# Patient Record
Sex: FEMALE | Race: WHITE | NOT HISPANIC OR LATINO | Employment: UNEMPLOYED | URBAN - METROPOLITAN AREA
[De-identification: names, ages, dates, MRNs, and addresses within clinical notes are randomized per-mention and may not be internally consistent; named-entity substitution may affect disease eponyms.]

---

## 2017-01-26 ENCOUNTER — PRENATAL OFFICE VISIT (OUTPATIENT)
Dept: OBGYN | Facility: CLINIC | Age: 37
End: 2017-01-26
Payer: MEDICAID

## 2017-01-26 VITALS
HEIGHT: 61 IN | BODY MASS INDEX: 26.32 KG/M2 | WEIGHT: 139.4 LBS | SYSTOLIC BLOOD PRESSURE: 110 MMHG | DIASTOLIC BLOOD PRESSURE: 60 MMHG

## 2017-01-26 DIAGNOSIS — Z34.82 PRENATAL CARE, SUBSEQUENT PREGNANCY, SECOND TRIMESTER: Primary | ICD-10-CM

## 2017-01-26 PROCEDURE — 87653 STREP B DNA AMP PROBE: CPT | Performed by: FAMILY MEDICINE

## 2017-01-26 PROCEDURE — 99212 OFFICE O/P EST SF 10 MIN: CPT | Performed by: FAMILY MEDICINE

## 2017-01-26 NOTE — PATIENT INSTRUCTIONS
Return in 1 week.   Schedule ultrasound   Phone numbers [place]:  Day/ night [phone] ask for ob triage  Emergency:  Call labor and delivery:  [phone]    What should I call about??    Contraction every 5 minutes for 1 hour, bleeding, loss of fluid, headache that doesn't resolve with tylenol, and decreased fetal movement       [facility] Address   [address], [place] [address]  [phone]    Dr. Cindy Laurent, DO    OB/GYN   [facility]              Dr. Cindy Laurent, DO    Obstetrics and Gynecology  [facility] [place] [facility] [place]

## 2017-01-26 NOTE — MR AVS SNAPSHOT
After Visit Summary   1/26/2017    Rizwana Plummer    MRN: [id number]           Patient Information     Date Of Birth          1980        Visit Information        Provider Department      1/26/2017 1:45 PM Cindy Laurent, DO [facility]        Today's Diagnoses     Prenatal care, subsequent pregnancy, second trimester    -  1       Care Instructions    Return in 1 week.   Schedule ultrasound   Phone numbers [place]:  Day/ night [phone] ask for ob triage  Emergency:  Call labor and delivery:  [phone]    What should I call about??    Contraction every 5 minutes for 1 hour, bleeding, loss of fluid, headache that doesn't resolve with tylenol, and decreased fetal movement       [facility] Address   [address] [facility], [place] [address] [phone]    Dr. Cindy Laurent, DO    OB/GYN   [facility]              Dr. Cindy Laurent, DO    Obstetrics and Gynecology  [facility] [place]             Follow-ups after your visit        Future tests that were ordered for you today     Open Future Orders        Priority Expected Expires Ordered    US OB Limited One Or More Fetuses Routine  1/26/2018 1/26/2017            Who to contact     If you have questions or need follow up information about today's clinic visit or your schedule please contact [facility] directly at [phone].  Normal or non-critical lab and imaging results will be communicated to you by MyChart, letter or phone within 4 business days after the clinic has received the results. If you do not hear from us within 7 days, please contact the clinic through MyChart or phone. If you have a critical or abnormal lab result, we will notify you by phone as soon as possible.  Submit refill requests through [employer] or call your pharmacy and they will forward the refill request to us. Please allow 3 business days for your refill  "to be completed.          Additional Information About Your Visit        [employer] Information     [employer] gives you secure access to your electronic health record. If you see a primary care provider, you can also send messages to your care team and make appointments. If you have questions, please call your primary care clinic.  If you do not have a primary care provider, please call [phone] and they will assist you.        Care EveryWhere ID     This is your Care EveryWhere ID. This could be used by other organizations to access your [place] medical records  [id number]        Your Vitals Were     Height BMI (Body Mass Index) Last Period             5' 1\" (1.549 m) 26.35 kg/m2 05/29/2016          Blood Pressure from Last 3 Encounters:   01/26/17 110/60   12/23/16 102/50   11/18/16 94/50    Weight from Last 3 Encounters:   01/26/17 139 lb 6.4 oz (63.231 kg)   12/23/16 135 lb 3.2 oz (61.326 kg)   11/18/16 131 lb 3.2 oz (59.512 kg)              We Performed the Following     Strep, Group B by PCR        Primary Care Provider Office Phone # Fax #    Cindy Bajwa Mehran,  [phone]       [facility] [address] [place] [address]        Thank you!     Thank you for choosing [facility]  for your care. Our goal is always to provide you with excellent care. Hearing back from our patients is one way we can continue to improve our services. Please take a few minutes to complete the written survey that you may receive in the mail after your visit with us. Thank you!             Your Updated Medication List - Protect others around you: Learn how to safely use, store and throw away your medicines at www.disposemymeds.org.          This list is accurate as of: 1/26/17  2:28 PM.  Always use your most recent med list.                   Brand Name Dispense Instructions for use    Abdominal Binder/Elastic 3XL Misc     1 each    1 Device daily       diphenhydrAMINE 25 MG " tablet    BENADRYL    60 tablet    Take 1-2 tablets (25-50 mg) by mouth every 6 hours as needed for sleep       doxylamine 25 MG Tabs tablet    UNISOM    14 each    Take 1 tablet (25 mg) by mouth At Bedtime       * ferrous sulfate 325 (65 FE) MG tablet    IRON    30 tablet    Take 1 tablet (325 mg) by mouth daily (with breakfast)       * ferrous sulfate 325 (65 FE) MG tablet    IRON    60 tablet    Take 1 tablet (325 mg) by mouth 2 times daily       prenatal multivitamin  plus iron 27-0.8 MG Tabs per tablet      Take 1 tablet by mouth daily.       * Notice:  This list has 2 medication(s) that are the same as other medications prescribed for you. Read the directions carefully, and ask your doctor or other care provider to review them with you.

## 2017-01-26 NOTE — PROGRESS NOTES
"CC: Here for routine prenatal visit @ 35w5d   HPI: Doing well, no concerns at this time.  /60 mmHg  Ht 1.549 m (5' 1\")  Wt 63.231 kg (139 lb 6.4 oz)  BMI 26.35 kg/m2  LMP 2016   See OB flowsheet    No vaginal bleeding, no LOF, some contractions contractions     This document serves as a record of the services and decisions personally performed and made by Cindy Laurent DO. It was created on his/her behalf by Solange Jimenez, a trained medical scribe. The creation of this document is based the provider's statements to the medical scribe.  Dorothy Jimenez 2:22 PM, 2017      ASSESSMENT/PLAN:  Rizwana Plummer is a 36 year old year old    @ 35w5d wks EGA with EDC 2017 who presents to the clinic for an ob visit.     1) I Hx of CS, would like ,  paperwork given at last visit, signed 16  2)  tdap and flu Injections done  3) insomnia:  On unisom, will increase to 1.5 tablets   4) Return in 1 week.  5) GBS and bedside US done today   6) Girl  7) Schedule growth US and compare to previous pregnancy    The information in this document, created by the medical scribe for me, accurately reflects the services I personally performed and the decisions made by me. I have reviewed and approved this document for accuracy prior to leaving the patient care area.  Cindy Laurent DO  2:22 PM, 2017      Dr. Cindy Laurent DO   OB/GYN   [facility]      "

## 2017-01-26 NOTE — NURSING NOTE
"35w5d    Chief Complaint   Patient presents with     Prenatal Care       Initial /60 mmHg  Ht 5' 1\" (1.549 m)  Wt 139 lb 6.4 oz (63.231 kg)  BMI 26.35 kg/m2  LMP 05/29/2016 Estimated body mass index is 26.35 kg/(m^2) as calculated from the following:    Height as of this encounter: 5' 1\" (1.549 m).    Weight as of this encounter: 139 lb 6.4 oz (63.231 kg).  BP completed using cuff size: rogers Benson CMA      "

## 2017-01-27 LAB
GP B STREP DNA SPEC QL NAA+PROBE: NORMAL
SPECIMEN SOURCE: NORMAL

## 2017-02-01 ENCOUNTER — HOSPITAL ENCOUNTER (INPATIENT)
Facility: CLINIC | Age: 37
LOS: 1 days | Discharge: HOME | End: 2017-02-02
Attending: EMERGENCY MEDICINE | Admitting: OBSTETRICS & GYNECOLOGY
Payer: MEDICAID

## 2017-02-01 ENCOUNTER — HOSPITAL ENCOUNTER (OUTPATIENT)
Facility: CLINIC | Age: 37
Discharge: STILL ADMITTED OR ADMINISTRATIVE | End: 2017-02-01
Attending: OBSTETRICS & GYNECOLOGY | Admitting: OBSTETRICS & GYNECOLOGY
Payer: MEDICAID

## 2017-02-01 VITALS
TEMPERATURE: 99 F | SYSTOLIC BLOOD PRESSURE: 90 MMHG | WEIGHT: 139 LBS | DIASTOLIC BLOOD PRESSURE: 55 MMHG | HEART RATE: 120 BPM | BODY MASS INDEX: 27.29 KG/M2 | RESPIRATION RATE: 20 BRPM | HEIGHT: 60 IN

## 2017-02-01 DIAGNOSIS — J10.1 INFLUENZA A: ICD-10-CM

## 2017-02-01 DIAGNOSIS — J02.0 ACUTE STREPTOCOCCAL PHARYNGITIS: ICD-10-CM

## 2017-02-01 DIAGNOSIS — R00.0 TACHYCARDIA: ICD-10-CM

## 2017-02-01 DIAGNOSIS — I95.9 HYPOTENSION, UNSPECIFIED HYPOTENSION TYPE: ICD-10-CM

## 2017-02-01 LAB
ALBUMIN SERPL-MCNC: 2.2 G/DL (ref 3.4–5)
ALBUMIN UR-MCNC: 30 MG/DL
ALP SERPL-CCNC: 98 U/L (ref 40–150)
ALT SERPL W P-5'-P-CCNC: 18 U/L (ref 0–50)
ANION GAP SERPL CALCULATED.3IONS-SCNC: 13 MMOL/L (ref 3–14)
APPEARANCE UR: CLEAR
AST SERPL W P-5'-P-CCNC: 35 U/L (ref 0–45)
BACTERIA #/AREA URNS HPF: ABNORMAL /HPF
BASOPHILS # BLD AUTO: 0 10E9/L (ref 0–0.2)
BASOPHILS NFR BLD AUTO: 0.1 %
BILIRUB DIRECT SERPL-MCNC: 0.1 MG/DL (ref 0–0.2)
BILIRUB SERPL-MCNC: 0.7 MG/DL (ref 0.2–1.3)
BILIRUB UR QL STRIP: NEGATIVE
BUN SERPL-MCNC: 6 MG/DL (ref 7–30)
CALCIUM SERPL-MCNC: 7.7 MG/DL (ref 8.5–10.1)
CHLORIDE SERPL-SCNC: 109 MMOL/L (ref 94–109)
CO2 SERPL-SCNC: 17 MMOL/L (ref 20–32)
COLOR UR AUTO: YELLOW
CREAT SERPL-MCNC: 0.52 MG/DL (ref 0.52–1.04)
DEPRECATED S PYO AG THROAT QL EIA: ABNORMAL
DEPRECATED S PYO AG THROAT QL EIA: NORMAL
DIFFERENTIAL METHOD BLD: ABNORMAL
EOSINOPHIL # BLD AUTO: 0.1 10E9/L (ref 0–0.7)
EOSINOPHIL NFR BLD AUTO: 0.5 %
ERYTHROCYTE [DISTWIDTH] IN BLOOD BY AUTOMATED COUNT: 13.3 % (ref 10–15)
FLUAV+FLUBV AG SPEC QL: ABNORMAL
FLUAV+FLUBV AG SPEC QL: POSITIVE
GFR SERPL CREATININE-BSD FRML MDRD: ABNORMAL ML/MIN/1.7M2
GLUCOSE SERPL-MCNC: 75 MG/DL (ref 70–99)
GLUCOSE UR STRIP-MCNC: NEGATIVE MG/DL
HCT VFR BLD AUTO: 29.2 % (ref 35–47)
HGB BLD-MCNC: 9.9 G/DL (ref 11.7–15.7)
HGB UR QL STRIP: NEGATIVE
IMM GRANULOCYTES # BLD: 0.3 10E9/L (ref 0–0.4)
IMM GRANULOCYTES NFR BLD: 2.6 %
KETONES UR STRIP-MCNC: >150 MG/DL
LACTATE BLD-SCNC: 1.3 MMOL/L (ref 0.7–2.1)
LEUKOCYTE ESTERASE UR QL STRIP: NEGATIVE
LYMPHOCYTES # BLD AUTO: 0.3 10E9/L (ref 0.8–5.3)
LYMPHOCYTES NFR BLD AUTO: 3.2 %
MCH RBC QN AUTO: 30.8 PG (ref 26.5–33)
MCHC RBC AUTO-ENTMCNC: 33.9 G/DL (ref 31.5–36.5)
MCV RBC AUTO: 91 FL (ref 78–100)
MICRO REPORT STATUS: ABNORMAL
MICRO REPORT STATUS: NORMAL
MONOCYTES # BLD AUTO: 0.8 10E9/L (ref 0–1.3)
MONOCYTES NFR BLD AUTO: 7.9 %
MUCOUS THREADS #/AREA URNS LPF: PRESENT /LPF
NEUTROPHILS # BLD AUTO: 8.1 10E9/L (ref 1.6–8.3)
NEUTROPHILS NFR BLD AUTO: 85.7 %
NITRATE UR QL: NEGATIVE
NRBC # BLD AUTO: 0 10*3/UL
NRBC BLD AUTO-RTO: 0 /100
PH UR STRIP: 6 PH (ref 5–7)
PLATELET # BLD AUTO: 143 10E9/L (ref 150–450)
POTASSIUM SERPL-SCNC: 3.5 MMOL/L (ref 3.4–5.3)
PROT SERPL-MCNC: 5.4 G/DL (ref 6.8–8.8)
RBC # BLD AUTO: 3.21 10E12/L (ref 3.8–5.2)
RBC #/AREA URNS AUTO: 1 /HPF (ref 0–2)
SODIUM SERPL-SCNC: 139 MMOL/L (ref 133–144)
SP GR UR STRIP: 1.02 (ref 1–1.03)
SPECIMEN SOURCE: ABNORMAL
SPECIMEN SOURCE: ABNORMAL
SPECIMEN SOURCE: NORMAL
SQUAMOUS #/AREA URNS AUTO: 1 /HPF (ref 0–1)
URN SPEC COLLECT METH UR: ABNORMAL
UROBILINOGEN UR STRIP-MCNC: 4 MG/DL (ref 0–2)
WBC # BLD AUTO: 9.5 10E9/L (ref 4–11)
WBC #/AREA URNS AUTO: 1 /HPF (ref 0–2)

## 2017-02-01 PROCEDURE — 80048 BASIC METABOLIC PNL TOTAL CA: CPT | Performed by: EMERGENCY MEDICINE

## 2017-02-01 PROCEDURE — 36415 COLL VENOUS BLD VENIPUNCTURE: CPT | Performed by: EMERGENCY MEDICINE

## 2017-02-01 PROCEDURE — 20000003 ZZH R&B ICU

## 2017-02-01 PROCEDURE — 93005 ELECTROCARDIOGRAM TRACING: CPT

## 2017-02-01 PROCEDURE — 96361 HYDRATE IV INFUSION ADD-ON: CPT

## 2017-02-01 PROCEDURE — 87640 STAPH A DNA AMP PROBE: CPT | Performed by: INTERNAL MEDICINE

## 2017-02-01 PROCEDURE — 99222 1ST HOSP IP/OBS MODERATE 55: CPT | Performed by: HOSPITALIST

## 2017-02-01 PROCEDURE — 87880 STREP A ASSAY W/OPTIC: CPT | Performed by: EMERGENCY MEDICINE

## 2017-02-01 PROCEDURE — 25000128 H RX IP 250 OP 636: Performed by: EMERGENCY MEDICINE

## 2017-02-01 PROCEDURE — 87641 MR-STAPH DNA AMP PROBE: CPT | Performed by: INTERNAL MEDICINE

## 2017-02-01 PROCEDURE — 96372 THER/PROPH/DIAG INJ SC/IM: CPT

## 2017-02-01 PROCEDURE — 80076 HEPATIC FUNCTION PANEL: CPT | Performed by: EMERGENCY MEDICINE

## 2017-02-01 PROCEDURE — 87804 INFLUENZA ASSAY W/OPTIC: CPT | Performed by: EMERGENCY MEDICINE

## 2017-02-01 PROCEDURE — 83605 ASSAY OF LACTIC ACID: CPT | Performed by: EMERGENCY MEDICINE

## 2017-02-01 PROCEDURE — 25000132 ZZH RX MED GY IP 250 OP 250 PS 637: Performed by: EMERGENCY MEDICINE

## 2017-02-01 PROCEDURE — 25000132 ZZH RX MED GY IP 250 OP 250 PS 637: Performed by: HOSPITALIST

## 2017-02-01 PROCEDURE — 25000128 H RX IP 250 OP 636: Performed by: HOSPITALIST

## 2017-02-01 PROCEDURE — 99285 EMERGENCY DEPT VISIT HI MDM: CPT | Mod: 25

## 2017-02-01 PROCEDURE — 85025 COMPLETE CBC W/AUTO DIFF WBC: CPT | Performed by: EMERGENCY MEDICINE

## 2017-02-01 PROCEDURE — 25000125 ZZHC RX 250: Performed by: EMERGENCY MEDICINE

## 2017-02-01 PROCEDURE — 96374 THER/PROPH/DIAG INJ IV PUSH: CPT

## 2017-02-01 PROCEDURE — 25000125 ZZHC RX 250: Performed by: HOSPITALIST

## 2017-02-01 PROCEDURE — 81001 URINALYSIS AUTO W/SCOPE: CPT | Performed by: EMERGENCY MEDICINE

## 2017-02-01 PROCEDURE — 87086 URINE CULTURE/COLONY COUNT: CPT | Performed by: EMERGENCY MEDICINE

## 2017-02-01 RX ORDER — ONDANSETRON 2 MG/ML
4 INJECTION INTRAMUSCULAR; INTRAVENOUS EVERY 6 HOURS PRN
Status: DISCONTINUED | OUTPATIENT
Start: 2017-02-01 | End: 2017-02-01 | Stop reason: HOSPADM

## 2017-02-01 RX ORDER — AMOXICILLIN 250 MG
1-2 CAPSULE ORAL 2 TIMES DAILY PRN
Status: DISCONTINUED | OUTPATIENT
Start: 2017-02-01 | End: 2017-02-02 | Stop reason: HOSPADM

## 2017-02-01 RX ORDER — NALOXONE HYDROCHLORIDE 0.4 MG/ML
.1-.4 INJECTION, SOLUTION INTRAMUSCULAR; INTRAVENOUS; SUBCUTANEOUS
Status: DISCONTINUED | OUTPATIENT
Start: 2017-02-01 | End: 2017-02-02 | Stop reason: HOSPADM

## 2017-02-01 RX ORDER — CEFTRIAXONE 1 G/1
1 INJECTION, POWDER, FOR SOLUTION INTRAMUSCULAR; INTRAVENOUS EVERY 24 HOURS
Status: DISCONTINUED | OUTPATIENT
Start: 2017-02-01 | End: 2017-02-02 | Stop reason: HOSPADM

## 2017-02-01 RX ORDER — SODIUM CHLORIDE, SODIUM LACTATE, POTASSIUM CHLORIDE, CALCIUM CHLORIDE 600; 310; 30; 20 MG/100ML; MG/100ML; MG/100ML; MG/100ML
INJECTION, SOLUTION INTRAVENOUS CONTINUOUS
Status: DISCONTINUED | OUTPATIENT
Start: 2017-02-01 | End: 2017-02-01 | Stop reason: HOSPADM

## 2017-02-01 RX ORDER — ONDANSETRON 2 MG/ML
4 INJECTION INTRAMUSCULAR; INTRAVENOUS EVERY 6 HOURS PRN
Status: DISCONTINUED | OUTPATIENT
Start: 2017-02-01 | End: 2017-02-02 | Stop reason: HOSPADM

## 2017-02-01 RX ORDER — ACETAMINOPHEN 325 MG/1
650 TABLET ORAL ONCE
Status: COMPLETED | OUTPATIENT
Start: 2017-02-01 | End: 2017-02-01

## 2017-02-01 RX ORDER — ACETAMINOPHEN 650 MG/1
650 SUPPOSITORY RECTAL EVERY 4 HOURS PRN
Status: DISCONTINUED | OUTPATIENT
Start: 2017-02-01 | End: 2017-02-02 | Stop reason: HOSPADM

## 2017-02-01 RX ORDER — ACETAMINOPHEN 325 MG/1
650-975 TABLET ORAL EVERY 4 HOURS PRN
Status: DISCONTINUED | OUTPATIENT
Start: 2017-02-01 | End: 2017-02-01 | Stop reason: HOSPADM

## 2017-02-01 RX ORDER — OSELTAMIVIR PHOSPHATE 75 MG/1
75 CAPSULE ORAL ONCE
Status: COMPLETED | OUTPATIENT
Start: 2017-02-01 | End: 2017-02-01

## 2017-02-01 RX ORDER — CEFTRIAXONE 1 G/1
1 INJECTION, POWDER, FOR SOLUTION INTRAMUSCULAR; INTRAVENOUS EVERY 24 HOURS
Status: CANCELLED | OUTPATIENT
Start: 2017-02-01 | End: 2017-02-04

## 2017-02-01 RX ORDER — SODIUM CHLORIDE 9 MG/ML
1000 INJECTION, SOLUTION INTRAVENOUS CONTINUOUS
Status: DISCONTINUED | OUTPATIENT
Start: 2017-02-01 | End: 2017-02-01

## 2017-02-01 RX ORDER — DEXAMETHASONE SODIUM PHOSPHATE 10 MG/ML
10 INJECTION, SOLUTION INTRAMUSCULAR; INTRAVENOUS ONCE
Status: COMPLETED | OUTPATIENT
Start: 2017-02-01 | End: 2017-02-01

## 2017-02-01 RX ORDER — OSELTAMIVIR PHOSPHATE 75 MG/1
75 CAPSULE ORAL 2 TIMES DAILY
Status: DISCONTINUED | OUTPATIENT
Start: 2017-02-01 | End: 2017-02-02 | Stop reason: HOSPADM

## 2017-02-01 RX ORDER — TERBUTALINE SULFATE 1 MG/ML
0.25 INJECTION, SOLUTION SUBCUTANEOUS ONCE
Status: COMPLETED | OUTPATIENT
Start: 2017-02-01 | End: 2017-02-01

## 2017-02-01 RX ORDER — ONDANSETRON 4 MG/1
4 TABLET, ORALLY DISINTEGRATING ORAL EVERY 6 HOURS PRN
Status: DISCONTINUED | OUTPATIENT
Start: 2017-02-01 | End: 2017-02-02 | Stop reason: HOSPADM

## 2017-02-01 RX ORDER — LANOLIN ALCOHOL/MO/W.PET/CERES
3 CREAM (GRAM) TOPICAL AT BEDTIME
Status: DISCONTINUED | OUTPATIENT
Start: 2017-02-01 | End: 2017-02-02 | Stop reason: HOSPADM

## 2017-02-01 RX ORDER — ACETAMINOPHEN 325 MG/1
650 TABLET ORAL EVERY 4 HOURS PRN
Status: DISCONTINUED | OUTPATIENT
Start: 2017-02-01 | End: 2017-02-02 | Stop reason: HOSPADM

## 2017-02-01 RX ADMIN — SODIUM CHLORIDE, POTASSIUM CHLORIDE, SODIUM LACTATE AND CALCIUM CHLORIDE: 600; 310; 30; 20 INJECTION, SOLUTION INTRAVENOUS at 08:56

## 2017-02-01 RX ADMIN — DEXTROSE AND SODIUM CHLORIDE: 5; 900 INJECTION, SOLUTION INTRAVENOUS at 21:23

## 2017-02-01 RX ADMIN — OSELTAMIVIR PHOSPHATE 75 MG: 75 CAPSULE ORAL at 16:58

## 2017-02-01 RX ADMIN — SODIUM CHLORIDE 1000 ML: 9 INJECTION, SOLUTION INTRAVENOUS at 10:57

## 2017-02-01 RX ADMIN — SODIUM CHLORIDE 1000 ML: 9 INJECTION, SOLUTION INTRAVENOUS at 15:15

## 2017-02-01 RX ADMIN — TERBUTALINE SULFATE 0.25 MG: 1 INJECTION, SOLUTION SUBCUTANEOUS at 16:24

## 2017-02-01 RX ADMIN — ACETAMINOPHEN 650 MG: 325 TABLET, FILM COATED ORAL at 13:21

## 2017-02-01 RX ADMIN — SODIUM CHLORIDE 1000 ML: 9 INJECTION, SOLUTION INTRAVENOUS at 13:21

## 2017-02-01 RX ADMIN — SODIUM CHLORIDE 1000 ML: 9 INJECTION, SOLUTION INTRAVENOUS at 12:01

## 2017-02-01 RX ADMIN — CEFTRIAXONE 1 G: 1 INJECTION, POWDER, FOR SOLUTION INTRAMUSCULAR; INTRAVENOUS at 21:31

## 2017-02-01 RX ADMIN — MELATONIN TAB 3 MG 3 MG: 3 TAB at 21:32

## 2017-02-01 RX ADMIN — DEXAMETHASONE SODIUM PHOSPHATE 10 MG: 10 INJECTION, SOLUTION INTRAMUSCULAR; INTRAVENOUS at 12:01

## 2017-02-01 RX ADMIN — ACETAMINOPHEN 975 MG: 325 TABLET, FILM COATED ORAL at 09:13

## 2017-02-01 RX ADMIN — ACETAMINOPHEN 650 MG: 325 TABLET, FILM COATED ORAL at 19:57

## 2017-02-01 RX ADMIN — SODIUM CHLORIDE 1000 ML: 9 INJECTION, SOLUTION INTRAVENOUS at 16:47

## 2017-02-01 RX ADMIN — PENICILLIN G BENZATHINE 1.2 MILLION UNITS: 600000 INJECTION, SUSPENSION INTRAMUSCULAR at 12:02

## 2017-02-01 RX ADMIN — DOXYLAMINE SUCCINATE 25 MG: 25 TABLET ORAL at 11:27

## 2017-02-01 ASSESSMENT — ACTIVITIES OF DAILY LIVING (ADL)
DRESS: 0-->INDEPENDENT
RETIRED_COMMUNICATION: 0-->UNDERSTANDS/COMMUNICATES WITHOUT DIFFICULTY
BATHING: 0-->INDEPENDENT
AMBULATION: 0-->INDEPENDENT
SWALLOWING: 0-->SWALLOWS FOODS/LIQUIDS WITHOUT DIFFICULTY
DRESS: 0-->INDEPENDENT
TOILETING: 0-->INDEPENDENT
SWALLOWING: 0-->SWALLOWS FOODS/LIQUIDS WITHOUT DIFFICULTY
COMMUNICATION: 0-->UNDERSTANDS/COMMUNICATES WITHOUT DIFFICULTY
FALL_HISTORY_WITHIN_LAST_SIX_MONTHS: NO
TOILETING: 0-->INDEPENDENT
COGNITION: 0 - NO COGNITION ISSUES REPORTED
RETIRED_EATING: 0-->INDEPENDENT
BATHING: 0-->INDEPENDENT
TRANSFERRING: 0-->INDEPENDENT
AMBULATION: 0-->INDEPENDENT
TRANSFERRING: 0-->INDEPENDENT
EATING: 0-->INDEPENDENT

## 2017-02-01 ASSESSMENT — ENCOUNTER SYMPTOMS
SHORTNESS OF BREATH: 1
SORE THROAT: 1
FEVER: 1
DYSURIA: 0
HEADACHES: 1
CHILLS: 1

## 2017-02-01 NOTE — ED PROVIDER NOTES
History     Chief Complaint:  Pharyngitis and Fever    The history is provided by the patient.      Rizwana Plummer is an otherwise healthy 36 year old female, 36 weeks pregnant, who presents to the ED for evaluation of sore throat, subjective fever and chills. The patient reports her son tested positive for strep throat yesterday. She is 36 weeks pregnant and has been feeling contractions, so she was initially seen in labor and delivery. The nurse there confirmed the baby was ok and directed the patient to the ED. The patient also complains of congestion and headache and also mentions having some shortness of breath last night, but states she is not feeling short of breath in the ED. She denies any pain with urination or urinary issues.    Allergies:  No known drug allergies    Medications:    Iron  Unisom  Prenatal vitamin  Benadryl    Past Medical History:    Varicella    Past Surgical History:    Infibulation  Bartholin cyst      Family History:    Cerebrovascular disease- mother  Diabetes- mother  Hypertension- mother, father    Social History:  The patient was in the ED alone.  Smoking Status: Never  Smokeless Tobacco: Never  Alcohol Use: No  Marital Status:       Review of Systems   Constitutional: Positive for fever and chills.   HENT: Positive for congestion and sore throat.    Respiratory: Positive for shortness of breath.    Genitourinary: Negative for dysuria.   Neurological: Positive for headaches.   All other systems reviewed and are negative.    Physical Exam   First Vitals:  BP: (!) 83/39 mmHg  Pulse: 124  Temp: 98.3  F (36.8  C)  Resp: 20  SpO2: 97 %      Physical Exam    Constitutional: Patient appears well-developed and well-nourished. Patient is in minimal distress  HENT:   Head: No external signs of trauma. No lesions noted.  Eyes: PEERL, EOMI B/L, no pain or limitation of superior gaze  Nose: Mild congestion, no exudates. Mild rhinorrhea. No FB noted  Mouth/Throat:                 Mucous membranes are moist and normal.                Right sided mild oropharyngeal exudate. Mild oropharyngeal erythema noted.              No tonsillar swelling noted.               No uvular deviation noted.              No swelling noted on the floor of the mouth  Neck:  FROM. Neck is supple  Cardiovascular:                Tachycardic rate, regular rhythm and normal heart sounds.                 Exam reveals no friction rub.                 No murmur heard.  Pulmonary/Chest:                Effort normal and breath sounds normal.                No respiratory distress.                There are no wheezes.                There are no rales.   Abdominal:                Soft.  Gravid.              Bowel sounds normal.                There is no distension.                There is no tenderness to palpation.              There is no rebound or guarding.   Musculoskeletal:                Normal range of motion.               Normal Tone  Neurological: Patient is alert and oriented to person, place, and time.   Skin: Skin is warm and dry. Patient is not diaphoretic. No pallor noted.    Emergency Department Course   EKG:  Vent Rate: 134  OK: 122  QRS: 74  QTc: 459  Sinus tachycardia, possible Left atrial enlargement, Non-specific ST-T wave abnormality, Abnormal EKG  Interpreted by Dr. Huerta at 17:01      Laboratory:  CBC: WBC 9.5, HGB 9.9(L), (L)  BMP: CO2 17(L), BUN 6(L), Calcium 7.7(L), o/w WNL (Creat 0.52)    Rapid Strep Screen: Positive  Influenza A/B Antigen: Positive (A)    Interventions:  1057 NS 1,000mL IV    1127 Unisom 25mg Oral    1201 NS 1,000mL IV    1201 Decadron 10mg Injection    1202 Bicillin injection 1.2M units    Emergency Department Course:  Nursing notes and vitals reviewed.  I performed an exam of the patient as documented above.    I spoke to Dr. Celis of OB/GYN about the patient.    1130 I updated the patient about her lab work.    1250 I spoke with Dr. Celis again about  the patient's persistent tachycardia and low blood pressure.     1325 I spoke to the hospitalist regarding the patient.     There were multiple other calls to [facility] regarding the patient. The OB provider there did not feel comfortable being the admitting physician for an ICU patient. The hospitalist did not feel comfortable being the admitting physician for an obstetric patient. During this time the patient began to have more regular contractions.     OB was contacted and an L&D nurse evaluated the patient and started fetal monitoring. Dr. Celis then evaluated the patient and recommended Terbutaline.     Dr. Celis will be the admitting physician on the patient.     I discussed the case with Dr. Bartlett, who will consult on the patient.    Findings and plan explained to the patient who consents to admission. Discussed the patient with  Dr. Celis of OB/GYN, and Dr. Bartlett. The floor nursing staff was uncomfortable with the patient's vital signs, so we will place her in the ICU for further monitoring, evaluation, and treatment.    Impression & Plan    Medical Decision Making:  Rizwana Plummer is a 36 year old female who presents to the ED for evaluation of sore throat and chills. She was initially seen on the OB floor and cleared for evaluation down here. The patient states that she has low blood pressure baseline, though she does not remember what her numbers are. But she has been persistently hypotensive here in the ED despite getting fluids while she was up in L&D and getting fluids while she was down here. She also has been tachycardic. We did find that she has strep pharyngitis, even though her initial test was negative. She also has influenza A. She was hypotensive in spite of multiple fluid boluses. During her ED stay she began to have more regular contractions. She was evaluated by Dr. Celis from OB. Terbutaline was started which stopped her contractions. I discussed the case with both Dr. Celis and   Tri. We will place the patient in the ICU. She has received Bicillin LA for strep and we will start Tamiflu for Influenza. She has recived 4L of fluid total during her L&D stay and ED stay.     Critical Care Note:    Systems at risk for life threatening failure: Cardiovascular and   Associated problems: Hypotension, Infection, Labor  Critical Interventions: IV Fluids, Antibiotics  Total Time: 35 min (excluding separately billed procedures)   Includes:  Bedside management, Case discussion related to critical care, Documentation,Multiple re-evaluations, Record review, Test review,   Excludes: EKG interpretation      Diagnosis:  1. (I95.9) Hypotension, unspecified hypotension type    2. (R00.0) Tachycardia    3. (J02.0) Acute streptococcal pharyngitis    4. (J10.1) Influenza A    Disposition:  The patient will be admitted to the ICU.    2/1/2017   [facility] EMERGENCY DEPARTMENT    IChristal, am serving as a scribe at 1035 on February 1, 2017 to document services personally performed by  Dr. Huerta, based on my observations and the provider's statements to me.      Ronald Huerta, DO  02/01/17 1711    Ronald Huerta,   02/01/17 1719

## 2017-02-01 NOTE — IP AVS SNAPSHOT
MRN:[id number]                      After Visit Summary   2/1/2017    Rizwana Plummer    MRN: [id number]           Thank you!     Thank you for choosing [facility] for your care. Our goal is always to provide you with excellent care. Hearing back from our patients is one way we can continue to improve our services. Please take a few minutes to complete the written survey that you may receive in the mail after you visit. If you would like to speak to someone directly about your visit please contact Patient Relations at [phone]. Thank you!          Patient Information     Date Of Birth          1980        About your hospital stay     You were admitted on:  February 1, 2017 You last received care in the:  [facility] Labor and Delivery    You were discharged on:  February 1, 2017       Who to Call     For medical emergencies, please call 911.  For non-urgent questions about your medical care, please call your primary care provider or clinic, [phone]          Attending Provider     Provider    Ranjit Celis MD       Primary Care Provider Office Phone # Fax #    Cindy Aydee Laurent,  [phone]       [facility] [address] [place] [address]        Your next 10 appointments already scheduled     Feb 03, 2017  2:00 PM   US OB LIMITED ONE OR MORE FETUSES with RIUS1   [facility]    [address]  [address]  [facility] [id number] [phone]           Please bring a list of your medicines (including vitamins, minerals and over-the-counter drugs). Also, tell your doctor about any allergies you may have. Wear comfortable clothes and leave your valuables at home.  If you re less than 20 weeks drink four 8-ounce glasses of fluid an hour before your exam. If you need to empty your bladder before your exam, try to release only a little urine. Then, drink another glass of  fluid.  You may have up to two family members in the exam room. If you bring a small child, an adult must be there to care for him or her.  Please call the Imaging Department at your exam site with any questions.            Feb 03, 2017  3:00 PM   ESTABLISHED PRENATAL with Cindy Laurent,    [facility] ([facility])    [address]  [facility] [id number] [phone]              Further instructions from your care team       Discharge Instruction for Undelivered Patients      You were seen for: Labor Assessment  We Consulted: Dr. Celis  You had (Test or Medicine):IV fluids, Strep throat culture     Diet:   Drink 8 to 12 glasses of liquids (milk, juice, water) every day.  You may eat meals and snacks.     Activity:  Call your doctor or nurse midwife if your baby is moving less than usual.     Call your provider if you notice:  Swelling in your face or increased swelling in your hands or legs.  Headaches that are not relieved by Tylenol (acetaminophen).  Changes in your vision (blurring: seeing spots or stars.)  Nausea (sick to your stomach) and vomiting (throwing up).   Weight gain of 5 pounds or more per week.  Heartburn that doesn't go away.  Signs of bladder infection: pain when you urinate (use the toilet), need to go more often and more urgently.  The bag of fairchild (rupture of membranes) breaks, or you notice leaking in your underwear.  Bright red blood in your underwear.  Abdominal (lower belly) or stomach pain.  For first baby: Contractions (tightening) less than 5 minutes apart for one hour or more.  Second (plus) baby: Contractions (tightening) less than 10 minutes apart and getting stronger.  Increase or change in vaginal discharge (note the color and amount)  Other: be seen in the ED    Follow-up:  As scheduled in the clinic          Pending Results     Date and Time Order Name Status Description    2/1/2017 0858 Beta strep group A culture In process   "           Admission Information        Provider Department Dept Phone    2/1/2017 Ranjit Celis MD, MD  Labor And Delivery [phone]      Your Vitals Were     Blood Pressure Pulse Temperature    90/55 mmHg 120 99  F (37.2  C) (Oral)    Respirations Height Weight    20 1.52 m (4' 11.84\") 63.05 kg (139 lb)    BMI (Body Mass Index) Last Period       27.29 kg/m2 05/29/2016       [employer] Information     [employer] gives you secure access to your electronic health record. If you see a primary care provider, you can also send messages to your care team and make appointments. If you have questions, please call your primary care clinic.  If you do not have a primary care provider, please call [phone] and they will assist you.        Care EveryWhere ID     This is your Care EveryWhere ID. This could be used by other organizations to access your [place] medical records  [id number]           Review of your medicines      UNREVIEWED medicines. Ask your doctor about these medicines        Dose / Directions    diphenhydrAMINE 25 MG tablet   Commonly known as:  BENADRYL   Used for:  Other insomnia        Dose:  25-50 mg   Take 1-2 tablets (25-50 mg) by mouth every 6 hours as needed for sleep   Quantity:  60 tablet   Refills:  1       doxylamine 25 MG Tabs tablet   Commonly known as:  UNISOM        Dose:  25 mg   Take 1 tablet (25 mg) by mouth At Bedtime   Quantity:  14 each   Refills:  0       * ferrous sulfate 325 (65 FE) MG tablet   Commonly known as:  IRON   Used for:  Anemia, unspecified type        Dose:  325 mg   Take 1 tablet (325 mg) by mouth daily (with breakfast)   Quantity:  30 tablet   Refills:  2       * ferrous sulfate 325 (65 FE) MG tablet   Commonly known as:  IRON   Used for:  Pregnancy related fatigue in third trimester, Anemia, unspecified type        Dose:  325 mg   Take 1 tablet (325 mg) by mouth 2 times daily   Quantity:  60 tablet   Refills:  2       prenatal multivitamin  plus iron 27-0.8 MG " Tabs per tablet        Dose:  1 tablet   Take 1 tablet by mouth daily.   Refills:  0       * Notice:  This list has 2 medication(s) that are the same as other medications prescribed for you. Read the directions carefully, and ask your doctor or other care provider to review them with you.      CONTINUE these medicines which have NOT CHANGED        Dose / Directions    Abdominal Binder/Elastic 3XL Misc   Used for:  Back pain, unspecified back location, unspecified back pain laterality, unspecified chronicity        Dose:  1 Device   1 Device daily   Quantity:  1 each   Refills:  3                Protect others around you: Learn how to safely use, store and throw away your medicines at www.disposemymeds.org.             Medication List: This is a list of all your medications and when to take them. Check marks below indicate your daily home schedule. Keep this list as a reference.      Medications           Morning Afternoon Evening Bedtime As Needed    Abdominal Binder/Elastic 3XL Misc   1 Device daily                                diphenhydrAMINE 25 MG tablet   Commonly known as:  BENADRYL   Take 1-2 tablets (25-50 mg) by mouth every 6 hours as needed for sleep                                doxylamine 25 MG Tabs tablet   Commonly known as:  UNISOM   Take 1 tablet (25 mg) by mouth At Bedtime                                * ferrous sulfate 325 (65 FE) MG tablet   Commonly known as:  IRON   Take 1 tablet (325 mg) by mouth daily (with breakfast)                                * ferrous sulfate 325 (65 FE) MG tablet   Commonly known as:  IRON   Take 1 tablet (325 mg) by mouth 2 times daily                                prenatal multivitamin  plus iron 27-0.8 MG Tabs per tablet   Take 1 tablet by mouth daily.                                * Notice:  This list has 2 medication(s) that are the same as other medications prescribed for you. Read the directions carefully, and ask your doctor or other care provider to  review them with you.

## 2017-02-01 NOTE — Clinical Note
Admitting Physician: NURYS VIEYRA [[id number]]   Clinical Service: OBSTETRICS & GYNECOLOGY SPECIALISTS [facility] [[id number]]   Bed Type: Cardiac Telemetry [44]   Special needs: Telemetry [17]   Bed request comments: BED @ 1624, 36 WEEKS PREGNANT, DR. CAIN TAKING SIGN OUT ON PATIENT

## 2017-02-01 NOTE — IP AVS SNAPSHOT
MRN:[id number]                      After Visit Summary   2/1/2017    Rizwana Plummer    MRN: [id number]           Thank you!     Thank you for choosing [facility] for your care. Our goal is always to provide you with excellent care. Hearing back from our patients is one way we can continue to improve our services. Please take a few minutes to complete the written survey that you may receive in the mail after you visit. If you would like to speak to someone directly about your visit please contact Patient Relations at [phone]. Thank you!          Patient Information     Date Of Birth          1980        About your hospital stay     You were admitted on:  February 1, 2017 You last received care in the:  [facility] Intensive [facility] Unit    You were discharged on:  February 2, 2017       Who to Call     For medical emergencies, please call 911.  For non-urgent questions about your medical care, please call your primary care provider or clinic, [phone]          Attending Provider     Provider    Ronald Huerta DO Sajwani, Rubina Abuali, MD       Primary Care Provider Office Phone # Fax #    Cindy Novakkaterina Laurent [phone]       [facility] [address] [place] [address]        Your next 10 appointments already scheduled     Feb 03, 2017  2:00 PM   US OB LIMITED ONE OR MORE FETUSES with RIUS1   [facility] ([facility]    [address]  [address]  [facility] [id number] [phone]           Please bring a list of your medicines (including vitamins, minerals and over-the-counter drugs). Also, tell your doctor about any allergies you may have. Wear comfortable clothes and leave your valuables at home.  If you re less than 20 weeks drink four 8-ounce glasses of fluid an hour before your exam. If you need to empty your bladder before your exam, try to release only a little  urine. Then, drink another glass of fluid.  You may have up to two family members in the exam room. If you bring a small child, an adult must be there to care for him or her.  Please call the Imaging Department at your exam site with any questions.            Feb 03, 2017  3:00 PM   ESTABLISHED PRENATAL with Cindy Laurent,    [facility] ([facility]    [address]  [facility] [id number] [phone]              Further instructions from your care team       Discharge Instruction for Undelivered Patients      You were seen for: Fever & chills  We Consulted: Dr. Celis & Solo  You had (Test or Medicine):Lab work and fetal monitoring     Diet:   Drink 8 to 12 glasses of liquids (milk, juice, water) every day.  You may eat meals and snacks.     Activity:  Call your doctor or nurse midwife if your baby is moving less than usual.     Call your provider if you notice:  Swelling in your face or increased swelling in your hands or legs.  Headaches that are not relieved by Tylenol (acetaminophen).  Changes in your vision (blurring: seeing spots or stars.)  Nausea (sick to your stomach) and vomiting (throwing up).   Weight gain of 5 pounds or more per week.  Heartburn that doesn't go away.  Signs of bladder infection: pain when you urinate (use the toilet), need to go more often and more urgently.  The bag of fairchild (rupture of membranes) breaks, or you notice leaking in your underwear.  Bright red blood in your underwear.  Abdominal (lower belly) or stomach pain.  For first baby: Contractions (tightening) less than 5 minutes apart for one hour or more.  Second (plus) baby: Contractions (tightening) less than 10 minutes apart and getting stronger.  *If less than 34 weeks: Contractions (tightenings) more than 6 times in one hour.  Increase or change in vaginal discharge (note the color and amount)  Other: You are being discharged with a prescription for Tamiflu  -complete as prescribe    Follow-up:  As scheduled in the clinic tomorrow 2/3/17 for ultrasound and OB doctor visit.          Pending Results     Date and Time Order Name Status Description    2/2/2017 1010 Beta strep group A culture In process     2/1/2017 1043 Urine Culture Preliminary     2/1/2017 0858 Beta strep group A culture Preliminary             Statement of Approval     Ordered          02/02/17 1251  I have reviewed and agree with all the recommendations and orders detailed in this document.   EFFECTIVE NOW     Approved and electronically signed by:  Ruthy Banda MD             Admission Information        Provider Department Dept Phone    2/1/2017 Keila Bartlett MD  [facility] [phone]      Your Vitals Were     Blood Pressure Pulse Temperature Respirations Pulse Oximetry Last Period    87/64 mmHg 122 97.9  F (36.6  C) (Oral) 24 93% 05/29/2016      MyChart Information     [facility] gives you secure access to your electronic health record. If you see a primary care provider, you can also send messages to your care team and make appointments. If you have questions, please call your primary care clinic.  If you do not have a primary care provider, please call [phone] and they will assist you.        Care EveryWhere ID     This is your Care EveryWhere ID. This could be used by other organizations to access your [place] medical records  [id number]           Review of your medicines      START taking        Dose / Directions    acetaminophen 325 MG tablet   Commonly known as:  TYLENOL   Used for:  Influenza A        Dose:  650 mg   Take 2 tablets (650 mg) by mouth every 4 hours as needed for mild pain   Quantity:  100 tablet   Refills:  0       oseltamivir 75 MG capsule   Commonly known as:  TAMIFLU   Indication:  Flu   Used for:  Influenza A        Dose:  75 mg   Take 1 capsule (75 mg) by mouth 2 times daily   Quantity:  8 capsule   Refills:  0         CONTINUE these medicines which have NOT  CHANGED        Dose / Directions    Abdominal Binder/Elastic 3XL Misc   Used for:  Back pain, unspecified back location, unspecified back pain laterality, unspecified chronicity        Dose:  1 Device   1 Device daily   Quantity:  1 each   Refills:  3       diphenhydrAMINE 25 MG tablet   Commonly known as:  BENADRYL   Used for:  Other insomnia        Dose:  25-50 mg   Take 1-2 tablets (25-50 mg) by mouth every 6 hours as needed for sleep   Quantity:  60 tablet   Refills:  1       ferrous sulfate 325 (65 FE) MG tablet   Commonly known as:  IRON   Used for:  Pregnancy related fatigue in third trimester, Anemia, unspecified type        Dose:  325 mg   Take 1 tablet (325 mg) by mouth 2 times daily   Quantity:  60 tablet   Refills:  2            Where to get your medicines      These medications were sent to [facility] [address] [place] [address]  AT [address] [facility] [address]     Phone:  [phone]    - oseltamivir 75 MG capsule      Some of these will need a paper prescription and others can be bought over the counter. Ask your nurse if you have questions.     You don't need a prescription for these medications    - acetaminophen 325 MG tablet             Protect others around you: Learn how to safely use, store and throw away your medicines at www.disposemymeds.org.             Medication List: This is a list of all your medications and when to take them. Check marks below indicate your daily home schedule. Keep this list as a reference.      Medications           Morning Afternoon Evening Bedtime As Needed    Abdominal Binder/Elastic 3XL Misc   1 Device daily                                acetaminophen 325 MG tablet   Commonly known as:  TYLENOL   Take 2 tablets (650 mg) by mouth every 4 hours as needed for mild pain   Last time this was given:  650 mg on 2/2/2017 12:40 PM                                diphenhydrAMINE 25 MG tablet    Commonly known as:  BENADRYL   Take 1-2 tablets (25-50 mg) by mouth every 6 hours as needed for sleep                                ferrous sulfate 325 (65 FE) MG tablet   Commonly known as:  IRON   Take 1 tablet (325 mg) by mouth 2 times daily                                oseltamivir 75 MG capsule   Commonly known as:  TAMIFLU   Take 1 capsule (75 mg) by mouth 2 times daily   Last time this was given:  75 mg on 2/2/2017  9:41 AM

## 2017-02-01 NOTE — PROVIDER NOTIFICATION
17 0822   Provider Notification   Provider Name/Title Dr. Celis   Method of Notification Phone   Request Evaluate - Remote   Notification Reason Patient Arrived;Status Update;Pain;Uterine Activity;Membrane Status   Patient  arrived with C/O rule out labor.  Patient has had a previous C/S and would like to .  She at this time is feeling ill.  She states her son has Strep and she has a HA and a backache.  Denies LOF, bleeding and reports fetal movement.  EFM applied and explained to patient.  Low grade temp and elevated HR on mother.  Patient states she has the chills.  Dr. Celis notified and the plan is IV fluids, and Strep A throat culture.

## 2017-02-01 NOTE — IP AVS SNAPSHOT
[facility] Labor and Delivery    [address]    [facility] [address]    Phone:  [phone]    Fax:  [phone]                                       After Visit Summary   2/1/2017    Rizwana Plummer    MRN: [id number]           After Visit Summary Signature Page     I have received my discharge instructions, and my questions have been answered. I have discussed any challenges I see with this plan with the nurse or doctor.    ..........................................................................................................................................  Patient/Patient Representative Signature      ..........................................................................................................................................  Patient Representative Print Name and Relationship to Patient    ..................................................               ................................................  Date                                            Time    ..........................................................................................................................................  Reviewed by Signature/Title    ...................................................              ..............................................  Date                                                            Time

## 2017-02-01 NOTE — PLAN OF CARE
Called to patient's room in ED to perform fetal heart and contraction monitoring. Patient states the contraction pain is in her lower abdomen. Rating the discomfort 6-8/10. Patient breathing through the contractions.     FHR baseline 160 minimal variability. No accels or decels. Frederick every 3-4 minutes. Unable to determine SVE. Posterior and very painful exam for patient.    Notified ED doctor and Birthplace charge nurse

## 2017-02-01 NOTE — DISCHARGE INSTRUCTIONS
Discharge Instruction for Undelivered Patients      You were seen for: Labor Assessment  We Consulted: Dr. Celis  You had (Test or Medicine):IV fluids, Strep throat culture     Diet:   Drink 8 to 12 glasses of liquids (milk, juice, water) every day.  You may eat meals and snacks.     Activity:  Call your doctor or nurse midwife if your baby is moving less than usual.     Call your provider if you notice:  Swelling in your face or increased swelling in your hands or legs.  Headaches that are not relieved by Tylenol (acetaminophen).  Changes in your vision (blurring: seeing spots or stars.)  Nausea (sick to your stomach) and vomiting (throwing up).   Weight gain of 5 pounds or more per week.  Heartburn that doesn't go away.  Signs of bladder infection: pain when you urinate (use the toilet), need to go more often and more urgently.  The bag of fairchild (rupture of membranes) breaks, or you notice leaking in your underwear.  Bright red blood in your underwear.  Abdominal (lower belly) or stomach pain.  For first baby: Contractions (tightening) less than 5 minutes apart for one hour or more.  Second (plus) baby: Contractions (tightening) less than 10 minutes apart and getting stronger.  Increase or change in vaginal discharge (note the color and amount)  Other: be seen in the ED    Follow-up:  As scheduled in the clinic

## 2017-02-01 NOTE — ED NOTES
Sore throat, fever, and chills. Headache. Son tested positive for strep throat yesterday. Patient states she is 36 weeks pregnant and is feeling contractions. Patient was seen in labor and delivery first and nurse stated baby was ok. ABC intact alert and no distress.

## 2017-02-01 NOTE — PHARMACY-ADMISSION MEDICATION HISTORY
Admission medication history interview status for this patient is complete. See [facility] admission navigator for allergy information, prior to admission medications and immunization status.     Medication history interview source(s):Patient  Medication history resources (including written lists, pill bottles, clinic record):None  [facility]    Changes made to PTA medication list:  Added: none  Deleted: unisom, prenatal vitamin, old order for ferrous sulfate  Changed: none    Actions taken by pharmacist (provider contacted, etc):None     Additional medication history information:None    Medication reconciliation/reorder completed by provider prior to medication history? No    For patients on insulin therapy: No (Yes/No)  Lantus/levemir/NPH/Mix 70/30 dose:  _____   in AM/PM  or twice daily   Sliding scale Novolog Y/N  If Yes, do you have a baseline novolog pre-meal dose:  ______units with meals   Patients eat three meals a day:   Y/N     Any Barriers to therapy:  cost of medications/comfortable with giving injections (if applicable)/ comfortable and confident with current diabetes regimen       Prior to Admission medications    Medication Sig Last Dose Taking? Auth Provider   ferrous sulfate (IRON) 325 (65 FE) MG tablet Take 1 tablet (325 mg) by mouth 2 times daily  Yes Cindy Laurent DO   diphenhydrAMINE (BENADRYL) 25 MG tablet Take 1-2 tablets (25-50 mg) by mouth every 6 hours as needed for sleep 1/31/2017 at Unknown time Yes Cindy Laurent DO   Elastic Bandages & Supports (ABDOMINAL BINDER/ELASTIC 3XL) MISC 1 Device daily   Cindy Laurent DO

## 2017-02-01 NOTE — IP AVS SNAPSHOT
[facility] Intensive [facility]    [address]    [facility] [address]    Phone:  [phone]    Fax:  [phone]                                       After Visit Summary   2/1/2017    Rizwana Plummer    MRN: [id number]           After Visit Summary Signature Page     I have received my discharge instructions, and my questions have been answered. I have discussed any challenges I see with this plan with the nurse or doctor.    ..........................................................................................................................................  Patient/Patient Representative Signature      ..........................................................................................................................................  Patient Representative Print Name and Relationship to Patient    ..................................................               ................................................  Date                                            Time    ..........................................................................................................................................  Reviewed by Signature/Title    ...................................................              ..............................................  Date                                                            Time

## 2017-02-01 NOTE — PLAN OF CARE
Dr Celis given verbal update, contractions slowing, not felt much by patient, who is resting,  moderate variability no accelerations rare deceleration, patient dozing on an off, family at bedside continuous monitoring until patient is stable, plan is for patient to go from ER to ICU

## 2017-02-01 NOTE — PROVIDER NOTIFICATION
02/01/17 1607   Provider Notification   Provider Name/Title Dr. Celis   Method of Notification At Bedside   Dr. Celis at bedside. Discussed the administering terbutaline to stop the contractions.    Terbutaline SQ 0.25mg

## 2017-02-01 NOTE — CONSULTS
[facility]  Hospitalist Consult Note  Name: Rizwana Plummer    MRN: [id number]  YOB: 1980    Age: 36 year old  Date of admission: 2017  Primary care provider: Cindy Laurent     Requesting Physician:  Dr. Celis   Reason for consult:  hypotension    Pt is a 36 yr old female  currently 36+ wk gestation who presented to L&D initially on 2017 with mailase and contractions. She was hydrated however her symptoms failed to improve so she was sent to the ER for further evaluation. In the ER she was found to have +strep and Influenza A infection. Terbutaline and IV dexamethasone were administered to stop contractions which was successful. She was also hypotensive and tachycardic and given 5 L of IVF. She will be monitored closely in the ICU due to nursing needs        Assessment and Plan:   Influenza A, acute strep pharyngitis: pt is acutely ill from these infectious processes. Her 2 yr old son was diagnosed yesterday with strep throat as well.   --given active infection and possibility of labor will treat with IV ceftriaxone x3 days (endometritis ppx)  --pt has also received bicillin in the ER    --will start on tamiflu x5 days  --supportive treatment with IVF , tylenol and close monitoring  --further steroids per Ob    Hypotension: Pts initial BP was 60/30s-she is not symptomatic. BP improved to 80s/40s which is likely her baseline with anemia and low SVR from pregnancy.   --cont to monitor BP  --IVF as above  --no aggressive measures unless she is symptomatic    Tachycardia: improved initially with IVF but worsened after terbutaline. Obtain EKG. Cont to monitor    36+ week gestation: had C section during last pregnancy. Primary team to manage. She was having regular contractions every 3-4 mins on admission now subsided after terbutaline              History of Present Illness:   Rizwana Plummer is a 36 year old  female @ 36w4d who presented to [facility] L[facility]D for contractions every  10-12 minutes and 1 day of feeling generally ill with fever, sore throat, chills, and headache. She was complaining of increased chest pressure that started this morning/during her time at the hospital, but she thinks it might be related to her heart beating so fast. Her son tested positive for strep throat earlier this week. Family member who lives with Rizwana says she has not been eating or drinking well for the past 1-2 days.    L&D gave her fluids and sent her to the ER for further management.    In the ED she was hypotensive to 60s/40s [normal baseline systolic is 90s] and received 5L of NS and almost 1L of LR. She was found to have Influenza A & Strep throat for which she was given IM Penicillin. Her contractions were consistently 10-12 minutes apart, given 0.25 mg Terbutaline, and they have since nearly abated. She has been tachycardic into upper 130s.              Past Medical History:     Past Medical History   Diagnosis Date     Varicella age 5             Past Surgical History:     Past Surgical History   Procedure Laterality Date     Infibulation       Marsupialization bartholin cyst  2013     Procedure: MARSUPIALIZATION BARTHOLIN CYST;  Removal of Inclusion Cyst.   Fetal heart tones 180's;  Surgeon: Rohit Parks MD;  Location: [facility] OR      section  2014     Procedure:  SECTION;   Primary  section                  Social History:     Social History   Substance Use Topics     Smoking status: Never Smoker      Smokeless tobacco: Never Used     Alcohol Use: No             Family History:     Family History   Problem Relation Age of Onset     CEREBROVASCULAR DISEASE Mother      secondary to severe preeclampsia with last pregnancy     DIABETES Mother      after stroke     Hypertension Mother      history of severe preeclampsia/stroke     Hypertension Father              Allergies:   No Known Allergies          Medications:              Review of Systems:   A  comprehensive greater than 10 system review of systems was carried out.  Pertinent positives and negatives are noted above.  Otherwise negative for contributory info.            Physical Exam:   Blood pressure 87/42, pulse 122, temperature 98.3  F (36.8  C), temperature source Oral, resp. rate 20, last menstrual period 05/29/2016, SpO2 100 %, not currently breastfeeding.  Exam:   GEN:  Alert, oriented x 3, appears uncombortable due to position on the bed and full term pregnancy, NAD.  HEENT:  Normocephalic/atraumatic, no scleral icterus, no nasal discharge, mouth moist.  CV:  Rapid rate, regular rhythm, no murmur or JVD.  S1 + S2 noted, no S3 or S4.  LUNGS:  Clear to auscultation bilaterally without rales/rhonchi/wheezing/retractions.  Symmetric chest rise on inhalation noted.  ABD:  Active bowel sounds, linea niagra, distended abd, did not check for fetal movement  No rebound/guarding/rigidity.  EXT:  No edema.  No cyanosis.  No joint synovitis noted.  SKIN:  Dry to touch, no exanthems noted in the visualized areas.          Data:      EKG/Telemetry:  Sinus tachycardia     Recent Labs  Lab 02/01/17  1110   WBC 9.5   HGB 9.9*   HCT 29.2*   MCV 91   *       Recent Labs  Lab 02/01/17  1110      POTASSIUM 3.5   CHLORIDE 109   CO2 17*   ANIONGAP 13   GLC 75   BUN 6*   CR 0.52   GFRESTIMATED >90Non  GFR Calc   GFRESTBLACK >90African American GFR Calc   ALEJANDRO 7.7*

## 2017-02-01 NOTE — H&P
[facility]  History and Physical       Date of Admission:  2017    Assessment & Plan  Rizwana Plummer is a 36 year old  female @ 36w4d who presented to [facility] L&D for contractions every 10-12 minutes and feeling generally ill with fever, sore throat, chills, and headache. She was hypotensive to 60s/40s and given IVF in L&D, then discharged for further evaluation for her illness in the ED. There she was found to have Influenza A & Strep throat (her 3 yo son just tested positive this week) for which she was given IM Penicillin. Her contractions were consistently 10-12 minutes apart, given 0.25 mg Terbutaline, and they have since nearly abated completely. Throughout her time in the ED she has been hypotensive, mostly 80s/40s and her baseline systolic is 90s, and has received 5L of NS and almost 1L of LR. She was complaining of increased chest pressure that started this morning/during her time at the hospital, but she thinks it might be related to her heart beating so fast - she has been tachycardic into upper 130s. She is stable, but at risk for fluid overload.    According to a family member Rizwana has not been eating or drinking well especially over the last couple days, but also had what sounds like hyperemesis gravidum in the first trimester. Otherwise pregnancy has reportedly been uncomplicated.    1. Pregnancy: 36w4d, sufficient prenatal care, O+, GBS (-) on 17. Patient had C-sxn with prior pregnancy.  -Terbutaline given to stop contractions - successfully stopped  -L&D nurse at bedside   -FHT 150s and appears stable as well    2. Influenza A: tested positive  - Tamiflu initiated, safe for fetus  - Supportive cares like tylenol for headache and fever    3. Strep Throat: tested positive on immunoassay in ED on 2nd swab  - Received 1 dose of IM Penicillin G, should be sufficient     4. Hypotension & Tachycardia: baseline BP 90s systolically, tachycardic to 130s today  - EKG pending for  tachycardia/chest pressure  - Concern for volume overload, monitor vitals closely and slow infusion/hr  - Terbutaline is known to cause some tachycardia, hypotension and angina, thus could be compounding some of her symptoms    Prophylaxis: DVT - Ambulate as able  Code Status: Full Code  Disposition: 1-2 days    The patient was seen and staffed with Keila Bartlett MD. They agree with the above stated assessment and plan.    Zehra Mondragonarabella, MS4  --------------------------------------------------    Chief Complaint  Fever, sore throat, headache, chills and contractions    History obtained from patient, family member and chart review    History of Present Illness:  Rizwana Plummer is a 36 year old  female @ 36w4d who presented to [facility] L&D for contractions every 10-12 minutes and 1 day of feeling generally ill with fever, sore throat, chills, and headache. She was complaining of increased chest pressure that started this morning/during her time at the hospital, but she thinks it might be related to her heart beating so fast. Her son tested positive for strep throat earlier this week. Family member who lives with Rizwana says she has not been eating or drinking well for the past 1-2 days.    L&D gave her fluids and sent her to the ER for further management.    In the ED she was hypotensive to 60s/40s [normal baseline systolic is 90s] and received 5L of NS and almost 1L of LR. She was found to have Influenza A & Strep throat for which she was given IM Penicillin. Her contractions were consistently 10-12 minutes apart, given 0.25 mg Terbutaline, and they have since nearly abated. She has been tachycardic into upper 130s.    Medical History   I have reviewed this patient's medical history and updated it with pertinent information if needed.   PAST MEDICAL HISTORY:   Past Medical History   Diagnosis Date     Varicella age 5       Review of patient's allergies indicates no known allergies.    PAST SURGICAL HISTORY:   Past  Surgical History   Procedure Laterality Date     Infibulation       Marsupialization bartholin cyst  2013     Procedure: MARSUPIALIZATION BARTHOLIN CYST;  Removal of Inclusion Cyst.   Fetal heart tones 180's;  Surgeon: Rohit Parks MD;  Location: [facility] OR      section  2014     Procedure:  SECTION;   Primary  section          FAMILY HISTORY:   Family History   Problem Relation Age of Onset     CEREBROVASCULAR DISEASE Mother      secondary to severe preeclampsia with last pregnancy     DIABETES Mother      after stroke     Hypertension Mother      history of severe preeclampsia/stroke     Hypertension Father        SOCIAL HISTORY:   Social History   Substance Use Topics     Smoking status: Never Smoker      Smokeless tobacco: Never Used     Alcohol Use: No       ROS negative except for findings in HPI      Physical Exam:    Blood pressure 87/42, pulse 122, temperature 98.3  F (36.8  C), temperature source Oral, resp. rate 20, last menstrual period 2016, SpO2 100 %, not currently breastfeeding.    GENERAL:  uncomfortable appearing thin pregnant female, in dark room   NECK:  no JVD, normal ROM  HEENT:  eyes sensitive to light?, EOMI, MMM  LUNGS:  clear, occasional cough  CARDIOVASCULAR:   regular rhthym, tachycardic 130s, no murmurs or gallops  ABDOMEN:   FHT monitors in place  EXTREMITIES:  thin, no LE edema  NEUROLOGIC:  intact. Alert & oriented.    Data:  Imaging:  EKG pending    Labs:   Rapid Strep: positive  Rapid Influenza A: positive  BMP 17:  Sodium: 139  K+: 3.5  Cl: 109  Co2: 17 L  BUN: 6 L  Cr: 0.52  Ca++: 7.7 L  A     2017 17:19   Ref. Range 2017 11:10   WBC Latest Ref Range: 4.0-11.0 10e9/L 9.5   Hemoglobin Latest Ref Range: 11.7-15.7 g/dL 9.9 (L)   Hematocrit Latest Ref Range: 35.0-47.0 % 29.2 (L)   Platelet Count Latest Ref Range: 150-450 10e9/L 143 (L)   RBC Count Latest Ref Range: 3.8-5.2 10e12/L 3.21 (L)   MCV Latest Ref Range:  fl  91   MCH Latest Ref Range: 26.5-33.0 pg 30.8   MCHC Latest Ref Range: 31.5-36.5 g/dL 33.9   RDW Latest Ref Range: 10.0-15.0 % 13.3     Results for DOREEN PATTERSON (MRN [id number]) as of 2/1/2017 17:19   Ref. Range 2/1/2017 13:55   Color Urine Unknown Yellow   Appearance Urine Unknown Clear   Glucose Urine Latest Ref Range: NEG mg/dL Negative   Bilirubin Urine Latest Ref Range: NEG  Negative   Ketones Urine Latest Ref Range: NEG mg/dL >150 (A)   Specific Gravity Urine Latest Ref Range: 1.003-1.035  1.025   pH Urine Latest Ref Range: 5.0-7.0 pH 6.0   Protein Albumin Urine Latest Ref Range: NEG mg/dL 30 (A)   Urobilinogen mg/dL Latest Ref Range: 0.0-2.0 mg/dL 4.0 (H)   Nitrite Urine Latest Ref Range: NEG  Negative   Blood Urine Latest Ref Range: NEG  Negative   Leukocyte Esterase Urine Latest Ref Range: NEG  Negative   Source Unknown Midstream Urine   WBC Urine Latest Ref Range: 0-2 /HPF 1   RBC Urine Latest Ref Range: 0-2 /HPF 1   Bacteria Urine Latest Ref Range: NEG /HPF Few (A)   Squamous Epithelial /HPF Urine Latest Ref Range: 0-1 /HPF 1   Mucous Urine Latest Ref Range: NEG /LPF Present (A)

## 2017-02-02 VITALS
HEART RATE: 122 BPM | DIASTOLIC BLOOD PRESSURE: 60 MMHG | OXYGEN SATURATION: 99 % | TEMPERATURE: 97.9 F | RESPIRATION RATE: 24 BRPM | SYSTOLIC BLOOD PRESSURE: 92 MMHG

## 2017-02-02 LAB
ALBUMIN SERPL-MCNC: 2.1 G/DL (ref 3.4–5)
ALP SERPL-CCNC: 89 U/L (ref 40–150)
ALT SERPL W P-5'-P-CCNC: 19 U/L (ref 0–50)
ANION GAP SERPL CALCULATED.3IONS-SCNC: 10 MMOL/L (ref 3–14)
AST SERPL W P-5'-P-CCNC: 28 U/L (ref 0–45)
BACTERIA SPEC CULT: NORMAL
BASOPHILS # BLD AUTO: 0 10E9/L (ref 0–0.2)
BASOPHILS NFR BLD AUTO: 0 %
BILIRUB SERPL-MCNC: 0.3 MG/DL (ref 0.2–1.3)
BUN SERPL-MCNC: 5 MG/DL (ref 7–30)
CALCIUM SERPL-MCNC: 7.7 MG/DL (ref 8.5–10.1)
CHLORIDE SERPL-SCNC: 114 MMOL/L (ref 94–109)
CO2 SERPL-SCNC: 18 MMOL/L (ref 20–32)
CREAT SERPL-MCNC: 0.48 MG/DL (ref 0.52–1.04)
DEPRECATED S PYO AG THROAT QL EIA: NORMAL
DIFFERENTIAL METHOD BLD: ABNORMAL
EOSINOPHIL # BLD AUTO: 0 10E9/L (ref 0–0.7)
EOSINOPHIL NFR BLD AUTO: 0 %
ERYTHROCYTE [DISTWIDTH] IN BLOOD BY AUTOMATED COUNT: 13.6 % (ref 10–15)
FLUAV+FLUBV AG SPEC QL: ABNORMAL
FLUAV+FLUBV AG SPEC QL: POSITIVE
GFR SERPL CREATININE-BSD FRML MDRD: ABNORMAL ML/MIN/1.7M2
GLUCOSE SERPL-MCNC: 96 MG/DL (ref 70–99)
HCT VFR BLD AUTO: 26.9 % (ref 35–47)
HGB BLD-MCNC: 9 G/DL (ref 11.7–15.7)
IMM GRANULOCYTES # BLD: 0.4 10E9/L (ref 0–0.4)
IMM GRANULOCYTES NFR BLD: 3.9 %
INTERPRETATION ECG - MUSE: NORMAL
LYMPHOCYTES # BLD AUTO: 0.8 10E9/L (ref 0.8–5.3)
LYMPHOCYTES NFR BLD AUTO: 8.7 %
Lab: NORMAL
MCH RBC QN AUTO: 30.3 PG (ref 26.5–33)
MCHC RBC AUTO-ENTMCNC: 33.5 G/DL (ref 31.5–36.5)
MCV RBC AUTO: 91 FL (ref 78–100)
MICRO REPORT STATUS: NORMAL
MICRO REPORT STATUS: NORMAL
MONOCYTES # BLD AUTO: 0.8 10E9/L (ref 0–1.3)
MONOCYTES NFR BLD AUTO: 8.8 %
MRSA DNA SPEC QL NAA+PROBE: NORMAL
NEUTROPHILS # BLD AUTO: 7.2 10E9/L (ref 1.6–8.3)
NEUTROPHILS NFR BLD AUTO: 78.6 %
NRBC # BLD AUTO: 0 10*3/UL
NRBC BLD AUTO-RTO: 0 /100
PLATELET # BLD AUTO: 138 10E9/L (ref 150–450)
POTASSIUM SERPL-SCNC: 3.6 MMOL/L (ref 3.4–5.3)
PROT SERPL-MCNC: 5.2 G/DL (ref 6.8–8.8)
RBC # BLD AUTO: 2.97 10E12/L (ref 3.8–5.2)
RSV AG SPEC QL: NORMAL
SODIUM SERPL-SCNC: 142 MMOL/L (ref 133–144)
SPECIMEN SOURCE: ABNORMAL
SPECIMEN SOURCE: NORMAL
WBC # BLD AUTO: 9.2 10E9/L (ref 4–11)

## 2017-02-02 PROCEDURE — 80053 COMPREHEN METABOLIC PANEL: CPT | Performed by: HOSPITALIST

## 2017-02-02 PROCEDURE — 87804 INFLUENZA ASSAY W/OPTIC: CPT | Performed by: INTERNAL MEDICINE

## 2017-02-02 PROCEDURE — 25000132 ZZH RX MED GY IP 250 OP 250 PS 637: Performed by: HOSPITALIST

## 2017-02-02 PROCEDURE — 25000128 H RX IP 250 OP 636: Performed by: HOSPITALIST

## 2017-02-02 PROCEDURE — 87807 RSV ASSAY W/OPTIC: CPT | Performed by: INTERNAL MEDICINE

## 2017-02-02 PROCEDURE — 25000125 ZZHC RX 250: Performed by: HOSPITALIST

## 2017-02-02 PROCEDURE — 99238 HOSP IP/OBS DSCHRG MGMT 30/<: CPT | Performed by: OBSTETRICS & GYNECOLOGY

## 2017-02-02 PROCEDURE — 36415 COLL VENOUS BLD VENIPUNCTURE: CPT | Performed by: HOSPITALIST

## 2017-02-02 PROCEDURE — 87081 CULTURE SCREEN ONLY: CPT | Performed by: OBSTETRICS & GYNECOLOGY

## 2017-02-02 PROCEDURE — 87880 STREP A ASSAY W/OPTIC: CPT | Performed by: INTERNAL MEDICINE

## 2017-02-02 PROCEDURE — 85025 COMPLETE CBC W/AUTO DIFF WBC: CPT | Performed by: HOSPITALIST

## 2017-02-02 RX ORDER — OSELTAMIVIR PHOSPHATE 75 MG/1
75 CAPSULE ORAL 2 TIMES DAILY
Qty: 8 CAPSULE | Refills: 0 | Status: SHIPPED | OUTPATIENT
Start: 2017-02-02 | End: 2017-02-07

## 2017-02-02 RX ORDER — ACETAMINOPHEN 325 MG/1
650 TABLET ORAL EVERY 4 HOURS PRN
Qty: 100 TABLET | COMMUNITY
Start: 2017-02-02 | End: 2018-05-03

## 2017-02-02 RX ADMIN — ACETAMINOPHEN 650 MG: 325 TABLET, FILM COATED ORAL at 12:40

## 2017-02-02 RX ADMIN — DEXTROSE AND SODIUM CHLORIDE: 5; 900 INJECTION, SOLUTION INTRAVENOUS at 11:25

## 2017-02-02 RX ADMIN — OSELTAMIVIR PHOSPHATE 75 MG: 75 CAPSULE ORAL at 09:41

## 2017-02-02 RX ADMIN — ACETAMINOPHEN 650 MG: 325 TABLET, FILM COATED ORAL at 01:23

## 2017-02-02 RX ADMIN — DOXYLAMINE SUCCINATE 25 MG: 25 TABLET ORAL at 00:29

## 2017-02-02 RX ADMIN — ONDANSETRON 4 MG: 2 INJECTION INTRAMUSCULAR; INTRAVENOUS at 05:35

## 2017-02-02 RX ADMIN — ACETAMINOPHEN 650 MG: 325 TABLET, FILM COATED ORAL at 05:34

## 2017-02-02 NOTE — PLAN OF CARE
-170. Variability mostly minimal with some moderate. Zero decelerations observed. Some 10 x 10 accelerations noted. Patient denies any contractions and none are picked up by TOCO. Patient is sleeping throughout cares. Fetus is active, and can be heard on monitor. Continue with continuous monitoring overnight.

## 2017-02-02 NOTE — PLAN OF CARE
Problem: Respiratory Insufficiency (Adult)  Goal: Effective Ventilation  Patient will demonstrate the desired outcomes by discharge/transition of care.   Outcome: No Change  ICU End of Shift Summary.  For vital signs and complete assessments, please see documentation flowsheets.     Pertinent assessments: VSS. Fetal heart tones monitored by L and D RN  Major Shift Events:   Plan (Upcoming Events): Tx to floor  Discharge/Transfer Needs: TBD    Bedside Shift Report Completed   Bedside Safety Check Completed

## 2017-02-02 NOTE — PLAN OF CARE
Problem: Respiratory Insufficiency (Adult)  Goal: Effective Ventilation  Patient will demonstrate the desired outcomes by discharge/transition of care.  ICU End of Shift Summary.  For vital signs and complete assessments, please see documentation flowsheets.     Pertinent assessments: Levo at 0.05, Heparin at 1500u/hr . Insulin gtt titrated to blood sugar   Major Shift Events: Period with lots of ectopy/runs PVCS Mg K and Phos replaced ectopy reduced  Plan (Upcoming Events): Continue to monitor. Will need to speak with pt'S Daughter  Discharge/Transfer Needs: TBD    Bedside Shift Report Completed yes  Bedside Safety Check Completed yes

## 2017-02-02 NOTE — PROGRESS NOTES
ICU End of Shift Summary.  For vital signs and complete assessments, please see documentation flowsheets.     Pertinent assessments: AO, Reports HA resolved with tylenol. LS clear, Sats >95% ra. Tele SR/ST. BP soft, consistent with baseline. Urinating without difficulty.   Major Shift Events: Rapid Strep repeated, negative. FLU A/B and RSV repeated, Flu A positive only. PIV removed.  Plan (Upcoming Events): Pt to discharge home. Rx sent to [facility].  Discharge/Transfer Needs: D/C instructions provided by L&D RN.     Bedside Shift Report Completed   Bedside Safety Check Completed

## 2017-02-02 NOTE — PROGRESS NOTES
Patient care was assumed today.  She was seen and examined by me this morning indicated plan was discussed with treatment team as well as primary team.  Patient is a 36-year-old female with 36 weeks plus pregnancy.  With concern for influenza A infection as well as sore throat with strep.  Patient appears to be stable, alert and oriented ×3.  Asymptomatic, blood pressure remained low but she has been stable for her.  I examined her oral pharyngeal area and there is no evidence of tonsillar swelling or exudate.  Rapid strep repeated and came back negative.  Patient however has influenza infection and she is appropriately being treated with Tamiflu.  Patient is stable from medicine standpoint and can be  discharged if okay with primary team.  Patient needs prophylactic course of Tamiflu and follow up with her primary doctor as needed.

## 2017-02-02 NOTE — DISCHARGE INSTRUCTIONS
Discharge Instruction for Undelivered Patients      You were seen for: Fever & chills  We Consulted: Dr. Celis & Solo  You had (Test or Medicine):Lab work and fetal monitoring     Diet:   Drink 8 to 12 glasses of liquids (milk, juice, water) every day.  You may eat meals and snacks.     Activity:  Call your doctor or nurse midwife if your baby is moving less than usual.     Call your provider if you notice:  Swelling in your face or increased swelling in your hands or legs.  Headaches that are not relieved by Tylenol (acetaminophen).  Changes in your vision (blurring: seeing spots or stars.)  Nausea (sick to your stomach) and vomiting (throwing up).   Weight gain of 5 pounds or more per week.  Heartburn that doesn't go away.  Signs of bladder infection: pain when you urinate (use the toilet), need to go more often and more urgently.  The bag of fairchild (rupture of membranes) breaks, or you notice leaking in your underwear.  Bright red blood in your underwear.  Abdominal (lower belly) or stomach pain.  For first baby: Contractions (tightening) less than 5 minutes apart for one hour or more.  Second (plus) baby: Contractions (tightening) less than 10 minutes apart and getting stronger.  *If less than 34 weeks: Contractions (tightenings) more than 6 times in one hour.  Increase or change in vaginal discharge (note the color and amount)  Other: You are being discharged with a prescription for Tamiflu -complete as prescribe    Follow-up:  As scheduled in the clinic tomorrow 2/3/17 for ultrasound and OB doctor visit.

## 2017-02-02 NOTE — PROGRESS NOTES
Infection Prevention:    Patient requires Droplet Precautions because of Influenza. Please contact Infection Prevention with any questions/concerns at *[phone].    Isaura Cummings, ICP

## 2017-02-02 NOTE — PLAN OF CARE
Rizwana having low back discomfort, not feeling contractions, repositioned right tilt, family into visit

## 2017-02-02 NOTE — PLAN OF CARE
Problem: Goal Outcome Summary  Goal: Goal Outcome Summary  Data: Patient presented to the ED 17 with fever and chills.   Reason for maternal/fetal assessment per patient is Pharyngitis and Fever  . Patient is a . Prenatal record reviewed.      Obstetric History       T1      TAB0   SAB1   E0   M0   L1        # Outcome Date GA Lbr Anthony/2nd Weight Sex Delivery Anes PTL Lv   3 Current                     2 Term 14 41w6d   3.705 kg (8 lb 2.7 oz) M   EPI N Y      Name: Radha      Apgar1:  3                Apgar5: 8   1 SAB 12         SAB     ND          Medical History:   Past Medical History   Diagnosis Date     Varicella age 5   . Gestational Age 36w5d. VSS. Cervix: not examined.  Fetal movement present. Patient denies cramping, backache, vaginal discharge, pelvic pressure, UTI symptoms, GI problems, bloody show, vaginal bleeding, edema, headache, visual disturbances, epigastric or URQ pain, abdominal pain, rupture of membranes. Support persons were present.  Action: Verbal consent for EFM. Triage assessment completed. EFM applied for fetal evaluation. Uterine assessment toco and palpation. Fetal assessment: Presumed adequate fetal oxygenation documented (see flow record). Patient was subsequently admitted to ICU in droplet isolation for a positive influenza A and positive strep throat culture.  Following medications for 24 hours the patient was discharged to home with a Tamiflu prescription and instructed to keep her U/S & OB appointment on 2/3/17.   Patient instructed to report change in fetal movement, vaginal leaking of fluid or bleeding, abdominal pain, or any concerns related to the pregnancy to her nurse/physician.   Response: Dr. Banda informed of hospitalist discharge. Plan per provider is discharge to home. Patient verbalized understanding of education and verbalized agreement with plan. Patient will discharge to home from the ICU when her ride arrives.

## 2017-02-02 NOTE — PLAN OF CARE
-170, variability predominantly minimal with some moderate, no accelerations or decelerations observed.  Pt denies feeling any contractions, and they are rarely observed per toco.  Difficult to maintain continuous FHR tracing - fetus palpably active.     Plan per Dr. Celis is to maintain continuous FHR monitoring overnight.

## 2017-02-02 NOTE — PLAN OF CARE
Problem: Goal Outcome Summary  Goal: Goal Outcome Summary  Assumed cares after report @ 720.

## 2017-02-02 NOTE — H&P
[facility] Labor and Delivery History and Physical    Rizwana Plummer MRN# [id number]   Age: 36 year old YOB: 1980     Date of Admission:  2017    Primary care provider: Cindy Laurent           Chief Complaint:   Rizwana Plummer is a 36 year old female who is 36w4d pregnant and being admitted for strep throat and influenza. Nausea, general malaise, multiple body aches. Previous  section. Symptoms began last evening.          Pregnancy history:     OBSTETRIC HISTORY:    Obstetric History       T1      TAB0   SAB1   E0   M0   L1       # Outcome Date GA Lbr Anthony/2nd Weight Sex Delivery Anes PTL Lv   3 Current            2 Term 14 41w6d  8 lb 2.7 oz (3.705 kg) M  EPI N Y      Name: Radha      Apgar1:  3                Apgar5: 8   1 SAB 12     SAB   ND          EDC: Estimated Date of Delivery: 17    Prenatal Labs: Lab Results   Component Value Date    ABO O 2016    RH  Pos 2016    AS Neg 2016    HEPBANG Nonreactive 2016    CHPCRT  2016     Negative   Negative for C. trachomatis rRNA by transcription mediated amplification.   A negative result by transcription mediated amplification does not preclude the   presence of C. trachomatis infection because results are dependent on proper   and adequate collection, absence of inhibitors, and sufficient rRNA to be   detected.      GCPCRT  2016     Negative   Negative for N. gonorrhoeae rRNA by transcription mediated amplification.   A negative result by transcription mediated amplification does not preclude the   presence of N. gonorrhoeae infection because results are dependent on proper   and adequate collection, absence of inhibitors, and sufficient rRNA to be   detected.      TREPAB Negative 2016    RUBELLAABIGG 73 2013    HGB 9.9* 2017    HIV Negative 2013       GBS Status:   Lab Results   Component Value Date    GBS  2017     Negative  No  GBS DNA detected, presumed negative for GBS or number of bacteria may be   below the limit of detection of the assay.   Assay performed on incubated broth culture of specimen using [employer] real-time   PCR.         Active Problem List  Patient Active Problem List   Diagnosis     Female genital infibulation     Epidermal inclusion cyst of infibulation scar     Supervision of elderly primigravida (>=35 years old at delivery)     Status post  delivery     Post term pregnancy, antepartum condition or complication     Prenatal care, subsequent pregnancy     Indication for care in labor or delivery       Medication Prior to Admission    (Not in a hospital admission).        Maternal Past Medical History:     Past Medical History   Diagnosis Date     Varicella age 5                       Family History:     I have reviewed this patient's family history  Family History   Problem Relation Age of Onset     CEREBROVASCULAR DISEASE Mother      secondary to severe preeclampsia with last pregnancy     DIABETES Mother      after stroke     Hypertension Mother      history of severe preeclampsia/stroke     Hypertension Father      Family history reviewed            Social History:     I have reviewed this patient's social history  Social History     Social History     Marital Status:      Spouse Name: N/A     Number of Children: 0     Years of Education: N/A     Occupational History      Unemployed     Social History Main Topics     Smoking status: Never Smoker      Smokeless tobacco: Never Used     Alcohol Use: No     Drug Use: No     Sexual Activity:     Partners: Male     Other Topics Concern     Not on file     Social History Narrative            Review of Systems:   Review of Systems    CONSTITUTIONAL:General malaise   EYES: NEGATIVE  ENT/MOUTH: NEGATIVE  RESP: NEGATIVE  CV: NEGATIVE  GI: Nausea  : NEGATIVE  MUSCULOSKELATAL: Multiple aches and pains  INTEGUMENTARY/SKIN: NEGATIVE  BREAST: NEGATIVE  NEURO:  NEGATIVE.           Physical Exam:     Vitals were reviewed  Patient Vitals for the past 8 hrs:   BP Temp Temp src Pulse Resp SpO2   02/01/17 1900 90/53 mmHg - - - - 98 %   02/01/17 1845 (!) 83/54 mmHg - - - - 97 %   02/01/17 1844 - - - - - 98 %   02/01/17 1830 98/52 mmHg - - - - 97 %   02/01/17 1820 - - - - - 97 %   02/01/17 1819 90/45 mmHg - - - - 96 %   02/01/17 1800 (!) 81/42 mmHg - - - - 97 %   02/01/17 1745 (!) 87/47 mmHg - - - - 98 %   02/01/17 1730 93/48 mmHg - - - - 98 %   02/01/17 1715 (!) 80/46 mmHg - - - - 97 %   02/01/17 1700 (!) 80/47 mmHg - - - - 100 %   02/01/17 1645 (!) 87/42 mmHg 98.3  F (36.8  C) Oral - - 100 %   02/01/17 1642 (!) 87/42 mmHg - - - - 100 %   02/01/17 1630 (!) 84/44 mmHg - - - - 100 %   02/01/17 1615 (!) 85/48 mmHg - - - - 97 %   02/01/17 1600 90/50 mmHg - - - - 99 %   02/01/17 1545 (!) 84/41 mmHg - - - - 100 %   02/01/17 1530 (!) 82/42 mmHg - - - - 96 %   02/01/17 1515 (!) 82/44 mmHg - - - - 98 %   02/01/17 1500 (!) 84/44 mmHg - - - - 97 %   02/01/17 1445 (!) 83/39 mmHg - - - - 97 %   02/01/17 1430 (!) 91/35 mmHg - - - - 98 %   02/01/17 1415 (!) 83/46 mmHg - - - - 95 %   02/01/17 1400 (!) 79/53 mmHg - - - - 100 %   02/01/17 1351 92/40 mmHg - - - - -   02/01/17 1330 (!) 82/43 mmHg - - - - 100 %   02/01/17 1316 (!) 88/47 mmHg - - 122 20 100 %   02/01/17 1315 (!) 88/47 mmHg - - - - 96 %   02/01/17 1300 (!) 61/27 mmHg - - - - 100 %   02/01/17 1245 (!) 73/36 mmHg - - - - 100 %   02/01/17 1230 (!) 61/39 mmHg - - - - -   02/01/17 1215 (!) 83/38 mmHg - - - - 98 %   02/01/17 1200 (!) 76/31 mmHg - - - - 99 %   02/01/17 1145 (!) 84/41 mmHg - - - - 99 %   02/01/17 1130 (!) 77/37 mmHg - - - - 100 %     General appearance: Some degree of distress  Skin: Dry  Mental Status: cooperative, normal affect, no gross thought process defects.  Breasts: Deferred  Lungs: Clear to auscultation.   Heart.: Normal rate and rhythm.  No murmurs, clicks or gallops.   Abdomen: BS active. Soft, Gravid.     Extremities: Normal     Cervix:   Membranes: intact   Dilation: closed   Effacement: 10%   Station:-4   Consistency: firm   Position: Posterior  Presentation:Cephalic  Fetal Heart Rate Tracing:  reassuring  Tocometer: frequency q 4 minutes                       Assessment:   Rizwana Plummer is a 36w4d pregnant female admitted with strep throat and symptomatic influenza. Tachycardiac with hypotension. With the significantly increased mortality rate of influenza in pregnancy will admit to ICU.           Plan:   With the significantly increased mortality rate of influenza in pregnancy will admit to ICU, antiviral medication, monitor, supportive measures, intensivist , hospitalist involvement.    Ranjit Celis MD, MD

## 2017-02-02 NOTE — PROVIDER NOTIFICATION
02/02/17 0921   Provider Notification   Provider Name/Title SOLO/NITHIN   Method of Notification At Bedside   Order to stop continuous EFM received.  EFM removed & POC discussed with to pt.  Questions answered.  Plan to re culture for strep throat.  OB cares completed and handoff to [facility] ICU @ 171.  Author will be notified when culture results are available.  Solo discussed possibility of discharge to home with follow up in clinic as scheduled tomorrow.  Patient in agreement with plan if able to be discharged.

## 2017-02-02 NOTE — PLAN OF CARE
Patient starting to feel some contractions that have palpated mild around 0530 this am. Very Irregular. FHT's continue to be minimal to moderate variability with accels rare, but zero decels. Patient in stand by assist to the bathroom and is steady on her feet. She is very tired this night shift.

## 2017-02-02 NOTE — DISCHARGE SUMMARY
[facility]    Discharge Summary  Obstetrics    Date of Admission:  2/1/2017  Date of Discharge:  2/2/2017  Discharging Provider: Ruthy Banda  Date of Service (when I saw the patient): 02/02/2017    Discharge Diagnoses  Active Problems:    Influenza A  Pregnancy at     Procedure/Surgery Information  None                History of Present Illness  Rizwana Plummer is a 36 year old female who presented with fever and malaise to the ED. She was evaluated on L&D, was found to be tachycardic but without obstetric issues, and was seen in the ED again where she tested positive for Influenza A. She is also positive for strep throat and was treated. Due to hypotension and tachycardia, though she was clinically stable otherwise, she was admitted to the ICU for observation.    Hospital Course  The patient's hospital course was unremarkable after admission. Continuous monitoring was performed overnight and showed a few contractions but nothing regular. She reports good fetal movement, no concerns. She denies SOB, chest pain. She has been managed by the intensivist, who feels she can be discharged today.     Medications discontinued or adjusted during this hospitalization: No change     Antibiotics prescribed at discharge: Tamiflu 75 mg po bid, Duration: 4 more days     Ruthy Banda MD    Discharge Disposition  Discharged to home   Condition at discharge: Good    Pending Results  Final pathology results: No pathology submitted    Unresulted Labs Ordered in the Past 30 Days of this Admission     Date and Time Order Name Status Description    2/2/2017 1010 Beta strep group A culture In process     2/1/2017 1043 Urine Culture Preliminary     2/1/2017 0858 Beta strep group A culture Preliminary           Primary Care Physician  Cindy Laurent    Physical Exam  Temp: 97.9  F (36.6  C) Temp src: Oral BP: (!) 87/64 mmHg Pulse: 122 Heart Rate: 103 Resp: 24 SpO2: 93 % O2 Device: None (Room air)    There were no  vitals filed for this visit.  Vital Signs with Ranges  Temp:  [97.9  F (36.6  C)-99  F (37.2  C)] 97.9  F (36.6  C)  Pulse:  [122] 122  Heart Rate:  [] 103  Resp:  [20-31] 24  BP: ()/(27-64) 87/64 mmHg  SpO2:  [93 %-100 %] 93 %  I/O last 3 completed shifts:  In: -   Out: 350 [Urine:350]    Abdomen: gravid nontender. Fetus is vertex.  Ext: negative for edema.  FHR tracing: normal baseline with moderate variability, no decels, accels present.    Consultations This Hospital Stay  HOSPITALIST IP CONSULT    Time Spent on This Encounter  I have spent less than 30 minutes on this discharge.    Discharge Orders  No discharge procedures on file.  Discharge Medications  Current Discharge Medication List      START taking these medications    Details   acetaminophen (TYLENOL) 325 MG tablet Take 2 tablets (650 mg) by mouth every 4 hours as needed for mild pain  Qty: 100 tablet    Associated Diagnoses: Influenza A      oseltamivir (TAMIFLU) 75 MG capsule Take 1 capsule (75 mg) by mouth 2 times daily  Qty: 8 capsule, Refills: 0    Associated Diagnoses: Influenza A         CONTINUE these medications which have NOT CHANGED    Details   ferrous sulfate (IRON) 325 (65 FE) MG tablet Take 1 tablet (325 mg) by mouth 2 times daily  Qty: 60 tablet, Refills: 2    Associated Diagnoses: Pregnancy related fatigue in third trimester; Anemia, unspecified type      diphenhydrAMINE (BENADRYL) 25 MG tablet Take 1-2 tablets (25-50 mg) by mouth every 6 hours as needed for sleep  Qty: 60 tablet, Refills: 1    Associated Diagnoses: Other insomnia      Elastic Bandages & Supports (ABDOMINAL BINDER/ELASTIC 3XL) MISC 1 Device daily  Qty: 1 each, Refills: 3    Associated Diagnoses: Back pain, unspecified back location, unspecified back pain laterality, unspecified chronicity           Allergies  No Known Allergies  Data  Most Recent 3 CBC's:  Recent Labs   Lab Test  02/02/17   0535  02/01/17   1110  12/23/16   1014  11/18/16   1252   WBC  9.2   9.5   --   7.8   HGB  9.0*  9.9*  11.2*  11.4*   MCV  91  91   --   89   PLT  138*  143*   --   201      Most Recent 3 BMP's:  Recent Labs   Lab Test  02/02/17   0535  02/01/17   1110  09/04/16   2224   NA  142  139  137   POTASSIUM  3.6  3.5  3.7   CHLORIDE  114*  109  107   CO2  18*  17*  26   BUN  5*  6*  6*   CR  0.48*  0.52  0.53   ANIONGAP  10  13  4   ALEJANDRO  7.7*  7.7*  8.2*   GLC  96  75  82     Most Recent 2 LFT's:  Recent Labs   Lab Test  02/02/17   0535  02/01/17   1110   AST  28  35   ALT  19  18   ALKPHOS  89  98   BILITOTAL  0.3  0.7     Most Recent INR's and Anticoagulation Dosing History:  Anticoagulation Dose History     There is no flowsheet data to display.        Most Recent 3 Troponin's:No lab results found.  Most Recent Cholesterol Panel:No lab results found.  Most Recent 6 Bacteria Isolates From Any Culture (See EPIC Reports for Culture Details):  Recent Labs   Lab Test  02/01/17   1355  02/01/17   0858  09/04/16   2140  07/26/16   1126  01/11/14   1130  05/28/13   0936   CULT  Culture in progress  Culture negative < 24 hours, reincubate  <10,000 colonies/mL mixed urogenital yamil Susceptibility testing not routinely   done    <10,000 colonies/mL urogenital aymil  Light growth Gram positive cocci No further identification Light growth Strain 2 Gram positive cocci No further identification*  No growth  No growth     Most Recent TSH, T4 and A1c Labs:  Recent Labs   Lab Test  01/23/14   1557   TSH  1.66   T4  1.01

## 2017-02-03 LAB
BACTERIA SPEC CULT: NORMAL
MICRO REPORT STATUS: NORMAL
SPECIMEN SOURCE: NORMAL

## 2017-02-04 LAB
BACTERIA SPEC CULT: NORMAL
MICRO REPORT STATUS: NORMAL
SPECIMEN SOURCE: NORMAL

## 2017-02-07 ENCOUNTER — PRENATAL OFFICE VISIT (OUTPATIENT)
Dept: OBGYN | Facility: CLINIC | Age: 37
End: 2017-02-07
Payer: MEDICAID

## 2017-02-07 VITALS
BODY MASS INDEX: 28.47 KG/M2 | TEMPERATURE: 98 F | DIASTOLIC BLOOD PRESSURE: 52 MMHG | WEIGHT: 145 LBS | SYSTOLIC BLOOD PRESSURE: 94 MMHG

## 2017-02-07 DIAGNOSIS — Z34.83 PRENATAL CARE, SUBSEQUENT PREGNANCY, THIRD TRIMESTER: Primary | ICD-10-CM

## 2017-02-07 DIAGNOSIS — R11.0 NAUSEA: ICD-10-CM

## 2017-02-07 DIAGNOSIS — E86.0 DEHYDRATION: Primary | ICD-10-CM

## 2017-02-07 PROCEDURE — 99212 OFFICE O/P EST SF 10 MIN: CPT | Performed by: FAMILY MEDICINE

## 2017-02-07 RX ORDER — ONDANSETRON 8 MG/1
8 TABLET, FILM COATED ORAL EVERY 8 HOURS PRN
Qty: 20 TABLET | Refills: 1 | Status: SHIPPED | OUTPATIENT
Start: 2017-02-07 | End: 2018-05-03

## 2017-02-07 NOTE — PATIENT INSTRUCTIONS
Return in 1 week.    Phone numbers [place]:  Day/ night [phone] ask for ob triage  Emergency:  Call labor and delivery:  [phone]    What should I call about??    Contraction every 5 minutes for 1 hour, bleeding, loss of fluid, headache that doesn't resolve with tylenol, and decreased fetal movement       [facility] Address   [address], [place] [address]  [phone]    Dr. Cindy Laurent, DO    OB/GYN   [facility] and [facility]              Dr. Cindy Laurent, DO    Obstetrics and Gynecology  [facility] [place] and [place]

## 2017-02-07 NOTE — NURSING NOTE
"Chief Complaint   Patient presents with     Prenatal Care       Initial BP 94/52 mmHg  Temp(Src) 98  F (36.7  C) (Oral)  Wt 145 lb (65.772 kg)  LMP 05/29/2016 Estimated body mass index is 28.47 kg/(m^2) as calculated from the following:    Height as of 2/1/17: 4' 11.84\" (1.52 m).    Weight as of this encounter: 145 lb (65.772 kg).  Medication Reconciliation: complete   Desiree Alas MA    "

## 2017-02-07 NOTE — PROGRESS NOTES
CC: Here for routine prenatal visit @ 37w3d   HPI: Patient presented to ED 17 for influenza and is starting to feel better. She reports feeling very dehydrated and weak.   BP 94/52 mmHg  Temp(Src) 98  F (36.7  C) (Oral)  Wt 65.772 kg (145 lb)  LMP 2016   See OB flowsheet    No vaginal bleeding, no LOF, no contractions     This document serves as a record of the services and decisions personally performed and made by Cindy Laurent DO. It was created on his/her behalf by Solange Jimenez, a trained medical scribe. The creation of this document is based the provider's statements to the medical scribe.  Mengibkaterina Jimenez 10:02 AM, 2017      ASSESSMENT/PLAN:  Rizwana Plummer is a 36 year old year old    @ 37w3d wks EGA with EDC 2017 who presents to the clinic for an ob visit.     1) I Hx of CS, would like ,  paperwork given at last visit, [place] consent signed 16  2)  tdap and flu Injections done  3) insomnia:  On unisom, will increase to 1.5 tablets    4) Return in 1 week.  5) GBS and bedside US done today    6) Girl  7) Growth US for 17  8) Dehydration from influenza: IV fluids twice per week, begin zofran and Pedialyte     The information in this document, created by the medical scribe for me, accurately reflects the services I personally performed and the decisions made by me. I have reviewed and approved this document for accuracy prior to leaving the patient care area.  Cindy Laurent DO  10:02 AM, 2017      Dr. Cindy Laurent DO   OB/GYN   [facility]

## 2017-02-07 NOTE — MR AVS SNAPSHOT
After Visit Summary   2/7/2017    Rizwana Plummer    MRN: [id number]           Patient Information     Date Of Birth          1980        Visit Information        Provider Department      2/7/2017 10:00 AM [facility]        Today's Diagnoses     Prenatal care, subsequent pregnancy, third trimester    -  1       Care Instructions    Return in 1 week.    Phone numbers [place]:  Day/ night [phone] ask for ob triage  Emergency:  Call labor and delivery:  [phone]    What should I call about??    Contraction every 5 minutes for 1 hour, bleeding, loss of fluid, headache that doesn't resolve with tylenol, and decreased fetal movement       [facility] Address   [address] [facility] [place] [address] [phone]    Dr. Cindy Laurent, DO    OB/GYN   [facility]              Dr. Cindy Laurent, DO    Obstetrics and Gynecology  [facility] and [place]               Follow-ups after your visit        Your next 10 appointments already scheduled     Feb 08, 2017  3:30 PM   [facility]ABL[facility]    [address] [place] [address] [phone]            Feb 16, 2017 11:15 AM   US OB LIMITED ONE OR MORE FETUSES with RIUS[facility]   [facility]    [address]  [address]  [facility] [id number] [phone]           Please bring a list of your medicines (including vitamins, minerals and over-the-counter drugs). Also, tell your doctor about any allergies you may have. Wear comfortable clothes and leave your valuables at home.  If you re less than 20 weeks drink four 8-ounce glasses of fluid an hour before your exam. If you need to empty your bladder before your exam, try to release only a little urine. Then, drink another glass of  fluid.  You may have up to two family members in the exam room. If you bring a small child, an adult must be there to care for him or her.  Please call the Imaging Department at your exam site with any questions.            Feb 16, 2017  2:45 PM   ESTABLISHED PRENATAL with Cindy Laurent, DO   [facility] ([facility]    [address]  [facility] [id number] [phone]              Who to contact     If you have questions or need follow up information about today's clinic visit or your schedule please contact [facility] directly at [phone].  Normal or non-critical lab and imaging results will be communicated to you by MyChart, letter or phone within 4 business days after the clinic has received the results. If you do not hear from us within 7 days, please contact the clinic through Corporahart or phone. If you have a critical or abnormal lab result, we will notify you by phone as soon as possible.  Submit refill requests through Fara or call your pharmacy and they will forward the refill request to us. Please allow 3 business days for your refill to be completed.          Additional Information About Your Visit        CorporaharGlyde Information     Fara gives you secure access to your electronic health record. If you see a primary care provider, you can also send messages to your care team and make appointments. If you have questions, please call your primary care clinic.  If you do not have a primary care provider, please call [phone] and they will assist you.        Care EveryWhere ID     This is your Care EveryWhere ID. This could be used by other organizations to access your [place] medical records  [id number]        Your Vitals Were     Last Period                   05/29/2016            Blood Pressure from Last 3 Encounters:   02/02/17 92/60   02/01/17 90/55   01/26/17 110/60    Weight from Last 3 Encounters:   02/01/17 139 lb (63.05  kg)   01/26/17 139 lb 6.4 oz (63.231 kg)   12/23/16 135 lb 3.2 oz (61.326 kg)              Today, you had the following     No orders found for display       Primary Care Provider Office Phone # Fax #    Cindy Laurent [phone]       [facility] [address] [facility] [facility] [address]        Thank you!     Thank you for choosing [facility]  for your care. Our goal is always to provide you with excellent care. Hearing back from our patients is one way we can continue to improve our services. Please take a few minutes to complete the written survey that you may receive in the mail after your visit with us. Thank you!             Your Updated Medication List - Protect others around you: Learn how to safely use, store and throw away your medicines at www.disposemymeds.org.          This list is accurate as of: 2/7/17 10:09 AM.  Always use your most recent med list.                   Brand Name Dispense Instructions for use    Abdominal Binder/Elastic 3XL Misc     1 each    1 Device daily       acetaminophen 325 MG tablet    TYLENOL    100 tablet    Take 2 tablets (650 mg) by mouth every 4 hours as needed for mild pain       diphenhydrAMINE 25 MG tablet    BENADRYL    60 tablet    Take 1-2 tablets (25-50 mg) by mouth every 6 hours as needed for sleep       ferrous sulfate 325 (65 FE) MG tablet    IRON    60 tablet    Take 1 tablet (325 mg) by mouth 2 times daily

## 2017-02-09 ENCOUNTER — INFUSION THERAPY VISIT (OUTPATIENT)
Dept: INFUSION THERAPY | Facility: CLINIC | Age: 37
End: 2017-02-09
Attending: FAMILY MEDICINE
Payer: MEDICAID

## 2017-02-09 VITALS — DIASTOLIC BLOOD PRESSURE: 52 MMHG | TEMPERATURE: 98.4 F | SYSTOLIC BLOOD PRESSURE: 87 MMHG

## 2017-02-09 DIAGNOSIS — E86.0 DEHYDRATION: Primary | ICD-10-CM

## 2017-02-09 PROCEDURE — 25000125 ZZHC RX 250: Performed by: FAMILY MEDICINE

## 2017-02-09 PROCEDURE — 25000128 H RX IP 250 OP 636: Performed by: FAMILY MEDICINE

## 2017-02-09 PROCEDURE — 96365 THER/PROPH/DIAG IV INF INIT: CPT

## 2017-02-09 RX ADMIN — ONDANSETRON HYDROCHLORIDE: 2 INJECTION, SOLUTION INTRAVENOUS at 13:24

## 2017-02-09 RX ADMIN — DEXTROSE MONOHYDRATE 500 ML: 50 INJECTION, SOLUTION INTRAVENOUS at 14:13

## 2017-02-09 RX ADMIN — SODIUM CHLORIDE 500 ML: 9 INJECTION, SOLUTION INTRAVENOUS at 13:24

## 2017-02-09 NOTE — MR AVS SNAPSHOT
After Visit Summary   2/9/2017    Rizwana Plummer    MRN: [id number]           Patient Information     Date Of Birth          1980        Visit Information        Provider Department      2/9/2017 1:00 PM RH INFUSION CHAIR 4 [facility]        Today's Diagnoses     Dehydration    -  1        Follow-ups after your visit        Your next 10 appointments already scheduled     Feb 14, 2017  3:00 PM   Level 1 with RH INFUSION CHAIR 3   [facility] ([facility])    [facility]  [address]  [facility] [address]   [phone]            Feb 16, 2017 11:15 AM   US OB LIMITED ONE OR MORE FETUSES with RIUS1   [facility] ([facility]    [address]  [address]  [facility] [address] [phone]           Please bring a list of your medicines (including vitamins, minerals and over-the-counter drugs). Also, tell your doctor about any allergies you may have. Wear comfortable clothes and leave your valuables at home.  If you re less than 20 weeks drink four 8-ounce glasses of fluid an hour before your exam. If you need to empty your bladder before your exam, try to release only a little urine. Then, drink another glass of fluid.  You may have up to two family members in the exam room. If you bring a small child, an adult must be there to care for him or her.  Please call the Imaging Department at your exam site with any questions.            Feb 16, 2017  2:45 PM   ESTABLISHED PRENATAL with Cindy Laurent,    [facility] ([facility]    [address] [place] [address] [phone]            Feb 17, 2017 11:30 AM   Level 1 with RH INFUSION CHAIR 8   [facility] ([facility])    [facility]  [address]  [facility] [address] [phone]              Who to contact     If you have questions or need follow up information about today's clinic visit or your schedule please contact [facility] directly at [phone].  Normal or non-critical lab and imaging results will be communicated to you by MyChart, letter or phone within 4 business days after the clinic has received the results. If you do not hear from us within 7 days, please contact the clinic through Facile Systemhart or phone. If you have a critical or abnormal lab result, we will notify you by phone as soon as possible.  Submit refill requests through GigaLogix or call your pharmacy and they will forward the refill request to us. Please allow 3 business days for your refill to be completed.          Additional Information About Your Visit        Facile Systemhart Information     G[employer] gives you secure access to your electronic health record. If you see a primary care provider, you can also send messages to your care team and make appointments. If you have questions, please call your primary care clinic.  If you do not have a primary care provider, please call [phone] and they will assist you.        Care EveryWhere ID     This is your Care EveryWhere ID. This could be used by other organizations to access your [place] medical records  [id number]        Your Vitals Were     Temperature Last Period                98.4  F (36.9  C) (Tympanic) 05/29/2016           Blood Pressure from Last 3 Encounters:   02/09/17 87/52   02/07/17 94/52   02/02/17 92/60    Weight from Last 3 Encounters:   02/07/17 65.772 kg (145 lb)   02/01/17 63.05 kg (139 lb)   01/26/17 63.231 kg (139 lb 6.4 oz)              Today, you had the following     No orders found for display       Primary Care Provider Office Phone # Fax #    Cindy Laurent [phone]       [facility] [address]  [facility] [address]         Thank you!     Thank you for choosing [facility]  for your care. Our goal is always to provide you with excellent care. Hearing back from our patients is one way we can continue to improve our services. Please take a few minutes to complete the written survey that you may receive in the mail after your visit with us. Thank you!             Your Updated Medication List - Protect others around you: Learn how to safely use, store and throw away your medicines at www.disposemymeds.org.          This list is accurate as of: 2/9/17  2:49 PM.  Always use your most recent med list.                   Brand Name Dispense Instructions for use    Abdominal Binder/Elastic 3XL Misc     1 each    1 Device daily       acetaminophen 325 MG tablet    TYLENOL    100 tablet    Take 2 tablets (650 mg) by mouth every 4 hours as needed for mild pain       diphenhydrAMINE 25 MG tablet    BENADRYL    60 tablet    Take 1-2 tablets (25-50 mg) by mouth every 6 hours as needed for sleep       ferrous sulfate 325 (65 FE) MG tablet    IRON    60 tablet    Take 1 tablet (325 mg) by mouth 2 times daily       ondansetron 8 MG tablet    ZOFRAN    20 tablet    Take 1 tablet (8 mg) by mouth every 8 hours as needed for nausea

## 2017-02-09 NOTE — PROGRESS NOTES
Infusion Nursing Note:  Rizwana Plummer presents today for IVF and Zofran.    Patient seen by provider today: No   present during visit today: Not Applicable.    Note: Pt states nausea.    Intravenous Access:  Peripheral IV placed.    Treatment Conditions:  Not Applicable.      Post Infusion Assessment:  Patient tolerated infusion without incident.  Blood return noted pre and post infusion.  Site patent and intact, free from redness, edema or discomfort.  Access discontinued per protocol.    Discharge Plan:   Discharge instructions reviewed with: Patient.  Patient and/or family verbalized understanding of discharge instructions and all questions answered.  Copy of AVS reviewed with patient and/or family.  Patient will return Tues for next appointment.  Patient discharged in stable condition accompanied by: self.  Departure Mode: Ambulatory.    Johanna Wilson RN

## 2017-02-15 ENCOUNTER — INFUSION THERAPY VISIT (OUTPATIENT)
Dept: INFUSION THERAPY | Facility: CLINIC | Age: 37
End: 2017-02-15
Attending: FAMILY MEDICINE
Payer: MEDICAID

## 2017-02-15 VITALS — TEMPERATURE: 98.1 F | RESPIRATION RATE: 16 BRPM | DIASTOLIC BLOOD PRESSURE: 55 MMHG | SYSTOLIC BLOOD PRESSURE: 99 MMHG

## 2017-02-15 DIAGNOSIS — E86.0 DEHYDRATION: Primary | ICD-10-CM

## 2017-02-15 PROCEDURE — 25000128 H RX IP 250 OP 636: Performed by: FAMILY MEDICINE

## 2017-02-15 PROCEDURE — 96361 HYDRATE IV INFUSION ADD-ON: CPT

## 2017-02-15 PROCEDURE — 96374 THER/PROPH/DIAG INJ IV PUSH: CPT

## 2017-02-15 PROCEDURE — 25000125 ZZHC RX 250: Performed by: FAMILY MEDICINE

## 2017-02-15 RX ORDER — ONDANSETRON 2 MG/ML
8 INJECTION INTRAMUSCULAR; INTRAVENOUS EVERY 6 HOURS PRN
Status: CANCELLED
Start: 2017-02-15

## 2017-02-15 RX ORDER — ONDANSETRON 2 MG/ML
8 INJECTION INTRAMUSCULAR; INTRAVENOUS EVERY 6 HOURS PRN
Status: DISCONTINUED | OUTPATIENT
Start: 2017-02-15 | End: 2017-02-15 | Stop reason: HOSPADM

## 2017-02-15 RX ADMIN — SODIUM CHLORIDE 500 ML: 9 INJECTION, SOLUTION INTRAVENOUS at 13:00

## 2017-02-15 RX ADMIN — ONDANSETRON 8 MG: 2 INJECTION INTRAMUSCULAR; INTRAVENOUS at 13:28

## 2017-02-15 RX ADMIN — DEXTROSE MONOHYDRATE 500 ML: 50 INJECTION, SOLUTION INTRAVENOUS at 13:30

## 2017-02-15 NOTE — MR AVS SNAPSHOT
After Visit Summary   2/15/2017    Rizwana Plummer    MRN: [id number]           Patient Information     Date Of Birth          1980        Visit Information        Provider Department      2/15/2017 1:00 PM RH INFUSION CHAIR 2 [facility]fusion [facility]        Today's Diagnoses     Dehydration    -  1       Follow-ups after your visit        Your next 10 appointments already scheduled     Feb 16, 2017 11:15 AM CST   US OB LIMITED ONE OR MORE FETUSES with RIUS1   [facility] ([facility]    [address]  [address]  [facility] [address] [phone]           Please bring a list of your medicines (including vitamins, minerals and over-the-counter drugs). Also, tell your doctor about any allergies you may have. Wear comfortable clothes and leave your valuables at home.  If you re less than 20 weeks drink four 8-ounce glasses of fluid an hour before your exam. If you need to empty your bladder before your exam, try to release only a little urine. Then, drink another glass of fluid.  You may have up to two family members in the exam room. If you bring a small child, an adult must be there to care for him or her.  Please call the Imaging Department at your exam site with any questions.            Feb 16, 2017  2:45 PM CST   ESTABLISHED PRENATAL with Cindy Laurent,    [facility] ([facility])    [address] [place] [id number] [phone]            Feb 17, 2017  1:00 PM CST   Level 3 with RH INFUSION CHAIR 3   [facility] ([facility])    [facility] [address]  [address]  [facility] [id number] [phone]              Who to contact     If you have questions or need follow up information about today's clinic visit or your schedule please contact [facility] INFUSION SERVICES  directly at [phone].  Normal or non-critical lab and imaging results will be communicated to you by MyChart, letter or phone within 4 business days after the clinic has received the results. If you do not hear from us within 7 days, please contact the clinic through [employer] or phone. If you have a critical or abnormal lab result, we will notify you by phone as soon as possible.  Submit refill requests through [employer] or call your pharmacy and they will forward the refill request to us. Please allow 3 business days for your refill to be completed.          Additional Information About Your Visit        [employer] Information     [employer] gives you secure access to your electronic health record. If you see a primary care provider, you can also send messages to your care team and make appointments. If you have questions, please call your primary care clinic.  If you do not have a primary care provider, please call [phone] and they will assist you.        Care EveryWhere ID     This is your Care EveryWhere ID. This could be used by other organizations to access your [facility] medical records  [id number]        Your Vitals Were     Temperature Respirations Last Period             98.1  F (36.7  C) (Tympanic) 16 05/29/2016          Blood Pressure from Last 3 Encounters:   02/15/17 99/55   02/09/17 (!) 87/52   02/07/17 94/52    Weight from Last 3 Encounters:   02/07/17 65.8 kg (145 lb)   02/01/17 63 kg (139 lb)   01/26/17 63.2 kg (139 lb 6.4 oz)              We Performed the Following     MD Instruction for Therapy Plan        Primary Care Provider Office Phone # Fax #    Cindy Laurent [phone]       [facility] [address] [facility] [address]        Thank you!     Thank you for choosing [facility] INFUSION SERVICES  for your care. Our goal is always to provide you with excellent care. Hearing back from our patients is one way we can continue to  improve our services. Please take a few minutes to complete the written survey that you may receive in the mail after your visit with us. Thank you!             Your Updated Medication List - Protect others around you: Learn how to safely use, store and throw away your medicines at www.disposemymeds.org.          This list is accurate as of: 2/15/17  3:43 PM.  Always use your most recent med list.                   Brand Name Dispense Instructions for use    Abdominal Binder/Elastic 3XL Misc     1 each    1 Device daily       acetaminophen 325 MG tablet    TYLENOL    100 tablet    Take 2 tablets (650 mg) by mouth every 4 hours as needed for mild pain       diphenhydrAMINE 25 MG tablet    BENADRYL    60 tablet    Take 1-2 tablets (25-50 mg) by mouth every 6 hours as needed for sleep       ferrous sulfate 325 (65 FE) MG tablet    IRON    60 tablet    Take 1 tablet (325 mg) by mouth 2 times daily       ondansetron 8 MG tablet    ZOFRAN    20 tablet    Take 1 tablet (8 mg) by mouth every 8 hours as needed for nausea

## 2017-02-15 NOTE — PROGRESS NOTES
Infusion Nursing Note:  Rizwana Plummer presents today for IVF and Zofran.    Patient seen by provider today: No   present during visit today: Not Applicable.    Note: N/A.    Intravenous Access:  Peripheral IV placed.    Treatment Conditions:  Not Applicable.      Post Infusion Assessment:  Patient tolerated infusion without incident.  Blood return noted pre and post infusion.  Site patent and intact, free from redness, edema or discomfort.  No evidence of extravasations.  Access discontinued per protocol.    Discharge Plan:   Discharge instructions reviewed with: Patient.  Patient and/or family verbalized understanding of discharge instructions and all questions answered.  AVS to patient via [employer].  Patient will return 2/17/2017 for next appointment.   Patient discharged in stable condition accompanied by: self.  Departure Mode: Ambulatory.    Sophie Kaur RN

## 2017-02-16 ENCOUNTER — TELEPHONE (OUTPATIENT)
Dept: OBGYN | Facility: CLINIC | Age: 37
End: 2017-02-16

## 2017-02-16 ENCOUNTER — RADIANT APPOINTMENT (OUTPATIENT)
Dept: ULTRASOUND IMAGING | Facility: CLINIC | Age: 37
End: 2017-02-16
Attending: FAMILY MEDICINE
Payer: MEDICAID

## 2017-02-16 DIAGNOSIS — Z34.83 PRENATAL CARE, SUBSEQUENT PREGNANCY, THIRD TRIMESTER: Primary | ICD-10-CM

## 2017-02-16 DIAGNOSIS — O09.293 IUGR (INTRAUTERINE GROWTH RESTRICTION) IN PRIOR PREGNANCY, PREGNANT, THIRD TRIMESTER: Primary | ICD-10-CM

## 2017-02-16 DIAGNOSIS — O36.5990 INTRAUTERINE GROWTH RESTRICTION (IUGR) AFFECTING CARE OF MOTHER: Primary | ICD-10-CM

## 2017-02-16 DIAGNOSIS — Z34.82 PRENATAL CARE, SUBSEQUENT PREGNANCY, SECOND TRIMESTER: ICD-10-CM

## 2017-02-16 PROCEDURE — 76815 OB US LIMITED FETUS(S): CPT | Performed by: FAMILY MEDICINE

## 2017-02-16 NOTE — TELEPHONE ENCOUNTER
Patient Needs dopplers radiology and BPP       Dr. Cindy Laurent, DO    Obstetrics and Gynecology  [facility] and [place]

## 2017-02-16 NOTE — TELEPHONE ENCOUNTER
Discussed case with MFM (asymmetric IUGR on us) they have   No openings in their clinic  Patient needs umbilical artery dopplers and bPP Friday and if abnormal delivery via CS (due to  Breech and )   If normal then needs at 39 weeks, I'm on call ok to arrange that. Will set up once I can get a hold of the patient.   RN to call patient  To get the BPP with UA dopplers tomorrow.      Called patient Again. Left message on answering machine for patient to call back.      Dr. Cindy Laurent, DO    Obstetrics and Gynecology  [facility] and [place]

## 2017-02-16 NOTE — NURSING NOTE
Pt had US today in [facility]. Pt had appt with Dr. Laurent at 2:45pm but pt rescheduled appt for next week. Called pt with no answer. LMOVM via [employer]  to call clinic back and that Dr. Laurent needs to see her today.    [facility] order placed for comprehensive US. Dr. Laurent would like her to have the [facility] US tomorrow.  Domi Benson CMA

## 2017-02-17 ENCOUNTER — TELEPHONE (OUTPATIENT)
Dept: OBGYN | Facility: CLINIC | Age: 37
End: 2017-02-17

## 2017-02-17 ENCOUNTER — HOSPITAL ENCOUNTER (OUTPATIENT)
Dept: ULTRASOUND IMAGING | Facility: CLINIC | Age: 37
Discharge: HOME | End: 2017-02-17
Attending: FAMILY MEDICINE | Admitting: FAMILY MEDICINE
Payer: MEDICAID

## 2017-02-17 DIAGNOSIS — Z34.83 PRENATAL CARE, SUBSEQUENT PREGNANCY, THIRD TRIMESTER: ICD-10-CM

## 2017-02-17 PROCEDURE — 76819 FETAL BIOPHYS PROFIL W/O NST: CPT

## 2017-02-17 NOTE — TELEPHONE ENCOUNTER
Pt walks in, checking on plan for care regarding US and possible .    MFM calls, reports they were overbooked so needed to scheduled pt a US instead t/ clinic or hospital.     Per  telephone encounter MD already aware of MFM situation. Pt has appt for today at 4:40 at hospital to have US completed.     Discussed above with pt. Pt asks if pt is growing all right. Reviewed  US result note and  telephone MD note with pt. Pt was under the impression that she would be getting a medication. Per epic no documentation of a med being advised. After further conversation pt misunderstood MFM is a clinic, not a medication. Reviewed plan of care with pt and pt verbalized understanding and agreement.

## 2017-02-18 ENCOUNTER — ANESTHESIA EVENT (OUTPATIENT)
Dept: SURGERY | Facility: CLINIC | Age: 37
End: 2017-02-18
Payer: MEDICAID

## 2017-02-18 ENCOUNTER — ANESTHESIA (OUTPATIENT)
Dept: SURGERY | Facility: CLINIC | Age: 37
End: 2017-02-18
Payer: MEDICAID

## 2017-02-18 ENCOUNTER — SURGERY (OUTPATIENT)
Age: 37
End: 2017-02-18

## 2017-02-18 ENCOUNTER — HOSPITAL ENCOUNTER (INPATIENT)
Facility: CLINIC | Age: 37
LOS: 3 days | Discharge: HOME | End: 2017-02-21
Attending: FAMILY MEDICINE | Admitting: FAMILY MEDICINE
Payer: MEDICAID

## 2017-02-18 DIAGNOSIS — Z98.891 STATUS POST CESAREAN DELIVERY: Primary | ICD-10-CM

## 2017-02-18 LAB
ABO + RH BLD: NORMAL
ABO + RH BLD: NORMAL
BLD GP AB SCN SERPL QL: NORMAL
BLOOD BANK CMNT PATIENT-IMP: NORMAL
HGB BLD-MCNC: 12.5 G/DL (ref 11.7–15.7)
SPECIMEN EXP DATE BLD: NORMAL

## 2017-02-18 PROCEDURE — 37000009 ZZH ANESTHESIA TECHNICAL FEE, EACH ADDTL 15 MIN: Performed by: FAMILY MEDICINE

## 2017-02-18 PROCEDURE — 88307 TISSUE EXAM BY PATHOLOGIST: CPT | Performed by: FAMILY MEDICINE

## 2017-02-18 PROCEDURE — 27210794 ZZH OR GENERAL SUPPLY STERILE: Performed by: FAMILY MEDICINE

## 2017-02-18 PROCEDURE — 86900 BLOOD TYPING SEROLOGIC ABO: CPT | Performed by: FAMILY MEDICINE

## 2017-02-18 PROCEDURE — 71000012 ZZH RECOVERY PHASE 1 LEVEL 1 FIRST HR: Performed by: FAMILY MEDICINE

## 2017-02-18 PROCEDURE — 36000056 ZZH SURGERY LEVEL 3 1ST 30 MIN: Performed by: FAMILY MEDICINE

## 2017-02-18 PROCEDURE — 59510 CESAREAN DELIVERY: CPT | Performed by: FAMILY MEDICINE

## 2017-02-18 PROCEDURE — 88307 TISSUE EXAM BY PATHOLOGIST: CPT | Mod: 26 | Performed by: FAMILY MEDICINE

## 2017-02-18 PROCEDURE — 12000029 ZZH R&B OB INTERMEDIATE

## 2017-02-18 PROCEDURE — 86780 TREPONEMA PALLIDUM: CPT | Performed by: FAMILY MEDICINE

## 2017-02-18 PROCEDURE — 86850 RBC ANTIBODY SCREEN: CPT | Performed by: FAMILY MEDICINE

## 2017-02-18 PROCEDURE — 25000128 H RX IP 250 OP 636: Performed by: NURSE ANESTHETIST, CERTIFIED REGISTERED

## 2017-02-18 PROCEDURE — 25800025 ZZH RX 258: Performed by: FAMILY MEDICINE

## 2017-02-18 PROCEDURE — 36000058 ZZH SURGERY LEVEL 3 EA 15 ADDTL MIN: Performed by: FAMILY MEDICINE

## 2017-02-18 PROCEDURE — 86901 BLOOD TYPING SEROLOGIC RH(D): CPT | Performed by: FAMILY MEDICINE

## 2017-02-18 PROCEDURE — 85018 HEMOGLOBIN: CPT | Performed by: FAMILY MEDICINE

## 2017-02-18 PROCEDURE — 25000128 H RX IP 250 OP 636: Performed by: FAMILY MEDICINE

## 2017-02-18 PROCEDURE — 25000132 ZZH RX MED GY IP 250 OP 250 PS 637: Performed by: FAMILY MEDICINE

## 2017-02-18 PROCEDURE — 25800025 ZZH RX 258: Performed by: NURSE ANESTHETIST, CERTIFIED REGISTERED

## 2017-02-18 PROCEDURE — 37000008 ZZH ANESTHESIA TECHNICAL FEE, 1ST 30 MIN: Performed by: FAMILY MEDICINE

## 2017-02-18 PROCEDURE — C1765 ADHESION BARRIER: HCPCS | Performed by: FAMILY MEDICINE

## 2017-02-18 PROCEDURE — 25000125 ZZHC RX 250: Performed by: NURSE ANESTHETIST, CERTIFIED REGISTERED

## 2017-02-18 RX ORDER — CEFAZOLIN SODIUM 2 G/100ML
2 INJECTION, SOLUTION INTRAVENOUS
Status: DISCONTINUED | OUTPATIENT
Start: 2017-02-18 | End: 2017-02-18 | Stop reason: HOSPADM

## 2017-02-18 RX ORDER — ONDANSETRON 4 MG/1
4 TABLET, ORALLY DISINTEGRATING ORAL EVERY 30 MIN PRN
Status: DISCONTINUED | OUTPATIENT
Start: 2017-02-18 | End: 2017-02-18

## 2017-02-18 RX ORDER — ACETAMINOPHEN 325 MG/1
650 TABLET ORAL EVERY 4 HOURS PRN
Status: DISCONTINUED | OUTPATIENT
Start: 2017-02-21 | End: 2017-02-21 | Stop reason: HOSPADM

## 2017-02-18 RX ORDER — ONDANSETRON 2 MG/ML
INJECTION INTRAMUSCULAR; INTRAVENOUS PRN
Status: DISCONTINUED | OUTPATIENT
Start: 2017-02-18 | End: 2017-02-18

## 2017-02-18 RX ORDER — SODIUM CHLORIDE, SODIUM LACTATE, POTASSIUM CHLORIDE, CALCIUM CHLORIDE 600; 310; 30; 20 MG/100ML; MG/100ML; MG/100ML; MG/100ML
INJECTION, SOLUTION INTRAVENOUS CONTINUOUS
Status: DISCONTINUED | OUTPATIENT
Start: 2017-02-18 | End: 2017-02-18 | Stop reason: HOSPADM

## 2017-02-18 RX ORDER — LABETALOL HYDROCHLORIDE 5 MG/ML
10 INJECTION, SOLUTION INTRAVENOUS
Status: DISCONTINUED | OUTPATIENT
Start: 2017-02-18 | End: 2017-02-18

## 2017-02-18 RX ORDER — MISOPROSTOL 200 UG/1
400 TABLET ORAL
Status: DISCONTINUED | OUTPATIENT
Start: 2017-02-18 | End: 2017-02-21 | Stop reason: HOSPADM

## 2017-02-18 RX ORDER — ONDANSETRON 2 MG/ML
4 INJECTION INTRAMUSCULAR; INTRAVENOUS EVERY 6 HOURS PRN
Status: DISCONTINUED | OUTPATIENT
Start: 2017-02-18 | End: 2017-02-18

## 2017-02-18 RX ORDER — LIDOCAINE 40 MG/G
CREAM TOPICAL
Status: DISCONTINUED | OUTPATIENT
Start: 2017-02-18 | End: 2017-02-18

## 2017-02-18 RX ORDER — LIDOCAINE 40 MG/G
CREAM TOPICAL
Status: DISCONTINUED | OUTPATIENT
Start: 2017-02-18 | End: 2017-02-21 | Stop reason: HOSPADM

## 2017-02-18 RX ORDER — AMOXICILLIN 250 MG
1-2 CAPSULE ORAL 2 TIMES DAILY
Status: DISCONTINUED | OUTPATIENT
Start: 2017-02-18 | End: 2017-02-21 | Stop reason: HOSPADM

## 2017-02-18 RX ORDER — SODIUM CHLORIDE, SODIUM LACTATE, POTASSIUM CHLORIDE, CALCIUM CHLORIDE 600; 310; 30; 20 MG/100ML; MG/100ML; MG/100ML; MG/100ML
INJECTION, SOLUTION INTRAVENOUS CONTINUOUS
Status: DISCONTINUED | OUTPATIENT
Start: 2017-02-18 | End: 2017-02-18

## 2017-02-18 RX ORDER — CEFAZOLIN SODIUM 1 G/3ML
INJECTION, POWDER, FOR SOLUTION INTRAMUSCULAR; INTRAVENOUS PRN
Status: DISCONTINUED | OUTPATIENT
Start: 2017-02-18 | End: 2017-02-18

## 2017-02-18 RX ORDER — DIPHENHYDRAMINE HCL 25 MG
25 CAPSULE ORAL EVERY 6 HOURS PRN
Status: DISCONTINUED | OUTPATIENT
Start: 2017-02-18 | End: 2017-02-21 | Stop reason: HOSPADM

## 2017-02-18 RX ORDER — ONDANSETRON 2 MG/ML
4 INJECTION INTRAMUSCULAR; INTRAVENOUS EVERY 6 HOURS PRN
Status: DISCONTINUED | OUTPATIENT
Start: 2017-02-18 | End: 2017-02-21 | Stop reason: HOSPADM

## 2017-02-18 RX ORDER — HYDRALAZINE HYDROCHLORIDE 20 MG/ML
2.5-5 INJECTION INTRAMUSCULAR; INTRAVENOUS EVERY 10 MIN PRN
Status: DISCONTINUED | OUTPATIENT
Start: 2017-02-18 | End: 2017-02-18

## 2017-02-18 RX ORDER — GLYCOPYRROLATE 0.2 MG/ML
INJECTION, SOLUTION INTRAMUSCULAR; INTRAVENOUS PRN
Status: DISCONTINUED | OUTPATIENT
Start: 2017-02-18 | End: 2017-02-18

## 2017-02-18 RX ORDER — ACETAMINOPHEN 325 MG/1
975 TABLET ORAL EVERY 8 HOURS
Status: DISPENSED | OUTPATIENT
Start: 2017-02-18 | End: 2017-02-21

## 2017-02-18 RX ORDER — NALBUPHINE HYDROCHLORIDE 10 MG/ML
2.5-5 INJECTION, SOLUTION INTRAMUSCULAR; INTRAVENOUS; SUBCUTANEOUS EVERY 6 HOURS PRN
Status: DISCONTINUED | OUTPATIENT
Start: 2017-02-18 | End: 2017-02-18 | Stop reason: HOSPADM

## 2017-02-18 RX ORDER — SIMETHICONE 80 MG
80 TABLET,CHEWABLE ORAL 4 TIMES DAILY PRN
Status: DISCONTINUED | OUTPATIENT
Start: 2017-02-18 | End: 2017-02-21 | Stop reason: HOSPADM

## 2017-02-18 RX ORDER — EPHEDRINE SULFATE 50 MG/ML
INJECTION, SOLUTION INTRAVENOUS PRN
Status: DISCONTINUED | OUTPATIENT
Start: 2017-02-18 | End: 2017-02-18

## 2017-02-18 RX ORDER — FERROUS SULFATE 325(65) MG
325 TABLET ORAL 2 TIMES DAILY
Status: DISCONTINUED | OUTPATIENT
Start: 2017-02-18 | End: 2017-02-21 | Stop reason: HOSPADM

## 2017-02-18 RX ORDER — NALBUPHINE HYDROCHLORIDE 10 MG/ML
2.5-5 INJECTION, SOLUTION INTRAMUSCULAR; INTRAVENOUS; SUBCUTANEOUS EVERY 6 HOURS PRN
Status: DISCONTINUED | OUTPATIENT
Start: 2017-02-18 | End: 2017-02-18

## 2017-02-18 RX ORDER — DIPHENHYDRAMINE HYDROCHLORIDE 50 MG/ML
25 INJECTION INTRAMUSCULAR; INTRAVENOUS EVERY 6 HOURS PRN
Status: DISCONTINUED | OUTPATIENT
Start: 2017-02-18 | End: 2017-02-21 | Stop reason: HOSPADM

## 2017-02-18 RX ORDER — NALOXONE HYDROCHLORIDE 0.4 MG/ML
.1-.4 INJECTION, SOLUTION INTRAMUSCULAR; INTRAVENOUS; SUBCUTANEOUS
Status: DISCONTINUED | OUTPATIENT
Start: 2017-02-18 | End: 2017-02-21 | Stop reason: HOSPADM

## 2017-02-18 RX ORDER — OXYTOCIN/0.9 % SODIUM CHLORIDE 30/500 ML
100 PLASTIC BAG, INJECTION (ML) INTRAVENOUS CONTINUOUS
Status: DISCONTINUED | OUTPATIENT
Start: 2017-02-18 | End: 2017-02-21 | Stop reason: HOSPADM

## 2017-02-18 RX ORDER — ONDANSETRON 4 MG/1
4 TABLET, ORALLY DISINTEGRATING ORAL EVERY 6 HOURS PRN
Status: DISCONTINUED | OUTPATIENT
Start: 2017-02-18 | End: 2017-02-18 | Stop reason: HOSPADM

## 2017-02-18 RX ORDER — HYDROMORPHONE HYDROCHLORIDE 1 MG/ML
.3-.5 INJECTION, SOLUTION INTRAMUSCULAR; INTRAVENOUS; SUBCUTANEOUS EVERY 30 MIN PRN
Status: DISCONTINUED | OUTPATIENT
Start: 2017-02-18 | End: 2017-02-21 | Stop reason: HOSPADM

## 2017-02-18 RX ORDER — DEXTROSE, SODIUM CHLORIDE, SODIUM LACTATE, POTASSIUM CHLORIDE, AND CALCIUM CHLORIDE 5; .6; .31; .03; .02 G/100ML; G/100ML; G/100ML; G/100ML; G/100ML
INJECTION, SOLUTION INTRAVENOUS CONTINUOUS
Status: DISCONTINUED | OUTPATIENT
Start: 2017-02-18 | End: 2017-02-21 | Stop reason: HOSPADM

## 2017-02-18 RX ORDER — OXYTOCIN/0.9 % SODIUM CHLORIDE 30/500 ML
340 PLASTIC BAG, INJECTION (ML) INTRAVENOUS CONTINUOUS PRN
Status: DISCONTINUED | OUTPATIENT
Start: 2017-02-18 | End: 2017-02-21 | Stop reason: HOSPADM

## 2017-02-18 RX ORDER — CEFAZOLIN SODIUM 1 G/3ML
1 INJECTION, POWDER, FOR SOLUTION INTRAMUSCULAR; INTRAVENOUS
Status: DISCONTINUED | OUTPATIENT
Start: 2017-02-18 | End: 2017-02-18 | Stop reason: HOSPADM

## 2017-02-18 RX ORDER — ONDANSETRON 2 MG/ML
4 INJECTION INTRAMUSCULAR; INTRAVENOUS EVERY 6 HOURS PRN
Status: DISCONTINUED | OUTPATIENT
Start: 2017-02-18 | End: 2017-02-18 | Stop reason: HOSPADM

## 2017-02-18 RX ORDER — IBUPROFEN 400 MG/1
400-800 TABLET, FILM COATED ORAL EVERY 6 HOURS PRN
Status: DISCONTINUED | OUTPATIENT
Start: 2017-02-19 | End: 2017-02-19

## 2017-02-18 RX ORDER — OXYCODONE HYDROCHLORIDE 5 MG/1
5-10 TABLET ORAL
Status: DISCONTINUED | OUTPATIENT
Start: 2017-02-18 | End: 2017-02-21 | Stop reason: HOSPADM

## 2017-02-18 RX ORDER — SODIUM CHLORIDE, SODIUM LACTATE, POTASSIUM CHLORIDE, CALCIUM CHLORIDE 600; 310; 30; 20 MG/100ML; MG/100ML; MG/100ML; MG/100ML
INJECTION, SOLUTION INTRAVENOUS CONTINUOUS PRN
Status: DISCONTINUED | OUTPATIENT
Start: 2017-02-18 | End: 2017-02-18

## 2017-02-18 RX ORDER — BISACODYL 10 MG
10 SUPPOSITORY, RECTAL RECTAL DAILY PRN
Status: DISCONTINUED | OUTPATIENT
Start: 2017-02-20 | End: 2017-02-21 | Stop reason: HOSPADM

## 2017-02-18 RX ORDER — NALOXONE HYDROCHLORIDE 0.4 MG/ML
.1-.4 INJECTION, SOLUTION INTRAMUSCULAR; INTRAVENOUS; SUBCUTANEOUS
Status: DISCONTINUED | OUTPATIENT
Start: 2017-02-18 | End: 2017-02-18

## 2017-02-18 RX ORDER — MORPHINE SULFATE 1 MG/ML
0.2 INJECTION, SOLUTION EPIDURAL; INTRATHECAL; INTRAVENOUS ONCE
Status: DISCONTINUED | OUTPATIENT
Start: 2017-02-18 | End: 2017-02-20 | Stop reason: CLARIF

## 2017-02-18 RX ORDER — LANOLIN 100 %
OINTMENT (GRAM) TOPICAL
Status: DISCONTINUED | OUTPATIENT
Start: 2017-02-18 | End: 2017-02-21 | Stop reason: HOSPADM

## 2017-02-18 RX ORDER — KETOROLAC TROMETHAMINE 30 MG/ML
30 INJECTION, SOLUTION INTRAMUSCULAR; INTRAVENOUS EVERY 6 HOURS
Status: COMPLETED | OUTPATIENT
Start: 2017-02-18 | End: 2017-02-19

## 2017-02-18 RX ORDER — ONDANSETRON 2 MG/ML
4 INJECTION INTRAMUSCULAR; INTRAVENOUS EVERY 30 MIN PRN
Status: DISCONTINUED | OUTPATIENT
Start: 2017-02-18 | End: 2017-02-18

## 2017-02-18 RX ORDER — MORPHINE SULFATE 1 MG/ML
0.2 INJECTION, SOLUTION EPIDURAL; INTRATHECAL; INTRAVENOUS ONCE
Status: DISCONTINUED | OUTPATIENT
Start: 2017-02-18 | End: 2017-02-18 | Stop reason: HOSPADM

## 2017-02-18 RX ORDER — NALOXONE HYDROCHLORIDE 0.4 MG/ML
.1-.4 INJECTION, SOLUTION INTRAMUSCULAR; INTRAVENOUS; SUBCUTANEOUS
Status: DISCONTINUED | OUTPATIENT
Start: 2017-02-18 | End: 2017-02-18 | Stop reason: HOSPADM

## 2017-02-18 RX ORDER — OXYTOCIN 10 [USP'U]/ML
10 INJECTION, SOLUTION INTRAMUSCULAR; INTRAVENOUS
Status: DISCONTINUED | OUTPATIENT
Start: 2017-02-18 | End: 2017-02-21 | Stop reason: HOSPADM

## 2017-02-18 RX ORDER — EPHEDRINE SULFATE 50 MG/ML
5 INJECTION, SOLUTION INTRAMUSCULAR; INTRAVENOUS; SUBCUTANEOUS
Status: DISCONTINUED | OUTPATIENT
Start: 2017-02-18 | End: 2017-02-18

## 2017-02-18 RX ORDER — ONDANSETRON 4 MG/1
4 TABLET, ORALLY DISINTEGRATING ORAL EVERY 6 HOURS PRN
Status: DISCONTINUED | OUTPATIENT
Start: 2017-02-18 | End: 2017-02-18

## 2017-02-18 RX ORDER — EPHEDRINE SULFATE 50 MG/ML
5 INJECTION, SOLUTION INTRAMUSCULAR; INTRAVENOUS; SUBCUTANEOUS
Status: DISCONTINUED | OUTPATIENT
Start: 2017-02-18 | End: 2017-02-18 | Stop reason: HOSPADM

## 2017-02-18 RX ORDER — LIDOCAINE 40 MG/G
CREAM TOPICAL
Status: DISCONTINUED | OUTPATIENT
Start: 2017-02-18 | End: 2017-02-18 | Stop reason: HOSPADM

## 2017-02-18 RX ORDER — DIPHENHYDRAMINE HCL 25 MG
25-50 TABLET ORAL EVERY 6 HOURS PRN
Status: DISCONTINUED | OUTPATIENT
Start: 2017-02-18 | End: 2017-02-18

## 2017-02-18 RX ORDER — HYDROCORTISONE 2.5 %
CREAM (GRAM) TOPICAL 3 TIMES DAILY PRN
Status: DISCONTINUED | OUTPATIENT
Start: 2017-02-18 | End: 2017-02-21 | Stop reason: HOSPADM

## 2017-02-18 RX ORDER — MORPHINE SULFATE 1 MG/ML
.2-1 INJECTION, SOLUTION EPIDURAL; INTRATHECAL; INTRAVENOUS
Status: DISCONTINUED | OUTPATIENT
Start: 2017-02-18 | End: 2017-02-18

## 2017-02-18 RX ADMIN — KETOROLAC TROMETHAMINE 30 MG: 30 INJECTION, SOLUTION INTRAMUSCULAR at 21:55

## 2017-02-18 RX ADMIN — SODIUM CHLORIDE, POTASSIUM CHLORIDE, SODIUM LACTATE AND CALCIUM CHLORIDE 1000 ML: 600; 310; 30; 20 INJECTION, SOLUTION INTRAVENOUS at 12:00

## 2017-02-18 RX ADMIN — SODIUM CHLORIDE, SODIUM LACTATE, POTASSIUM CHLORIDE, CALCIUM CHLORIDE AND DEXTROSE MONOHYDRATE: 5; 600; 310; 30; 20 INJECTION, SOLUTION INTRAVENOUS at 20:02

## 2017-02-18 RX ADMIN — PHENYLEPHRINE HYDROCHLORIDE 100 MCG: 10 INJECTION, SOLUTION INTRAMUSCULAR; INTRAVENOUS; SUBCUTANEOUS at 14:02

## 2017-02-18 RX ADMIN — OXYTOCIN-SODIUM CHLORIDE 0.9% IV SOLN 30 UNIT/500ML: 30-0.9/5 SOLUTION at 14:25

## 2017-02-18 RX ADMIN — SODIUM CITRATE AND CITRIC ACID MONOHYDRATE 30 ML: 500; 334 SOLUTION ORAL at 13:35

## 2017-02-18 RX ADMIN — ACETAMINOPHEN 975 MG: 325 TABLET, FILM COATED ORAL at 20:01

## 2017-02-18 RX ADMIN — SODIUM CHLORIDE, POTASSIUM CHLORIDE, SODIUM LACTATE AND CALCIUM CHLORIDE: 600; 310; 30; 20 INJECTION, SOLUTION INTRAVENOUS at 13:48

## 2017-02-18 RX ADMIN — SENNOSIDES AND DOCUSATE SODIUM 1 TABLET: 8.6; 5 TABLET ORAL at 21:03

## 2017-02-18 RX ADMIN — CEFAZOLIN 2 G: 1 INJECTION, POWDER, FOR SOLUTION INTRAMUSCULAR; INTRAVENOUS at 13:48

## 2017-02-18 RX ADMIN — GLYCOPYRROLATE 0.2 MG: 0.2 INJECTION, SOLUTION INTRAMUSCULAR; INTRAVENOUS at 14:00

## 2017-02-18 RX ADMIN — DOXYLAMINE SUCCINATE 25 MG: 25 TABLET ORAL at 21:55

## 2017-02-18 RX ADMIN — DIPHENHYDRAMINE HYDROCHLORIDE 25 MG: 50 INJECTION, SOLUTION INTRAMUSCULAR; INTRAVENOUS at 18:10

## 2017-02-18 RX ADMIN — SODIUM CHLORIDE, POTASSIUM CHLORIDE, SODIUM LACTATE AND CALCIUM CHLORIDE: 600; 310; 30; 20 INJECTION, SOLUTION INTRAVENOUS at 13:07

## 2017-02-18 RX ADMIN — KETOROLAC TROMETHAMINE 30 MG: 30 INJECTION, SOLUTION INTRAMUSCULAR at 15:53

## 2017-02-18 RX ADMIN — IRON 325 MG: 65 TABLET ORAL at 21:03

## 2017-02-18 RX ADMIN — ONDANSETRON 4 MG: 2 INJECTION INTRAMUSCULAR; INTRAVENOUS at 14:00

## 2017-02-18 RX ADMIN — EPHEDRINE SULFATE 5 MG: 50 INJECTION, SOLUTION INTRAMUSCULAR; INTRAVENOUS; SUBCUTANEOUS at 14:02

## 2017-02-18 NOTE — H&P
[facility] Labor and Delivery History and Physical    Rizwana Plummer MRN# [id number]   Age: 36 year old YOB: 1980     Date of Admission:  2017    Primary care provider: Cindy Laurent           Chief Complaint:   Rizwana Plummer is a 36 year old female who is  @ 39w0d pregnant and being admitted for repeat ,   Due to IUGR by AC < 1%.          Pregnancy history:     OBSTETRIC HISTORY:    Obstetric History       T1      TAB0   SAB1   E0   M0   L1       # Outcome Date GA Lbr Anthony/2nd Weight Sex Delivery Anes PTL Lv   3 Current            2 Term 14 41w6d  3.705 kg (8 lb 2.7 oz) M  EPI N Y      Name: Radha      Apgar1:  3                Apgar5: 8   1 SAB 12     SAB   ND          EDC: Estimated Date of Delivery: 2017    Prenatal Labs:   Lab Results   Component Value Date    ABO O 2016    RH  Pos 2016    AS Neg 2016    HEPBANG Nonreactive 2016    CHPCRT  2016     Negative   Negative for C. trachomatis rRNA by transcription mediated amplification.   A negative result by transcription mediated amplification does not preclude the   presence of C. trachomatis infection because results are dependent on proper   and adequate collection, absence of inhibitors, and sufficient rRNA to be   detected.      GCPCRT  2016     Negative   Negative for N. gonorrhoeae rRNA by transcription mediated amplification.   A negative result by transcription mediated amplification does not preclude the   presence of N. gonorrhoeae infection because results are dependent on proper   and adequate collection, absence of inhibitors, and sufficient rRNA to be   detected.      TREPAB Negative 2016    RUBELLAABIGG 73 2013    HGB 9.0 (L) 2017    HIV Negative 2013       GBS Status:   Lab Results   Component Value Date    GBS  2017     Negative  No GBS DNA detected, presumed negative for GBS or number of  bacteria may be   below the limit of detection of the assay.   Assay performed on incubated broth culture of specimen using [employer] real-time   PCR.         Active Problem List  Patient Active Problem List   Diagnosis     Female genital infibulation     Epidermal inclusion cyst of infibulation scar     Supervision of elderly primigravida (>=35 years old at delivery)     Status post  delivery     Post term pregnancy, antepartum condition or complication     Prenatal care, subsequent pregnancy     Indication for care in labor or delivery     Influenza A     Dehydration       Medication Prior to Admission  Prescriptions Prior to Admission   Medication Sig Dispense Refill Last Dose     ondansetron (ZOFRAN) 8 MG tablet Take 1 tablet (8 mg) by mouth every 8 hours as needed for nausea 20 tablet 1      acetaminophen (TYLENOL) 325 MG tablet Take 2 tablets (650 mg) by mouth every 4 hours as needed for mild pain 100 tablet  Taking     ferrous sulfate (IRON) 325 (65 FE) MG tablet Take 1 tablet (325 mg) by mouth 2 times daily 60 tablet 2 Taking     diphenhydrAMINE (BENADRYL) 25 MG tablet Take 1-2 tablets (25-50 mg) by mouth every 6 hours as needed for sleep 60 tablet 1 Taking     Elastic Bandages & Supports (ABDOMINAL BINDER/ELASTIC 3XL) MISC 1 Device daily 1 each 3 Taking   .        Maternal Past Medical History:     Past Medical History   Diagnosis Date     Varicella age 5                       Family History:   This patient has no significant family history            Social History:   This patient has no significant social history         Review of Systems:   C: NEGATIVE for fever, chills, change in weight  I: NEGATIVE for worrisome rashes, moles or lesions  E: NEGATIVE for vision changes or irritation  E/M: NEGATIVE for ear, mouth and throat problems  R: NEGATIVE for significant cough or SOB  B: NEGATIVE for masses, tenderness or discharge  CV: NEGATIVE for chest pain, palpitations or peripheral edema  GI: NEGATIVE  for nausea, abdominal pain, heartburn, or change in bowel habits  : NEGATIVE for frequency, dysuria, or hematuria  M: NEGATIVE for significant arthralgias or myalgia  N: NEGATIVE for weakness, dizziness or paresthesias  E: NEGATIVE for temperature intolerance, skin/hair changes  H: NEGATIVE for bleeding problems  P: NEGATIVE for changes in mood or affect          Physical Exam:   Vitals were reviewed  All vitals stable  No data found.    Constitutional: Awake, alert, cooperative, no apparent distress, and appears stated age.  Eyes: Lids and lashes normal, pupils equal, round and reactive to light, extra ocular muscles intact, sclera clear, conjunctiva normal.  ENT: Normocephalic, without obvious abnormality, atramatic, sinuses nontender on palpation, external ears without lesions, oral pharynx with moist mucus membranes, tonsils without erythema or exudates, gums normal and good dentition.  Neck: Supple, symmetrical, trachea midline, no adenopathy, thyroid symmetric, not enlarged and no tenderness, skin normal.  Hematologic / Lymphatic: No cervical lymphadenopathy and no supraclavicular lymphadenopathy.  Back: Symmetric, no curvature, spinous processes are non-tender on palpation, paraspinous muscles are non-tender on palpation, no costal vertebral tenderness.  Lungs: No increased work of breathing, good air exchange, clear to auscultation bilaterally, no crackles or wheezing.  Cardiovascular: Regular rate and rhythm, normal S1 and S2, no S3 or S4, and no murmur noted.  Chest / Breast: Breasts symmetrical, skin without lesion(s), no nipple retraction or dimpling, no nipple discharge, no masses palpated, no axillary or supraclavicular adenopathy.  Abdomen: No scars, normal bowel sounds, soft, non-distended, non-tender, no masses palpated, no hepatosplenomegally.  Genitourinary: No urethral discharge, normal external genitalia, no hernia.  Musculoskeletal: No redness, warmth, or swelling of the joints.  Full range  of motion noted.  Motor strength is 5 out of 5 all extremities bilaterally.  Tone is normal.  Neurologic: Awake, alert, oriented to name, place and time.  Cranial nerves II-XII are grossly intact.  Motor is 5 out of 5 bilaterally.  Cerebellar finger to nose, heel to shin intact.  Sensory is intact.  Babinski down going, Romberg negative, and gait is normal.  Neuropsychiatric: Normal affect, mood, orientation, memory and insight.  Skin: No rashes, erythema, pallor, petechia or purpura.   Cervix:   Membranes: intact   Exam deferred   Presentation:Cephalic/obluque/unstable lie  Fetal Heart Rate Tracing: Tier 1 (normal)  Tocometer: none                       Assessment:   Rizwana Plummer is a 36 year old female who is  @ 39w0d pregnant and being admitted for repeat ,   Due to IUGR by AC < 1%.        Plan:   Admit - see IP orders  Prepare for  section.    Cindy Laurent, DO

## 2017-02-18 NOTE — TELEPHONE ENCOUNTER
D/w patient  She will call back   Phone number given.  Dr. Cindy Laurent,     Obstetrics and Gynecology  [facility] and [place]

## 2017-02-18 NOTE — IP AVS SNAPSHOT
[facility] Postpartum    [address]    [facility] [address]    Phone:  [phone]    Fax:  [phone]                                       After Visit Summary   2/18/2017    Rizwana Plummer    MRN: [id number]           After Visit Summary Signature Page     I have received my discharge instructions, and my questions have been answered. I have discussed any challenges I see with this plan with the nurse or doctor.    ..........................................................................................................................................  Patient/Patient Representative Signature      ..........................................................................................................................................  Patient Representative Print Name and Relationship to Patient    ..................................................               ................................................  Date                                            Time    ..........................................................................................................................................  Reviewed by Signature/Title    ...................................................              ..............................................  Date                                                            Time

## 2017-02-18 NOTE — ANESTHESIA POSTPROCEDURE EVALUATION
Patient: Rizwana Plummer    Procedure(s):   SECTION - Wound Class: II-Clean Contaminated    Diagnosis:Pregnancy  Diagnosis Additional Information: Non reassuring biophysical profile  Dr Laurent    Anesthesia Type:  Spinal    Note:  Anesthesia Post Evaluation    Patient location during evaluation: PACU  Patient participation: Able to fully participate in evaluation  Level of consciousness: awake  Pain management: adequate  Airway patency: patent  Cardiovascular status: acceptable  Respiratory status: acceptable  Hydration status: acceptable  PONV: none     Anesthetic complications: None          Last vitals:  Vitals:    17 1517 17 1523 17 1526   BP: (!) 80/41 (!) 87/53 (!) 86/54   Resp:      Temp:      SpO2:            Electronically Signed By: Afshin Dawson MD  2017  3:29 PM

## 2017-02-18 NOTE — ANESTHESIA PREPROCEDURE EVALUATION
Anesthesia Evaluation     . Pt has had prior anesthetic.     No history of anesthetic complications     ROS/MED HX    ENT/Pulmonary:       Neurologic:       Cardiovascular:         METS/Exercise Tolerance:     Hematologic:         Musculoskeletal:         GI/Hepatic:         Renal/Genitourinary:         Endo:         Psychiatric:         Infectious Disease:         Malignancy:         Other:    (+) Possibly pregnant              Physical Exam  Normal systems: cardiovascular, pulmonary and dental    Airway   Mallampati: II  TM distance: >3 FB  Neck ROM: full    Dental     Cardiovascular       Pulmonary                     Anesthesia Plan      History & Physical Review  History and physical reviewed and following examination; no interval change.    ASA Status:  2 .    NPO Status:  > 8 hours    Plan for Spinal     SAB w/ GETA as backup. duramorph for POP, plan and risks fully discussed, including, bleeding, infection, injury to nerves and nearby structures, backpain, HA's, blocks that spread too high or not high enough, with backup plan of GETA. Desires to proceed. Pt came from [facility] with non-reassuring biophy profile, pt desired to have CS at 1530, then changed to 1330. Nonemergent.      Postoperative Care  Postoperative pain management:  Neuraxial analgesia.      Consents  Anesthetic plan, risks, benefits and alternatives discussed with:  Patient.  Use of blood products discussed: Yes.   .                          .

## 2017-02-18 NOTE — PLAN OF CARE
Rizwana here for scheduled c/s.  Fetus active and FHR reassuring.  Occas ctx's but no bldg or srom.  IV started and labs collected.  Dr. Laurent here at 1115-  POC and procedure discussed.

## 2017-02-18 NOTE — ANESTHESIA CARE TRANSFER NOTE
Patient: Rizwana Plummer    Procedure(s):   SECTION - Wound Class: II-Clean Contaminated    Diagnosis: Pregnancy  Diagnosis Additional Information: No value filed.    Anesthesia Type:   No value filed.     Note:  Airway :Room Air  Patient transferred to:Labor and Delivery  Comments: Pt awake vss exchanging well report to rn      Vitals: (Last set prior to Anesthesia Care Transfer)    CRNA VITALS  2017 1440 - 2017 1510      2017             Pulse: 88    SpO2: 97 %                Electronically Signed By: SAHIL Bernabe CRNA  2017  3:10 PM

## 2017-02-18 NOTE — ANESTHESIA PROCEDURE NOTES
Peripheral nerve/Neuraxial procedure note : intrathecal  Pre-Procedure  Performed by NATIVIDAD BRADEN  Referred by [facility]  Location: OR      Pre-Anesthestic Checklist: patient identified, IV checked, risks and benefits discussed, informed consent, monitors and equipment checked, pre-op evaluation and at physician/surgeon's request    Timeout  Correct Patient: Yes   Correct Procedure: Yes   Correct Site: Yes   Correct Laterality: N/A   Correct Position: Yes   Site Marked: N/A   .   Procedure Documentation  ASA 1  Diagnosis:repeat CS.    Procedure:    Intrathecal.  Insertion Site:L3-4  (midline approach)      Patient Prep;mask, sterile gloves, povidone-iodine 7.5% surgical scrub, patient draped.  .  Needle: (). # of attempts: 1. # of redirects:. Spinal Needle: Sprotte 24 G. 3.5 in.  Introducer used. . .     Assessment/Narrative  .  .  0.01 mL of clear CSF fluid removed . Comments:  0.2 astromorph and 1.7cc .75marc

## 2017-02-18 NOTE — TELEPHONE ENCOUNTER
Patient coming in for repeat cs at 11 to start cs at 1 pm   Dr. Cindy Laurent,     Obstetrics and Gynecology  [facility] and [place]

## 2017-02-18 NOTE — L&D DELIVERY NOTE
Delivery Summary    Rizwana Plummer MRN# [id number]   Age: 36 year old YOB: 1980     ASSESSMENT & PLAN: 37 y/o  @ 39w0d with IUGR by AC < 1%, recommended for repeat CS   Due to IUGR, at delivery true knot noted in cord.  CS without complication.         Labor Length    2nd Stage (hrs):  0 (min):  0   3rd Stage (hrs):  0 (min):  2      Rupture identifier:  Rupture 1   Rupture date/time: 17 1423   Rupture type:  Artificial Rupture of Membranes      Delivery/Placenta Date and Time    Delivery Date:  17 Delivery Time:   2:24 PM   Placenta Date/Time:  2017  2:26 PM   Oxytocin given at the time of delivery:  after delivery of baby      Apgars    Living status:  Yes    1 Minute 5 Minute 10 Minute 15 Minute 20 Minute   Skin color: 0  1       Heart rate: 2  2       Reflex irritability: 2  2       Muscle tone: 2  2       Respiratory effort: 2  2       Total: 8  9          Apgars assigned by:  ROMY MELENDEZ      Cord    Vessels:  3 Vessels Complications:  Knot   Cord Blood Disposition:  Lab Gases Sent?:  No         Alma Measurements    Weight:  116.8 oz       Labor Events and Shoulder Dystocia    Fetal Tracing Prior to Delivery:  Category 1   Fetal Tracing Comments:  IUGR by AC <1%, true not in cord at delivery   Shoulder dystocia present?:  Neg            Delivery (Maternal) (Provider to Complete) (467125)    Episiotomy:  None   Perineal lacerations:  None          Mother's Information  Mother: Rizwana Plummer #[id number]    Start of Mother's Information     IO Blood Loss  17 1419 - 17 1516    Mom's I/O Activity            End of Mother's Information  Mother: Rizwana Plummer #[id number]            Delivery - Provider to Complete (723098)    Delivering clinician:  MARLON FORRESTER   Delivery Type (Choose the 1 that will go to the Birth History):  , Low Transverse                      Specifics:  Repeat   Indications for Repeat:  39+ weeks /Planned repeat, Medical  Indication/Planned repeat   Other personnel:   Provider Role   MAINE HARMON Delivery Nurse            Placenta    Delayed Cord Clamping:  Done   Date/Time:  2/18/2017  2:26 PM   Removal:  Spontaneous   Comments:  true knot noted in cord   Disposition:  Pathology      Anesthesia    Method:  Spinal         Presentation and Position    Presentation:  Vertex   Position:  Left Occiput Anterior                    Cindy Laurent DO

## 2017-02-18 NOTE — OP NOTE
PREOPERATIVE DIAGNOSIS: A 36-year-old  at 39w0d  weeks estimated gestational age admitted for repeat  section with IUGR by AC < 1%.  POSTOPERATIVE DIAGNOSES:  A 36-year-old  at 39w0d  weeks estimated gestational age admitted for repeat  section with IUGR by AC < 1%.  True knot in cord at delivery.  PROCEDURE:   1. Repeat low transverse  section.   2. Seprafilm placement for adhesiolysis.   COMPLICATIONS: None apparent at time of procedure.   ESTIMATED BLOOD LOSS:400  mL.   SURGEONS: Cindy Laurent DO.   INDICATIONS:  A 36-year-old  at 39w0d  weeks estimated gestational age admitted for repeat  section with IUGR by AC < 1%.   Patient signed consent for repeat  section, risks benefits, alternatives are discussed with the patient.    OUTCOME: female infant 7# 4.8 oz  apgars 8 at 1 minute and 9 at 5 minutes.   Findings:   Normal uterus, tubes and ovaries. True knot in fetal cord was noted upon delivery of the baby.  DETAILS OF PROCEDURE: After informed consent was signed, the patient was placed in dorsal supine position, spinal  placed for anesthesia. Fetal heart tones were obtained and found to be in 150s. The patient was prepped and draped in normal sterile fashion and a time out was performed. Adequate anesthesia was reported per patient after the spinal was dosed to a surgical level.  A pfannensteil incision was then carried down to the underlying fascia and incised in the midline. Blunt/sharp dissection of the peritoneum was performed and blunt stretching of the muscles was perfomed. The uterus was visualized. The peritoneum was visualized, bladder blade was placed, The vesicouterine peritoneum incised and a bladder flap created. The bladder blade was replaced. Lower uterine segment was paper thin, it was incised with a scalpel. Blunt stretching was performed and the baby delivered atraumatically. The cord was clamped and cut. The nose and mouth were bulb  suctioned.  Infant was taken over to the waiting  staff. The placenta spontaneously delivered. The uterus was cleared of all clots and debris. The placenta was examined and found to be complete.  The uterus was exteriorized.  The lower uterine segment was reapproximated with 0 Monocryl in a running locked fashion. A second layer of the same suture was used for imbrication, although the tissue was thin, excellent re-approximation of tissue was noted. The bladder flap was recreated using 3-0 Monocryl. The uterus was replaced.  Irrigation was performed. Seprafilm was placed over the bladder flap. Hemostasis was noted. The peritoneum was closed with 3-0 monocryl. The rectus muscles were reapproximated in the midline and closed with several figure-of-8 mattress sutures of 3-0 monocryl  Hemostasis was assured with Bovie cautery on the rectus muscles, and Seprafilm was placed over it. The fascia was reapproximated with 0 looped PDS in a running locked fashion with good reapproximation. A subcuticular stitch using 4-0 Monocryl was used to reapproximate the skin. Steri-Strips, telfa, and tegaderm was applied. The patient tolerated the procedure well. Sponge, lap and needle counts were correct x2.   MARLON FORRESTER DO

## 2017-02-18 NOTE — PLAN OF CARE
Data: Rizwana Plummer transferred to 429 via cart at 1730. Baby transferred via parent's arms.  Action: Receiving unit notified of transfer: Yes. Patient and family notified of room change. Report given to ROMY Sands at 1740. Belongings sent to receiving unit. Accompanied by Registered Nurse. Oriented patient to surroundings. Call light within reach. ID bands double-checked with receiving RN.  Response: Patient tolerated transfer and is stable.

## 2017-02-19 LAB
HGB BLD-MCNC: 11 G/DL (ref 11.7–15.7)
T PALLIDUM IGG+IGM SER QL: NEGATIVE

## 2017-02-19 PROCEDURE — 25000128 H RX IP 250 OP 636: Performed by: FAMILY MEDICINE

## 2017-02-19 PROCEDURE — 36415 COLL VENOUS BLD VENIPUNCTURE: CPT | Performed by: FAMILY MEDICINE

## 2017-02-19 PROCEDURE — 12000027 ZZH R&B OB

## 2017-02-19 PROCEDURE — 25000132 ZZH RX MED GY IP 250 OP 250 PS 637: Performed by: FAMILY MEDICINE

## 2017-02-19 PROCEDURE — 25000125 ZZHC RX 250: Performed by: FAMILY MEDICINE

## 2017-02-19 PROCEDURE — 85018 HEMOGLOBIN: CPT | Performed by: FAMILY MEDICINE

## 2017-02-19 PROCEDURE — 99212 OFFICE O/P EST SF 10 MIN: CPT

## 2017-02-19 PROCEDURE — 90715 TDAP VACCINE 7 YRS/> IM: CPT | Performed by: FAMILY MEDICINE

## 2017-02-19 RX ORDER — IBUPROFEN 400 MG/1
400-800 TABLET, FILM COATED ORAL EVERY 6 HOURS PRN
Status: DISCONTINUED | OUTPATIENT
Start: 2017-02-19 | End: 2017-02-21 | Stop reason: HOSPADM

## 2017-02-19 RX ADMIN — IRON 325 MG: 65 TABLET ORAL at 19:44

## 2017-02-19 RX ADMIN — SENNOSIDES AND DOCUSATE SODIUM 1 TABLET: 8.6; 5 TABLET ORAL at 19:44

## 2017-02-19 RX ADMIN — SENNOSIDES AND DOCUSATE SODIUM 1 TABLET: 8.6; 5 TABLET ORAL at 10:02

## 2017-02-19 RX ADMIN — KETOROLAC TROMETHAMINE 30 MG: 30 INJECTION, SOLUTION INTRAMUSCULAR at 03:42

## 2017-02-19 RX ADMIN — OXYCODONE HYDROCHLORIDE 5 MG: 5 TABLET ORAL at 21:17

## 2017-02-19 RX ADMIN — IRON 325 MG: 65 TABLET ORAL at 10:02

## 2017-02-19 RX ADMIN — ACETAMINOPHEN 975 MG: 325 TABLET, FILM COATED ORAL at 03:42

## 2017-02-19 RX ADMIN — CLOSTRIDIUM TETANI TOXOID ANTIGEN (FORMALDEHYDE INACTIVATED), CORYNEBACTERIUM DIPHTHERIAE TOXOID ANTIGEN (FORMALDEHYDE INACTIVATED), BORDETELLA PERTUSSIS TOXOID ANTIGEN (GLUTARALDEHYDE INACTIVATED), BORDETELLA PERTUSSIS FILAMENTOUS HEMAGGLUTININ ANTIGEN (FORMALDEHYDE INACTIVATED), BORDETELLA PERTUSSIS PERTACTIN ANTIGEN, AND BORDETELLA PERTUSSIS FIMBRIAE 2/3 ANTIGEN 0.5 ML: 5; 2; 2.5; 5; 3; 5 INJECTION, SUSPENSION INTRAMUSCULAR at 10:08

## 2017-02-19 RX ADMIN — DIPHENHYDRAMINE HYDROCHLORIDE 25 MG: 50 INJECTION, SOLUTION INTRAMUSCULAR; INTRAVENOUS at 00:31

## 2017-02-19 RX ADMIN — OXYCODONE HYDROCHLORIDE 5 MG: 5 TABLET ORAL at 18:18

## 2017-02-19 RX ADMIN — ACETAMINOPHEN 975 MG: 325 TABLET, FILM COATED ORAL at 19:44

## 2017-02-19 RX ADMIN — KETOROLAC TROMETHAMINE 30 MG: 30 INJECTION, SOLUTION INTRAMUSCULAR at 10:02

## 2017-02-19 RX ADMIN — ACETAMINOPHEN 975 MG: 325 TABLET, FILM COATED ORAL at 12:08

## 2017-02-19 NOTE — PLAN OF CARE
Problem: Goal Outcome Summary  Goal: Goal Outcome Summary  Outcome: Improving  Pt doing well this shift, taking tylenol for pain and voiding without difficulty.  Visitors here and pt up to sit on sofa.  Pt encouraged to increase fluids and ambulate.  Pt also encouraged to breastfeed every 2-3 hours.

## 2017-02-19 NOTE — PROGRESS NOTES
[facility]    Obstetrics Post-Op / Progress Note    Assessment & Plan   Assessment:  -1 Day Post-Op  Procedure(s):   SECTION - Wound Class: II-Clean Contaminated    Doing well.  Clean wound without signs of infection.  Pain well-controlled.    Plan:  Ambulation encouraged  Breast feeding strategies discussed  Anticipate discharge in 2 days    Ruthy Banda     Interval History   Doing well.  Pain is well-controlled.  No fevers.  No history of wound drainage, warmth or significant erythema.  Good appetite.  Denies chest pain, shortness of breath, nausea or vomiting.  Ambulatory.  Breastfeeding well and bottle feeding as well.    Medications     oxytocin in 0.9% NaCl 100 mL/hr (17 1528)     dextrose 5% lactated ringers 125 mL/hr at 17     oxytocin in 0.9% NaCl       NO Rho (D) immune globulin (RhoGam) needed - mother Rh POSITIVE       - MEDICATION INSTRUCTIONS -         ferrous sulfate  325 mg Oral BID     sodium chloride (PF)  3 mL Intracatheter Q8H     senna-docusate  1-2 tablet Oral BID     acetaminophen  975 mg Oral Q8H     morphine (PF)  0.2 mg Intrathecal Once       Physical Exam   Temp: 97.9  F (36.6  C) Temp src: Oral BP: (!) 77/43   Heart Rate: 85 Resp: 16 SpO2: 98 %      Vitals:    17 1223   Weight: 65.8 kg (145 lb)     Vital Signs with Ranges  Temp:  [97.5  F (36.4  C)-98.9  F (37.2  C)] 97.9  F (36.6  C)  Heart Rate:  [77-93] 85  Resp:  [14-18] 16  BP: ()/(36-73) 77/43  SpO2:  [97 %-100 %] 98 %  I/O last 3 completed shifts:  In: 3232 [I.V.:2057; IV Piggyback:1175]  Out: 2300 [Urine:1900; Blood:400]    Uterine fundus is firm, non-tender and at the level of the umbilicus  Incision C/D/I  Extremities Non-tender    Data   Recent Labs   Lab Test  17   1200   ABO  O   RH   Pos   AS  Neg     Recent Labs   Lab Test  17   0835  17   1200   HGB  11.0*  12.5     Recent Labs   Lab Test  16   1125   RUQIGG  26

## 2017-02-19 NOTE — PLAN OF CARE
Problem: Goal Outcome Summary  Goal: Goal Outcome Summary  Outcome: Improving  Patient meeting expected outcomes. Bro catheter draining clear yellow urine. Breastfeeding going well, is supplementing with formula also. Refused to sit up or get out of bed, wants to get up later this morning. Bonding with baby.

## 2017-02-19 NOTE — PROVIDER NOTIFICATION
02/18/17 2124   Provider Notification   Provider Name/Title Dr. Laurent   Method of Notification Phone   Notification Reason Medication Request   Dr. Laurent notified of patients request for Unisom PRN, patient does take this medication at home. Order received.

## 2017-02-19 NOTE — PROGRESS NOTES
Stable patient resting comfortably in bed. Benadryl given x1 for itching. Report given to ROMY Shahid who assumes patient cares.

## 2017-02-20 PROCEDURE — 12000027 ZZH R&B OB

## 2017-02-20 PROCEDURE — 25000132 ZZH RX MED GY IP 250 OP 250 PS 637: Performed by: FAMILY MEDICINE

## 2017-02-20 RX ADMIN — IBUPROFEN 800 MG: 400 TABLET ORAL at 16:47

## 2017-02-20 RX ADMIN — OXYCODONE HYDROCHLORIDE 10 MG: 5 TABLET ORAL at 14:26

## 2017-02-20 RX ADMIN — OXYCODONE HYDROCHLORIDE 10 MG: 5 TABLET ORAL at 01:50

## 2017-02-20 RX ADMIN — IRON 325 MG: 65 TABLET ORAL at 20:18

## 2017-02-20 RX ADMIN — OXYCODONE HYDROCHLORIDE 10 MG: 5 TABLET ORAL at 17:30

## 2017-02-20 RX ADMIN — IBUPROFEN 800 MG: 400 TABLET ORAL at 23:33

## 2017-02-20 RX ADMIN — BISACODYL 10 MG: 10 SUPPOSITORY RECTAL at 11:49

## 2017-02-20 RX ADMIN — DOXYLAMINE SUCCINATE 25 MG: 25 TABLET ORAL at 21:03

## 2017-02-20 RX ADMIN — SENNOSIDES AND DOCUSATE SODIUM 2 TABLET: 8.6; 5 TABLET ORAL at 20:18

## 2017-02-20 RX ADMIN — SIMETHICONE CHEW TAB 80 MG 80 MG: 80 TABLET ORAL at 11:15

## 2017-02-20 RX ADMIN — ACETAMINOPHEN 975 MG: 325 TABLET, FILM COATED ORAL at 03:58

## 2017-02-20 RX ADMIN — OXYCODONE HYDROCHLORIDE 10 MG: 5 TABLET ORAL at 20:18

## 2017-02-20 RX ADMIN — SIMETHICONE CHEW TAB 80 MG 80 MG: 80 TABLET ORAL at 06:21

## 2017-02-20 RX ADMIN — OXYCODONE HYDROCHLORIDE 10 MG: 5 TABLET ORAL at 11:15

## 2017-02-20 RX ADMIN — SIMETHICONE CHEW TAB 80 MG 80 MG: 80 TABLET ORAL at 17:39

## 2017-02-20 RX ADMIN — ACETAMINOPHEN 975 MG: 325 TABLET, FILM COATED ORAL at 11:49

## 2017-02-20 RX ADMIN — SIMETHICONE CHEW TAB 80 MG 80 MG: 80 TABLET ORAL at 23:33

## 2017-02-20 RX ADMIN — OXYCODONE HYDROCHLORIDE 10 MG: 5 TABLET ORAL at 23:33

## 2017-02-20 RX ADMIN — IRON 325 MG: 65 TABLET ORAL at 08:26

## 2017-02-20 RX ADMIN — OXYCODONE HYDROCHLORIDE 10 MG: 5 TABLET ORAL at 08:26

## 2017-02-20 RX ADMIN — SENNOSIDES AND DOCUSATE SODIUM 2 TABLET: 8.6; 5 TABLET ORAL at 08:26

## 2017-02-20 RX ADMIN — ACETAMINOPHEN 975 MG: 325 TABLET, FILM COATED ORAL at 20:18

## 2017-02-20 NOTE — PLAN OF CARE
Problem: Goal Outcome Summary  Goal: Goal Outcome Summary  Outcome: No Change  Pt meeting expected goals for the shift. Using tylenol and oxycodone for pain. FOB at bedside and attentive to mom/baby.

## 2017-02-20 NOTE — PROGRESS NOTES
[facility] Obstetrics Post-Op / Progress Note         Assessment and Plan:    Assessment:   Post-operative day #2  Low transverse repeat  section  L&D complications: Pregnancy      Doing well.      Plan:   Ambulation encouraged           Interval History:   Doing well.  Pain is well-controlled.  No fevers.  No history of wound drainage, warmth or significant erythema.  Good appetite.  Denies chest pain, shortness of breath, nausea or vomiting.  Ambulatory.            Significant Problems:    None          Review of Systems:    The patient denies any chest pain, shortness of breath, excessive pain, fever, chills, purulent drainage from the wound, nausea or vomiting.          Medications:   All medications related to the patient's surgery have been reviewed          Physical Exam:   All vitals stable         Data:   All laboratory data related to this surgery reviewed  All imaging studies related to this surgery reviewed    Marlo Erwin MD

## 2017-02-20 NOTE — LACTATION NOTE
Lactation in to see patient. Patient breast feeding well. Patient wondering about supplementing, baby content after nursing so discussed no need for supplement at this time. Encouraged to call prn

## 2017-02-20 NOTE — PLAN OF CARE
Problem: Goal Outcome Summary  Goal: Goal Outcome Summary  Outcome: Improving  After suppository patient states she had a bowel movement and passed a large amount of gas and feels much better. Ambulation encouraged.

## 2017-02-20 NOTE — PLAN OF CARE
Problem: Goal Outcome Summary  Goal: Goal Outcome Summary  Outcome: Improving  Patient meeting expected outcomes. Breast and bottle feeding  per patient's preference. Pain controlled with tylenol and oxycodone. Independent with self and  cares. Bonding with baby.  supportive and at bedside.

## 2017-02-20 NOTE — PLAN OF CARE
Problem: Goal Outcome Summary  Goal: Goal Outcome Summary  Outcome: Improving  Up in room this morning. States she is having abdominal pain that feels like gas pains. Abdomen soft but distended. Bowel sounds present. Stated she was not passing gas. Encouraged ambulation in the hallway. Per patient - passed flatus with ambulation. Suppository given with no results so far. Resting at this time but states she will walk in hallway after resting. Caring for baby and self independently.

## 2017-02-20 NOTE — PROGRESS NOTES
Stable patient meeting expected goals. Pain being managed with tylenol. Patient declined ibuprofen offer stating it upsets her stomach, knows oxycodone is available. No drainage noted on incision dressing. Plan made with patient to remove dressing after she showers this evening. Voiding without difficulty. Up and ambulating in room. Breast and formula feeding infant. Independent with self and  cares.

## 2017-02-21 ENCOUNTER — TELEPHONE (OUTPATIENT)
Dept: OBGYN | Facility: CLINIC | Age: 37
End: 2017-02-21

## 2017-02-21 VITALS
HEIGHT: 59 IN | OXYGEN SATURATION: 98 % | TEMPERATURE: 97.8 F | BODY MASS INDEX: 29.23 KG/M2 | RESPIRATION RATE: 16 BRPM | SYSTOLIC BLOOD PRESSURE: 105 MMHG | DIASTOLIC BLOOD PRESSURE: 41 MMHG | WEIGHT: 145 LBS | HEART RATE: 83 BPM

## 2017-02-21 DIAGNOSIS — Z98.891 STATUS POST CESAREAN DELIVERY: ICD-10-CM

## 2017-02-21 LAB — COPATH REPORT: NORMAL

## 2017-02-21 PROCEDURE — 25000132 ZZH RX MED GY IP 250 OP 250 PS 637: Performed by: FAMILY MEDICINE

## 2017-02-21 RX ORDER — OXYCODONE HYDROCHLORIDE 5 MG/1
5 TABLET ORAL EVERY 6 HOURS PRN
Qty: 20 TABLET | Refills: 0 | Status: SHIPPED | OUTPATIENT
Start: 2017-02-21 | End: 2018-05-03

## 2017-02-21 RX ORDER — OXYCODONE HYDROCHLORIDE 5 MG/1
5 TABLET ORAL EVERY 6 HOURS PRN
Qty: 20 TABLET | Refills: 0 | Status: SHIPPED | OUTPATIENT
Start: 2017-02-21 | End: 2017-02-21

## 2017-02-21 RX ORDER — AMOXICILLIN 250 MG
1-2 CAPSULE ORAL 2 TIMES DAILY
Qty: 100 TABLET | Refills: 1 | Status: SHIPPED | OUTPATIENT
Start: 2017-02-21 | End: 2018-05-03

## 2017-02-21 RX ADMIN — IRON 325 MG: 65 TABLET ORAL at 08:33

## 2017-02-21 RX ADMIN — SENNOSIDES AND DOCUSATE SODIUM 1 TABLET: 8.6; 5 TABLET ORAL at 08:33

## 2017-02-21 RX ADMIN — SIMETHICONE CHEW TAB 80 MG 80 MG: 80 TABLET ORAL at 08:33

## 2017-02-21 NOTE — PLAN OF CARE
Problem: Goal Outcome Summary  Goal: Goal Outcome Summary  Outcome: Improving  Pt stable, vitals WNL. Breastfeeding well, minimal assist with better latch, supplementing with formula per choice. Pt up and ambulating in room, encouraged to ambulate in hallways. Pt up and showered this shift, dressing removed. Taking tylenol, ibuprofen and oxycodone for discomfort. Pt taking simethicone.

## 2017-02-21 NOTE — PLAN OF CARE
Problem: Goal Outcome Summary  Goal: Goal Outcome Summary  Outcome: Adequate for Discharge Date Met:  02/21/17  Discharged home with baby.

## 2017-02-21 NOTE — DISCHARGE SUMMARY
[facility] Discharge Summary    Rizwana Plummer MRN# [id number]   Age: 36 year old YOB: 1980     Date of Admission:  2017  Date of Discharge::  2017  Admitting Physician:  Cindy Laurent DO  Discharge Physician:  Ammy Bustillo MD     Home clinic: [facility]          Admission Diagnoses:   IUP at 39w0d  Scheduled repeat  section  IUGR          Discharge Diagnosis:   S/p  section of liveborn infant          Procedures:   Procedure(s): Repeat low transverse  section            Medications Prior to Admission:     Prescriptions Prior to Admission   Medication Sig Dispense Refill Last Dose     Prenatal MV-Min-Fe Fum-FA-DHA (PRENATAL 1 PO) Take 1 tablet by mouth        ondansetron (ZOFRAN) 8 MG tablet Take 1 tablet (8 mg) by mouth every 8 hours as needed for nausea 20 tablet 1      acetaminophen (TYLENOL) 325 MG tablet Take 2 tablets (650 mg) by mouth every 4 hours as needed for mild pain 100 tablet  Taking     ferrous sulfate (IRON) 325 (65 FE) MG tablet Take 1 tablet (325 mg) by mouth 2 times daily 60 tablet 2 Taking     diphenhydrAMINE (BENADRYL) 25 MG tablet Take 1-2 tablets (25-50 mg) by mouth every 6 hours as needed for sleep 60 tablet 1 Taking     Elastic Bandages & Supports (ABDOMINAL BINDER/ELASTIC 3XL) MISC 1 Device daily 1 each 3 Taking             Discharge Medications:     Current Discharge Medication List      START taking these medications    Details   oxyCODONE (ROXICODONE) 5 MG IR tablet Take 1 tablet (5 mg) by mouth every 6 hours as needed for moderate to severe pain  Qty: 20 tablet, Refills: 0    Associated Diagnoses: Status post  delivery      senna-docusate (SENOKOT-S;PERICOLACE) 8.6-50 MG per tablet Take 1-2 tablets by mouth 2 times daily  Qty: 100 tablet, Refills: 1    Associated Diagnoses: Status post  delivery         CONTINUE these medications which have NOT CHANGED    Details   Prenatal MV-Min-Fe  Fum-FA-DHA (PRENATAL 1 PO) Take 1 tablet by mouth      ondansetron (ZOFRAN) 8 MG tablet Take 1 tablet (8 mg) by mouth every 8 hours as needed for nausea  Qty: 20 tablet, Refills: 1    Associated Diagnoses: Nausea      acetaminophen (TYLENOL) 325 MG tablet Take 2 tablets (650 mg) by mouth every 4 hours as needed for mild pain  Qty: 100 tablet    Associated Diagnoses: Influenza A      ferrous sulfate (IRON) 325 (65 FE) MG tablet Take 1 tablet (325 mg) by mouth 2 times daily  Qty: 60 tablet, Refills: 2    Associated Diagnoses: Pregnancy related fatigue in third trimester; Anemia, unspecified type      diphenhydrAMINE (BENADRYL) 25 MG tablet Take 1-2 tablets (25-50 mg) by mouth every 6 hours as needed for sleep  Qty: 60 tablet, Refills: 1    Associated Diagnoses: Other insomnia      Elastic Bandages & Supports (ABDOMINAL BINDER/ELASTIC 3XL) MISC 1 Device daily  Qty: 1 each, Refills: 3    Associated Diagnoses: Back pain, unspecified back location, unspecified back pain laterality, unspecified chronicity                   Consultations:   No consultations were requested during this admission          Brief History of Labor or Admission:   Rizwana Plummer is a 36 year old female who is  @ 39w0d pregnant and being admitted for repeat ,   Due to IUGR by AC < 1%.           Hospital Course:   The patient's hospital course was unremarkable.  She recovered as anticipated and experienced no post-operative complications. On discharge, her pain was well controlled. Vaginal bleeding is similar to peak menstrual flow.  Voiding without difficulty.  Ambulating well and tolerating a normal diet.  No fever or significant wound drainage.  Breastfeeding well.  Infant is stable.  No bowel movement yet.  She was discharged on post-partum day #3.    Post-partum hemoglobin:   Hemoglobin   Date Value Ref Range Status   2017 11.0 (L) 11.7 - 15.7 g/dL Final             Discharge Instructions and Follow-Up:   Discharge diet:  Regular   Discharge activity: No lifting, driving, or strenuous exercise for 6 week(s)  No driving or operating machinery while on narcotic analgesics  Pelvic rest: abstain from intercourse and do not use tampons for 6 week(s)   Discharge follow-up: Follow up with primary OB provider in 6 weeks   Wound care: Drink plenty of fluids  Ice to area for comfort  Keep wound clean and dry           Discharge Disposition:   Discharged to home      Attestation:  I have reviewed today's vital signs, notes, medications, labs and imaging.    Ammy Bustillo MD

## 2017-02-21 NOTE — DISCHARGE INSTRUCTIONS
Postop  Birth Instructions    Activity       Do not lift more than 10 pounds for 6 weeks after surgery.  Ask family and friends for help when you need it.    No driving until you have stopped taking your pain medications (usually two weeks after surgery).    No heavy exercise or activity for 6 weeks.  Don't do anything that will put a strain on your surgery site.    Don't strain when using the toilet.  Your care team may prescribe a stool softener if you have problems with your bowel movements.     To care for your incision:       Keep the incision clean and dry.    Do not soak your incision in water. No swimming or hot tubs until it has fully healed. You may soak in the bathtub if the water level is below your incision.    Do not use peroxide, gel, cream, lotion, or ointment on your incision.    Adjust your clothes to avoid pressure on your surgery site (check the elastic in your underwear for example).     You may see a small amount of clear or pink drainage and this is normal.  Check with your health care provider:       If the drainage increases or has an odor.    If the incision reddens, you have swelling, or develop a rash.    If you have increased pain and the medicine we prescribed doesn't help.    If you have a fever above 100.4 F (38 C) with or without chills when placing thermometer under your tongue.   The area around your incision (surgery wound), will feel numb.  This is normal. The numbness should go away in less than a year.     Keep your hands clean:  Always wash your hands before touching your incision (surgery wound). This helps reduce your risk of infection. If your hands aren't dirty, you may use an alcohol hand-rub to clean your hands. Keep your nails clean and short.    Call your healthcare provider if you have any of these symptoms:       You soak a sanitary pad with blood within 1 hour, or you see blood clots larger than a golf ball.    Bleeding that lasts more than 6  weeks.    Vaginal discharge that smells bad.    Severe pain, cramping or tenderness in your lower belly area.    A need to urinate more frequently (use the toilet more often), more urgently (use the toilet very quickly), or it burns when you urinate.    Nausea and vomiting.    Redness, swelling or pain around a vein in your leg.    Problems breastfeeding or a red or painful area on your breast.    Chest pain and cough or are gasping for air.    Problems with coping with sadness, anxiety or depression. If you have concerns about hurting yourself or the baby, call your provider immediately.      You have questions or concerns after you return home.     You can take up to 3 tabs of ibuprofen (600mg) every 6 hours as needed for pain.  You can additionally take 1-2 tabs of tylenol (325-650mg regular strength 500-1000mg extra strength), every 6 hours for additional pain control as needed.  It is best to alternate your medications so you are taking something every 3 hours if you are having continued pain.    The oxycodone is to be used as needed for breakthrough pain during the day or night.     If you have vaginal pain you can take sitz baths 1-2x/day. Fill the tub with 2-3 inches of warm water and soak the perineal and vaginal area for 10 minutes. Ice or warm packs can also be applied for comfort.

## 2017-02-21 NOTE — TELEPHONE ENCOUNTER
Patient needed Rx narcotic printed and signed   Previous one disregarded   Dr. Cindy Laurent,     Obstetrics and Gynecology  [facility] - [place] and [place]

## 2017-02-21 NOTE — PLAN OF CARE
Problem: Goal Outcome Summary  Goal: Goal Outcome Summary  Outcome: Improving  Progressing per plan of care. Vss, up ad olamide, fundal checks wnl, incision approx with steri strips covering and no drainage, voiding w/o difficulty,and passing gas, taking ibu, oxy for pain, breast and bottle feeding infant with formula, parents bonding well and attentive to infants needs, continue to monitor.

## 2017-03-01 ENCOUNTER — TELEPHONE (OUTPATIENT)
Dept: INTERNAL MEDICINE | Facility: CLINIC | Age: 37
End: 2017-03-01

## 2017-03-01 DIAGNOSIS — Z98.891 S/P CESAREAN SECTION: Primary | ICD-10-CM

## 2017-03-01 RX ORDER — OXYCODONE HYDROCHLORIDE 5 MG/1
5 TABLET ORAL EVERY 8 HOURS PRN
Qty: 18 TABLET | Refills: 0 | Status: SHIPPED | OUTPATIENT
Start: 2017-03-01 | End: 2018-05-03

## 2017-03-01 NOTE — TELEPHONE ENCOUNTER
Refill given, please advise to have someone  paper copy at [place] office   Office placed with triage   Dr. Cindy Laurent, DO    Obstetrics and Gynecology  [facility] - [place] and [place]

## 2017-03-01 NOTE — TELEPHONE ENCOUNTER
Pt advised.   Left the rx at the  in the ob dept in [facility].   BHAVIK Andrew RN

## 2017-03-01 NOTE — TELEPHONE ENCOUNTER
Reason for Call:  Medication or medication refill:    Do you use a [facility]?  Name of the pharmacy and phone number for the current request:  [address] ([place]) - [phone]    Name of the medication requested: oxyCODONE (ROXICODONE) 5 MG IR tablet    Other request: 1 pill left, having pain from     Can we leave a detailed message on this number? YES    Phone number patient can be reached at: Cell number on file:    Telephone Information:   Mobile [phone]       Best Time: anytime    Call taken on 3/1/2017 at 2:38 PM by NORA JAIMES

## 2017-04-06 ENCOUNTER — PRENATAL OFFICE VISIT (OUTPATIENT)
Dept: OBGYN | Facility: CLINIC | Age: 37
End: 2017-04-06
Payer: COMMERCIAL

## 2017-04-06 VITALS
DIASTOLIC BLOOD PRESSURE: 60 MMHG | BODY MASS INDEX: 27.21 KG/M2 | HEIGHT: 59 IN | SYSTOLIC BLOOD PRESSURE: 110 MMHG | WEIGHT: 135 LBS

## 2017-04-06 PROCEDURE — 99207 ZZC POST PARTUM EXAM: CPT | Performed by: FAMILY MEDICINE

## 2017-04-06 NOTE — NURSING NOTE
"Chief Complaint   Patient presents with     Post Partum Exam      17--pap not due--discuss her belly--feels bloated and that gas is moving       Initial /60  Ht 4' 11\" (1.499 m)  Wt 135 lb (61.2 kg)  LMP 2016  BMI 27.27 kg/m2 Estimated body mass index is 27.27 kg/(m^2) as calculated from the following:    Height as of this encounter: 4' 11\" (1.499 m).    Weight as of this encounter: 135 lb (61.2 kg).  Medication Reconciliation: complete   Domi Benson CMA      "

## 2017-04-06 NOTE — PROGRESS NOTES
SUBJECTIVE: Rizwana is here for a 6-week postpartum checkup.    Delivery date was 2/18/17. She had a repeat c/s of a viable girl, weight 7 pounds 3 oz., with no complications.  Since delivery, she has been breast feeding.  She has no signs of infection, bleeding or other complications.  She is not pregnant.  We discussed contraceptions and she has chosen none.  She  has not had intercourse since delivery and complains of No discomfort. Patient screened for postpartum depression and complaints are NEGATIVE. Screening has also been completed for intimate partner violence.  Domi Benson CMA    The patient reports that she is planning to have another child but they are going to wait a while and use condoms for now. The patient and her  report that the baby is doing very well. She states that there is something moving around in her stomach after the pregnancy and she is concerned about that. The patient reports that she and her  did not have to try very long to have children each time, but they have had periods of preventing pregnancy in between or periods apart from each other.     EXAM:  Today's Depression Rating was 1  PHQ-9 SCORE 1/23/2014   Total Score 5       GENERAL healthy, alert and no distress  EYES EOMI, intact visual fields, PERRL and funduscopic deferred  HENT: Normocephalic. TM's grossly normal, oropharynx without significant findings.  RESP: Clear to auscultation  BREASTS: NEGATIVE  CV: NEGATIVE  GI: positive for: diastasis recti   GYN PELVIC: normal external genitalia, normal urethral meatus, normal vaginal mucosa, normal cervix and normal adnexa, no masses or tenderness  SKIN: no ulcers, lesions or rash. Incision intact with no signs of infection.  NEURO: alert/oriented to person, location and time, CN 2-12 intact, brisk, symmetric reflexes at knees and biceps b/l, strength 5/5 throughout and symmetric, sensation to light touch grossly intact throughout  PSYCH: NEGATIVE    ASSESSMENT:    Normal postpartum exam after repeat c/s.    PLAN:  Return as needed or at time of next expected pap, pelvic, or breast exam.     This document serves as a record of the services and decisions personally performed and made by Cindy Laurent DO. It was created on her behalf by Liliana Presley, a trained medical scribe. The creation of this document is based the provider's statements to the medical scribe.  Liliana Presley April 6, 2017 11:22 AM

## 2017-04-06 NOTE — PATIENT INSTRUCTIONS
Return yearly for physical     Dr. Cindy Laurent, DO    Obstetrics and Gynecology  [facility] - [place] and [place]

## 2017-04-06 NOTE — MR AVS SNAPSHOT
After Visit Summary   4/6/2017    Rizwana Plummer    MRN: [id number]           Patient Information     Date Of Birth          1980        Visit Information        Provider Department      4/6/2017 11:15 AM Cindy Laurent, DO [facility]        Today's Diagnoses     Routine postpartum follow-up    -  1      Care Instructions    Return yearly for physical     Dr. Cindy Laurent, DO    Obstetrics and Gynecology  [facility] [place]               Follow-ups after your visit        Follow-up notes from your care team     Return in about 1 year (around 4/6/2018).      Who to contact     If you have questions or need follow up information about today's clinic visit or your schedule please contact [facility] directly at [phone].  Normal or non-critical lab and imaging results will be communicated to you by MyChart, letter or phone within 4 business days after the clinic has received the results. If you do not hear from us within 7 days, please contact the clinic through Parascalehart or phone. If you have a critical or abnormal lab result, we will notify you by phone as soon as possible.  Submit refill requests through [employer] or call your pharmacy and they will forward the refill request to us. Please allow 3 business days for your refill to be completed.          Additional Information About Your Visit        MyChart Information     [employer] gives you secure access to your electronic health record. If you see a primary care provider, you can also send messages to your care team and make appointments. If you have questions, please call your primary care clinic.  If you do not have a primary care provider, please call [phone] and they will assist you.        Care EveryWhere ID     This is your Care EveryWhere ID. This could be used by other organizations to access your [place] medical records  [id number]        Your Vitals Were     Height  "Last Period BMI (Body Mass Index)             4' 11\" (1.499 m) 05/29/2016 27.27 kg/m2          Blood Pressure from Last 3 Encounters:   04/06/17 110/60   02/21/17 105/41   02/15/17 99/55    Weight from Last 3 Encounters:   04/06/17 135 lb (61.2 kg)   02/18/17 145 lb (65.8 kg)   02/07/17 145 lb (65.8 kg)              Today, you had the following     No orders found for display       Primary Care Provider Office Phone # Fax #    Cindy Laurent,  [phone]       [facility] [address] [facility] [address]        Thank you!     Thank you for choosing [facility]  for your care. Our goal is always to provide you with excellent care. Hearing back from our patients is one way we can continue to improve our services. Please take a few minutes to complete the written survey that you may receive in the mail after your visit with us. Thank you!             Your Updated Medication List - Protect others around you: Learn how to safely use, store and throw away your medicines at www.disposemymeds.org.          This list is accurate as of: 4/6/17 11:40 AM.  Always use your most recent med list.                   Brand Name Dispense Instructions for use    Abdominal Binder/Elastic 3XL Misc     1 each    1 Device daily       acetaminophen 325 MG tablet    TYLENOL    100 tablet    Take 2 tablets (650 mg) by mouth every 4 hours as needed for mild pain       diphenhydrAMINE 25 MG tablet    BENADRYL    60 tablet    Take 1-2 tablets (25-50 mg) by mouth every 6 hours as needed for sleep       ferrous sulfate 325 (65 FE) MG tablet    IRON    60 tablet    Take 1 tablet (325 mg) by mouth 2 times daily       ondansetron 8 MG tablet    ZOFRAN    20 tablet    Take 1 tablet (8 mg) by mouth every 8 hours as needed for nausea       * oxyCODONE 5 MG IR tablet    ROXICODONE    20 tablet    Take 1 tablet (5 mg) by mouth every 6 hours as needed for moderate to severe pain       * oxyCODONE 5 " MG IR tablet    ROXICODONE    18 tablet    Take 1 tablet (5 mg) by mouth every 8 hours as needed for pain maximum 3 tablet(s) per day       PRENATAL 1 PO      Take 1 tablet by mouth Reported on 4/6/2017       senna-docusate 8.6-50 MG per tablet    SENOKOT-S;PERICOLACE    100 tablet    Take 1-2 tablets by mouth 2 times daily       * Notice:  This list has 2 medication(s) that are the same as other medications prescribed for you. Read the directions carefully, and ask your doctor or other care provider to review them with you.

## 2017-04-07 ASSESSMENT — PATIENT HEALTH QUESTIONNAIRE - PHQ9: SUM OF ALL RESPONSES TO PHQ QUESTIONS 1-9: 1

## 2017-08-06 ENCOUNTER — OFFICE VISIT (OUTPATIENT)
Dept: URGENT CARE | Facility: URGENT CARE | Age: 37
End: 2017-08-06
Payer: COMMERCIAL

## 2017-08-06 VITALS
DIASTOLIC BLOOD PRESSURE: 54 MMHG | OXYGEN SATURATION: 97 % | WEIGHT: 142 LBS | SYSTOLIC BLOOD PRESSURE: 96 MMHG | HEART RATE: 84 BPM | TEMPERATURE: 97.4 F | BODY MASS INDEX: 28.68 KG/M2

## 2017-08-06 DIAGNOSIS — R07.0 THROAT PAIN: Primary | ICD-10-CM

## 2017-08-06 LAB
DEPRECATED S PYO AG THROAT QL EIA: NORMAL
MICRO REPORT STATUS: NORMAL
SPECIMEN SOURCE: NORMAL

## 2017-08-06 PROCEDURE — 87880 STREP A ASSAY W/OPTIC: CPT | Performed by: NURSE PRACTITIONER

## 2017-08-06 PROCEDURE — 99202 OFFICE O/P NEW SF 15 MIN: CPT | Performed by: NURSE PRACTITIONER

## 2017-08-06 PROCEDURE — 87081 CULTURE SCREEN ONLY: CPT | Performed by: NURSE PRACTITIONER

## 2017-08-06 NOTE — PROGRESS NOTES
SUBJECTIVE:                                                    Rizwana Plummer is a 37 year old female who presents to clinic today for the following health issues:      ENT Symptoms             Symptoms: cc Present Absent Comment   Fever/Chills   x    Fatigue   x    Muscle Aches   x    Eye Irritation   x    Sneezing   x    Nasal Samy/Drg   x    Sinus Pressure/Pain   x    Loss of smell   x    Dental pain   x    Sore Throat  x  Dry throat    Swollen Glands   x    Ear Pain/Fullness   x    Cough  x  Dry cough    Wheeze   x    Chest Pain   x    Shortness of breath   x    Rash   x    Other         Symptom duration:  3-4 days    Symptom severity:  Moderate    Treatments tried:  No   Contacts:  Yes- son has strep, diagnosed Saturday              No Known Allergies    Past Medical History:   Diagnosis Date     Varicella age 5         Current Outpatient Prescriptions on File Prior to Visit:  oxyCODONE (ROXICODONE) 5 MG IR tablet Take 1 tablet (5 mg) by mouth every 8 hours as needed for pain maximum 3 tablet(s) per day (Patient not taking: Reported on 4/6/2017)   senna-docusate (SENOKOT-S;PERICOLACE) 8.6-50 MG per tablet Take 1-2 tablets by mouth 2 times daily (Patient not taking: Reported on 4/6/2017)   oxyCODONE (ROXICODONE) 5 MG IR tablet Take 1 tablet (5 mg) by mouth every 6 hours as needed for moderate to severe pain (Patient not taking: Reported on 4/6/2017)   Prenatal MV-Min-Fe Fum-FA-DHA (PRENATAL 1 PO) Take 1 tablet by mouth Reported on 4/6/2017   ondansetron (ZOFRAN) 8 MG tablet Take 1 tablet (8 mg) by mouth every 8 hours as needed for nausea (Patient not taking: Reported on 4/6/2017)   acetaminophen (TYLENOL) 325 MG tablet Take 2 tablets (650 mg) by mouth every 4 hours as needed for mild pain (Patient not taking: Reported on 4/6/2017)   ferrous sulfate (IRON) 325 (65 FE) MG tablet Take 1 tablet (325 mg) by mouth 2 times daily (Patient not taking: Reported on 4/6/2017)   diphenhydrAMINE (BENADRYL) 25 MG tablet Take  1-2 tablets (25-50 mg) by mouth every 6 hours as needed for sleep (Patient not taking: Reported on 4/6/2017)   Elastic Bandages & Supports (ABDOMINAL BINDER/ELASTIC 3XL) MISC 1 Device daily (Patient not taking: Reported on 8/6/2017)     No current facility-administered medications on file prior to visit.     Social History   Substance Use Topics     Smoking status: Never Smoker     Smokeless tobacco: Never Used     Alcohol use No       ROS:  Consitutional: As above  ENT: As above  Respiratory: As above    OBJECTIVE:  BP 96/54 (BP Location: Left arm, Patient Position: Sitting, Cuff Size: Adult Regular)  Pulse 84  Temp 97.4  F (36.3  C) (Oral)  Wt 142 lb (64.4 kg)  SpO2 97%  BMI 28.68 kg/m2  GENERAL APPEARANCE: healthy, alert and no distress  EYES: conjunctiva clear  EARS:small cerumen.   Ear canals w/o erythema, TM's intact w/o erythema.    NOSE/MOUTH: Nose and mouth without ulcers, erythema or lesions  THROAT: mild erythema w/ no tonsillar enlargement . no exudates  NECK: supple, nontender, no lymphadenopathy  RESP: lungs clear to auscultation - no rales, rhonchi or wheezes  CV: regular rates and rhythm, normal S1 S2, no murmur noted  NEURO: awake, alert        Rapid Strep test: Negative    ASSESSMENT:     ICD-10-CM    1. Throat pain R07.0 Rapid strep screen     Beta strep group A culture       PLAN:  I discussed lab results with the patient.  Lots of rest and fluids.  RTC if any worsening symptoms or if not improving.    Rosangela Ruth FNP-BC

## 2017-08-06 NOTE — NURSING NOTE
"Chief Complaint   Patient presents with     Pharyngitis       Initial BP 96/54 (BP Location: Left arm, Patient Position: Sitting, Cuff Size: Adult Regular)  Pulse 84  Temp 97.4  F (36.3  C) (Oral)  Wt 142 lb (64.4 kg)  SpO2 97%  BMI 28.68 kg/m2 Estimated body mass index is 28.68 kg/(m^2) as calculated from the following:    Height as of 4/6/17: 4' 11\" (1.499 m).    Weight as of this encounter: 142 lb (64.4 kg).  Medication Reconciliation: complete   Qiana Orozco MA      "

## 2017-08-06 NOTE — MR AVS SNAPSHOT
After Visit Summary   8/6/2017    Rizwana Plummer    MRN: [id number]           Patient Information     Date Of Birth          1980        Visit Information        Provider Department      8/6/2017 2:00 PM Rosangela Ruth NP [facility]        Today's Diagnoses     Throat pain    -  1       Follow-ups after your visit        Follow-up notes from your care team     Return if symptoms worsen or fail to improve.      Who to contact     If you have questions or need follow up information about today's clinic visit or your schedule please contact [facility] directly at [phone].  Normal or non-critical lab and imaging results will be communicated to you by [employer] My [employer], letter or phone within 4 business days after the clinic has received the results. If you do not hear from us within 7 days, please contact the clinic through [employer] or phone. If you have a critical or abnormal lab result, we will notify you by phone as soon as possible.  Submit refill requests through [employer] or call your pharmacy and they will forward the refill request to us. Please allow 3 business days for your refill to be completed.          Additional Information About Your Visit        MyChart Information     [employer] gives you secure access to your electronic health record. If you see a primary care provider, you can also send messages to your care team and make appointments. If you have questions, please call your primary care clinic.  If you do not have a primary care provider, please call [phone] and they will assist you.        Care EveryWhere ID     This is your Care EveryWhere ID. This could be used by other organizations to access your [place] medical records  [id number]        Your Vitals Were     Pulse Temperature Pulse Oximetry BMI (Body Mass Index)          84 97.4  F (36.3  C) (Oral) 97% 28.68 kg/m2         Blood Pressure from Last 3 Encounters:   08/06/17 96/54   04/06/17  110/60   02/21/17 105/41    Weight from Last 3 Encounters:   08/06/17 142 lb (64.4 kg)   04/06/17 135 lb (61.2 kg)   02/18/17 145 lb (65.8 kg)              We Performed the Following     Beta strep group A culture     Rapid strep screen        Primary Care Provider Office Phone # Fax #    Cindy Laurent, [phone]       [facility] [address] [facility] [address]        Equal Access to Services     JORGE HINES : Hadii aad ku hadasho Soomaali, waaxda luqadaha, qaybta kaalmada adeegyada, waxay idiin hayaan adeeg kharash marquez . So [facility] [phone].    ATENCIÓN: Si habla español, tiene a pink disposición servicios gratuitos de asistencia lingüística. Llame al [phone].    We comply with applicable federal civil rights laws and [place] laws. We do not discriminate on the basis of race, color, national origin, age, disability sex, sexual orientation or gender identity.            Thank you!     Thank you for choosing [facility]  for your care. Our goal is always to provide you with excellent care. Hearing back from our patients is one way we can continue to improve our services. Please take a few minutes to complete the written survey that you may receive in the mail after your visit with us. Thank you!             Your Updated Medication List - Protect others around you: Learn how to safely use, store and throw away your medicines at www.disposemymeds.org.          This list is accurate as of: 8/6/17  3:13 PM.  Always use your most recent med list.                   Brand Name Dispense Instructions for use Diagnosis    Abdominal Binder/Elastic 3XL Misc     1 each    1 Device daily    Back pain, unspecified back location, unspecified back pain laterality, unspecified chronicity       acetaminophen 325 MG tablet    TYLENOL    100 tablet    Take 2 tablets (650 mg) by mouth every 4 hours as needed for mild pain    Influenza A       diphenhydrAMINE 25 MG  tablet    BENADRYL    60 tablet    Take 1-2 tablets (25-50 mg) by mouth every 6 hours as needed for sleep    Other insomnia       ferrous sulfate 325 (65 FE) MG tablet    IRON    60 tablet    Take 1 tablet (325 mg) by mouth 2 times daily    Pregnancy related fatigue in third trimester, Anemia, unspecified type       ondansetron 8 MG tablet    ZOFRAN    20 tablet    Take 1 tablet (8 mg) by mouth every 8 hours as needed for nausea    Nausea       * oxyCODONE 5 MG IR tablet    ROXICODONE    20 tablet    Take 1 tablet (5 mg) by mouth every 6 hours as needed for moderate to severe pain    Status post  delivery       * oxyCODONE 5 MG IR tablet    ROXICODONE    18 tablet    Take 1 tablet (5 mg) by mouth every 8 hours as needed for pain maximum 3 tablet(s) per day    S/P  section       PRENATAL 1 PO      Take 1 tablet by mouth Reported on 2017        senna-docusate 8.6-50 MG per tablet    SENOKOT-S;PERICOLACE    100 tablet    Take 1-2 tablets by mouth 2 times daily    Status post  delivery       * Notice:  This list has 2 medication(s) that are the same as other medications prescribed for you. Read the directions carefully, and ask your doctor or other care provider to review them with you.

## 2017-08-07 LAB
BACTERIA SPEC CULT: NORMAL
MICRO REPORT STATUS: NORMAL
SPECIMEN SOURCE: NORMAL

## 2018-02-08 RX ORDER — ONDANSETRON 2 MG/ML
8 INJECTION INTRAMUSCULAR; INTRAVENOUS EVERY 6 HOURS PRN
Status: CANCELLED
Start: 2018-02-08

## 2018-05-03 ENCOUNTER — OFFICE VISIT (OUTPATIENT)
Dept: OBGYN | Facility: CLINIC | Age: 38
End: 2018-05-03
Payer: COMMERCIAL

## 2018-05-03 VITALS — BODY MASS INDEX: 26.05 KG/M2 | SYSTOLIC BLOOD PRESSURE: 98 MMHG | WEIGHT: 129 LBS | DIASTOLIC BLOOD PRESSURE: 60 MMHG

## 2018-05-03 DIAGNOSIS — N92.6 IRREGULAR MENSTRUAL CYCLE: ICD-10-CM

## 2018-05-03 DIAGNOSIS — Z00.00 HEALTH CARE MAINTENANCE: Primary | ICD-10-CM

## 2018-05-03 DIAGNOSIS — E55.9 VITAMIN D DEFICIENCY: ICD-10-CM

## 2018-05-03 DIAGNOSIS — R53.83 OTHER FATIGUE: ICD-10-CM

## 2018-05-03 PROBLEM — Z98.891 S/P CESAREAN SECTION: Status: RESOLVED | Noted: 2017-02-18 | Resolved: 2018-05-03

## 2018-05-03 LAB — HGB BLD-MCNC: 11.7 G/DL (ref 11.7–15.7)

## 2018-05-03 PROCEDURE — 82306 VITAMIN D 25 HYDROXY: CPT | Performed by: ADVANCED PRACTICE MIDWIFE

## 2018-05-03 PROCEDURE — 85018 HEMOGLOBIN: CPT | Performed by: ADVANCED PRACTICE MIDWIFE

## 2018-05-03 PROCEDURE — 84443 ASSAY THYROID STIM HORMONE: CPT | Performed by: ADVANCED PRACTICE MIDWIFE

## 2018-05-03 PROCEDURE — 99395 PREV VISIT EST AGE 18-39: CPT | Performed by: ADVANCED PRACTICE MIDWIFE

## 2018-05-03 PROCEDURE — 36415 COLL VENOUS BLD VENIPUNCTURE: CPT | Performed by: ADVANCED PRACTICE MIDWIFE

## 2018-05-03 PROCEDURE — 84702 CHORIONIC GONADOTROPIN TEST: CPT | Performed by: ADVANCED PRACTICE MIDWIFE

## 2018-05-03 NOTE — MR AVS SNAPSHOT
After Visit Summary   5/3/2018    Rizwana Plummer    MRN: [id number]           Patient Information     Date Of Birth          1980        Visit Information        Provider Department      5/3/2018 10:45 AM Erica Ibrahim APRN CNM [facility]        Today's Diagnoses     Health care maintenance    -  1    Other fatigue        Irregular menstrual cycle           Follow-ups after your visit        Follow-up notes from your care team     Return in about 1 year (around 5/3/2019).      Your next 10 appointments already scheduled     May 07, 2018  9:40 AM CDT   [facility]lanos [facility]    [address]  [facility] [address] [phone]              Future tests that were ordered for you today     Open Future Orders        Priority Expected Expires Ordered    Lipid panel Routine  5/3/2019 5/3/2018            Who to contact     If you have questions or need follow up information about today's clinic visit or your schedule please contact [facility] directly at [phone].  Normal or non-critical lab and imaging results will be communicated to you by Seeliohart, letter or phone within 4 business days after the clinic has received the results. If you do not hear from us within 7 days, please contact the clinic through Seeliohart or phone. If you have a critical or abnormal lab result, we will notify you by phone as soon as possible.  Submit refill requests through [employer] or call your pharmacy and they will forward the refill request to us. Please allow 3 business days for your refill to be completed.          Additional Information About Your Visit        MyChart Information     [employer] gives you secure access to your electronic health record. If you see a primary care provider, you can also send messages to your care team and make appointments. If you have questions, please call your  primary care clinic.  If you do not have a primary care provider, please call [phone] and they will assist you.        Care EveryWhere ID     This is your Care EveryWhere ID. This could be used by other organizations to access your [place] medical records  [id number]        Your Vitals Were     Last Period BMI (Body Mass Index)                04/26/2018 (Exact Date) 26.05 kg/m2           Blood Pressure from Last 3 Encounters:   05/03/18 98/60   08/06/17 96/54   04/06/17 110/60    Weight from Last 3 Encounters:   05/03/18 129 lb (58.5 kg)   08/06/17 142 lb (64.4 kg)   04/06/17 135 lb (61.2 kg)              We Performed the Following     HCG quantitative pregnancy     Hemoglobin     TSH with free T4 reflex     Vitamin D Deficiency        Primary Care Provider Office Phone # Fax #    Cindy Bajwa Mehran,  [phone]       [address] [facility] [address]        Equal Access to Services     JORGE HINES : Hadii aad ku hadasho Soomaali, waaxda luqadaha, qaybta kaalmada adeegyada, waxay idiin hayjamesn shahzad zayas . So [facility] [phone].    ATENCIÓN: Si habla español, tiene a pink disposición servicios gratuitos de asistencia lingüística. Llame al [phone].    We comply with applicable federal civil rights laws and [place] laws. We do not discriminate on the basis of race, color, national origin, age, disability, sex, sexual orientation, or gender identity.            Thank you!     Thank you for choosing [facility]  for your care. Our goal is always to provide you with excellent care. Hearing back from our patients is one way we can continue to improve our services. Please take a few minutes to complete the written survey that you may receive in the mail after your visit with us. Thank you!             Your Updated Medication List - Protect others around you: Learn how to safely use, store and throw away your medicines at www.disposemymeds.org.      Notice  As  of 5/3/2018 11:23 AM    You have not been prescribed any medications.

## 2018-05-03 NOTE — PROGRESS NOTES
"Rizwana is a 37 year old  female who presents for annual exam.     Besides routine health maintenance,  she would like to discuss increased fatigue and feelings of \"pregnancy.\"  States she has been feeling as though she was pregnant due to fatigue, back pain and overall feeling of \"pregnancy\".  States she had 2 menses last month and concerned about this as well.  Requesting labs today due to fatigue.  Menses are irregular and normal lasting 7 days.   Menses flow: normal.    Patient's last menstrual period was 2018 (exact date)..   Using none for contraception.    She is currently considering pregnancy.    REPRODUCTIVE/GYNECOLOGIC HISTORY:  Rizwana is sexually active with male partner(s) and is currently in monogamous relationship.   STI testing offered?  Declined  History of abnormal Pap smear:  No  SOCIAL HISTORY  Abuse: does not report having previously been physical or sexually abused.    Do you feel safe in your environment? YES    She  reports that she has never smoked. She has never used smokeless tobacco.      Last PHQ-9 score on record =   PHQ-9 SCORE 2017   Total Score -   Total Score 1     Last GAD7 score on record = No flowsheet data found.      HEALTH MAINTENANCE:  Cholesterol: (No results found for: CHOL History of abnormal lipids: No  History of abnormal Mammo: No  Regular Self Breast Exams: No  TSH: (  TSH   Date Value Ref Range Status   2014 1.66 0.4 - 5.0 mU/L Final    )  Pap; (  Lab Results   Component Value Date    PAP NIL 2016    PAP NIL 2013    )  Immunizations up to date: yes  (Gardasil, Tdap, Flu)  Health maintenance updated:  yes    HEALTHY HABITS  Eating habits: eats regular meals  Calcium/Vitamin D intake: source:  none Adequate? No  Exercise: How often do you exercise? None  Have you had an eye exam in the last two years? NO (Recommended)  Do you routinely see the dentist once or twice yearly? NO (Recommended)      HISTORY:  Obstetric History       T2   "    L2     SAB1   TAB0   Ectopic0   Multiple0   Live Births2       # Outcome Date GA Lbr Anthony/2nd Weight Sex Delivery Anes PTL Lv   3 Term 17 39w0d  7 lb 4.8 oz (3.31 kg) F CS-LTranv Spinal  CATRACHO      Name: IGLESIA PATTERSON      Apgar1:  8                Apgar5: 9   2 Term 14 41w6d  8 lb 2.7 oz (3.705 kg) M CS-Unspec EPI N CATRACHO      Name: Radha      Apgar1:  3                Apgar5: 8   1 SAB 12     SAB   ND        Past Medical History:   Diagnosis Date     Varicella age 5     Past Surgical History:   Procedure Laterality Date      SECTION  2014    Procedure:  SECTION;   Primary  section         SECTION N/A 2017    Procedure:  SECTION;  Surgeon: Cindy Laurent DO;  Location: [facility] OR     Infibulation       MARSUPIALIZATION BARTHOLIN CYST  2013    Procedure: MARSUPIALIZATION BARTHOLIN CYST;  Removal of Inclusion Cyst.   Fetal heart tones 180's;  Surgeon: Rohit Parks MD;  Location: [facility] OR     Family History   Problem Relation Age of Onset     CEREBROVASCULAR DISEASE Mother      secondary to severe preeclampsia with last pregnancy     DIABETES Mother      after stroke     Hypertension Mother      history of severe preeclampsia/stroke     Hypertension Father      Social History     Social History     Marital status:      Spouse name: N/A     Number of children: 0     Years of education: N/A     Occupational History      Unemployed     Social History Main Topics     Smoking status: Never Smoker     Smokeless tobacco: Never Used     Alcohol use No     Drug use: No     Sexual activity: Yes     Partners: Male     Other Topics Concern     None     Social History Narrative     No current outpatient prescriptions on file.   No Known Allergies    Past medical, surgical, social and family history were reviewed and updated in EPIC.    ROS:   12 point review of systems negative other than symptoms noted below.  Constitutional:  Fatigue    PHYSICAL EXAM:  BP 98/60 (BP Location: Left arm, Patient Position: Sitting, Cuff Size: Adult Regular)  Wt 129 lb (58.5 kg)  LMP 04/26/2018 (Exact Date)  BMI 26.05 kg/m2   BMI: Body mass index is 26.05 kg/(m^2).  Constitutional: healthy, alert and no distress  Neck: symmetrical, thyroid normal size, no masses present, no lymphadenopathy present.   Cardiovascular: RRR, no murmurs present  Respiratory: breathing unlabored, lungs CTA bilaterally  Breast:normal without masses, tenderness or nipple discharge and no palpable axillary masses or adenopathy  Gastrointestinal: abdomen soft, non-tender, bowel sounds present  PELVIC EXAM:  Vulva: No lesions, no adenopathy, BUS WNL  Vagina: Moist, pink, discharge normal  well rugated, no lesions  Cervix:smooth, pink, no visible lesions  Uterus: Normal size, non-tender, mobile  Ovaries: No masses palpated  Rectal exam: deferred    ASSESSMENT/PLAN:    ICD-10-CM    1. Health care maintenance Z00.00 Lipid panel     TSH with free T4 reflex   2. Other fatigue R53.83 TSH with free T4 reflex     Hemoglobin     Vitamin D Deficiency     HCG quantitative pregnancy   3. Irregular menstrual cycle N92.6 HCG quantitative pregnancy       COUNSELING:   Reviewed preventive health counseling, as reflected in patient instructions  Special attention given to:        Regular exercise       Healthy diet/nutrition       Vision screening       Family planning        Preconception counseling       Pap smear with HPV due 2019       Return to Clinic in 1 year and as needed        30 minutes was spent face to face with the patient today discussing her history, diagnosis, and follow-up plan as noted above. Over 50% of the visit was spent in counseling and coordination of care of fatigue, labs, preconception counseling.    SAHIL Odonnell, CNM, WHNP

## 2018-05-03 NOTE — NURSING NOTE
"Chief Complaint   Patient presents with     Gyn Exam     Annual with pap- concerns of weakness, lethargic       Initial BP 98/60 (BP Location: Left arm, Patient Position: Sitting, Cuff Size: Adult Regular)  Wt 129 lb (58.5 kg)  LMP 04/26/2018 (Exact Date)  BMI 26.05 kg/m2 Estimated body mass index is 26.05 kg/(m^2) as calculated from the following:    Height as of 4/6/17: 4' 11\" (1.499 m).    Weight as of this encounter: 129 lb (58.5 kg).  Medication Reconciliation: complete     Devon Frost CMA       "

## 2018-05-04 LAB
B-HCG SERPL-ACNC: <1 IU/L (ref 0–5)
DEPRECATED CALCIDIOL+CALCIFEROL SERPL-MC: 14 UG/L (ref 20–75)
TSH SERPL DL<=0.005 MIU/L-ACNC: 1.8 MU/L (ref 0.4–4)

## 2018-05-20 ENCOUNTER — HOSPITAL ENCOUNTER (EMERGENCY)
Facility: CLINIC | Age: 38
Discharge: HOME | End: 2018-05-20
Attending: EMERGENCY MEDICINE | Admitting: EMERGENCY MEDICINE
Payer: COMMERCIAL

## 2018-05-20 VITALS
RESPIRATION RATE: 20 BRPM | HEART RATE: 102 BPM | DIASTOLIC BLOOD PRESSURE: 118 MMHG | SYSTOLIC BLOOD PRESSURE: 124 MMHG | TEMPERATURE: 98.6 F | OXYGEN SATURATION: 100 %

## 2018-05-20 DIAGNOSIS — J02.9 ACUTE PHARYNGITIS, UNSPECIFIED ETIOLOGY: ICD-10-CM

## 2018-05-20 DIAGNOSIS — E86.0 DEHYDRATION: ICD-10-CM

## 2018-05-20 DIAGNOSIS — Z33.1 PREGNANT STATE, INCIDENTAL: ICD-10-CM

## 2018-05-20 LAB
ALBUMIN UR-MCNC: NEGATIVE MG/DL
ANION GAP SERPL CALCULATED.3IONS-SCNC: 4 MMOL/L (ref 3–14)
APPEARANCE UR: CLEAR
BASOPHILS # BLD AUTO: 0 10E9/L (ref 0–0.2)
BASOPHILS NFR BLD AUTO: 0.1 %
BILIRUB UR QL STRIP: NEGATIVE
BUN SERPL-MCNC: 8 MG/DL (ref 7–30)
CALCIUM SERPL-MCNC: 8.4 MG/DL (ref 8.5–10.1)
CHLORIDE SERPL-SCNC: 110 MMOL/L (ref 94–109)
CO2 SERPL-SCNC: 26 MMOL/L (ref 20–32)
COLOR UR AUTO: YELLOW
CREAT SERPL-MCNC: 0.59 MG/DL (ref 0.52–1.04)
DEPRECATED S PYO AG THROAT QL EIA: NORMAL
DIFFERENTIAL METHOD BLD: ABNORMAL
EOSINOPHIL # BLD AUTO: 0 10E9/L (ref 0–0.7)
EOSINOPHIL NFR BLD AUTO: 0.1 %
ERYTHROCYTE [DISTWIDTH] IN BLOOD BY AUTOMATED COUNT: 13.1 % (ref 10–15)
GFR SERPL CREATININE-BSD FRML MDRD: >90 ML/MIN/1.7M2
GLUCOSE SERPL-MCNC: 109 MG/DL (ref 70–99)
GLUCOSE UR STRIP-MCNC: NEGATIVE MG/DL
HCG UR QL: POSITIVE
HCT VFR BLD AUTO: 35 % (ref 35–47)
HGB BLD-MCNC: 11.1 G/DL (ref 11.7–15.7)
HGB UR QL STRIP: NEGATIVE
IMM GRANULOCYTES # BLD: 0 10E9/L (ref 0–0.4)
IMM GRANULOCYTES NFR BLD: 0.1 %
KETONES UR STRIP-MCNC: 5 MG/DL
LEUKOCYTE ESTERASE UR QL STRIP: NEGATIVE
LYMPHOCYTES # BLD AUTO: 0.8 10E9/L (ref 0.8–5.3)
LYMPHOCYTES NFR BLD AUTO: 10.5 %
MCH RBC QN AUTO: 26.2 PG (ref 26.5–33)
MCHC RBC AUTO-ENTMCNC: 31.7 G/DL (ref 31.5–36.5)
MCV RBC AUTO: 83 FL (ref 78–100)
MONOCYTES # BLD AUTO: 0.6 10E9/L (ref 0–1.3)
MONOCYTES NFR BLD AUTO: 8.1 %
MUCOUS THREADS #/AREA URNS LPF: PRESENT /LPF
NEUTROPHILS # BLD AUTO: 5.9 10E9/L (ref 1.6–8.3)
NEUTROPHILS NFR BLD AUTO: 81.1 %
NITRATE UR QL: NEGATIVE
NRBC # BLD AUTO: 0 10*3/UL
NRBC BLD AUTO-RTO: 0 /100
PH UR STRIP: 6 PH (ref 5–7)
PLATELET # BLD AUTO: 240 10E9/L (ref 150–450)
POTASSIUM SERPL-SCNC: 3.7 MMOL/L (ref 3.4–5.3)
RBC # BLD AUTO: 4.23 10E12/L (ref 3.8–5.2)
RBC #/AREA URNS AUTO: 3 /HPF (ref 0–2)
SODIUM SERPL-SCNC: 140 MMOL/L (ref 133–144)
SOURCE: ABNORMAL
SP GR UR STRIP: 1.02 (ref 1–1.03)
SPECIMEN SOURCE: NORMAL
SQUAMOUS #/AREA URNS AUTO: 1 /HPF (ref 0–1)
UROBILINOGEN UR STRIP-MCNC: 0 MG/DL (ref 0–2)
WBC # BLD AUTO: 7.3 10E9/L (ref 4–11)
WBC #/AREA URNS AUTO: 1 /HPF (ref 0–5)

## 2018-05-20 PROCEDURE — 36415 COLL VENOUS BLD VENIPUNCTURE: CPT | Performed by: EMERGENCY MEDICINE

## 2018-05-20 PROCEDURE — 99284 EMERGENCY DEPT VISIT MOD MDM: CPT | Mod: 25

## 2018-05-20 PROCEDURE — 81001 URINALYSIS AUTO W/SCOPE: CPT | Performed by: EMERGENCY MEDICINE

## 2018-05-20 PROCEDURE — 85025 COMPLETE CBC W/AUTO DIFF WBC: CPT | Performed by: EMERGENCY MEDICINE

## 2018-05-20 PROCEDURE — 96361 HYDRATE IV INFUSION ADD-ON: CPT

## 2018-05-20 PROCEDURE — 80048 BASIC METABOLIC PNL TOTAL CA: CPT | Performed by: EMERGENCY MEDICINE

## 2018-05-20 PROCEDURE — 25000128 H RX IP 250 OP 636: Performed by: EMERGENCY MEDICINE

## 2018-05-20 PROCEDURE — 93005 ELECTROCARDIOGRAM TRACING: CPT

## 2018-05-20 PROCEDURE — 81025 URINE PREGNANCY TEST: CPT | Performed by: EMERGENCY MEDICINE

## 2018-05-20 PROCEDURE — 87081 CULTURE SCREEN ONLY: CPT | Performed by: EMERGENCY MEDICINE

## 2018-05-20 PROCEDURE — 87880 STREP A ASSAY W/OPTIC: CPT | Performed by: EMERGENCY MEDICINE

## 2018-05-20 PROCEDURE — 96360 HYDRATION IV INFUSION INIT: CPT

## 2018-05-20 RX ORDER — ONDANSETRON 4 MG/1
4 TABLET, ORALLY DISINTEGRATING ORAL EVERY 8 HOURS PRN
Qty: 10 TABLET | Refills: 0 | Status: SHIPPED | OUTPATIENT
Start: 2018-05-20 | End: 2018-05-23

## 2018-05-20 RX ORDER — ONDANSETRON 2 MG/ML
4 INJECTION INTRAMUSCULAR; INTRAVENOUS EVERY 30 MIN PRN
Status: DISCONTINUED | OUTPATIENT
Start: 2018-05-20 | End: 2018-05-20 | Stop reason: HOSPADM

## 2018-05-20 RX ORDER — DOXYLAMINE SUCCINATE AND PYRIDOXINE HYDROCHLORIDE, DELAYED RELEASE TABLETS 10 MG/10 MG 10; 10 MG/1; MG/1
1 TABLET, DELAYED RELEASE ORAL 3 TIMES DAILY
Qty: 30 TABLET | Refills: 0 | Status: SHIPPED | OUTPATIENT
Start: 2018-05-20 | End: 2018-05-30

## 2018-05-20 RX ORDER — SODIUM CHLORIDE 9 MG/ML
1000 INJECTION, SOLUTION INTRAVENOUS CONTINUOUS
Status: DISCONTINUED | OUTPATIENT
Start: 2018-05-20 | End: 2018-05-20 | Stop reason: HOSPADM

## 2018-05-20 RX ORDER — METOCLOPRAMIDE HYDROCHLORIDE 5 MG/ML
10 INJECTION INTRAMUSCULAR; INTRAVENOUS ONCE
Status: DISCONTINUED | OUTPATIENT
Start: 2018-05-20 | End: 2018-05-20 | Stop reason: HOSPADM

## 2018-05-20 RX ADMIN — SODIUM CHLORIDE 1000 ML: 9 INJECTION, SOLUTION INTRAVENOUS at 10:39

## 2018-05-20 ASSESSMENT — ENCOUNTER SYMPTOMS
NAUSEA: 1
VOMITING: 0
FEVER: 0
SORE THROAT: 1
DIARRHEA: 0
ABDOMINAL PAIN: 0
CHILLS: 1
LIGHT-HEADEDNESS: 1

## 2018-05-20 NOTE — ED AVS SNAPSHOT
[facility] Emergency Department    [address]    [facility] [address]    Phone:  [phone]    Fax:  [phone]                                       Rizwana Plummer   MRN: [id number]    Department:  [facility] Emergency Department   Date of Visit:  5/20/2018           Patient Information     Date Of Birth          1980        Your diagnoses for this visit were:     Dehydration     Acute pharyngitis, unspecified etiology     Pregnant state, incidental        You were seen by Ranjit Brooks MD.        Discharge Instructions         Dehydration (Adult)  Dehydration occurs when your body loses too much fluid. This may be the result of prolonged vomiting or diarrhea, excessive sweating, or a high fever. It may also happen if you don t drink enough fluid when you re sick or out in the heat. Misuse of diuretics (water pills) can also be a cause.  Symptoms include thirst and decreased urine output. You may also feel dizzy, weak, fatigued, or very drowsy. The diet described below is usually enough to treat dehydration. In some cases, you may need medicine.  Home care    Drink at least 12 8-ounce glasses of fluid every day to resolve the dehydration. Fluid may include water; orange juice; lemonade; apple, grape, or cranberry juice; clear fruit drinks; electrolyte replacement and sports drinks; and teas and coffee without caffeine. Don't drink alcohol. If you have been diagnosed with a kidney disease, ask your doctor how much and what types of fluids you should drink to prevent dehydration. If you have kidney disease, fluid can build up in the body. This can be dangerous to your health.    If you have a fever, muscle aches, or a headache as a result of a cold or flu, you may take acetaminophen or ibuprofen, unless another medicine was prescribed. If you have chronic liver or kidney disease, or have ever had a stomach ulcer or gastrointestinal bleeding, talk with your  healthcare provider before using these medicines. Don't take aspirin if you are younger than 18 and have a fever. Aspirin raises the chance for severe liver injury.  Follow-up care  Follow up with your healthcare provider, or as advised.  When to seek medical advice  Call your healthcare provider right away if any of these occur:    Continued vomiting    Frequent diarrhea (more than 5 times a day); blood (red or black color) or mucus in diarrhea    Blood in vomit or stool    Swollen abdomen or increasing abdominal pain    Weakness, dizziness, or fainting    Unusual drowsiness or confusion    Reduced urine output or extreme thirst    Fever of 100.4 F (38 C) or higher  Date Last Reviewed: 5/1/2017 2000-2017 [employer]. [address] [place] [address], [place] [address]. All rights reserved. This information is not intended as a substitute for professional medical care. Always follow your healthcare professional's instructions.        Pregnancy    Your exam today shows that you are pregnant.  Pregnancy symptoms  During pregnancy your body s hormones change. This causes physical and emotional changes. This is normal. Knowing what to expect is important for your piece of mind and so you know when to seek help for a problem. Here are some of the most common symptoms:    Morning sickness or nausea. This can happen any time of the day or night.    Tender, swollen breasts    Need to urinate frequently    Tiredness or fatigue    Dizziness    Indigestion or heartburn    Food cravings or turn-offs    Constipation    Emotional changes. This can range from anxiety to excitement to depression.  General care for a healthy pregnancy  Here are things you can do to help make sure your baby is born healthy:    Rest when you feel tired. This is especially true in the later months of pregnancy.    Drink more fluids. Your body needs more fluids than you may be used to. Drink 8 to10 glasses of juice, milk, or water every  day.    Eat well-balanced meals. Eat at regular times to give your body enough protein. You can expect to gain about 30 pounds during the pregnancy. Don t try to diet or lose weight while you are pregnant.    Take a prenatal vitamin every day. This helps you meet the extra nutritional needs of pregnancy.    Don t take any other medicine during your pregnancy unless your healthcare provider tells you to. This includes prescription medicines and those you buy over the counter. Many medicines can harm the growing baby.    If you have nausea or vomiting, don t eat greasy or fried foods. Eat several smaller meals throughout the day rather than 3 large meals.    If you smoke, you must stop. The nicotine you breathe in goes right to the baby.    Stay away from alcohol, even in moderate amounts. Daily drinking will harm your baby and can cause permanent brain damage.    Don t use recreational drugs, especially cocaine, crack, and heroin. These will harm your baby. Also avoid marijuana.    If you were using recreational drugs or prescribed medicine when you found out that you were pregnant, talk with your healthcare provider about possible effects on your growing baby.    If you have medical problems that you need to take medicine for, talk with your healthcare provider.  Follow-up care  Call your healthcare provider to arrange for prenatal care. Prenatal care is important. You can see your family provider, a pregnancy specialist (obstetrician), or a primary care clinic.  When to seek medical advice  Call your healthcare provider right away if any of these occur:    Vaginal bleeding    Pain in your belly (abdomen) or back that is moderate or severe    Lots of vomiting, or you can t keep any fluids down for 6 hours    Burning feeling when you urinate    Headache, dizziness, or rapid weight gain    Fever    Vision changes or blurred vision  Date Last Reviewed: 10/1/2016    1530-3315 [employer]. [address], [place] [address]. All rights reserved. This information is not intended as a substitute for professional medical care. Always follow your healthcare professional's instructions.          24 Hour Appointment Hotline       To make an appointment at any [facility], call [phone] ([phone]). If you don't have a family doctor or clinic, we will help you find one. [place] clinics are conveniently located to serve the needs of you and your family.             Review of your medicines      START taking        Dose / Directions Last dose taken    Doxylamine-Pyridoxine 10-10 MG Tbec   Dose:  1 capsule   Quantity:  30 tablet        Take 1 capsule by mouth 3 times daily for 10 days   Refills:  0        ondansetron 4 MG ODT tab   Commonly known as:  ZOFRAN ODT   Dose:  4 mg   Quantity:  10 tablet        Take 1 tablet (4 mg) by mouth every 8 hours as needed for nausea   Refills:  0          Our records show that you are taking the medicines listed below. If these are incorrect, please call your family doctor or clinic.        Dose / Directions Last dose taken    cholecalciferol 1000 UNIT tablet   Commonly known as:  vitamin D3   Dose:  1000 Units   Quantity:  90 tablet        Take 1 tablet (1,000 Units) by mouth daily   Refills:  0                Prescriptions were sent or printed at these locations (2 Prescriptions)                   Other Prescriptions                Printed at Department/Unit printer (2 of 2)         Doxylamine-Pyridoxine 10-10 MG TBEC               ondansetron (ZOFRAN ODT) 4 MG ODT tab                Procedures and tests performed during your visit     Basic metabolic panel    Beta strep group A culture    CBC with platelets differential    EKG 12 lead    HCG qualitative urine    Peripheral IV: Standard    Rapid strep screen    UA reflex to Microscopic and Culture      Orders Needing Specimen Collection     None      Pending Results     Date and Time Order Name Status Description     5/20/2018 1102 Beta strep group A culture In process     5/20/2018 1014 EKG 12 lead Preliminary             Pending Culture Results     Date and Time Order Name Status Description    5/20/2018 1102 Beta strep group A culture In process             Pending Results Instructions     If you had any lab results that were not finalized at the time of your Discharge, you can call the ED Lab Result RN at [phone]. You will be contacted by this team for any positive Lab results or changes in treatment. The nurses are available 7 days a week from 10A to 6:30P.  You can leave a message 24 hours per day and they will return your call.        Test Results From Your Hospital Stay        5/20/2018 12:17 PM      Component Results     Component Value Ref Range & Units Status    Color Urine Yellow  Final    Appearance Urine Clear  Final    Glucose Urine Negative NEG^Negative mg/dL Final    Bilirubin Urine Negative NEG^Negative Final    Ketones Urine 5 (A) NEG^Negative mg/dL Final    Specific Gravity Urine 1.023 1.003 - 1.035 Final    Blood Urine Negative NEG^Negative Final    pH Urine 6.0 5.0 - 7.0 pH Final    Protein Albumin Urine Negative NEG^Negative mg/dL Final    Urobilinogen mg/dL 0.0 0.0 - 2.0 mg/dL Final    Nitrite Urine Negative NEG^Negative Final    Leukocyte Esterase Urine Negative NEG^Negative Final    Source Midstream Urine  Final    RBC Urine 3 (H) 0 - 2 /HPF Final    WBC Urine 1 0 - 5 /HPF Final    Squamous Epithelial /HPF Urine 1 0 - 1 /HPF Final    Mucous Urine Present (A) NEG^Negative /LPF Final         5/20/2018 12:18 PM      Component Results     Component Value Ref Range & Units Status    HCG Qual Urine Positive (A) NEG^Negative Final    This test is for screening purposes.  Results should be interpreted along with   the clinical picture.  Confirmation testing is available if warranted by   ordering BNM221, HCG Quantitative Pregnancy.           5/20/2018 11:27 AM      Component Results     Component     Specimen Description    Throat    Rapid Strep A Screen    NEGATIVE: No Group A streptococcal antigen detected by immunoassay, await culture report.         5/20/2018 11:16 AM      Component Results     Component Value Ref Range & Units Status    Sodium 140 133 - 144 mmol/L Final    Potassium 3.7 3.4 - 5.3 mmol/L Final    Chloride 110 (H) 94 - 109 mmol/L Final    Carbon Dioxide 26 20 - 32 mmol/L Final    Anion Gap 4 3 - 14 mmol/L Final    Glucose 109 (H) 70 - 99 mg/dL Final    Urea Nitrogen 8 7 - 30 mg/dL Final    Creatinine 0.59 0.52 - 1.04 mg/dL Final    GFR Estimate >90 >60 mL/min/1.7m2 Final    Non  GFR Calc    GFR Estimate If Black >90 >60 mL/min/1.7m2 Final    African American GFR Calc    Calcium 8.4 (L) 8.5 - 10.1 mg/dL Final         5/20/2018 11:01 AM      Component Results     Component Value Ref Range & Units Status    WBC 7.3 4.0 - 11.0 10e9/L Final    RBC Count 4.23 3.8 - 5.2 10e12/L Final    Hemoglobin 11.1 (L) 11.7 - 15.7 g/dL Final    Hematocrit 35.0 35.0 - 47.0 % Final    MCV 83 78 - 100 fl Final    MCH 26.2 (L) 26.5 - 33.0 pg Final    MCHC 31.7 31.5 - 36.5 g/dL Final    RDW 13.1 10.0 - 15.0 % Final    Platelet Count 240 150 - 450 10e9/L Final    Diff Method Automated Method  Final    % Neutrophils 81.1 % Final    % Lymphocytes 10.5 % Final    % Monocytes 8.1 % Final    % Eosinophils 0.1 % Final    % Basophils 0.1 % Final    % Immature Granulocytes 0.1 % Final    Nucleated RBCs 0 0 /100 Final    Absolute Neutrophil 5.9 1.6 - 8.3 10e9/L Final    Absolute Lymphocytes 0.8 0.8 - 5.3 10e9/L Final    Absolute Monocytes 0.6 0.0 - 1.3 10e9/L Final    Absolute Eosinophils 0.0 0.0 - 0.7 10e9/L Final    Absolute Basophils 0.0 0.0 - 0.2 10e9/L Final    Abs Immature Granulocytes 0.0 0 - 0.4 10e9/L Final    Absolute Nucleated RBC 0.0  Final         5/20/2018 11:27 AM                Clinical Quality Measure: Blood Pressure Screening     Your blood pressure was checked while you were in the emergency  department today. The last reading we obtained was  BP: (!) 124/118 . Please read the guidelines below about what these numbers mean and what you should do about them.  If your systolic blood pressure (the top number) is less than 120 and your diastolic blood pressure (the bottom number) is less than 80, then your blood pressure is normal. There is nothing more that you need to do about it.  If your systolic blood pressure (the top number) is 120-139 or your diastolic blood pressure (the bottom number) is 80-89, your blood pressure may be higher than it should be. You should have your blood pressure rechecked within a year by a primary care provider.  If your systolic blood pressure (the top number) is 140 or greater or your diastolic blood pressure (the bottom number) is 90 or greater, you may have high blood pressure. High blood pressure is treatable, but if left untreated over time it can put you at risk for heart attack, stroke, or kidney failure. You should have your blood pressure rechecked by a primary care provider within the next 4 weeks.  If your provider in the emergency department today gave you specific instructions to follow-up with your doctor or provider even sooner than that, you should follow that instruction and not wait for up to 4 weeks for your follow-up visit.        Thank you for choosing [facility]       Thank you for choosing [facility] for your care. Our goal is always to provide you with excellent care. Hearing back from our patients is one way we can continue to improve our services. Please take a few minutes to complete the written survey that you may receive in the mail after you visit with us. Thank you!        [facility] Information     [facility] gives you secure access to your electronic health record. If you see a primary care provider, you can also send messages to your care team and make appointments. If you have questions, please call your primary care clinic.  If you do not have a  primary care provider, please call [phone] and they will assist you.        Care EveryWhere ID     This is your Care EveryWhere ID. This could be used by other organizations to access your [place] medical records  [id number]        Equal Access to Services     JORGE HINES : Demond Burroughs, sincere monique, sebastien suero. So [facility] [phone].    ATENCIÓN: Si habla español, tiene a pink disposición servicios gratuitos de asistencia lingüística. Llame al [phone].    We comply with applicable federal civil rights laws and [place] laws. We do not discriminate on the basis of race, color, national origin, age, disability, sex, sexual orientation, or gender identity.            After Visit Summary       This is your record. Keep this with you and show to your community pharmacist(s) and doctor(s) at your next visit.

## 2018-05-20 NOTE — DISCHARGE INSTRUCTIONS
Dehydration (Adult)  Dehydration occurs when your body loses too much fluid. This may be the result of prolonged vomiting or diarrhea, excessive sweating, or a high fever. It may also happen if you don t drink enough fluid when you re sick or out in the heat. Misuse of diuretics (water pills) can also be a cause.  Symptoms include thirst and decreased urine output. You may also feel dizzy, weak, fatigued, or very drowsy. The diet described below is usually enough to treat dehydration. In some cases, you may need medicine.  Home care    Drink at least 12 8-ounce glasses of fluid every day to resolve the dehydration. Fluid may include water; orange juice; lemonade; apple, grape, or cranberry juice; clear fruit drinks; electrolyte replacement and sports drinks; and teas and coffee without caffeine. Don't drink alcohol. If you have been diagnosed with a kidney disease, ask your doctor how much and what types of fluids you should drink to prevent dehydration. If you have kidney disease, fluid can build up in the body. This can be dangerous to your health.    If you have a fever, muscle aches, or a headache as a result of a cold or flu, you may take acetaminophen or ibuprofen, unless another medicine was prescribed. If you have chronic liver or kidney disease, or have ever had a stomach ulcer or gastrointestinal bleeding, talk with your healthcare provider before using these medicines. Don't take aspirin if you are younger than 18 and have a fever. Aspirin raises the chance for severe liver injury.  Follow-up care  Follow up with your healthcare provider, or as advised.  When to seek medical advice  Call your healthcare provider right away if any of these occur:    Continued vomiting    Frequent diarrhea (more than 5 times a day); blood (red or black color) or mucus in diarrhea    Blood in vomit or stool    Swollen abdomen or increasing abdominal pain    Weakness, dizziness, or fainting    Unusual drowsiness or  confusion    Reduced urine output or extreme thirst    Fever of 100.4 F (38 C) or higher  Date Last Reviewed: 5/1/2017 2000-2017 [employer]. [address] [place] [address], [place] [address]. All rights reserved. This information is not intended as a substitute for professional medical care. Always follow your healthcare professional's instructions.        Pregnancy    Your exam today shows that you are pregnant.  Pregnancy symptoms  During pregnancy your body s hormones change. This causes physical and emotional changes. This is normal. Knowing what to expect is important for your piece of mind and so you know when to seek help for a problem. Here are some of the most common symptoms:    Morning sickness or nausea. This can happen any time of the day or night.    Tender, swollen breasts    Need to urinate frequently    Tiredness or fatigue    Dizziness    Indigestion or heartburn    Food cravings or turn-offs    Constipation    Emotional changes. This can range from anxiety to excitement to depression.  General care for a healthy pregnancy  Here are things you can do to help make sure your baby is born healthy:    Rest when you feel tired. This is especially true in the later months of pregnancy.    Drink more fluids. Your body needs more fluids than you may be used to. Drink 8 to10 glasses of juice, milk, or water every day.    Eat well-balanced meals. Eat at regular times to give your body enough protein. You can expect to gain about 30 pounds during the pregnancy. Don t try to diet or lose weight while you are pregnant.    Take a prenatal vitamin every day. This helps you meet the extra nutritional needs of pregnancy.    Don t take any other medicine during your pregnancy unless your healthcare provider tells you to. This includes prescription medicines and those you buy over the counter. Many medicines can harm the growing baby.    If you have nausea or vomiting, don t eat greasy or fried foods. Eat  several smaller meals throughout the day rather than 3 large meals.    If you smoke, you must stop. The nicotine you breathe in goes right to the baby.    Stay away from alcohol, even in moderate amounts. Daily drinking will harm your baby and can cause permanent brain damage.    Don t use recreational drugs, especially cocaine, crack, and heroin. These will harm your baby. Also avoid marijuana.    If you were using recreational drugs or prescribed medicine when you found out that you were pregnant, talk with your healthcare provider about possible effects on your growing baby.    If you have medical problems that you need to take medicine for, talk with your healthcare provider.  Follow-up care  Call your healthcare provider to arrange for prenatal care. Prenatal care is important. You can see your family provider, a pregnancy specialist (obstetrician), or a primary care clinic.  When to seek medical advice  Call your healthcare provider right away if any of these occur:    Vaginal bleeding    Pain in your belly (abdomen) or back that is moderate or severe    Lots of vomiting, or you can t keep any fluids down for 6 hours    Burning feeling when you urinate    Headache, dizziness, or rapid weight gain    Fever    Vision changes or blurred vision  Date Last Reviewed: 10/1/2016    5304-7772 [employer]. [address] [place] [address], [place] [address]. All rights reserved. This information is not intended as a substitute for professional medical care. Always follow your healthcare professional's instructions.

## 2018-05-20 NOTE — ED AVS SNAPSHOT
[facility] Emergency Department    [address]    [facility] [address]    Phone:  [phone]    Fax:  [phone]                                       Rizwana Plummer   MRN: [id number]    Department:  [facility] Emergency Department   Date of Visit:  5/20/2018           After Visit Summary Signature Page     I have received my discharge instructions, and my questions have been answered. I have discussed any challenges I see with this plan with the nurse or doctor.    ..........................................................................................................................................  Patient/Patient Representative Signature      ..........................................................................................................................................  Patient Representative Print Name and Relationship to Patient    ..................................................               ................................................  Date                                            Time    ..........................................................................................................................................  Reviewed by Signature/Title    ...................................................              ..............................................  Date                                                            Time

## 2018-05-20 NOTE — ED TRIAGE NOTES
Arrives with dizziness, throat pain, and generalized body aches since yesterday, states she also had a positive urine HCG test at home last night, alert and oriented, ABCs intact.

## 2018-05-20 NOTE — ED PROVIDER NOTES
History     Chief Complaint:  Light headedness    HPI   Rizwana Plummer is an otherwise healthy 37 year old female who presents to the Emergency Department for evaluation of lightheadedness. The patient reports the onset of light headedness three days ago after fasting, now with associated sore throat, nausea, chills and generalized body ache. Last night the patient had a positive home pregnancy test. Of note, the patient has been exposed to her children at home who have recently been ill. The patient denies any fevers, abdominal pain, vomiting, dysuria, hematuria, vaginal pain, vaginal bleeding or any other associated symptoms. No recent travel.     Allergies:  No known drug allergies    Medications:    Vitamin D3    Past Medical History:    Varicella  Dehydration  Influenza A  Epidermal inclusion cyst    Past Surgical History:     section  Infibulation  Marsupialization bartholin cyst    Family History:    Cerebrovascular disease: mother  Diabetes: mother  Hypertension: mother, father    Social History:  Smoking Status: never smoker  Smokeless Tobacco: never used  Alcohol Use: no  Marital Status:   [2]     Review of Systems   Constitutional: Positive for chills. Negative for fever.   HENT: Positive for sore throat.    Gastrointestinal: Positive for nausea. Negative for abdominal pain, diarrhea and vomiting.   Genitourinary: Negative.    Neurological: Positive for light-headedness.   All other systems reviewed and are negative.    Physical Exam   First Vitals:  BP: 100/57  Pulse: 114  Temp: 98.6  F (37  C)  Resp: 20  SpO2: 100 %    Physical Exam  General: Patient is alert and cooperative.  HENT:  Normal nose, oropharynx. Moist oral mucosa.  Eyes: EOMI. Normal conjunctiva.  Neck:  Normal range of motion and appearance.   Cardiovascular:  Normal rate, regular rhythm and normal heart sounds.   Pulmonary/Chest:  Effort normal. No wheezing or crackles.  Abdominal: Soft. No distension or tenderness.      Musculoskeletal: Normal range of motion. No edema or tenderness.   Neurological: oriented, normal strength, sensation, and coordination.   Skin: Warm and dry. No rash or bruising.   Psychiatric: Normal mood and affect. Normal behavior and judgement.    Emergency Department Course   ECG:  Indication: Dizziness  Time: 1011  Vent. Rate 107 bpm. NY interval 128. QRS duration 72. QT/QTc 316/421. P-R-T axis 62 60 45.   sinus tachycardia. Otherwise normal ECG. Read time: 1012.    Laboratory:  CBC: WBC: 7.3, HGB: 11.1(L), PLT: 240  BMP: Glucose 109(H), chloride 110(H), calcium 8.4(L), o/w WNL (Creat 0.59)  Rapid strep screen: negative  Rapid Strep Test: Negative  Strep A Culture: Pending    UA reflex to Microscopic and Culture: urineketon 5, RBC 3(H),  mucous present., o/w WNL.  HCG qualitative urine: Positive    Interventions:  1039 NS 1 L IV    Emergency Department Course:  Nursing notes and vitals reviewed. I performed an exam of the patient as documented above.     EKG obtained in the ED, see results above.     The patient's throat was swabbed and this sample was sent for strep evaluation, findings above.    Blood drawn. This was sent to the lab for further testing, results above.    The patient provided a urine sample here in the emergency department. This was sent for laboratory testing, findings above.    I reassessed the patient.     Findings and plan explained to the Patient. Patient discharged home with instructions regarding supportive care, medications, and reasons to return. The importance of close follow-up was reviewed.     Impression & Plan      Medical Decision Making:  Rizwana Plummer is a 37 year old afebrile, english speaking [place] female here with complaints of lightheadedness and nausea. This follows three days of fasting. She does additionally however note a mild sore throat and a positive home pregnancy test from yesterday. She has an unremarkable physical examination. Testing here has included a  negative rapid strep test, positive UPT but negative UA and normal CBC and BMP. She was hydrated with normal saline and medicated with antiemetic with improvement in her symptoms and is now tolerating PO. I suspect her lightheadedness is predominantly related to mild dehydration from fasting. She also may have a mild viral pharyngitis. Finally, she may have some mild nausea from her very early pregnancy but has no associated abdominal pain or vaginal bleeding. Just by dates, this would be extremely early and there is not any indication at this time for any additional testing or imaging. She is discharged with reassurance, limited supply of antiemetic and instructed to follow up with OB GYN to establish  care in the near future.     Diagnosis:    ICD-10-CM    1. Dehydration E86.0 Beta strep group A culture   2. Acute pharyngitis, unspecified etiology J02.9    3. Pregnant state, incidental Z33.1      Disposition:  discharged to home  Discharge Medications:  Discharge Medication List as of 2018 12:58 PM      START taking these medications    Details   Doxylamine-Pyridoxine 10-10 MG TBEC Take 1 capsule by mouth 3 times daily for 10 days, Disp-30 tablet, R-0, Local Print      ondansetron (ZOFRAN ODT) 4 MG ODT tab Take 1 tablet (4 mg) by mouth every 8 hours as needed for nausea, Disp-10 tablet, R-0, Local Print             IZehra, am serving as a scribe on 2018 at 10:10 AM to personally document services performed by Ranjit Brooks MD based on my observations and the provider's statements to me.     Zehra Biggs  2018   [facility] EMERGENCY DEPARTMENT       Ranjit Brooks MD  18 [id number]

## 2018-05-21 LAB — INTERPRETATION ECG - MUSE: NORMAL

## 2018-05-22 LAB
BACTERIA SPEC CULT: NORMAL
SPECIMEN SOURCE: NORMAL

## 2018-05-28 ENCOUNTER — HOSPITAL ENCOUNTER (EMERGENCY)
Facility: CLINIC | Age: 38
Discharge: HOME | End: 2018-05-28
Attending: EMERGENCY MEDICINE | Admitting: EMERGENCY MEDICINE
Payer: COMMERCIAL

## 2018-05-28 VITALS
OXYGEN SATURATION: 95 % | DIASTOLIC BLOOD PRESSURE: 51 MMHG | RESPIRATION RATE: 16 BRPM | SYSTOLIC BLOOD PRESSURE: 91 MMHG | TEMPERATURE: 97.4 F

## 2018-05-28 DIAGNOSIS — R51.9 PREGNANCY HEADACHE IN FIRST TRIMESTER: ICD-10-CM

## 2018-05-28 DIAGNOSIS — O26.891 PREGNANCY HEADACHE IN FIRST TRIMESTER: ICD-10-CM

## 2018-05-28 DIAGNOSIS — R51.9 ACUTE NONINTRACTABLE HEADACHE, UNSPECIFIED HEADACHE TYPE: Primary | ICD-10-CM

## 2018-05-28 LAB
ALBUMIN UR-MCNC: NEGATIVE MG/DL
ANION GAP SERPL CALCULATED.3IONS-SCNC: 8 MMOL/L (ref 3–14)
APPEARANCE UR: CLEAR
BASOPHILS # BLD AUTO: 0 10E9/L (ref 0–0.2)
BASOPHILS NFR BLD AUTO: 0.2 %
BILIRUB UR QL STRIP: NEGATIVE
BUN SERPL-MCNC: 5 MG/DL (ref 7–30)
CALCIUM SERPL-MCNC: 8.5 MG/DL (ref 8.5–10.1)
CHLORIDE SERPL-SCNC: 108 MMOL/L (ref 94–109)
CO2 SERPL-SCNC: 25 MMOL/L (ref 20–32)
COLOR UR AUTO: ABNORMAL
CREAT SERPL-MCNC: 0.6 MG/DL (ref 0.52–1.04)
DIFFERENTIAL METHOD BLD: ABNORMAL
EOSINOPHIL # BLD AUTO: 0.3 10E9/L (ref 0–0.7)
EOSINOPHIL NFR BLD AUTO: 2.7 %
ERYTHROCYTE [DISTWIDTH] IN BLOOD BY AUTOMATED COUNT: 13.2 % (ref 10–15)
GFR SERPL CREATININE-BSD FRML MDRD: >90 ML/MIN/1.7M2
GLUCOSE SERPL-MCNC: 100 MG/DL (ref 70–99)
GLUCOSE UR STRIP-MCNC: NEGATIVE MG/DL
HCT VFR BLD AUTO: 31.5 % (ref 35–47)
HGB BLD-MCNC: 10 G/DL (ref 11.7–15.7)
HGB UR QL STRIP: NEGATIVE
IMM GRANULOCYTES # BLD: 0.1 10E9/L (ref 0–0.4)
IMM GRANULOCYTES NFR BLD: 0.6 %
KETONES UR STRIP-MCNC: NEGATIVE MG/DL
LEUKOCYTE ESTERASE UR QL STRIP: NEGATIVE
LYMPHOCYTES # BLD AUTO: 1.7 10E9/L (ref 0.8–5.3)
LYMPHOCYTES NFR BLD AUTO: 16.5 %
MCH RBC QN AUTO: 26 PG (ref 26.5–33)
MCHC RBC AUTO-ENTMCNC: 31.7 G/DL (ref 31.5–36.5)
MCV RBC AUTO: 82 FL (ref 78–100)
MONOCYTES # BLD AUTO: 0.8 10E9/L (ref 0–1.3)
MONOCYTES NFR BLD AUTO: 7.4 %
NEUTROPHILS # BLD AUTO: 7.4 10E9/L (ref 1.6–8.3)
NEUTROPHILS NFR BLD AUTO: 72.6 %
NITRATE UR QL: NEGATIVE
NRBC # BLD AUTO: 0 10*3/UL
NRBC BLD AUTO-RTO: 0 /100
PH UR STRIP: 7 PH (ref 5–7)
PLATELET # BLD AUTO: 301 10E9/L (ref 150–450)
POTASSIUM SERPL-SCNC: 3.6 MMOL/L (ref 3.4–5.3)
RBC # BLD AUTO: 3.84 10E12/L (ref 3.8–5.2)
RBC #/AREA URNS AUTO: 1 /HPF (ref 0–2)
SODIUM SERPL-SCNC: 141 MMOL/L (ref 133–144)
SOURCE: ABNORMAL
SP GR UR STRIP: 1 (ref 1–1.03)
SQUAMOUS #/AREA URNS AUTO: 4 /HPF (ref 0–1)
UROBILINOGEN UR STRIP-MCNC: 0 MG/DL (ref 0–2)
WBC # BLD AUTO: 10.2 10E9/L (ref 4–11)
WBC #/AREA URNS AUTO: 1 /HPF (ref 0–5)

## 2018-05-28 PROCEDURE — 99284 EMERGENCY DEPT VISIT MOD MDM: CPT | Mod: 25

## 2018-05-28 PROCEDURE — 96361 HYDRATE IV INFUSION ADD-ON: CPT

## 2018-05-28 PROCEDURE — 25000128 H RX IP 250 OP 636: Performed by: EMERGENCY MEDICINE

## 2018-05-28 PROCEDURE — 85025 COMPLETE CBC W/AUTO DIFF WBC: CPT | Performed by: EMERGENCY MEDICINE

## 2018-05-28 PROCEDURE — 80048 BASIC METABOLIC PNL TOTAL CA: CPT | Performed by: EMERGENCY MEDICINE

## 2018-05-28 PROCEDURE — 96374 THER/PROPH/DIAG INJ IV PUSH: CPT

## 2018-05-28 PROCEDURE — 81001 URINALYSIS AUTO W/SCOPE: CPT | Performed by: EMERGENCY MEDICINE

## 2018-05-28 RX ORDER — METOCLOPRAMIDE HYDROCHLORIDE 5 MG/ML
10 INJECTION INTRAMUSCULAR; INTRAVENOUS ONCE
Status: COMPLETED | OUTPATIENT
Start: 2018-05-28 | End: 2018-05-28

## 2018-05-28 RX ADMIN — SODIUM CHLORIDE 1000 ML: 9 INJECTION, SOLUTION INTRAVENOUS at 10:23

## 2018-05-28 RX ADMIN — METOCLOPRAMIDE 10 MG: 5 INJECTION, SOLUTION INTRAMUSCULAR; INTRAVENOUS at 10:23

## 2018-05-28 ASSESSMENT — ENCOUNTER SYMPTOMS
BACK PAIN: 1
DYSURIA: 0
HEADACHES: 1
FEVER: 0

## 2018-05-28 NOTE — ED PROVIDER NOTES
History     Chief Complaint:  Back pain and headache    HPI   Rizwana Plummer is a one month pregnant 37 year old female who presents with headache and back pain.  Patient says that she began having a headache (9/10 severity) 2 days ago along with back pain (severity 7.5/10).  Patient says she feels nauseas but denies any fever, vision changes, dysuria, abnormal vaginal bleeding, abnormal vaginal discharge or vomiting.  She says this is her 4th pregnancy, the first was a miscarriage and the second and third were normal.  Patient took tylenol.     Allergies:  No known drug allergies     Medications:    Vitamin D3     Past Medical History:    Varicella  Dehydration  Influenza A  Epidermal inclusion cyst     Past Surgical History:     section  Infibulation  Marsupialization bartholin cyst     Family History:    Cerebrovascular disease: mother  Diabetes: mother  Hypertension: mother, father    Social History:  Smoking Status: Never Smoker  Alcohol Use: No  Patient presents alone.  Marital Status:   [2]    Review of Systems   Constitutional: Negative for fever.   Eyes: Negative for visual disturbance.   Genitourinary: Negative for dysuria, vaginal bleeding and vaginal discharge.   Musculoskeletal: Positive for back pain.   Neurological: Positive for headaches.   All other systems reviewed and are negative.    Physical Exam     Patient Vitals for the past 24 hrs:   BP Temp Temp src Heart Rate Resp SpO2   18 1147 91/51 - - - - 95 %   18 1030 93/52 - - - - -   18 1024 96/57 - - - - -   18 0958 - - - - - 100 %   18 0957 - - - - - 100 %   18 0956 - - - - - 100 %   18 0955 - - - - - 98 %   18 0954 - - - - - 98 %   18 0952 - - - - - 99 %   18 0951 - - - - - 99 %   18 0950 - - - - - 99 %   18 0948 - - - - - 99 %   18 0947 - - - - - 99 %   18 0946 - - - - - 99 %   18 0945 - - - - - 99 %   18 0944 - - - - - 99 %   18  0942 - - - - - 100 %   05/28/18 0941 - - - - - 100 %   05/28/18 0940 - - - - - 99 %   05/28/18 0939 - - - - - 99 %   05/28/18 0937 - - - - - 99 %   05/28/18 0936 - - - - - 100 %   05/28/18 0935 - - - - - 99 %   05/28/18 0934 - - - - - 99 %   05/28/18 0932 - - - - - 99 %   05/28/18 0931 - - - - - 100 %   05/28/18 0930 - - - - - 100 %   05/28/18 0926 106/54 97.4  F (36.3  C) Oral 95 16 100 %       Physical Exam  General: Alert, appears well-developed and well-nourished. Cooperative.     In no distress  HEENT:  Head:  Atraumatic  Ears:  External ears are normal  Mouth/Throat:  Oropharynx is without erythema or exudate and mucous membranes are moist.   Eyes:   Conjunctivae normal and EOM are normal. No scleral icterus.    Pupils are equal, round, and reactive to light.   Neck:   Normal range of motion. Neck supple.  CV:  Normal rate, regular rhythm, normal heart sounds and radial pulses are 2+ and symmetric.  No murmur.  Resp:  Breath sounds are clear bilaterally    Non-labored, no retractions or accessory muscle use  GI:  Abdomen is soft, no distension, no tenderness. No rebound or guarding.  No CVA tenderness bilaterally  MS:  Normal range of motion. No edema.    Normal strength in all 4 extremities.     Back atraumatic.    No midline cervical, thoracic, or lumbar tenderness.  Bilateral lumbar Paraspinous muscle tenderness.  Skin:  Warm and dry.  No rash or lesions noted.  Neuro:   Alert. Normal strength. GCS: 15      Psych: Normal mood and affect.    Emergency Department Course     Laboratory:  CBC: WBC: 10.2, HGB: 10.0 L, PLT: 301    BMP: Glucose 100 H, BUN 5 L, o/w WNL (Creatinine: 0.60)    UA with micro: Squamous epithelial/HPF urine 4 H o/w negative    Interventions:  1023 NS 1L IV  1023 Reglan 10 mg IV    Emergency Department Course:  Nursing notes and vitals reviewed.  I performed an exam of the patient as documented above.     IV inserted. Medicine administered as documented above. Blood drawn. This was sent  to the lab for further testing, results above.    The patient provided a urine sample here in the emergency department. This was sent for laboratory testing, findings above.     1145 I rechecked the patient and discussed the results of her workup thus far.     Findings and plan explained to the Patient. Patient discharged home with instructions regarding supportive care, medications, and reasons to return. The importance of close follow-up was reviewed.     I personally reviewed the laboratory results with the Patient and answered all related questions prior to discharge.     Impression & Plan    Medical Decision Making:  Patient is a 37-year-old female  approximately 1 month pregnant who presents with a frontal headache and chronic lower back pain.  Patient says the headache has been persistent since yesterday evening.  Her vital signs are reassuring and no evidence of hypertension or concerns for preeclampsia today.  Urine reassuring there is no evidence of UTI or pyelonephritis.  There is no proteinuria, very low concern that the headache presentation is related to preeclampsia.  Patient did improve with ED interventions here, including IV fluids and metoclopramide IV.  She had tried Tylenol at home with minimal improvement.  We discussed the use of Reglan and pregnancy and she agreed to administration.  I considered a broad differential including tension, migraine, occipital neuralgia, and preeclampsia.  I also considered less, but serious causes such as meningitis, encephalitis, subarachnoid bleed, stroke, and tumor.  She has no signs of a serious headache etiology at this point.  I do not feel advanced imaging is indicated, nor CT/lumbar puncture for subarachnoid hemorrhage.  She had full improvement in her symptoms prior to discharge on her recheck.  No evidence of any electrolyte dysfunction.  Supportive outpatient management is indicated.  Will follow up with OB/GYN for establishment of this new  pregnancy within the next 1 month.  All questions answered and return precautions understood prior to discharge.  Discharged home.    Critical Care time:  none    Diagnosis:    ICD-10-CM    1. Acute nonintractable headache, unspecified headache type R51    2. Pregnancy headache in first trimester O26.891     R51        Disposition:  discharged to home    Moose Camargo  5/28/2018   [facility] EMERGENCY DEPARTMENT  I, Moose Camargo, magdi serving as a scribe at 9:19 AM on 5/28/2018 to document services personally performed by Rod Starkey MD based on my observations and the provider's statements to me.        Rod Starkey MD  05/28/18 1328

## 2018-05-28 NOTE — ED AVS SNAPSHOT
[facility] Emergency Department    [address]    [facility] [address]    Phone:  [phone]    Fax:  [phone]                                       Rizwana Plummer   MRN: [id number]    Department:  [facility] Emergency Department   Date of Visit:  5/28/2018           After Visit Summary Signature Page     I have received my discharge instructions, and my questions have been answered. I have discussed any challenges I see with this plan with the nurse or doctor.    ..........................................................................................................................................  Patient/Patient Representative Signature      ..........................................................................................................................................  Patient Representative Print Name and Relationship to Patient    ..................................................               ................................................  Date                                            Time    ..........................................................................................................................................  Reviewed by Signature/Title    ...................................................              ..............................................  Date                                                            Time

## 2018-05-28 NOTE — ED AVS SNAPSHOT
[facility] Emergency Department    [address]    [facility] [address]    Phone:  [phone]    Fax:  [phone]                                       Rizwana Plummer   MRN: [id number]    Department:  [facility] Emergency Department   Date of Visit:  5/28/2018           Patient Information     Date Of Birth          1980        Your diagnoses for this visit were:     Acute nonintractable headache, unspecified headache type     Pregnancy headache in first trimester        You were seen by Rod Starkey MD.      Follow-up Information     Schedule an appointment as soon as possible for a visit with Cindy Laurent DO.    Specialty:  OB/Gyn    Contact information:    [address] [facility] [address]  [phone]          Follow up with [facility] Emergency Department.    Specialty:  EMERGENCY MEDICINE    Why:  If symptoms worsen    Contact information:    [address] [facility] [id number] [phone]        Discharge Instructions       Discharge Instructions  Headache    You were seen today for a headache. Headaches may be caused by many different things such as muscle tension, sinus inflammation, anxiety and stress, having too little sleep, too much alcohol, some medical conditions or injury. You may have a migraine, which is caused by changes in the blood vessels in your head.  At this time your provider does not find that your headache is a sign of anything dangerous or life-threatening.  However, sometimes the signs of serious illness do not show up right away.      Generally, every Emergency Department visit should have a follow-up clinic visit with either a primary or a specialty clinic/provider. Please follow-up as instructed by your emergency provider today.    Return to the Emergency Department if:    You get a new fever of 100.4 F or higher.    Your headache gets much worse.    You get a stiff neck with your  headache.    You get a new headache that is significantly different or worse than headaches you have had before.    You are vomiting (throwing up) and cannot keep food or water down.    You have blurry or double vision or other problems with your eyes.    You have a new weakness on one side of your body.    You have difficulty with balance which is new.    You or your family thinks you are confused.    You have a seizure.    What can I do to help myself?    Pain medications - You may take a pain medication such as Tylenol  (acetaminophen), Advil , Motrin  (ibuprofen) or Aleve  (naproxen).    Take a pain reliever as soon as you notice symptoms.  Starting medications as soon as you start to have symptoms may lessen the amount of pain you have.    Relaxing in a quiet, dark room may help.    Get enough sleep and eat meals regularly.    You may need to watch for certain foods or other things which may trigger your headaches.  Keeping a journal of your headaches and possible triggers may help you and your primary provider to identify things which you should avoid which may be causing your headaches.  If you were given a prescription for medicine here today, be sure to read all of the information (including the package insert) that comes with your prescription.  This will include important information about the medicine, its side effects, and any warnings that you need to know about.  The pharmacist who fills the prescription can provide more information and answer questions you may have about the medicine.  If you have questions or concerns that the pharmacist cannot address, please call or return to the Emergency Department.   Remember that you can always come back to the Emergency Department if you are not able to see your regular provider in the amount of time listed above, if you get any new symptoms, or if there is anything that worries you.      Your next 10 appointments already scheduled     Jun 07, 2018  9:00 AM  CDT   New Pren[facility] with [facility] PRENATAL NURSE   [facility]    [address]  [facility] [id number] [phone]            Jun 19, 2018 11:15 AM CDT   Ultrasound with RIUS1   [facility]    [address]  [address]  [facility] [address] [phone]            Jul 10, 2018  2:00 PM CDT   New Prenatal with Ammy Bustillo MD   [facility]    [address]  [facility] [id number] [phone]              24 Hour Appointment Hotline       To make an appointment at any [facility], call [phone] ([phone]). If you don't have a family doctor or clinic, we will help you find one. The [facility] are conveniently located to serve the needs of you and your family.             Review of your medicines      Our records show that you are taking the medicines listed below. If these are incorrect, please call your family doctor or clinic.        Dose / Directions Last dose taken    cholecalciferol 1000 UNIT tablet   Commonly known as:  vitamin D3   Dose:  1000 Units   Quantity:  90 tablet        Take 1 tablet (1,000 Units) by mouth daily   Refills:  0        Doxylamine-Pyridoxine 10-10 MG Tbec   Dose:  1 capsule   Quantity:  30 tablet        Take 1 capsule by mouth 3 times daily for 10 days   Refills:  0        TYLENOL PO   Dose:  325 mg        Take 325 mg by mouth   Refills:  0                Procedures and tests performed during your visit     Basic metabolic panel    CBC with platelets differential    UA with Microscopic      Orders Needing Specimen Collection     None      Pending Results     No orders found from 5/26/2018 to 5/29/2018.            Pending Culture Results     No orders found from 5/26/2018 to 5/29/2018.            Pending Results Instructions     If you had any lab results that were not finalized at the time of  your Discharge, you can call the ED Lab Result RN at [phone]. You will be contacted by this team for any positive Lab results or changes in treatment. The nurses are available 7 days a week from 10A to 6:30P.  You can leave a message 24 hours per day and they will return your call.        Test Results From Your Hospital Stay        5/28/2018 10:10 AM      Component Results     Component Value Ref Range & Units Status    WBC 10.2 4.0 - 11.0 10e9/L Final    RBC Count 3.84 3.8 - 5.2 10e12/L Final    Hemoglobin 10.0 (L) 11.7 - 15.7 g/dL Final    Hematocrit 31.5 (L) 35.0 - 47.0 % Final    MCV 82 78 - 100 fl Final    MCH 26.0 (L) 26.5 - 33.0 pg Final    MCHC 31.7 31.5 - 36.5 g/dL Final    RDW 13.2 10.0 - 15.0 % Final    Platelet Count 301 150 - 450 10e9/L Final    Diff Method Automated Method  Final    % Neutrophils 72.6 % Final    % Lymphocytes 16.5 % Final    % Monocytes 7.4 % Final    % Eosinophils 2.7 % Final    % Basophils 0.2 % Final    % Immature Granulocytes 0.6 % Final    Nucleated RBCs 0 0 /100 Final    Absolute Neutrophil 7.4 1.6 - 8.3 10e9/L Final    Absolute Lymphocytes 1.7 0.8 - 5.3 10e9/L Final    Absolute Monocytes 0.8 0.0 - 1.3 10e9/L Final    Absolute Eosinophils 0.3 0.0 - 0.7 10e9/L Final    Absolute Basophils 0.0 0.0 - 0.2 10e9/L Final    Abs Immature Granulocytes 0.1 0 - 0.4 10e9/L Final    Absolute Nucleated RBC 0.0  Final         5/28/2018 10:23 AM      Component Results     Component Value Ref Range & Units Status    Color Urine Straw  Final    Appearance Urine Clear  Final    Glucose Urine Negative NEG^Negative mg/dL Final    Bilirubin Urine Negative NEG^Negative Final    Ketones Urine Negative NEG^Negative mg/dL Final    Specific Gravity Urine 1.003 1.003 - 1.035 Final    Blood Urine Negative NEG^Negative Final    pH Urine 7.0 5.0 - 7.0 pH Final    Protein Albumin Urine Negative NEG^Negative mg/dL Final    Urobilinogen mg/dL 0.0 0.0 - 2.0 mg/dL Final    Nitrite Urine Negative NEG^Negative  Final    Leukocyte Esterase Urine Negative NEG^Negative Final    Source Midstream Urine  Final    WBC Urine 1 0 - 5 /HPF Final    RBC Urine 1 0 - 2 /HPF Final    Squamous Epithelial /HPF Urine 4 (H) 0 - 1 /HPF Final         5/28/2018 11:09 AM      Component Results     Component Value Ref Range & Units Status    Sodium 141 133 - 144 mmol/L Final    Potassium 3.6 3.4 - 5.3 mmol/L Final    Chloride 108 94 - 109 mmol/L Final    Carbon Dioxide 25 20 - 32 mmol/L Final    Anion Gap 8 3 - 14 mmol/L Final    Glucose 100 (H) 70 - 99 mg/dL Final    Urea Nitrogen 5 (L) 7 - 30 mg/dL Final    Creatinine 0.60 0.52 - 1.04 mg/dL Final    GFR Estimate >90 >60 mL/min/1.7m2 Final    Non  GFR Calc    GFR Estimate If Black >90 >60 mL/min/1.7m2 Final    African American GFR Calc    Calcium 8.5 8.5 - 10.1 mg/dL Final                Clinical Quality Measure: Blood Pressure Screening     Your blood pressure was checked while you were in the emergency department today. The last reading we obtained was  BP: 93/52 . Please read the guidelines below about what these numbers mean and what you should do about them.  If your systolic blood pressure (the top number) is less than 120 and your diastolic blood pressure (the bottom number) is less than 80, then your blood pressure is normal. There is nothing more that you need to do about it.  If your systolic blood pressure (the top number) is 120-139 or your diastolic blood pressure (the bottom number) is 80-89, your blood pressure may be higher than it should be. You should have your blood pressure rechecked within a year by a primary care provider.  If your systolic blood pressure (the top number) is 140 or greater or your diastolic blood pressure (the bottom number) is 90 or greater, you may have high blood pressure. High blood pressure is treatable, but if left untreated over time it can put you at risk for heart attack, stroke, or kidney failure. You should have your blood  pressure rechecked by a primary care provider within the next 4 weeks.  If your provider in the emergency department today gave you specific instructions to follow-up with your doctor or provider even sooner than that, you should follow that instruction and not wait for up to 4 weeks for your follow-up visit.        Thank you for choosing [facility]       Thank you for choosing [facility] for your care. Our goal is always to provide you with excellent care. Hearing back from our patients is one way we can continue to improve our services. Please take a few minutes to complete the written survey that you may receive in the mail after you visit with us. Thank you!        [employer] Information     [employer] gives you secure access to your electronic health record. If you see a primary care provider, you can also send messages to your care team and make appointments. If you have questions, please call your primary care clinic.  If you do not have a primary care provider, please call [phone] and they will assist you.        Care EveryWhere ID     This is your Care EveryWhere ID. This could be used by other organizations to access your [facility] medical records  [id number]        Equal Access to Services     JORGE HINES : Hadras Burroughs, wadivine monique, qadwaine hernandez, sebastien zayas . So [facility] [phone].    ATENCIÓN: Si habla español, tiene a pink disposición servicios gratuitos de asistencia lingüística. Llame al [phone].    We comply with applicable federal civil rights laws and [place] laws. We do not discriminate on the basis of race, color, national origin, age, disability, sex, sexual orientation, or gender identity.            After Visit Summary       This is your record. Keep this with you and show to your community pharmacist(s) and doctor(s) at your next visit.

## 2018-05-29 ENCOUNTER — TELEPHONE (OUTPATIENT)
Dept: OBGYN | Facility: CLINIC | Age: 38
End: 2018-05-29

## 2018-05-29 NOTE — LETTER
[facility]  [address]  [facility] [address]  [phone]      May 29, 2018      Regarding: Rizwana Plummer  YOB: 1980  [address]   [facility] [address]    To Whom It May Concern:     Routine teeth cleaning, use of Novocaine and dental x-rays are safe during all stages of pregnancy. Antibiotics that are category B and narcotic pain medications can also be safely used during all stages of pregnancy. Examples of these include, but are not limited to: amoxicillin, penicillin, Vicodin, Darvocet, Tylenol #3 and Percocet. Root canals are safe during all trimesters of pregnancy.    Please use these guidelines when treating our patients. If you have additional questions or concerns, please call us at [phone].      Sincerely,    Ammy Bustillo MD/[facility]

## 2018-05-29 NOTE — TELEPHONE ENCOUNTER
Spoke with pt. LMP 4/26/18. Pt did have a Pos HCg on 5/20/18 in the ED.     Dental letter printed. Pt will pick it up in the [place] office.     BHAVIK Andrew RN

## 2018-05-29 NOTE — TELEPHONE ENCOUNTER
Patient 1 mo pregnant, very painful wisdom teeth-needs extraction. Dentist will not see her without a note from her doctor, has not been seen yet as it is early in pregnancy. Dentist can get her in at 2:15 PM but will need note prior to that. Please contact patient at [phone] ok to AR

## 2018-06-04 DIAGNOSIS — Z34.90 SUPERVISION OF NORMAL PREGNANCY: Primary | ICD-10-CM

## 2018-06-05 ENCOUNTER — APPOINTMENT (OUTPATIENT)
Dept: ULTRASOUND IMAGING | Facility: CLINIC | Age: 38
End: 2018-06-05
Attending: PHYSICIAN ASSISTANT
Payer: COMMERCIAL

## 2018-06-05 ENCOUNTER — HOSPITAL ENCOUNTER (EMERGENCY)
Facility: CLINIC | Age: 38
Discharge: HOME | End: 2018-06-05
Attending: PHYSICIAN ASSISTANT | Admitting: PHYSICIAN ASSISTANT
Payer: COMMERCIAL

## 2018-06-05 VITALS
OXYGEN SATURATION: 100 % | DIASTOLIC BLOOD PRESSURE: 57 MMHG | TEMPERATURE: 97.5 F | HEART RATE: 79 BPM | SYSTOLIC BLOOD PRESSURE: 90 MMHG

## 2018-06-05 DIAGNOSIS — O21.9 NAUSEA/VOMITING IN PREGNANCY: ICD-10-CM

## 2018-06-05 LAB
ALBUMIN UR-MCNC: NEGATIVE MG/DL
ANION GAP SERPL CALCULATED.3IONS-SCNC: 8 MMOL/L (ref 3–14)
APPEARANCE UR: ABNORMAL
BACTERIA #/AREA URNS HPF: ABNORMAL /HPF
BASOPHILS # BLD AUTO: 0 10E9/L (ref 0–0.2)
BASOPHILS NFR BLD AUTO: 0.2 %
BILIRUB UR QL STRIP: NEGATIVE
BUN SERPL-MCNC: 7 MG/DL (ref 7–30)
CALCIUM SERPL-MCNC: 9.1 MG/DL (ref 8.5–10.1)
CHLORIDE SERPL-SCNC: 106 MMOL/L (ref 94–109)
CO2 SERPL-SCNC: 23 MMOL/L (ref 20–32)
COLOR UR AUTO: ABNORMAL
CREAT SERPL-MCNC: 0.61 MG/DL (ref 0.52–1.04)
DIFFERENTIAL METHOD BLD: ABNORMAL
EOSINOPHIL # BLD AUTO: 0.1 10E9/L (ref 0–0.7)
EOSINOPHIL NFR BLD AUTO: 1 %
ERYTHROCYTE [DISTWIDTH] IN BLOOD BY AUTOMATED COUNT: 13.3 % (ref 10–15)
GFR SERPL CREATININE-BSD FRML MDRD: >90 ML/MIN/1.7M2
GLUCOSE SERPL-MCNC: 85 MG/DL (ref 70–99)
GLUCOSE UR STRIP-MCNC: NEGATIVE MG/DL
HCT VFR BLD AUTO: 34.6 % (ref 35–47)
HGB BLD-MCNC: 11 G/DL (ref 11.7–15.7)
HGB UR QL STRIP: NEGATIVE
IMM GRANULOCYTES # BLD: 0.1 10E9/L (ref 0–0.4)
IMM GRANULOCYTES NFR BLD: 0.6 %
KETONES UR STRIP-MCNC: NEGATIVE MG/DL
LEUKOCYTE ESTERASE UR QL STRIP: ABNORMAL
LYMPHOCYTES # BLD AUTO: 1.9 10E9/L (ref 0.8–5.3)
LYMPHOCYTES NFR BLD AUTO: 20.7 %
MCH RBC QN AUTO: 25.6 PG (ref 26.5–33)
MCHC RBC AUTO-ENTMCNC: 31.8 G/DL (ref 31.5–36.5)
MCV RBC AUTO: 81 FL (ref 78–100)
MONOCYTES # BLD AUTO: 0.6 10E9/L (ref 0–1.3)
MONOCYTES NFR BLD AUTO: 7.1 %
MUCOUS THREADS #/AREA URNS LPF: PRESENT /LPF
NEUTROPHILS # BLD AUTO: 6.3 10E9/L (ref 1.6–8.3)
NEUTROPHILS NFR BLD AUTO: 70.4 %
NITRATE UR QL: NEGATIVE
NRBC # BLD AUTO: 0 10*3/UL
NRBC BLD AUTO-RTO: 0 /100
PH UR STRIP: 7 PH (ref 5–7)
PLATELET # BLD AUTO: 427 10E9/L (ref 150–450)
POTASSIUM SERPL-SCNC: 3.9 MMOL/L (ref 3.4–5.3)
RBC # BLD AUTO: 4.3 10E12/L (ref 3.8–5.2)
RBC #/AREA URNS AUTO: 1 /HPF (ref 0–2)
SODIUM SERPL-SCNC: 137 MMOL/L (ref 133–144)
SOURCE: ABNORMAL
SP GR UR STRIP: 1 (ref 1–1.03)
SQUAMOUS #/AREA URNS AUTO: 14 /HPF (ref 0–1)
UROBILINOGEN UR STRIP-MCNC: 0 MG/DL (ref 0–2)
WBC # BLD AUTO: 9 10E9/L (ref 4–11)
WBC #/AREA URNS AUTO: 4 /HPF (ref 0–5)

## 2018-06-05 PROCEDURE — 25000128 H RX IP 250 OP 636: Performed by: PHYSICIAN ASSISTANT

## 2018-06-05 PROCEDURE — 96361 HYDRATE IV INFUSION ADD-ON: CPT

## 2018-06-05 PROCEDURE — 96375 TX/PRO/DX INJ NEW DRUG ADDON: CPT

## 2018-06-05 PROCEDURE — 81001 URINALYSIS AUTO W/SCOPE: CPT | Performed by: PHYSICIAN ASSISTANT

## 2018-06-05 PROCEDURE — 96374 THER/PROPH/DIAG INJ IV PUSH: CPT

## 2018-06-05 PROCEDURE — 80048 BASIC METABOLIC PNL TOTAL CA: CPT | Performed by: PHYSICIAN ASSISTANT

## 2018-06-05 PROCEDURE — 85025 COMPLETE CBC W/AUTO DIFF WBC: CPT | Performed by: PHYSICIAN ASSISTANT

## 2018-06-05 PROCEDURE — 76801 OB US < 14 WKS SINGLE FETUS: CPT

## 2018-06-05 PROCEDURE — 99285 EMERGENCY DEPT VISIT HI MDM: CPT | Mod: 25

## 2018-06-05 RX ORDER — ONDANSETRON 4 MG/1
4 TABLET, ORALLY DISINTEGRATING ORAL EVERY 8 HOURS PRN
Qty: 10 TABLET | Refills: 0 | Status: SHIPPED | OUTPATIENT
Start: 2018-06-05 | End: 2018-06-12

## 2018-06-05 RX ORDER — ONDANSETRON 2 MG/ML
4 INJECTION INTRAMUSCULAR; INTRAVENOUS EVERY 30 MIN PRN
Status: DISCONTINUED | OUTPATIENT
Start: 2018-06-05 | End: 2018-06-05 | Stop reason: HOSPADM

## 2018-06-05 RX ORDER — SODIUM CHLORIDE 9 MG/ML
1000 INJECTION, SOLUTION INTRAVENOUS CONTINUOUS
Status: DISCONTINUED | OUTPATIENT
Start: 2018-06-05 | End: 2018-06-05 | Stop reason: HOSPADM

## 2018-06-05 RX ORDER — METOCLOPRAMIDE HYDROCHLORIDE 5 MG/ML
10 INJECTION INTRAMUSCULAR; INTRAVENOUS ONCE
Status: COMPLETED | OUTPATIENT
Start: 2018-06-05 | End: 2018-06-05

## 2018-06-05 RX ADMIN — METOCLOPRAMIDE 10 MG: 5 INJECTION, SOLUTION INTRAMUSCULAR; INTRAVENOUS at 10:52

## 2018-06-05 RX ADMIN — SODIUM CHLORIDE 1000 ML: 9 INJECTION, SOLUTION INTRAVENOUS at 10:52

## 2018-06-05 RX ADMIN — ONDANSETRON 4 MG: 2 INJECTION INTRAMUSCULAR; INTRAVENOUS at 10:59

## 2018-06-05 ASSESSMENT — ENCOUNTER SYMPTOMS
DYSURIA: 0
VOMITING: 1
NAUSEA: 1
ABDOMINAL PAIN: 0
BACK PAIN: 1

## 2018-06-05 NOTE — ED AVS SNAPSHOT
[facility] Emergency Department    [address]    [facility] [address]    Phone:  [phone]    Fax:  [phone]                                       Rizwana Plummer   MRN: [id number]    Department:  [facility] Emergency Department   Date of Visit:  6/5/2018           Patient Information     Date Of Birth          1980        Your diagnoses for this visit were:     Nausea/vomiting in pregnancy        You were seen by Meagan Gomez PA-C.      Follow-up Information     Follow up with Cindy Laurent DO In 2 days.    Specialty:  OB/Gyn    Contact information:    [address] [facility] [address]  [phone]          Follow up with [facility] Emergency Department.    Specialty:  EMERGENCY MEDICINE    Why:  If symptoms worsen    Contact information:    [address]  [facility] [id number] [phone]        Discharge Instructions       Please follow up with OB in the next few days to weeks for first OB visit    Eating Tips for Morning Sickness   Hyperemesis Gravidarum  During pregnancy, you need to eat enough food to meet your needs and the needs of your baby. Severe nausea and vomiting (hyperemesis) may lead to weight loss and dehydration (too little fluid in the body).   Follow these tips to help control nausea.   1. Eat 6 to 8 small meals in a day, about two hours apart.  2. Before rising in the morning, eat a small amount of dry food. Choose from the list below.  ? soda crackers (saltines)  ? dry toast with jelly  ? breadsticks  ? dry cereal  ? rice cakes  ? pretzels  ? plain potatoes, rice or noodles  ? plain low-fat cookies or cake  3. Avoid liquids with meals. Drink liquids 30 to 60 minutes before or after eating. Sip slowly.  4. Foods and drinks should be cool or at room temperature. Try:  ? flavored gelatin  ? sherbet, sorbet or Popsicles  ? carbonated (fizzy) drinks  ? ice cubes made from juice.  5. Avoid  hot drinks and foods.  6. Avoid drinks with caffeine (coffee, tea, cola drinks). They may increase stomach acid.  7. Avoid very sweet, hot or spicy foods.  8. Avoid high-fat foods such as butter, margarine, mayonnaise, malave, gravies, pie crust, pastries and fried foods. They take longer to leave the stomach.  9. Avoid strong food odors such as fish, cabbage or broccoli. Avoid cooking odors by eating food you do not have to cook.  10. Do not lie down after eating. Rest sitting up for an hour after meals.  11. Take your prenatal vitamins with food in the evening. Tell your doctor if you cannot take them.  12. Nausea is often gone by midday. You may eat more food in the late afternoon, supper and mid-evening. Find the times best for you.  Keep a food diary to help you find foods that you can eat without problems. Try any food that appeals to you.  Menu Planning Guidelines     [employer]. Reprinted with permission.  Food groups Foods recommended  Foods that may cause distress    Soups Low-fat broth-based and cream soups made with allowed foods. All other soups.    Meats and substitutes  (Six or more ounces daily) All lean, tender meats, poultry or fish. All should be baked, broiled or boiled. Boiled egg. Low-fat or fat-free cheeses. Fried meat, poultry or fish; highly seasoned, cured or smoked meat, poultry or fish (i.e., corned beef, luncheon meat, frankfurters, sausages, sardines, anchovies, malave and strong flavored cheese). Peanut butter.    Fruits (Two or more servings daily; include a vitamin C source daily) Fruit juices, canned fruits, grapefruit and orange sections (without membrane). Other fresh and dried fruits, if tolerated.     Vegetables  (Three or more servings daily) Vegetable juices, cooked vegetables (i.e., asparagus, green or wax beans, beets, carrots, peas, pumpkin, winter squash, spinach and mushrooms). Raw vegetables if tolerated.  Gas-forming vegetables (i.e., dried peas and beans, corn, broccoli,  onions, cauliflower, [place] sprouts, cucumbers, cabbage, turnip, rutabagas, sauerkraut, green peppers).    Bread, cereal, potato, pasta and grains  (Six or more servings daily) Enriched breads and cereals, plain crackers, potatoes, enriched rice, barley, noodles, spaghetti, macaroni and other pastas. Very coarse cereals such as bran; seeds in or on breads, rolls and crackers; breads made with nuts or dried fruits; fried breads and pastries such as doughnuts; fried potatoes, fried rice, wild rice, seasoned rice and pasta mixes.    Dessert fats Low-fat versions of cakes, cookies, custard, pudding, ice cream, frozen yogurt sherbet; ice pops, gelatin, frozen fruit bars, sorbet. Desserts containing salad dressings, nuts, coconut; high-fat desserts.    Milk and milk products (Four or more cups daily) Fat-free and low-fat milk products. Whole milk, cream.    Beverages   (Four or more cups daily) Water, decaffeinated coffee and tea, fruit drinks, caffeine-free carbonated beverages, weak tea, lemonade, sports drinks. All caffeine-containing beverages (i.e., coffee, strong tea, cocoa, cola); alcoholic beverages.    Condiments and sweets Iodized salt, flavorings, low-fat gravies and sauces, herbs and spices as tolerated; sugar, syrup, honey, jelly, seedless jam, hard candies and marshmallows. Strongly flavored seasonings and condiments (i.e., catsup, pepper, barbecue sauce, chili sauce, chili pepper, horseradish, garlic, mustard and vinegar), olives, pickles, nuts, chocolate candy.    For informational purposes only. Not to replace the advice of your health care provider.   Copyright   2006 [facility]. All rights reserved. [employer] [id number] - REV 09/15.      Your next 10 appointments already scheduled     Jun 07, 2018  9:00 AM CDT   New Prenatal with RI PRENATAL NURSE   [facility]    [address]  [facility] [address]   [phone]            Jun 19,  2018 11:15 AM CDT   Ultrasound with RIUS1   [facility]    [address]  [address]  [facility] [id number] [phone]            Jul 17, 2018  1:00 PM CDT   New Prenatal with Ammy Bustillo MD   [facility] ([facility]    [address]  [facility] [id number] [phone]              24 Hour Appointment Hotline       To make an appointment at any [facility], call [phone] ([phone]). If you don't have a family doctor or clinic, we will help you find one. [facility] are conveniently located to serve the needs of you and your family.             Review of your medicines      START taking        Dose / Directions Last dose taken    ondansetron 4 MG ODT tab   Commonly known as:  ZOFRAN ODT   Dose:  4 mg   Quantity:  10 tablet        Take 1 tablet (4 mg) by mouth every 8 hours as needed   Refills:  0          Our records show that you are taking the medicines listed below. If these are incorrect, please call your family doctor or clinic.        Dose / Directions Last dose taken    cholecalciferol 1000 UNIT tablet   Commonly known as:  vitamin D3   Dose:  1000 Units   Quantity:  90 tablet        Take 1 tablet (1,000 Units) by mouth daily   Refills:  0        TYLENOL PO   Dose:  325 mg        Take 325 mg by mouth   Refills:  0                Prescriptions were sent or printed at these locations (1 Prescription)                   Other Prescriptions                Printed at Department/Unit printer (1 of 1)         ondansetron (ZOFRAN ODT) 4 MG ODT tab                Procedures and tests performed during your visit     Basic metabolic panel    CBC with platelets differential    Peripheral IV catheter    UA with Microscopic    US OB <14 Weeks W Transvaginal      Orders Needing Specimen Collection     None      Pending Results     Date and Time Order Name Status Description    6/5/2018  1019 US OB <14 Weeks W Transvaginal Preliminary             Pending Culture Results     No orders found from 6/3/2018 to 6/6/2018.            Pending Results Instructions     If you had any lab results that were not finalized at the time of your Discharge, you can call the ED Lab Result RN at [phone]. You will be contacted by this team for any positive Lab results or changes in treatment. The nurses are available 7 days a week from 10A to 6:30P.  You can leave a message 24 hours per day and they will return your call.        Test Results From Your Hospital Stay        6/5/2018 10:59 AM      Component Results     Component Value Ref Range & Units Status    WBC 9.0 4.0 - 11.0 10e9/L Final    RBC Count 4.30 3.8 - 5.2 10e12/L Final    Hemoglobin 11.0 (L) 11.7 - 15.7 g/dL Final    Hematocrit 34.6 (L) 35.0 - 47.0 % Final    MCV 81 78 - 100 fl Final    MCH 25.6 (L) 26.5 - 33.0 pg Final    MCHC 31.8 31.5 - 36.5 g/dL Final    RDW 13.3 10.0 - 15.0 % Final    Platelet Count 427 150 - 450 10e9/L Final    Diff Method Automated Method  Final    % Neutrophils 70.4 % Final    % Lymphocytes 20.7 % Final    % Monocytes 7.1 % Final    % Eosinophils 1.0 % Final    % Basophils 0.2 % Final    % Immature Granulocytes 0.6 % Final    Nucleated RBCs 0 0 /100 Final    Absolute Neutrophil 6.3 1.6 - 8.3 10e9/L Final    Absolute Lymphocytes 1.9 0.8 - 5.3 10e9/L Final    Absolute Monocytes 0.6 0.0 - 1.3 10e9/L Final    Absolute Eosinophils 0.1 0.0 - 0.7 10e9/L Final    Absolute Basophils 0.0 0.0 - 0.2 10e9/L Final    Abs Immature Granulocytes 0.1 0 - 0.4 10e9/L Final    Absolute Nucleated RBC 0.0  Final         6/5/2018 11:19 AM      Component Results     Component Value Ref Range & Units Status    Sodium 137 133 - 144 mmol/L Final    Potassium 3.9 3.4 - 5.3 mmol/L Final    Chloride 106 94 - 109 mmol/L Final    Carbon Dioxide 23 20 - 32 mmol/L Final    Anion Gap 8 3 - 14 mmol/L Final    Glucose 85 70 - 99 mg/dL Final    Urea Nitrogen 7 7 -  30 mg/dL Final    Creatinine 0.61 0.52 - 1.04 mg/dL Final    GFR Estimate >90 >60 mL/min/1.7m2 Final    Non  GFR Calc    GFR Estimate If Black >90 >60 mL/min/1.7m2 Final    African American GFR Calc    Calcium 9.1 8.5 - 10.1 mg/dL Final         6/5/2018 11:08 AM      Component Results     Component Value Ref Range & Units Status    Color Urine Straw  Final    Appearance Urine Slightly Cloudy  Final    Glucose Urine Negative NEG^Negative mg/dL Final    Bilirubin Urine Negative NEG^Negative Final    Ketones Urine Negative NEG^Negative mg/dL Final    Specific Gravity Urine 1.004 1.003 - 1.035 Final    Blood Urine Negative NEG^Negative Final    pH Urine 7.0 5.0 - 7.0 pH Final    Protein Albumin Urine Negative NEG^Negative mg/dL Final    Urobilinogen mg/dL 0.0 0.0 - 2.0 mg/dL Final    Nitrite Urine Negative NEG^Negative Final    Leukocyte Esterase Urine Trace (A) NEG^Negative Final    Source Midstream Urine  Final    WBC Urine 4 0 - 5 /HPF Final    RBC Urine 1 0 - 2 /HPF Final    Bacteria Urine Few (A) NEG^Negative /HPF Final    Squamous Epithelial /HPF Urine 14 (H) 0 - 1 /HPF Final    Mucous Urine Present (A) NEG^Negative /LPF Final         6/5/2018 11:53 AM      Narrative     ULTRASOUND OBSTETRIC LESS THAN FOURTEEN WEEKS WITH TRANSVAGINAL  IMAGING SINGLE  6/5/2018 11:43 AM    HISTORY:  Low back pain during early pregnancy.    TECHNIQUE: Transabdominal and transvaginal imaging were performed.   Transvaginal imaging was performed to better evaluate the uterus and  gestational sac.    COMPARISON:  None.    FINDINGS:   Estimated gestational age by current ultrasound measurement: 6 weeks 2  days.  Estimated date of delivery based on this ultrasound: 1/27/2019.  Patient reported LMP: 4/26/2018.  Estimated gestational age by reported LMP: 5 weeks 5 days.    Crown-rump length: 0.2 cm.   Embryonic cardiac activity: 99 bpm.   Yolk sac: Present.  Subchorionic hemorrhage: None.    Multiple uterine fibroids are  identified, with the largest a  subserosal fibroid in the lower uterine segment measuring 2.9 cm.    Right ovary: Unremarkable.  Left ovary: Contains a small probable corpus luteum cyst.  Adnexal mass: None.  Free pelvic fluid: Trace amount of nonspecific free fluid in the  pelvis.        Impression     IMPRESSION:   1. Single live intrauterine pregnancy of estimated gestational age 6  weeks 2 days.  2. Multiple uterine fibroids are noted, with the largest measuring 2.9  cm.  3. Trace free fluid in the pelvis is nonspecific and may be  physiologic.                Clinical Quality Measure: Blood Pressure Screening     Your blood pressure was checked while you were in the emergency department today. The last reading we obtained was  BP: 90/57 . Please read the guidelines below about what these numbers mean and what you should do about them.  If your systolic blood pressure (the top number) is less than 120 and your diastolic blood pressure (the bottom number) is less than 80, then your blood pressure is normal. There is nothing more that you need to do about it.  If your systolic blood pressure (the top number) is 120-139 or your diastolic blood pressure (the bottom number) is 80-89, your blood pressure may be higher than it should be. You should have your blood pressure rechecked within a year by a primary care provider.  If your systolic blood pressure (the top number) is 140 or greater or your diastolic blood pressure (the bottom number) is 90 or greater, you may have high blood pressure. High blood pressure is treatable, but if left untreated over time it can put you at risk for heart attack, stroke, or kidney failure. You should have your blood pressure rechecked by a primary care provider within the next 4 weeks.  If your provider in the emergency department today gave you specific instructions to follow-up with your doctor or provider even sooner than that, you should follow that instruction and not wait for up  to 4 weeks for your follow-up visit.        Thank you for choosing [facility]       Thank you for choosing [facility] for your care. Our goal is always to provide you with excellent care. Hearing back from our patients is one way we can continue to improve our services. Please take a few minutes to complete the written survey that you may receive in the mail after you visit with us. Thank you!        [employer] Information     [employer] gives you secure access to your electronic health record. If you see a primary care provider, you can also send messages to your care team and make appointments. If you have questions, please call your primary care clinic.  If you do not have a primary care provider, please call [phone] and they will assist you.        Care EveryWhere ID     This is your Care EveryWhere ID. This could be used by other organizations to access your [facility] medical records  [id number]        Equal Access to Services     JORGE HINES : Demond Burroughs, sincere monique, sebastien suero. [facility] [phone].    ATENCIÓN: Si habla español, tiene a pink disposición servicios gratuitos de asistencia lingüística. Mellisa al [phone].    We comply with applicable federal civil rights laws and [place] laws. We do not discriminate on the basis of race, color, national origin, age, disability, sex, sexual orientation, or gender identity.            After Visit Summary       This is your record. Keep this with you and show to your community pharmacist(s) and doctor(s) at your next visit.

## 2018-06-05 NOTE — ED PROVIDER NOTES
History     Chief Complaint:  Emesis in pregnancy     HPI:   The history is provided by the patient.      Rizwana Plummer is a 37 year old female (A1) 5 weeks gravid who presents to the emergency department with her  for evaluation of vomiting. Of note, the patient was suppose to be seen by OB today but due to confusion with her appointment time she was unable to be evaluated. She reports that her LMP was on 18. She reports that she has had nausea for about 2 weeks with emesis. Her nausea is exacerbated with food and liquids and has not eaten since lunch yesterday. She has also felt weak with low bilateral back pain that started about 2 days ago. In the past, she has had this back pain with pregnancies but does not feel it is related to her vomiting. She presents today as she was unable to be seen by her OB. Here in the ED, the patient is still nauseous. She denies any abdominal pain, dysuria, or vaginal discharge or bleeding and has no other complaints.  No fevers.     Allergies:  No known drug allergies     Medications:    Tylenol   Vitamin D      Past Medical History:    Varicella   Genital infibulation   Epidermal inclusion cyst of infibulation scar    Past Surgical History:     x 2  Infibulation   Marsupialization bartholin cyst     Family History:    Mother - cerebrovascular disease, diabetes mellitus, hypertension   Father - hypertension     Social History:  Presents with her     Tobacco use: Never smoker   Alcohol use: No   PCP: Cindy Laurent    Marital Status:        Review of Systems   Gastrointestinal: Positive for nausea and vomiting. Negative for abdominal pain.   Genitourinary: Negative for dysuria, vaginal bleeding and vaginal discharge.   Musculoskeletal: Positive for back pain.   All other systems reviewed and are negative.    Physical Exam     Patient Vitals for the past 24 hrs:   BP Temp Temp src Pulse Heart Rate SpO2   18 1200 90/57 - - - - 100 %    06/05/18 1008 95/58 97.5  F (36.4  C) Oral 79 79 99 %        Physical Exam  Constitutional: Alert, attentive  HENT:    Nose: Nose normal.    Mouth/Throat: Oropharynx is clear, mucous membranes are moist   Eyes: EOM are normal. Pupils are equal, round, and reactive to light.   CV: regular rate and rhythm  Chest: Effort normal and breath sounds normal.   GI:  There is no tenderness. No distension.   MSK: Normal range of motion. No CVA tenderness. There is no tenderness to palpation of the lower back.   Neurological: Alert, attentive  Skin: Skin is warm and dry.     Emergency Department Course     Imaging:  Radiographic findings were communicated with the patient and family who voiced understanding of the findings.     US OB <14 Weeks with transvaginal:  IMPRESSION:   1. Single live intrauterine pregnancy of estimated gestational age 6 weeks 2 days.  2. Multiple uterine fibroids are noted, with the largest measuring 2.9 cm.  3. Trace free fluid in the pelvis is nonspecific and may be physiologic.    Imaging independently reviewed and agree with radiologist interpretation.      Laboratory:  CBC: HGB 11.0 (low), ow WNL (WBC 9.0, )     BMP: WNL (Creatinine 0.61)   UA with Microscopic: Leukocyte esterase trace, Bacteria few, Squamous 14 (high), Mucous present, ow Negative     Interventions:  1052: NS 1L IV Bolus    1059: Zofran 4 mg IV    Emergency Department Course:  Past medical records, nursing notes, and vitals reviewed.  1011: I performed an exam of the patient and obtained history, as documented above.     IV inserted and blood drawn. This was sent to the lab for further testing, results above.   Above interventions provided.      The patient was sent for a US while in the emergency department, findings above.     I personally reviewed the laboratory results with the Patient and spouse and answered all related questions prior to discharge.        1203: I rechecked the patient. Patient is felling well after  the above interventions. Findings and plan explained to the Patient and spouse. Patient discharged home with instructions regarding supportive care, medications, and reasons to return. The importance of close follow-up was reviewed.      Impression & Plan      Medical Decision Making:   Rizwana Plummer is a 37 year old female who presents for evaluation of nausea and vomiting.  Ultrasound shows she is currently 6 weeks and 2 days pregnant.  Nonetheless. I considered a broad differential diagnosis for this patient including viral gastroenteritis, food poisoning, bowel obstruction, intra-abdominal infection such as colitis, cholecystitis, UTI, pyelonephritis, appendicitis, etc.  There are no signs of worrisome intra-abdominal pathologies detected during the visit today.  The patient has a completely benign abdominal exam without rebound, guarding, or marked tenderness to palpation.  I doubt ectopic pregnancy based on IUP seen today.      Supportive outpatient management is therefore indicated.  Abdominal pain precautions are given for home.    It was discussed with the patient to return to the ED for blood in stool, increasing pain, or fevers more than 102.   Feels much improved after interventions in ED.  I believe all of her symptoms are at this point explained by the pregnancy and hyperemesis gravidarum. She will see her OB within one week for further evaluation or sooner if possible/needed and I will provide Zofran for management of her nausea in the interim.     Diagnosis:    ICD-10-CM    1. Nausea/vomiting in pregnancy O21.9        Disposition:  Discharged to home with plan as outlined.     Discharge Medications:  Discharge Medication List as of 6/5/2018 12:12 PM      START taking these medications    Details   ondansetron (ZOFRAN ODT) 4 MG ODT tab Take 1 tablet (4 mg) by mouth every 8 hours as needed, Disp-10 tablet, R-0, Local Print             Scribe Disclosure:   Harpreet OLIVEIRA, am serving as a scribe at  10:11 AM on 6/5/2018 to document services personally performed by Meagan Gomez PA-C based on my observations and the provider's statements to me.       Carmen Gomez PA-C  6/5/2018   [facility] EMERGENCY DEPARTMENT       Meagan Gomez PA-C  06/05/18 1325

## 2018-06-05 NOTE — ED TRIAGE NOTES
Pt presents to ED with complaints of nausea and emesis during pregnancy. Pt thinks she is about 5 weeks pregnant. Pt tried to see her OB today but they couldn't get her in. Pt also complains of two days of low back pain. Pt has hx of back pain with pregnancy according to her chart. ABCs intact. A&Ox3

## 2018-06-05 NOTE — ED AVS SNAPSHOT
[facility] Emergency Department    [address]    [facility] [address]    Phone:  [phone]    Fax:  [phone]                                       Rizwana Plummer   MRN: [id number]    Department:  [facility] Emergency Department   Date of Visit:  6/5/2018           Patient Information     Date Of Birth          1980        Your diagnoses for this visit were:     Nausea/vomiting in pregnancy        You were seen by Meagan Gomez PA-C.      Follow-up Information     Follow up with Cindy Laurent DO In 2 days.    Specialty:  OB/Gyn    Contact information:    [address] [facility] [address]  [phone]          Follow up with [facility] Emergency Department.    Specialty:  EMERGENCY MEDICINE    Why:  If symptoms worsen    Contact information:    [address]  [facility] [id number] [phone]        Discharge Instructions       Please follow up with OB in the next few days to weeks for first OB visit    Eating Tips for Morning Sickness   Hyperemesis Gravidarum  During pregnancy, you need to eat enough food to meet your needs and the needs of your baby. Severe nausea and vomiting (hyperemesis) may lead to weight loss and dehydration (too little fluid in the body).   Follow these tips to help control nausea.   1. Eat 6 to 8 small meals in a day, about two hours apart.  2. Before rising in the morning, eat a small amount of dry food. Choose from the list below.  ? soda crackers (saltines)  ? dry toast with jelly  ? breadsticks  ? dry cereal  ? rice cakes  ? pretzels  ? plain potatoes, rice or noodles  ? plain low-fat cookies or cake  3. Avoid liquids with meals. Drink liquids 30 to 60 minutes before or after eating. Sip slowly.  4. Foods and drinks should be cool or at room temperature. Try:  ? flavored gelatin  ? sherbet, sorbet or Popsicles  ? carbonated (fizzy) drinks  ? ice cubes made from juice.  5. Avoid  hot drinks and foods.  6. Avoid drinks with caffeine (coffee, tea, cola drinks). They may increase stomach acid.  7. Avoid very sweet, hot or spicy foods.  8. Avoid high-fat foods such as butter, margarine, mayonnaise, malave, gravies, pie crust, pastries and fried foods. They take longer to leave the stomach.  9. Avoid strong food odors such as fish, cabbage or broccoli. Avoid cooking odors by eating food you do not have to cook.  10. Do not lie down after eating. Rest sitting up for an hour after meals.  11. Take your prenatal vitamins with food in the evening. Tell your doctor if you cannot take them.  12. Nausea is often gone by midday. You may eat more food in the late afternoon, supper and mid-evening. Find the times best for you.  Keep a food diary to help you find foods that you can eat without problems. Try any food that appeals to you.  Menu Planning Guidelines     [employer]. Reprinted with permission.  Food groups Foods recommended  Foods that may cause distress    Soups Low-fat broth-based and cream soups made with allowed foods. All other soups.    Meats and substitutes  (Six or more ounces daily) All lean, tender meats, poultry or fish. All should be baked, broiled or boiled. Boiled egg. Low-fat or fat-free cheeses. Fried meat, poultry or fish; highly seasoned, cured or smoked meat, poultry or fish (i.e., corned beef, luncheon meat, frankfurters, sausages, sardines, anchovies, malave and strong flavored cheese). Peanut butter.    Fruits (Two or more servings daily; include a vitamin C source daily) Fruit juices, canned fruits, grapefruit and orange sections (without membrane). Other fresh and dried fruits, if tolerated.     Vegetables  (Three or more servings daily) Vegetable juices, cooked vegetables (i.e., asparagus, green or wax beans, beets, carrots, peas, pumpkin, winter squash, spinach and mushrooms). Raw vegetables if tolerated.  Gas-forming vegetables (i.e., dried peas and beans, corn, broccoli,  onions, cauliflower, [place] sprouts, cucumbers, cabbage, turnip, rutabagas, sauerkraut, green peppers).    Bread, cereal, potato, pasta and grains  (Six or more servings daily) Enriched breads and cereals, plain crackers, potatoes, enriched rice, barley, noodles, spaghetti, macaroni and other pastas. Very coarse cereals such as bran; seeds in or on breads, rolls and crackers; breads made with nuts or dried fruits; fried breads and pastries such as doughnuts; fried potatoes, fried rice, wild rice, seasoned rice and pasta mixes.    Dessert fats Low-fat versions of cakes, cookies, custard, pudding, ice cream, frozen yogurt sherbet; ice pops, gelatin, frozen fruit bars, sorbet. Desserts containing salad dressings, nuts, coconut; high-fat desserts.    Milk and milk products (Four or more cups daily) Fat-free and low-fat milk products. Whole milk, cream.    Beverages   (Four or more cups daily) Water, decaffeinated coffee and tea, fruit drinks, caffeine-free carbonated beverages, weak tea, lemonade, sports drinks. All caffeine-containing beverages (i.e., coffee, strong tea, cocoa, cola); alcoholic beverages.    Condiments and sweets Iodized salt, flavorings, low-fat gravies and sauces, herbs and spices as tolerated; sugar, syrup, honey, jelly, seedless jam, hard candies and marshmallows. Strongly flavored seasonings and condiments (i.e., catsup, pepper, barbecue sauce, chili sauce, chili pepper, horseradish, garlic, mustard and vinegar), olives, pickles, nuts, chocolate candy.    For informational purposes only. Not to replace the advice of your health care provider.   Copyright   2006 [facility]. All rights reserved. [employer] 619294 - REV 09/15.      Your next 10 appointments already scheduled     Jun 07, 2018  9:00 AM CDT   New Prenatal with RI PRENATAL NURSE   [facility]    [address]  [facility] [address]   [phone]            Jun 19,  2018 11:15 AM CDT   Ultrasound with RIUS1   [facility]    [address] [place]  [address]  [facility] [id number] [phone]            Jul 17, 2018  1:00 PM CDT   New Prenatal with Ammy Bustillo MD   [facility]    [address]  [facility] [id number] [phone]              [facility] Scheduling Hotline     To schedule an appointment at [place] [facility], please call [phone]. If you don't have a family doctor or clinic, we will help you find one. [facility] are conveniently located to serve the needs of you and your family.           Review of your medicines      START taking        Dose / Directions Last dose taken    ondansetron 4 MG ODT tab   Commonly known as:  ZOFRAN ODT   Dose:  4 mg   Quantity:  10 tablet        Take 1 tablet (4 mg) by mouth every 8 hours as needed   Refills:  0          Our records show that you are taking the medicines listed below. If these are incorrect, please call your family doctor or clinic.        Dose / Directions Last dose taken    cholecalciferol 1000 UNIT tablet   Commonly known as:  vitamin D3   Dose:  1000 Units   Quantity:  90 tablet        Take 1 tablet (1,000 Units) by mouth daily   Refills:  0        TYLENOL PO   Dose:  325 mg        Take 325 mg by mouth   Refills:  0                Prescriptions were sent or printed at these locations (1 Prescription)                   Other Prescriptions                Printed at Department/Unit printer (1 of 1)         ondansetron (ZOFRAN ODT) 4 MG ODT tab                Procedures and tests performed during your visit     Basic metabolic panel    CBC with platelets differential    Peripheral IV catheter    UA with Microscopic    US OB <14 Weeks W Transvaginal      Orders Needing Specimen Collection     None      Pending Results     Date and Time Order Name Status Description    6/5/2018 1019 US OB <14  Weeks W Transvaginal Preliminary             Pending Culture Results     No orders found from 6/3/2018 to 6/6/2018.            Pending Results Instructions     If you had any lab results that were not finalized at the time of your Discharge, you can call the ED Lab Result RN at [phone]. You will be contacted by this team for any positive Lab results or changes in treatment. The nurses are available 7 days a week from 10A to 6:30P.  You can leave a message 24 hours per day and they will return your call.        Test Results From Your Hospital Stay        6/5/2018 10:59 AM      Component Results     Component Value Ref Range & Units Status    WBC 9.0 4.0 - 11.0 10e9/L Final    RBC Count 4.30 3.8 - 5.2 10e12/L Final    Hemoglobin 11.0 (L) 11.7 - 15.7 g/dL Final    Hematocrit 34.6 (L) 35.0 - 47.0 % Final    MCV 81 78 - 100 fl Final    MCH 25.6 (L) 26.5 - 33.0 pg Final    MCHC 31.8 31.5 - 36.5 g/dL Final    RDW 13.3 10.0 - 15.0 % Final    Platelet Count 427 150 - 450 10e9/L Final    Diff Method Automated Method  Final    % Neutrophils 70.4 % Final    % Lymphocytes 20.7 % Final    % Monocytes 7.1 % Final    % Eosinophils 1.0 % Final    % Basophils 0.2 % Final    % Immature Granulocytes 0.6 % Final    Nucleated RBCs 0 0 /100 Final    Absolute Neutrophil 6.3 1.6 - 8.3 10e9/L Final    Absolute Lymphocytes 1.9 0.8 - 5.3 10e9/L Final    Absolute Monocytes 0.6 0.0 - 1.3 10e9/L Final    Absolute Eosinophils 0.1 0.0 - 0.7 10e9/L Final    Absolute Basophils 0.0 0.0 - 0.2 10e9/L Final    Abs Immature Granulocytes 0.1 0 - 0.4 10e9/L Final    Absolute Nucleated RBC 0.0  Final         6/5/2018 11:19 AM      Component Results     Component Value Ref Range & Units Status    Sodium 137 133 - 144 mmol/L Final    Potassium 3.9 3.4 - 5.3 mmol/L Final    Chloride 106 94 - 109 mmol/L Final    Carbon Dioxide 23 20 - 32 mmol/L Final    Anion Gap 8 3 - 14 mmol/L Final    Glucose 85 70 - 99 mg/dL Final    Urea Nitrogen 7 7 - 30 mg/dL Final     Creatinine 0.61 0.52 - 1.04 mg/dL Final    GFR Estimate >90 >60 mL/min/1.7m2 Final    Non  GFR Calc    GFR Estimate If Black >90 >60 mL/min/1.7m2 Final    African American GFR Calc    Calcium 9.1 8.5 - 10.1 mg/dL Final         6/5/2018 11:08 AM      Component Results     Component Value Ref Range & Units Status    Color Urine Straw  Final    Appearance Urine Slightly Cloudy  Final    Glucose Urine Negative NEG^Negative mg/dL Final    Bilirubin Urine Negative NEG^Negative Final    Ketones Urine Negative NEG^Negative mg/dL Final    Specific Gravity Urine 1.004 1.003 - 1.035 Final    Blood Urine Negative NEG^Negative Final    pH Urine 7.0 5.0 - 7.0 pH Final    Protein Albumin Urine Negative NEG^Negative mg/dL Final    Urobilinogen mg/dL 0.0 0.0 - 2.0 mg/dL Final    Nitrite Urine Negative NEG^Negative Final    Leukocyte Esterase Urine Trace (A) NEG^Negative Final    Source Midstream Urine  Final    WBC Urine 4 0 - 5 /HPF Final    RBC Urine 1 0 - 2 /HPF Final    Bacteria Urine Few (A) NEG^Negative /HPF Final    Squamous Epithelial /HPF Urine 14 (H) 0 - 1 /HPF Final    Mucous Urine Present (A) NEG^Negative /LPF Final         6/5/2018 11:53 AM      Narrative     ULTRASOUND OBSTETRIC LESS THAN FOURTEEN WEEKS WITH TRANSVAGINAL  IMAGING SINGLE  6/5/2018 11:43 AM    HISTORY:  Low back pain during early pregnancy.    TECHNIQUE: Transabdominal and transvaginal imaging were performed.   Transvaginal imaging was performed to better evaluate the uterus and  gestational sac.    COMPARISON:  None.    FINDINGS:   Estimated gestational age by current ultrasound measurement: 6 weeks 2  days.  Estimated date of delivery based on this ultrasound: 1/27/2019.  Patient reported LMP: 4/26/2018.  Estimated gestational age by reported LMP: 5 weeks 5 days.    Crown-rump length: 0.2 cm.   Embryonic cardiac activity: 99 bpm.   Yolk sac: Present.  Subchorionic hemorrhage: None.    Multiple uterine fibroids are identified, with  the largest a  subserosal fibroid in the lower uterine segment measuring 2.9 cm.    Right ovary: Unremarkable.  Left ovary: Contains a small probable corpus luteum cyst.  Adnexal mass: None.  Free pelvic fluid: Trace amount of nonspecific free fluid in the  pelvis.        Impression     IMPRESSION:   1. Single live intrauterine pregnancy of estimated gestational age 6  weeks 2 days.  2. Multiple uterine fibroids are noted, with the largest measuring 2.9  cm.  3. Trace free fluid in the pelvis is nonspecific and may be  physiologic.                Clinical Quality Measure: Blood Pressure Screening     Your blood pressure was checked while you were in the emergency department today. The last reading we obtained was  BP: 90/57 . Please read the guidelines below about what these numbers mean and what you should do about them.  If your systolic blood pressure (the top number) is less than 120 and your diastolic blood pressure (the bottom number) is less than 80, then your blood pressure is normal. There is nothing more that you need to do about it.  If your systolic blood pressure (the top number) is 120-139 or your diastolic blood pressure (the bottom number) is 80-89, your blood pressure may be higher than it should be. You should have your blood pressure rechecked within a year by a primary care provider.  If your systolic blood pressure (the top number) is 140 or greater or your diastolic blood pressure (the bottom number) is 90 or greater, you may have high blood pressure. High blood pressure is treatable, but if left untreated over time it can put you at risk for heart attack, stroke, or kidney failure. You should have your blood pressure rechecked by a primary care provider within the next 4 weeks.  If your provider in the emergency department today gave you specific instructions to follow-up with your doctor or provider even sooner than that, you should follow that instruction and not wait for up to 4 weeks for  your follow-up visit.        Thank you for choosing [facility]       Thank you for choosing [facility] for your care. Our goal is always to provide you with excellent care. Hearing back from our patients is one way we can continue to improve our services. Please take a few minutes to complete the written survey that you may receive in the mail after you visit with us. Thank you!        [employer] Information     [employer] gives you secure access to your electronic health record. If you see a primary care provider, you can also send messages to your care team and make appointments. If you have questions, please call your primary care clinic.  If you do not have a primary care provider, please call [phone] and they will assist you.        Care EveryWhere ID     This is your Care EveryWhere ID. This could be used by other organizations to access your [facility] medical records  [id number]        Equal Access to Services     JORGE HINES : Demond Burroughs, sincere monique, sebastien suero. So [facility] [phone].    ATENCIÓN: Si habla español, tiene a pink disposición servicios gratuitos de asistencia lingüística. Mellisa al [phone].    We comply with applicable federal civil rights laws and [place] laws. We do not discriminate on the basis of race, color, national origin, age, disability, sex, sexual orientation, or gender identity.            After Visit Summary       This is your record. Keep this with you and show to your community pharmacist(s) and doctor(s) at your next visit.

## 2018-06-05 NOTE — DISCHARGE INSTRUCTIONS
Please follow up with OB in the next few days to weeks for first OB visit    Eating Tips for Morning Sickness   Hyperemesis Gravidarum  During pregnancy, you need to eat enough food to meet your needs and the needs of your baby. Severe nausea and vomiting (hyperemesis) may lead to weight loss and dehydration (too little fluid in the body).   Follow these tips to help control nausea.   1. Eat 6 to 8 small meals in a day, about two hours apart.  2. Before rising in the morning, eat a small amount of dry food. Choose from the list below.  ? soda crackers (saltines)  ? dry toast with jelly  ? breadsticks  ? dry cereal  ? rice cakes  ? pretzels  ? plain potatoes, rice or noodles  ? plain low-fat cookies or cake  3. Avoid liquids with meals. Drink liquids 30 to 60 minutes before or after eating. Sip slowly.  4. Foods and drinks should be cool or at room temperature. Try:  ? flavored gelatin  ? sherbet, sorbet or Popsicles  ? carbonated (fizzy) drinks  ? ice cubes made from juice.  5. Avoid hot drinks and foods.  6. Avoid drinks with caffeine (coffee, tea, cola drinks). They may increase stomach acid.  7. Avoid very sweet, hot or spicy foods.  8. Avoid high-fat foods such as butter, margarine, mayonnaise, malave, gravies, pie crust, pastries and fried foods. They take longer to leave the stomach.  9. Avoid strong food odors such as fish, cabbage or broccoli. Avoid cooking odors by eating food you do not have to cook.  10. Do not lie down after eating. Rest sitting up for an hour after meals.  11. Take your prenatal vitamins with food in the evening. Tell your doctor if you cannot take them.  12. Nausea is often gone by midday. You may eat more food in the late afternoon, supper and mid-evening. Find the times best for you.  Keep a food diary to help you find foods that you can eat without problems. Try any food that appeals to you.  Menu Planning Guidelines     [employer]. Reprinted with permission.  Food groups Foods  recommended  Foods that may cause distress    Soups Low-fat broth-based and cream soups made with allowed foods. All other soups.    Meats and substitutes  (Six or more ounces daily) All lean, tender meats, poultry or fish. All should be baked, broiled or boiled. Boiled egg. Low-fat or fat-free cheeses. Fried meat, poultry or fish; highly seasoned, cured or smoked meat, poultry or fish (i.e., corned beef, luncheon meat, frankfurters, sausages, sardines, anchovies, malave and strong flavored cheese). Peanut butter.    Fruits (Two or more servings daily; include a vitamin C source daily) Fruit juices, canned fruits, grapefruit and orange sections (without membrane). Other fresh and dried fruits, if tolerated.     Vegetables  (Three or more servings daily) Vegetable juices, cooked vegetables (i.e., asparagus, green or wax beans, beets, carrots, peas, pumpkin, winter squash, spinach and mushrooms). Raw vegetables if tolerated.  Gas-forming vegetables (i.e., dried peas and beans, corn, broccoli, onions, cauliflower, [place] sprouts, cucumbers, cabbage, turnip, rutabagas, sauerkraut, green peppers).    Bread, cereal, potato, pasta and grains  (Six or more servings daily) Enriched breads and cereals, plain crackers, potatoes, enriched rice, barley, noodles, spaghetti, macaroni and other pastas. Very coarse cereals such as bran; seeds in or on breads, rolls and crackers; breads made with nuts or dried fruits; fried breads and pastries such as doughnuts; fried potatoes, fried rice, wild rice, seasoned rice and pasta mixes.    Dessert fats Low-fat versions of cakes, cookies, custard, pudding, ice cream, frozen yogurt sherbet; ice pops, gelatin, frozen fruit bars, sorbet. Desserts containing salad dressings, nuts, coconut; high-fat desserts.    Milk and milk products (Four or more cups daily) Fat-free and low-fat milk products. Whole milk, cream.    Beverages   (Four or more cups daily) Water, decaffeinated coffee and tea,  fruit drinks, caffeine-free carbonated beverages, weak tea, lemonade, sports drinks. All caffeine-containing beverages (i.e., coffee, strong tea, cocoa, cola); alcoholic beverages.    Condiments and sweets Iodized salt, flavorings, low-fat gravies and sauces, herbs and spices as tolerated; sugar, syrup, honey, jelly, seedless jam, hard candies and marshmallows. Strongly flavored seasonings and condiments (i.e., catsup, pepper, barbecue sauce, chili sauce, chili pepper, horseradish, garlic, mustard and vinegar), olives, pickles, nuts, chocolate candy.    For informational purposes only. Not to replace the advice of your health care provider.   Copyright   2006 [facility]. All rights reserved. tic 246125 - REV 09/15.

## 2018-06-05 NOTE — ED AVS SNAPSHOT
[facility] Emergency Department    [address]    [facility] [address]    Phone:  [phone]    Fax:  [phone]                                       Rizwana Plummer   MRN: [id number]    Department:  [facility] Emergency Department   Date of Visit:  6/5/2018           After Visit Summary Signature Page     I have received my discharge instructions, and my questions have been answered. I have discussed any challenges I see with this plan with the nurse or doctor.    ..........................................................................................................................................  Patient/Patient Representative Signature      ..........................................................................................................................................  Patient Representative Print Name and Relationship to Patient    ..................................................               ................................................  Date                                            Time    ..........................................................................................................................................  Reviewed by Signature/Title    ...................................................              ..............................................  Date                                                            Time

## 2018-06-07 ENCOUNTER — TELEPHONE (OUTPATIENT)
Dept: OBGYN | Facility: CLINIC | Age: 38
End: 2018-06-07

## 2018-06-07 ENCOUNTER — PRENATAL OFFICE VISIT (OUTPATIENT)
Dept: NURSING | Facility: CLINIC | Age: 38
End: 2018-06-07
Payer: COMMERCIAL

## 2018-06-07 DIAGNOSIS — Z34.90 SUPERVISION OF NORMAL PREGNANCY: Primary | ICD-10-CM

## 2018-06-07 LAB
ABO + RH BLD: NORMAL
ABO + RH BLD: NORMAL
BLD GP AB SCN SERPL QL: NORMAL
BLOOD BANK CMNT PATIENT-IMP: NORMAL
ERYTHROCYTE [DISTWIDTH] IN BLOOD BY AUTOMATED COUNT: 13.8 % (ref 10–15)
HCT VFR BLD AUTO: 33.7 % (ref 35–47)
HGB BLD-MCNC: 10.7 G/DL (ref 11.7–15.7)
MCH RBC QN AUTO: 25.8 PG (ref 26.5–33)
MCHC RBC AUTO-ENTMCNC: 31.8 G/DL (ref 31.5–36.5)
MCV RBC AUTO: 81 FL (ref 78–100)
PLATELET # BLD AUTO: 435 10E9/L (ref 150–450)
RBC # BLD AUTO: 4.15 10E12/L (ref 3.8–5.2)
SPECIMEN EXP DATE BLD: NORMAL
WBC # BLD AUTO: 9.9 10E9/L (ref 4–11)

## 2018-06-07 PROCEDURE — 36415 COLL VENOUS BLD VENIPUNCTURE: CPT | Performed by: OBSTETRICS & GYNECOLOGY

## 2018-06-07 PROCEDURE — 85027 COMPLETE CBC AUTOMATED: CPT | Performed by: OBSTETRICS & GYNECOLOGY

## 2018-06-07 PROCEDURE — 86850 RBC ANTIBODY SCREEN: CPT | Performed by: OBSTETRICS & GYNECOLOGY

## 2018-06-07 PROCEDURE — 86900 BLOOD TYPING SEROLOGIC ABO: CPT | Performed by: OBSTETRICS & GYNECOLOGY

## 2018-06-07 PROCEDURE — 86901 BLOOD TYPING SEROLOGIC RH(D): CPT | Performed by: OBSTETRICS & GYNECOLOGY

## 2018-06-07 PROCEDURE — 86780 TREPONEMA PALLIDUM: CPT | Performed by: OBSTETRICS & GYNECOLOGY

## 2018-06-07 PROCEDURE — 87389 HIV-1 AG W/HIV-1&-2 AB AG IA: CPT | Performed by: OBSTETRICS & GYNECOLOGY

## 2018-06-07 PROCEDURE — 86762 RUBELLA ANTIBODY: CPT | Performed by: OBSTETRICS & GYNECOLOGY

## 2018-06-07 PROCEDURE — 99207 ZZC NO CHARGE NURSE ONLY: CPT

## 2018-06-07 PROCEDURE — 87086 URINE CULTURE/COLONY COUNT: CPT | Performed by: OBSTETRICS & GYNECOLOGY

## 2018-06-07 PROCEDURE — 87340 HEPATITIS B SURFACE AG IA: CPT | Performed by: OBSTETRICS & GYNECOLOGY

## 2018-06-07 RX ORDER — PRENATAL VIT/IRON FUM/FOLIC AC 27MG-0.8MG
1 TABLET ORAL DAILY
Qty: 100 TABLET | Refills: 3 | COMMUNITY
Start: 2018-06-07

## 2018-06-07 NOTE — NURSING NOTE
NPN nurse visit. 1st dr visit scheduled for 7/17/18 with Ammy Bustillo M.D. Pap not due. Last pap 8/2016.  6w0d    BHAVIK Andrew RN

## 2018-06-07 NOTE — TELEPHONE ENCOUNTER
Letter written and printed,   Please fax [place] office   Dr. Cindy Laurent, DO    Obstetrics and Gynecology  [facility] [place] [facility] [place]

## 2018-06-07 NOTE — TELEPHONE ENCOUNTER
6w0d    [facility] calls to request letter for pt to have possibly 4 teeth extracted total, but just one today.    Just using novocaine so far.      Pt is in chair right now.  Letter started.    Fax       Lakia GERMAN R.N.  [facility]

## 2018-06-07 NOTE — LETTER
[facility]  [address], [address]  [place] [address]  [phone]        June 7, 2018    Rizwana Plummer  [address]   [facility] [address]           To whom it may concern,     The above patient is pregnant.  She may have routine evaluation and treatment of dental condition, such as xrays with lead apron, she may have novacaine (unless an allergy exists), she may have antibiotics such as amoxicillin and keflex (unless an allergy exists), for others please call our office.  Pain medications such as narcotics are ok (unless an allergy exists), avoid NSAIDS and aspirin.  She may also have teeth extracted.          Sincerely,         Dr. Cindy Laurent, DO    OB/GYN   [facility]        Sincerely,      Cindy Laurent, DO

## 2018-06-07 NOTE — MR AVS SNAPSHOT
After Visit Summary   6/7/2018    Rizwana Plummer    MRN: [id number]           Patient Information     Date Of Birth          1980        Visit Information        Provider Department      6/7/2018 9:00 AM RI PRENATAL NURSE [facility]        Today's Diagnoses     Supervision of normal pregnancy    -  1       Follow-ups after your visit        Your next 10 appointments already scheduled     Jun 19, 2018 11:15 AM CDT   Ultrasound with RIUS1   [facility] ([facility])    [address]  [address]  [facility] [id number] [phone]            Jul 17, 2018  1:00 PM CDT   New Prenatal with Ammy Bustillo MD   [facility] ([facility])    [address] [place] [address] [phone]              Future tests that were ordered for you today     Open Future Orders        Priority Expected Expires Ordered    US OB <14 Weeks w Transvaginal Single Routine  6/7/2019 6/7/2018            Who to contact     If you have questions or need follow up information about today's clinic visit or your schedule please contact [facility] directly at [phone].  Normal or non-critical lab and imaging results will be communicated to you by MyChart, letter or phone within 4 business days after the clinic has received the results. If you do not hear from us within 7 days, please contact the clinic through [employer] or phone. If you have a critical or abnormal lab result, we will notify you by phone as soon as possible.  Submit refill requests through [employer] or call your pharmacy and they will forward the refill request to us. Please allow 3 business days for your refill to be completed.          Additional Information About Your Visit        MyChart Information     [employer] gives you secure access to your electronic health record. If you see a primary care provider, you can also send messages to  your care team and make appointments. If you have questions, please call your primary care clinic.  If you do not have a primary care provider, please call [phone] and they will assist you.        Care EveryWhere ID     This is your Care EveryWhere ID. This could be used by other organizations to access your [facility] medical records  [id number]        Your Vitals Were     Last Period                   04/26/2018 (Exact Date)            Blood Pressure from Last 3 Encounters:   06/05/18 90/57   05/28/18 91/51   05/20/18 (!) 124/118    Weight from Last 3 Encounters:   05/03/18 129 lb (58.5 kg)   08/06/17 142 lb (64.4 kg)   04/06/17 135 lb (61.2 kg)              We Performed the Following     ABO/Rh type and screen     CBC with platelets     Hepatitis B surface antigen     HIV Antigen Antibody Combo     Rubella Antibody IgG Quantitative     Treponema Abs w Reflex to RPR and Titer     Urine Culture Aerobic Bacterial        Primary Care Provider Office Phone # Fax #    Cindy Bajwa Mehran,  [phone]       [address] [facility] [address]        Equal Access to Services     JORGE HINES : Hadii aad ku hadasho Soomaali, waaxda luqadaha, qaybta kaalmada adeegyada, sebastien zayas . So [facility] [phone].    ATENCIÓN: Si habla español, tiene a pink disposición servicios gratuitos de asistencia lingüística. Llame al [phone].    We comply with applicable federal civil rights laws and [place] laws. We do not discriminate on the basis of race, color, national origin, age, disability, sex, sexual orientation, or gender identity.            Thank you!     Thank you for choosing [facility]  for your care. Our goal is always to provide you with excellent care. Hearing back from our patients is one way we can continue to improve our services. Please take a few minutes to complete the written survey that you may receive in the mail after your visit with us.  Thank you!             Your Updated Medication List - Protect others around you: Learn how to safely use, store and throw away your medicines at www.disposemymeds.org.          This list is accurate as of 6/7/18 10:06 AM.  Always use your most recent med list.                   Brand Name Dispense Instructions for use Diagnosis    cholecalciferol 1000 UNIT tablet    vitamin D3    90 tablet    Take 1 tablet (1,000 Units) by mouth daily    Vitamin D deficiency       ondansetron 4 MG ODT tab    ZOFRAN ODT    10 tablet    Take 1 tablet (4 mg) by mouth every 8 hours as needed        prenatal multivitamin plus iron 27-0.8 MG Tabs per tablet     100 tablet    Take 1 tablet by mouth daily        TYLENOL PO      Take 325 mg by mouth

## 2018-06-08 LAB
BACTERIA SPEC CULT: NORMAL
BACTERIA SPEC CULT: NORMAL
HBV SURFACE AG SERPL QL IA: NONREACTIVE
HIV 1+2 AB+HIV1 P24 AG SERPL QL IA: NONREACTIVE
RUBV IGG SERPL IA-ACNC: 21 IU/ML
SPECIMEN SOURCE: NORMAL
T PALLIDUM AB SER QL: NONREACTIVE

## 2018-06-09 ENCOUNTER — HOSPITAL ENCOUNTER (EMERGENCY)
Facility: CLINIC | Age: 38
Discharge: HOME | End: 2018-06-09
Attending: EMERGENCY MEDICINE | Admitting: EMERGENCY MEDICINE
Payer: COMMERCIAL

## 2018-06-09 VITALS
HEART RATE: 89 BPM | DIASTOLIC BLOOD PRESSURE: 46 MMHG | WEIGHT: 125.66 LBS | TEMPERATURE: 97.8 F | SYSTOLIC BLOOD PRESSURE: 87 MMHG | RESPIRATION RATE: 18 BRPM | BODY MASS INDEX: 25.38 KG/M2 | OXYGEN SATURATION: 98 %

## 2018-06-09 DIAGNOSIS — O21.9 NAUSEA AND VOMITING DURING PREGNANCY: ICD-10-CM

## 2018-06-09 LAB
ALBUMIN SERPL-MCNC: 3.4 G/DL (ref 3.4–5)
ALBUMIN UR-MCNC: NEGATIVE MG/DL
ALP SERPL-CCNC: 54 U/L (ref 40–150)
ALT SERPL W P-5'-P-CCNC: 15 U/L (ref 0–50)
ANION GAP SERPL CALCULATED.3IONS-SCNC: 8 MMOL/L (ref 3–14)
APPEARANCE UR: ABNORMAL
AST SERPL W P-5'-P-CCNC: 14 U/L (ref 0–45)
BASOPHILS # BLD AUTO: 0 10E9/L (ref 0–0.2)
BASOPHILS NFR BLD AUTO: 0.3 %
BILIRUB SERPL-MCNC: 0.5 MG/DL (ref 0.2–1.3)
BILIRUB UR QL STRIP: NEGATIVE
BUN SERPL-MCNC: 6 MG/DL (ref 7–30)
CALCIUM SERPL-MCNC: 8.9 MG/DL (ref 8.5–10.1)
CHLORIDE SERPL-SCNC: 103 MMOL/L (ref 94–109)
CO2 SERPL-SCNC: 25 MMOL/L (ref 20–32)
COLOR UR AUTO: YELLOW
CREAT SERPL-MCNC: 0.64 MG/DL (ref 0.52–1.04)
DIFFERENTIAL METHOD BLD: ABNORMAL
EOSINOPHIL # BLD AUTO: 0.1 10E9/L (ref 0–0.7)
EOSINOPHIL NFR BLD AUTO: 1.5 %
ERYTHROCYTE [DISTWIDTH] IN BLOOD BY AUTOMATED COUNT: 13.7 % (ref 10–15)
GFR SERPL CREATININE-BSD FRML MDRD: >90 ML/MIN/1.7M2
GLUCOSE SERPL-MCNC: 92 MG/DL (ref 70–99)
GLUCOSE UR STRIP-MCNC: NEGATIVE MG/DL
HCT VFR BLD AUTO: 33.3 % (ref 35–47)
HGB BLD-MCNC: 10.7 G/DL (ref 11.7–15.7)
HGB UR QL STRIP: NEGATIVE
IMM GRANULOCYTES # BLD: 0 10E9/L (ref 0–0.4)
IMM GRANULOCYTES NFR BLD: 0.3 %
KETONES UR STRIP-MCNC: 20 MG/DL
LEUKOCYTE ESTERASE UR QL STRIP: NEGATIVE
LYMPHOCYTES # BLD AUTO: 1.4 10E9/L (ref 0.8–5.3)
LYMPHOCYTES NFR BLD AUTO: 20.9 %
MCH RBC QN AUTO: 25.8 PG (ref 26.5–33)
MCHC RBC AUTO-ENTMCNC: 32.1 G/DL (ref 31.5–36.5)
MCV RBC AUTO: 80 FL (ref 78–100)
MONOCYTES # BLD AUTO: 0.5 10E9/L (ref 0–1.3)
MONOCYTES NFR BLD AUTO: 6.7 %
MUCOUS THREADS #/AREA URNS LPF: PRESENT /LPF
NEUTROPHILS # BLD AUTO: 4.8 10E9/L (ref 1.6–8.3)
NEUTROPHILS NFR BLD AUTO: 70.3 %
NITRATE UR QL: NEGATIVE
NRBC # BLD AUTO: 0 10*3/UL
NRBC BLD AUTO-RTO: 0 /100
PH UR STRIP: 7 PH (ref 5–7)
PLATELET # BLD AUTO: 367 10E9/L (ref 150–450)
POTASSIUM SERPL-SCNC: 3.7 MMOL/L (ref 3.4–5.3)
PROT SERPL-MCNC: 7.4 G/DL (ref 6.8–8.8)
RBC # BLD AUTO: 4.14 10E12/L (ref 3.8–5.2)
RBC #/AREA URNS AUTO: 5 /HPF (ref 0–2)
SODIUM SERPL-SCNC: 136 MMOL/L (ref 133–144)
SOURCE: ABNORMAL
SP GR UR STRIP: 1.02 (ref 1–1.03)
SQUAMOUS #/AREA URNS AUTO: 2 /HPF (ref 0–1)
UROBILINOGEN UR STRIP-MCNC: 4 MG/DL (ref 0–2)
WBC # BLD AUTO: 6.8 10E9/L (ref 4–11)
WBC #/AREA URNS AUTO: 1 /HPF (ref 0–5)

## 2018-06-09 PROCEDURE — 96361 HYDRATE IV INFUSION ADD-ON: CPT

## 2018-06-09 PROCEDURE — 80053 COMPREHEN METABOLIC PANEL: CPT | Performed by: EMERGENCY MEDICINE

## 2018-06-09 PROCEDURE — 96374 THER/PROPH/DIAG INJ IV PUSH: CPT

## 2018-06-09 PROCEDURE — 81001 URINALYSIS AUTO W/SCOPE: CPT | Performed by: EMERGENCY MEDICINE

## 2018-06-09 PROCEDURE — 99284 EMERGENCY DEPT VISIT MOD MDM: CPT | Mod: 25

## 2018-06-09 PROCEDURE — 25000128 H RX IP 250 OP 636: Performed by: EMERGENCY MEDICINE

## 2018-06-09 PROCEDURE — 85025 COMPLETE CBC W/AUTO DIFF WBC: CPT | Performed by: EMERGENCY MEDICINE

## 2018-06-09 RX ORDER — ONDANSETRON 2 MG/ML
4 INJECTION INTRAMUSCULAR; INTRAVENOUS EVERY 30 MIN PRN
Status: DISCONTINUED | OUTPATIENT
Start: 2018-06-09 | End: 2018-06-09 | Stop reason: HOSPADM

## 2018-06-09 RX ADMIN — SODIUM CHLORIDE 2000 ML: 9 INJECTION, SOLUTION INTRAVENOUS at 07:46

## 2018-06-09 RX ADMIN — ONDANSETRON 4 MG: 2 INJECTION INTRAMUSCULAR; INTRAVENOUS at 07:46

## 2018-06-09 ASSESSMENT — ENCOUNTER SYMPTOMS
ABDOMINAL PAIN: 0
NAUSEA: 1
DIARRHEA: 0
FEVER: 0
VOMITING: 1

## 2018-06-09 NOTE — ED PROVIDER NOTES
History     Chief Complaint:  Vomiting    The history is provided by the patient.      Rizwana Plummer is a 37 year old  female who presents to the emergency department today for evaluation of vomiting. The patient is approximately 7 weeks pregnant with her lmp 2018. See ultrasound results from 2018 confirming pregnancy below. The patient presents to the emergency department today because over the past 3 weeks, she has been persistently vomiting and has been unable to keep any fluids or food down. She states that she has always had this problem with her previous pregnancies and had required infusions. She is scheduled for an infusion in 3 days, but states she cannot wait that long. She is currently taking Prenatal vitamins, Zofran, and Unisom at home for symptom management. Zofran initially helped with the vomiting, but not anymore. She otherwise denies abdominal pain, vaginal bleeding, diarrhea, fever, blood in her vomit. She is otherwise healthy and has no known medical problems.     ULTRASOUND OBSTETRIC LESS THAN FOURTEEN WEEKS WITH TRANSVAGINAL - 2018   IMPRESSION:   1. Single live intrauterine pregnancy of estimated gestational age 6  weeks 2 days.  2. Multiple uterine fibroids are noted, with the largest measuring 2.9  cm.  3. Trace free fluid in the pelvis is nonspecific and may be  Physiologic.    Reading per radiology     Allergies:  Drug allergies reviewed. No pertinent drug allergies.     Medications:    Acetaminophen (TYLENOL PO)  cholecalciferol (VITAMIN D3) 1000 UNIT tablet  Prenatal Vit-Fe Fumarate-FA (PRENATAL MULTIVITAMIN PLUS IRON) 27-0.8 MG TABS per tablet  Unisom     Past Medical History:    Varicella    Past Surgical History:     sections  Infibulation  Marsupialization bartholin cyst    Family History:    Cerebrovascular disease  Mother   Diabetes     Mother   Hypertension     Mother   Hypertension     Father     Social History:  Smoking Status: Never Smoker  Smokeless  Tobacco: Never Used  Alcohol Use: Negative   Marital Status:       Review of Systems   Constitutional: Negative for fever.   Gastrointestinal: Positive for nausea and vomiting. Negative for abdominal pain and diarrhea.   Genitourinary: Negative for vaginal bleeding.   All other systems reviewed and are negative.    Physical Exam     Patient Vitals for the past 24 hrs:   BP Temp Temp src Pulse Resp SpO2 Weight   06/09/18 0955 (!) 87/46 - - - - - -   06/09/18 0945 (!) 88/53 - - - - - -   06/09/18 0940 - - - - - 98 % -   06/09/18 0939 - - - - - 100 % -   06/09/18 0932 (!) 81/49 - - - - 100 % -   06/09/18 0931 - - - - - 100 % -   06/09/18 0930 (!) 81/34 - - - - 100 % -   06/09/18 0929 - - - - - 100 % -   06/09/18 0830 - - - - - 100 % -   06/09/18 0815 92/43 - - - - 100 % -   06/09/18 0800 (!) 88/35 - - - - 100 % -   06/09/18 0753 92/55 - - - - - -   06/09/18 0748 (!) 86/30 - - - - 100 % -   06/09/18 0745 (!) 83/35 - - - - 98 % -   06/09/18 0743 - - - - - 100 % -   06/09/18 0742 - - - - - 100 % -   06/09/18 0741 91/58 - - - - 100 % -   06/09/18 0720 (!) 86/43 97.8  F (36.6  C) Temporal 89 18 99 % 57 kg (125 lb 10.6 oz)      Physical Exam  Constitutional: Alert, attentive, GCS 15, middle aged woman appears fatigued, sitting up in bed   HENT:    Nose: Nose normal.    Mouth/Throat: Oropharynx is clear, mucous membranes are dry  Eyes: Normal conjunctiva. Pupils are equal, round, and reactive to light.   CV: regular rate and rhythm  Chest: Effort normal and breath sounds normal.   GI:  There is no tenderness to deep palpation, no rebound or guarding. No distension. Normal bowel sounds   MSK: Normal range of motion.   Neurological: Alert, attentive, oriented x4, strength intact   Skin: Skin is warm and dry.    Emergency Department Course     Imaging:none    Laboratory:  Laboratory findings were communicated with the patient who voiced understanding of the findings.    UA with Microscopic: Urineketon 20 (A), Urobilinogen  4.0 mg/dL (H), RBC/HPF 5 (H), Squamous Epithelial/HPF 2 (H), Mucous Present (A) o/w WNL   CBC: WBC 6.8, HGB 10.7 (L),   CMP: BUN 6 (L) o/w WNL (Creatinine 0.64)    Interventions:  0746 NS 2000 mL IV   0746 Zofran 4 mg IV     Emergency Department Course:    Nursing notes and vitals reviewed.    0727 I performed an exam of the patient as documented above.     IV was inserted and blood was drawn for laboratory testing, results above.      0850 The patient is drinking some juice. She still denies any abdominal pain.    0959 The patient feels well and is asymptomatic. She feels ready for discharge.    I personally reviewed the lab results with the patient. I discussed the treatment plan with the patient. She expressed understanding of this plan and consented to discharge. She will be discharged home with instructions for care and follow up. In addition, the patient will return to the emergency department if their symptoms persist, worsen, if new symptoms arise or if there is any concern.  All questions were answered.     Impression & Plan      Medical Decision Making:  Rizwana Plummer is a 37 year old female ~7 weeks gestational age, who presents for evaluation of nausea and vomiting.  She is currently pregnant, which was previously confirmed by US.  I considered a broad differential diagnosis for this patient including viral gastroenteritis, food poisoning, bowel obstruction, intra-abdominal infection such as colitis, cholecystitis, UTI, pyelonephritis, appendicitis, etc.  There are no signs of worrisome intra-abdominal pathologies detected during the visit today.  The patient has a completely benign abdominal exam without rebound, guarding, or marked tenderness to palpation.  I doubt ectopic pregnancy based on recent US.  No abdominal pain or vaginal bleeding.  UA and labs are overall reassuring with small ketones, and no bacteruria.  Her blood pressures are low at baseline and did not change with IVF.  She is  asymptomatic (not dizzy, fatigued) and ambulated without difficulty.  She is feeling much improved, tolerated PO challenge,  and requesting to be discharged.  Supportive outpatient management is therefore indicated.    It was discussed with the patient to return to the ED for blood in stool, increasing pain, or fevers more than 102.  She has unisom and zofran for home use.  I believe all of her symptoms are at this point explained by the pregnancy and hyperemesis gravidarum.      Diagnosis:    ICD-10-CM    1. Nausea and vomiting during pregnancy O21.9 Comprehensive metabolic panel     UA with Microscopic     Disposition:   The patient is discharged to home.     Discharge Medications:  Discharge Medication List as of 6/9/2018 10:02 AM        Scribe Disclosure:  I, Sammie Lazar, am serving as a scribe at 7:23 AM on 6/9/2018 to document services personally performed by Saira Bryan MD, based on my observations and the provider's statements to me.      [facility] EMERGENCY DEPARTMENT       Saira Bryan MD  06/09/18 0481

## 2018-06-09 NOTE — ED AVS SNAPSHOT
[facility] Emergency Department    [address]    [facility] [address]    Phone:  [phone]    Fax:  [phone]                                       Rizwana Plummer   MRN: [id number]    Department:  [facility] Emergency Department   Date of Visit:  6/9/2018           Patient Information     Date Of Birth          1980        Your diagnoses for this visit were:     Nausea and vomiting during pregnancy        You were seen by Saira Bryan MD.      Follow-up Information     Go to [facility] Emergency Department.    Specialty:  EMERGENCY MEDICINE    Why:  If symptoms worsen including unable to keep down liquids, fevers, vaginal bleeding or abdominal pain    Contact information:    [address]  [facility] [address] [phone]        Follow up with Cindy Laurent DO. Schedule an appointment as soon as possible for a visit in 3 days.    Specialty:  OB/Gyn    Why:  for check     Contact information:    [address] [facility] [address]  [phone]          Discharge Instructions       Eating Tips for Morning Sickness   Hyperemesis Gravidarum  During pregnancy, you need to eat enough food to meet your needs and the needs of your baby. Severe nausea and vomiting (hyperemesis) may lead to weight loss and dehydration (too little fluid in the body).   Follow these tips to help control nausea.   1. Eat 6 to 8 small meals in a day, about two hours apart.  2. Before rising in the morning, eat a small amount of dry food. Choose from the list below.  ? soda crackers (saltines)  ? dry toast with jelly  ? breadsticks  ? dry cereal  ? rice cakes  ? pretzels  ? plain potatoes, rice or noodles  ? plain low-fat cookies or cake  3. Avoid liquids with meals. Drink liquids 30 to 60 minutes before or after eating. Sip slowly.  4. Foods and drinks should be cool or at room temperature. Try:  ? flavored gelatin  ? sherbet, sorbet or  Popsicles  ? carbonated (fizzy) drinks  ? ice cubes made from juice.  5. Avoid hot drinks and foods.  6. Avoid drinks with caffeine (coffee, tea, cola drinks). They may increase stomach acid.  7. Avoid very sweet, hot or spicy foods.  8. Avoid high-fat foods such as butter, margarine, mayonnaise, malave, gravies, pie crust, pastries and fried foods. They take longer to leave the stomach.  9. Avoid strong food odors such as fish, cabbage or broccoli. Avoid cooking odors by eating food you do not have to cook.  10. Do not lie down after eating. Rest sitting up for an hour after meals.  11. Take your prenatal vitamins with food in the evening. Tell your doctor if you cannot take them.  12. Nausea is often gone by midday. You may eat more food in the late afternoon, supper and mid-evening. Find the times best for you.  Keep a food diary to help you find foods that you can eat without problems. Try any food that appeals to you.  Menu Planning Guidelines     [employer]. Reprinted with permission.  Food groups Foods recommended  Foods that may cause distress    Soups Low-fat broth-based and cream soups made with allowed foods. All other soups.    Meats and substitutes  (Six or more ounces daily) All lean, tender meats, poultry or fish. All should be baked, broiled or boiled. Boiled egg. Low-fat or fat-free cheeses. Fried meat, poultry or fish; highly seasoned, cured or smoked meat, poultry or fish (i.e., corned beef, luncheon meat, frankfurters, sausages, sardines, anchovies, malave and strong flavored cheese). Peanut butter.    Fruits (Two or more servings daily; include a vitamin C source daily) Fruit juices, canned fruits, grapefruit and orange sections (without membrane). Other fresh and dried fruits, if tolerated.     Vegetables  (Three or more servings daily) Vegetable juices, cooked vegetables (i.e., asparagus, green or wax beans, beets, carrots, peas, pumpkin, winter squash, spinach and mushrooms). Raw vegetables if  tolerated.  Gas-forming vegetables (i.e., dried peas and beans, corn, broccoli, onions, cauliflower, [place] sprouts, cucumbers, cabbage, turnip, rutabagas, sauerkraut, green peppers).    Bread, cereal, potato, pasta and grains  (Six or more servings daily) Enriched breads and cereals, plain crackers, potatoes, enriched rice, barley, noodles, spaghetti, macaroni and other pastas. Very coarse cereals such as bran; seeds in or on breads, rolls and crackers; breads made with nuts or dried fruits; fried breads and pastries such as doughnuts; fried potatoes, fried rice, wild rice, seasoned rice and pasta mixes.    Dessert fats Low-fat versions of cakes, cookies, custard, pudding, ice cream, frozen yogurt sherbet; ice pops, gelatin, frozen fruit bars, sorbet. Desserts containing salad dressings, nuts, coconut; high-fat desserts.    Milk and milk products (Four or more cups daily) Fat-free and low-fat milk products. Whole milk, cream.    Beverages   (Four or more cups daily) Water, decaffeinated coffee and tea, fruit drinks, caffeine-free carbonated beverages, weak tea, lemonade, sports drinks. All caffeine-containing beverages (i.e., coffee, strong tea, cocoa, cola); alcoholic beverages.    Condiments and sweets Iodized salt, flavorings, low-fat gravies and sauces, herbs and spices as tolerated; sugar, syrup, honey, jelly, seedless jam, hard candies and marshmallows. Strongly flavored seasonings and condiments (i.e., catsup, pepper, barbecue sauce, chili sauce, chili pepper, horseradish, garlic, mustard and vinegar), olives, pickles, nuts, chocolate candy.    For informational purposes only. Not to replace the advice of your health care provider.   Copyright   2006 [place] [employer] [facility]. All rights reserved. [employer] [id number] - REV 09/15.      Hyperemesis Gravidarum  Hyperemesis gravidarum is a severe form of morning sickness that can affect some women during pregnancy. It may develop around the 5th week and last  until the 16th week of pregnancy. In some women, it may last longer. Symptoms include severe nausea and vomiting. This can lead to problems such as as weight loss and dehydration.  It is not clear what causes hyperemesis gravidarum. It may be due to rising hormone levels early in the pregnancy. It can be a serious threat to mother and fetus, if symptoms are severe. Therefore, follow the advice below carefully. If symptoms are not controlled by home measures, a hospital stay may be needed. IV (intravenous) fluids and medicines may be given.  Home care    Diet  ? Keep a log of the foods you eat and how they affect your symptoms. Avoid foods that trigger your symptoms.  ? Eat frequent small meals throughout the day rather than three large meals. This can help keep the stomach from being empty, which can make nausea worse.  ? Choose foods that are high in carbohydrates. Eating foods high in protein may also help. Limit greasy or spicy foods.  ? Before getting out of bed in the morning, try eating crackers or dry toast. This may help settle your stomach.  ? Drink cold, clear liquids. Drinking small amounts of liquids with electrolytes, such as sports drinks may help as well.    Medicine  ? If needed, your healthcare provider may prescribe certain medicines to help relieve nausea and vomiting. Vitamin B6 and bogdan may also be advised. Don t try any over-the-counter medicines or home remedies without talking to your provider first.  Follow-up care  Follow up with your healthcare provider, or as advised.  When to seek medical advice  Call your healthcare provider right away if any of these occur:    Signs of dehydration (dry mouth, extreme thirst, dark urine or little urine output, dizziness, weakness, or fainting)    Vomiting that won t stop    Inability to keep down liquids    Frequent diarrhea    Weight loss or no weight gain over a 2-week period    Severe constant pain in the lower right abdomen    Fever of 100.4 F  (38 C) or higher, or as directed by your provider  Date Last Reviewed: 8/20/2015 2000-2017 [employer]. [address] [place] [address], [place] [address]. All rights reserved. This information is not intended as a substitute for professional medical care. Always follow your healthcare professional's instructions.            Your next 10 appointments already scheduled     Jun 12, 2018 10:30 AM CDT   ESTABL[facility]ENATAL with [facility]hy Banda MD   [facility]    [address] [place] [id number] [phone]            Jun 19, 2018 11:15 AM CDT   Ultrasound with RIUS1   [facility]    [address]  [address]  [facility] [id number] [phone]            Jul 17, 2018  1:00 PM CDT   New Prenatal with Ammy Bustillo MD   [facility]    [address] [place] [id number] [phone]              24 Hour Appointment Hotline       To make an appointment at any [facility], call [phone] ([phone]). If you don't have a family doctor or clinic, we will help you find one. [facility] are conveniently located to serve the needs of you and your family.             Review of your medicines      Our records show that you are taking the medicines listed below. If these are incorrect, please call your family doctor or clinic.        Dose / Directions Last dose taken    cholecalciferol 1000 UNIT tablet   Commonly known as:  vitamin D3   Dose:  1000 Units   Quantity:  90 tablet        Take 1 tablet (1,000 Units) by mouth daily   Refills:  0        prenatal multivitamin plus iron 27-0.8 MG Tabs per tablet   Dose:  1 tablet   Quantity:  100 tablet        Take 1 tablet by mouth daily   Refills:  3        TYLENOL PO   Dose:  325 mg        Take 325 mg by mouth   Refills:  0          ASK your doctor about these  medications        Dose / Directions Last dose taken    ondansetron 4 MG ODT tab   Commonly known as:  ZOFRAN ODT   Dose:  4 mg   Quantity:  10 tablet   Ask about: Should I take this medication?        Take 1 tablet (4 mg) by mouth every 8 hours as needed   Refills:  0                Procedures and tests performed during your visit     CBC with platelets differential    Comprehensive metabolic panel    UA with Microscopic      Orders Needing Specimen Collection     None      Pending Results     No orders found from 6/7/2018 to 6/10/2018.            Pending Culture Results     No orders found from 6/7/2018 to 6/10/2018.            Pending Results Instructions     If you had any lab results that were not finalized at the time of your Discharge, you can call the ED Lab Result RN at [phone]. You will be contacted by this team for any positive Lab results or changes in treatment. The nurses are available 7 days a week from 10A to 6:30P.  You can leave a message 24 hours per day and they will return your call.        Test Results From Your Hospital Stay        6/9/2018  7:50 AM      Component Results     Component Value Ref Range & Units Status    WBC 6.8 4.0 - 11.0 10e9/L Final    RBC Count 4.14 3.8 - 5.2 10e12/L Final    Hemoglobin 10.7 (L) 11.7 - 15.7 g/dL Final    Hematocrit 33.3 (L) 35.0 - 47.0 % Final    MCV 80 78 - 100 fl Final    MCH 25.8 (L) 26.5 - 33.0 pg Final    MCHC 32.1 31.5 - 36.5 g/dL Final    RDW 13.7 10.0 - 15.0 % Final    Platelet Count 367 150 - 450 10e9/L Final    Diff Method Automated Method  Final    % Neutrophils 70.3 % Final    % Lymphocytes 20.9 % Final    % Monocytes 6.7 % Final    % Eosinophils 1.5 % Final    % Basophils 0.3 % Final    % Immature Granulocytes 0.3 % Final    Nucleated RBCs 0 0 /100 Final    Absolute Neutrophil 4.8 1.6 - 8.3 10e9/L Final    Absolute Lymphocytes 1.4 0.8 - 5.3 10e9/L Final    Absolute Monocytes 0.5 0.0 - 1.3 10e9/L Final    Absolute Eosinophils 0.1 0.0 - 0.7  10e9/L Final    Absolute Basophils 0.0 0.0 - 0.2 10e9/L Final    Abs Immature Granulocytes 0.0 0 - 0.4 10e9/L Final    Absolute Nucleated RBC 0.0  Final         6/9/2018  8:15 AM      Component Results     Component Value Ref Range & Units Status    Sodium 136 133 - 144 mmol/L Final    Potassium 3.7 3.4 - 5.3 mmol/L Final    Chloride 103 94 - 109 mmol/L Final    Carbon Dioxide 25 20 - 32 mmol/L Final    Anion Gap 8 3 - 14 mmol/L Final    Glucose 92 70 - 99 mg/dL Final    Urea Nitrogen 6 (L) 7 - 30 mg/dL Final    Creatinine 0.64 0.52 - 1.04 mg/dL Final    GFR Estimate >90 >60 mL/min/1.7m2 Final    Non  GFR Calc    GFR Estimate If Black >90 >60 mL/min/1.7m2 Final    African American GFR Calc    Calcium 8.9 8.5 - 10.1 mg/dL Final    Bilirubin Total 0.5 0.2 - 1.3 mg/dL Final    Albumin 3.4 3.4 - 5.0 g/dL Final    Protein Total 7.4 6.8 - 8.8 g/dL Final    Alkaline Phosphatase 54 40 - 150 U/L Final    ALT 15 0 - 50 U/L Final    AST 14 0 - 45 U/L Final         6/9/2018  9:01 AM      Component Results     Component Value Ref Range & Units Status    Color Urine Yellow  Final    Appearance Urine Slightly Cloudy  Final    Glucose Urine Negative NEG^Negative mg/dL Final    Bilirubin Urine Negative NEG^Negative Final    Ketones Urine 20 (A) NEG^Negative mg/dL Final    Specific Gravity Urine 1.023 1.003 - 1.035 Final    Blood Urine Negative NEG^Negative Final    pH Urine 7.0 5.0 - 7.0 pH Final    Protein Albumin Urine Negative NEG^Negative mg/dL Final    Urobilinogen mg/dL 4.0 (H) 0.0 - 2.0 mg/dL Final    Nitrite Urine Negative NEG^Negative Final    Leukocyte Esterase Urine Negative NEG^Negative Final    Source Clean catch urine  Final    WBC Urine 1 0 - 5 /HPF Final    RBC Urine 5 (H) 0 - 2 /HPF Final    Squamous Epithelial /HPF Urine 2 (H) 0 - 1 /HPF Final    Mucous Urine Present (A) NEG^Negative /LPF Final                Clinical Quality Measure: Blood Pressure Screening     Your blood pressure was checked  while you were in the emergency department today. The last reading we obtained was  BP: (!) 87/46 . Please read the guidelines below about what these numbers mean and what you should do about them.  If your systolic blood pressure (the top number) is less than 120 and your diastolic blood pressure (the bottom number) is less than 80, then your blood pressure is normal. There is nothing more that you need to do about it.  If your systolic blood pressure (the top number) is 120-139 or your diastolic blood pressure (the bottom number) is 80-89, your blood pressure may be higher than it should be. You should have your blood pressure rechecked within a year by a primary care provider.  If your systolic blood pressure (the top number) is 140 or greater or your diastolic blood pressure (the bottom number) is 90 or greater, you may have high blood pressure. High blood pressure is treatable, but if left untreated over time it can put you at risk for heart attack, stroke, or kidney failure. You should have your blood pressure rechecked by a primary care provider within the next 4 weeks.  If your provider in the emergency department today gave you specific instructions to follow-up with your doctor or provider even sooner than that, you should follow that instruction and not wait for up to 4 weeks for your follow-up visit.        Thank you for choosing [facility]       Thank you for choosing [facility] for your care. Our goal is always to provide you with excellent care. Hearing back from our patients is one way we can continue to improve our services. Please take a few minutes to complete the written survey that you may receive in the mail after you visit with us. Thank you!        [employer] Information     [employer] gives you secure access to your electronic health record. If you see a primary care provider, you can also send messages to your care team and make appointments. If you have questions, please call your primary care  clinic.  If you do not have a primary care provider, please call [phone] and they will assist you.        Care EveryWhere ID     This is your Care EveryWhere ID. This could be used by other organizations to access your [place] medical records  [id number]        Equal Access to Services     JORGE HINES : Demond Burroughs, sincere monique, bia hernandez, sebastien morales. So [facility] [phone].    ATENCIÓN: Si habla español, tiene a pink disposición servicios gratuitos de asistencia lingüística. Llame al [phone].    We comply with applicable federal civil rights laws and [place] laws. We do not discriminate on the basis of race, color, national origin, age, disability, sex, sexual orientation, or gender identity.            After Visit Summary       This is your record. Keep this with you and show to your community pharmacist(s) and doctor(s) at your next visit.

## 2018-06-09 NOTE — ED NOTES
Patient roadtested by this RN. Patient denies any dizziness, steady gait, states she is feeling better. MD notified.

## 2018-06-09 NOTE — ED NOTES
Patient discharged to home. Patient received follow-up with OB/GYN.  Patient received discharge instructions and has no other questions at this time.

## 2018-06-09 NOTE — ED TRIAGE NOTES
Arrives with emesis during pregnancy approx 7 wks, pt BP 86/42, she reports it is usually low in 80's. Pt unable to keep any food or fluids down. A/ox 3, ABC's intact.

## 2018-06-09 NOTE — DISCHARGE INSTRUCTIONS
Eating Tips for Morning Sickness   Hyperemesis Gravidarum  During pregnancy, you need to eat enough food to meet your needs and the needs of your baby. Severe nausea and vomiting (hyperemesis) may lead to weight loss and dehydration (too little fluid in the body).   Follow these tips to help control nausea.   1. Eat 6 to 8 small meals in a day, about two hours apart.  2. Before rising in the morning, eat a small amount of dry food. Choose from the list below.  ? soda crackers (saltines)  ? dry toast with jelly  ? breadsticks  ? dry cereal  ? rice cakes  ? pretzels  ? plain potatoes, rice or noodles  ? plain low-fat cookies or cake  3. Avoid liquids with meals. Drink liquids 30 to 60 minutes before or after eating. Sip slowly.  4. Foods and drinks should be cool or at room temperature. Try:  ? flavored gelatin  ? sherbet, sorbet or Popsicles  ? carbonated (fizzy) drinks  ? ice cubes made from juice.  5. Avoid hot drinks and foods.  6. Avoid drinks with caffeine (coffee, tea, cola drinks). They may increase stomach acid.  7. Avoid very sweet, hot or spicy foods.  8. Avoid high-fat foods such as butter, margarine, mayonnaise, malave, gravies, pie crust, pastries and fried foods. They take longer to leave the stomach.  9. Avoid strong food odors such as fish, cabbage or broccoli. Avoid cooking odors by eating food you do not have to cook.  10. Do not lie down after eating. Rest sitting up for an hour after meals.  11. Take your prenatal vitamins with food in the evening. Tell your doctor if you cannot take them.  12. Nausea is often gone by midday. You may eat more food in the late afternoon, supper and mid-evening. Find the times best for you.  Keep a food diary to help you find foods that you can eat without problems. Try any food that appeals to you.  Menu Planning Guidelines     [employer]. Reprinted with permission.  Food groups Foods recommended  Foods that may cause distress    Soups Low-fat broth-based and cream  soups made with allowed foods. All other soups.    Meats and substitutes  (Six or more ounces daily) All lean, tender meats, poultry or fish. All should be baked, broiled or boiled. Boiled egg. Low-fat or fat-free cheeses. Fried meat, poultry or fish; highly seasoned, cured or smoked meat, poultry or fish (i.e., corned beef, luncheon meat, frankfurters, sausages, sardines, anchovies, malave and strong flavored cheese). Peanut butter.    Fruits (Two or more servings daily; include a vitamin C source daily) Fruit juices, canned fruits, grapefruit and orange sections (without membrane). Other fresh and dried fruits, if tolerated.     Vegetables  (Three or more servings daily) Vegetable juices, cooked vegetables (i.e., asparagus, green or wax beans, beets, carrots, peas, pumpkin, winter squash, spinach and mushrooms). Raw vegetables if tolerated.  Gas-forming vegetables (i.e., dried peas and beans, corn, broccoli, onions, cauliflower, [place] sprouts, cucumbers, cabbage, turnip, rutabagas, sauerkraut, green peppers).    Bread, cereal, potato, pasta and grains  (Six or more servings daily) Enriched breads and cereals, plain crackers, potatoes, enriched rice, barley, noodles, spaghetti, macaroni and other pastas. Very coarse cereals such as bran; seeds in or on breads, rolls and crackers; breads made with nuts or dried fruits; fried breads and pastries such as doughnuts; fried potatoes, fried rice, wild rice, seasoned rice and pasta mixes.    Dessert fats Low-fat versions of cakes, cookies, custard, pudding, ice cream, frozen yogurt sherbet; ice pops, gelatin, frozen fruit bars, sorbet. Desserts containing salad dressings, nuts, coconut; high-fat desserts.    Milk and milk products (Four or more cups daily) Fat-free and low-fat milk products. Whole milk, cream.    Beverages   (Four or more cups daily) Water, decaffeinated coffee and tea, fruit drinks, caffeine-free carbonated beverages, weak tea, lemonade, sports drinks.  All caffeine-containing beverages (i.e., coffee, strong tea, cocoa, cola); alcoholic beverages.    Condiments and sweets Iodized salt, flavorings, low-fat gravies and sauces, herbs and spices as tolerated; sugar, syrup, honey, jelly, seedless jam, hard candies and marshmallows. Strongly flavored seasonings and condiments (i.e., catsup, pepper, barbecue sauce, chili sauce, chili pepper, horseradish, garlic, mustard and vinegar), olives, pickles, nuts, chocolate candy.    For informational purposes only. Not to replace the advice of your health care provider.   Copyright   2006 [facility]. All rights reserved. [employer] 347886 - REV 09/15.      Hyperemesis Gravidarum  Hyperemesis gravidarum is a severe form of morning sickness that can affect some women during pregnancy. It may develop around the 5th week and last until the 16th week of pregnancy. In some women, it may last longer. Symptoms include severe nausea and vomiting. This can lead to problems such as as weight loss and dehydration.  It is not clear what causes hyperemesis gravidarum. It may be due to rising hormone levels early in the pregnancy. It can be a serious threat to mother and fetus, if symptoms are severe. Therefore, follow the advice below carefully. If symptoms are not controlled by home measures, a hospital stay may be needed. IV (intravenous) fluids and medicines may be given.  Home care    Diet  ? Keep a log of the foods you eat and how they affect your symptoms. Avoid foods that trigger your symptoms.  ? Eat frequent small meals throughout the day rather than three large meals. This can help keep the stomach from being empty, which can make nausea worse.  ? Choose foods that are high in carbohydrates. Eating foods high in protein may also help. Limit greasy or spicy foods.  ? Before getting out of bed in the morning, try eating crackers or dry toast. This may help settle your stomach.  ? Drink cold, clear liquids. Drinking small  amounts of liquids with electrolytes, such as sports drinks may help as well.    Medicine  ? If needed, your healthcare provider may prescribe certain medicines to help relieve nausea and vomiting. Vitamin B6 and bogdan may also be advised. Don t try any over-the-counter medicines or home remedies without talking to your provider first.  Follow-up care  Follow up with your healthcare provider, or as advised.  When to seek medical advice  Call your healthcare provider right away if any of these occur:    Signs of dehydration (dry mouth, extreme thirst, dark urine or little urine output, dizziness, weakness, or fainting)    Vomiting that won t stop    Inability to keep down liquids    Frequent diarrhea    Weight loss or no weight gain over a 2-week period    Severe constant pain in the lower right abdomen    Fever of 100.4 F (38 C) or higher, or as directed by your provider  Date Last Reviewed: 8/20/2015 2000-2017 [employer]. [address] [place] [address], [place] [address]. All rights reserved. This information is not intended as a substitute for professional medical care. Always follow your healthcare professional's instructions.

## 2018-06-09 NOTE — ED AVS SNAPSHOT
[facility] Emergency Department    [address]    [facility] [address]    Phone:  [phone]    Fax:  [phone]                                       Rizwana Plummer   MRN: [id number]    Department:  [facility] Emergency Department   Date of Visit:  6/9/2018           After Visit Summary Signature Page     I have received my discharge instructions, and my questions have been answered. I have discussed any challenges I see with this plan with the nurse or doctor.    ..........................................................................................................................................  Patient/Patient Representative Signature      ..........................................................................................................................................  Patient Representative Print Name and Relationship to Patient    ..................................................               ................................................  Date                                            Time    ..........................................................................................................................................  Reviewed by Signature/Title    ...................................................              ..............................................  Date                                                            Time

## 2018-06-12 ENCOUNTER — OFFICE VISIT (OUTPATIENT)
Dept: OBGYN | Facility: CLINIC | Age: 38
End: 2018-06-12
Payer: COMMERCIAL

## 2018-06-12 ENCOUNTER — INFUSION THERAPY VISIT (OUTPATIENT)
Dept: INFUSION THERAPY | Facility: CLINIC | Age: 38
End: 2018-06-12
Attending: FAMILY MEDICINE
Payer: COMMERCIAL

## 2018-06-12 VITALS — SYSTOLIC BLOOD PRESSURE: 114 MMHG | WEIGHT: 126 LBS | DIASTOLIC BLOOD PRESSURE: 60 MMHG | BODY MASS INDEX: 25.45 KG/M2

## 2018-06-12 VITALS
RESPIRATION RATE: 18 BRPM | WEIGHT: 127.6 LBS | BODY MASS INDEX: 25.77 KG/M2 | SYSTOLIC BLOOD PRESSURE: 101 MMHG | DIASTOLIC BLOOD PRESSURE: 65 MMHG | TEMPERATURE: 99.5 F | OXYGEN SATURATION: 100 %

## 2018-06-12 DIAGNOSIS — O21.0 HYPEREMESIS GRAVIDARUM: Primary | ICD-10-CM

## 2018-06-12 PROCEDURE — 99214 OFFICE O/P EST MOD 30 MIN: CPT | Performed by: OBSTETRICS & GYNECOLOGY

## 2018-06-12 PROCEDURE — 96361 HYDRATE IV INFUSION ADD-ON: CPT

## 2018-06-12 PROCEDURE — 25000128 H RX IP 250 OP 636: Performed by: OBSTETRICS & GYNECOLOGY

## 2018-06-12 PROCEDURE — 96374 THER/PROPH/DIAG INJ IV PUSH: CPT

## 2018-06-12 RX ORDER — METOCLOPRAMIDE 5 MG/1
5 TABLET ORAL 4 TIMES DAILY PRN
Qty: 120 TABLET | Refills: 1 | Status: SHIPPED | OUTPATIENT
Start: 2018-06-12 | End: 2018-12-26

## 2018-06-12 RX ORDER — PYRIDOXINE HCL (VITAMIN B6) 50 MG
50 TABLET ORAL 3 TIMES DAILY
Qty: 90 TABLET | Refills: 1 | Status: SHIPPED | OUTPATIENT
Start: 2018-06-12 | End: 2019-01-21

## 2018-06-12 RX ORDER — DEXTROSE, SODIUM CHLORIDE, SODIUM LACTATE, POTASSIUM CHLORIDE, AND CALCIUM CHLORIDE 5; .6; .31; .03; .02 G/100ML; G/100ML; G/100ML; G/100ML; G/100ML
1000 INJECTION, SOLUTION INTRAVENOUS ONCE
Status: COMPLETED | OUTPATIENT
Start: 2018-06-12 | End: 2018-06-12

## 2018-06-12 RX ORDER — ONDANSETRON 4 MG/1
4 TABLET, ORALLY DISINTEGRATING ORAL EVERY 8 HOURS PRN
Qty: 30 TABLET | Refills: 3 | Status: ON HOLD | OUTPATIENT
Start: 2018-06-12 | End: 2019-01-26

## 2018-06-12 RX ORDER — DEXTROSE, SODIUM CHLORIDE, SODIUM LACTATE, POTASSIUM CHLORIDE, AND CALCIUM CHLORIDE 5; .6; .31; .03; .02 G/100ML; G/100ML; G/100ML; G/100ML; G/100ML
1000 INJECTION, SOLUTION INTRAVENOUS ONCE
Status: CANCELLED | OUTPATIENT
Start: 2018-06-12 | End: 2018-06-12

## 2018-06-12 RX ORDER — ONDANSETRON 2 MG/ML
4 INJECTION INTRAMUSCULAR; INTRAVENOUS ONCE
Status: CANCELLED | OUTPATIENT
Start: 2018-06-12 | End: 2018-06-12

## 2018-06-12 RX ORDER — ONDANSETRON 2 MG/ML
4 INJECTION INTRAMUSCULAR; INTRAVENOUS ONCE
Status: COMPLETED | OUTPATIENT
Start: 2018-06-12 | End: 2018-06-12

## 2018-06-12 RX ADMIN — SODIUM CHLORIDE, SODIUM LACTATE, POTASSIUM CHLORIDE, CALCIUM CHLORIDE AND DEXTROSE MONOHYDRATE 1000 ML: 5; 600; 310; 30; 20 INJECTION, SOLUTION INTRAVENOUS at 15:02

## 2018-06-12 RX ADMIN — ONDANSETRON 4 MG: 2 INJECTION INTRAMUSCULAR; INTRAVENOUS at 13:54

## 2018-06-12 RX ADMIN — SODIUM CHLORIDE, POTASSIUM CHLORIDE, SODIUM LACTATE AND CALCIUM CHLORIDE 1000 ML: 600; 310; 30; 20 INJECTION, SOLUTION INTRAVENOUS at 13:50

## 2018-06-12 ASSESSMENT — PAIN SCALES - GENERAL: PAINLEVEL: NO PAIN (0)

## 2018-06-12 NOTE — PROGRESS NOTES
Infusion Nursing Note:  Rizwana Plummer presents today for IV hydration and Zofran.    Patient seen by provider today: No   present during visit today: Not Applicable.    Note: Has c/o nausea and emesis.  Appetite poor.    Intravenous Access:  Peripheral IV placed.    Treatment Conditions:  Not Applicable.      Post Infusion Assessment:  Patient tolerated infusion without incident.  No evidence of extravasations.  Access discontinued per protocol.    Discharge Plan:   Discharge instructions reviewed with: Patient.  Patient discharged in stable condition accompanied by: self.  Departure Mode: Ambulatory.    GALE NINA RN

## 2018-06-12 NOTE — NURSING NOTE
"Chief Complaint   Patient presents with     Prenatal Care     Prenatal pt @ 6 4/7wks. Zofran from ED not helpful any longer.       Initial /60  Wt 126 lb (57.2 kg)  LMP 2018 (Exact Date)  Breastfeeding? No  BMI 25.45 kg/m2 Estimated body mass index is 25.45 kg/(m^2) as calculated from the following:    Height as of 17: 4' 11\" (1.499 m).    Weight as of this encounter: 126 lb (57.2 kg).  BP completed using cuff size: regular        Christal Sanchez LPN                "

## 2018-06-12 NOTE — PROGRESS NOTES
SUBJECTIVE:                                                     Rizwana Plummer is a 37 year old female  who presents to clinic today for hyperemesis. She has had several visits to the ED for IV fluids, which was also the case in her first pregnancy. She did not need any infusion therapy in her last pregnancy. She tried Zofran, which helped for about a week but then didn't work as well. She still takes it tid. She tried vitamin B6 and unisom, but because the unisom made her tired, she stopped taking it. Has not tried anything else.    Most liquids are hard to keep down/don't taste good to her. She has tried water, could do Gatorade for a while. [place] foods. Eats small frequent amounts. Wondering about setting up recurrent infusion therapy.      Problem list and histories reviewed & adjusted, as indicated.  Additional history: as documented.    Patient Active Problem List   Diagnosis     Female genital infibulation     Epidermal inclusion cyst of infibulation scar     Influenza A     Dehydration     Hyperemesis gravidarum     Past Surgical History:   Procedure Laterality Date      SECTION  2014    Procedure:  SECTION;   Primary  section         SECTION N/A 2017    Procedure:  SECTION;  Surgeon: Cindy Laurent DO;  Location: [facility] OR     Infibulation       MARSUPIALIZATION BARTHOLIN CYST  2013    Procedure: MARSUPIALIZATION BARTHOLIN CYST;  Removal of Inclusion Cyst.   Fetal heart tones 180's;  Surgeon: Rohit Parks MD;  Location: [facility] OR      Social History   Substance Use Topics     Smoking status: Never Smoker     Smokeless tobacco: Never Used     Alcohol use No      Problem (# of Occurrences) Relation (Name,Age of Onset)    CEREBROVASCULAR DISEASE (1) Mother: secondary to severe preeclampsia with last pregnancy    DIABETES (1) Mother: after stroke    Hypertension (2) Mother: history of severe preeclampsia/stroke, Father              Current  Outpatient Prescriptions on File Prior to Visit:  cholecalciferol (VITAMIN D3) 1000 UNIT tablet Take 1 tablet (1,000 Units) by mouth daily   Prenatal Vit-Fe Fumarate-FA (PRENATAL MULTIVITAMIN PLUS IRON) 27-0.8 MG TABS per tablet Take 1 tablet by mouth daily   Acetaminophen (TYLENOL PO) Take 325 mg by mouth     No current facility-administered medications on file prior to visit.   No Known Allergies    ROS:  Negative except as noted above.    OBJECTIVE:     Exam:  Constitutional:  Appearance: Well nourished, well developed alert, in no acute distress  Chest:  Respiratory Effort:  Breathing unlabored  Cardiovascular: no edema.  Neurologic/Psychiatric:  Mental Status:  Oriented X3       In-Clinic Test Results:  No results found for this or any previous visit (from the past 24 hour(s)).    ASSESSMENT/PLAN:                                                        ICD-10-CM    1. Hyperemesis gravidarum O21.0 ondansetron (ZOFRAN ODT) 4 MG ODT tab     metoclopramide (REGLAN) 5 MG tablet     pyridOXINE ([facility]S VITAMIN B-6) 50 MG tablet       We discussed the following:   Start by eating a little something before getting out of bed in the morning and continue with snacks every 2 hours throughout the day. Be sure to drink plenty of water. It is helpful to take small sips of water or another beverage, about a tablespoon at a time, every 10-15 minutes rather than drinking a lot at one time.You may use pyridoxine (vitamin B6), 25 mg by mouth every 6-8 hours as needed with 1/2 of unisom 25 mg tablet taken once or twice daily. These items may be purchased over the counter. The unisom can make you sleepy, so start with 1/2 tab at night and add a morning dose if needed.    Will plan:    1. Infusion of 2 L LR with 4 mg Zofran times one today or tomorrow.  2. Start reglan 5 mg po up to qid, start with this scheduled qid and then can move to as needed..  3. Continue Zofran 4 mg po tid. Scheduled.  4. Continue vit B6 and add back unisom 12.5  "mg up to tid as needed, planning mostly to use 12.5-25 mg at night as this makes her sleepy.    Follow up with myself or Dr. Bustillo in 1 week for recheck. Discussed that dehydration significantly increases symptoms and makes this harder to treat at home. The goal is to get her out of the first trimester with minimal or no need for outpatient infusions and no ED visits, to the extent that is possible. We discussed that this will require diligence in drinking fluids at home on her part, even when she does not feel like drinking and is not \"thirsty.\" She is willing to try these measures.      Ruthy Banda MD  [facility]      "

## 2018-06-12 NOTE — MR AVS SNAPSHOT
After Visit Summary   6/12/2018    Rizwana Plummer    MRN: [id number]           Patient Information     Date Of Birth          1980        Visit Information        Provider Department      6/12/2018 10:30 AM Ruthy Banda MD [facility]        Today's Diagnoses     Hyperemesis gravidarum    -  1       Follow-ups after your visit        Your next 10 appointments already scheduled     Jun 19, 2018 11:15 AM CDT   Ultrasound with RIUS1   [facility] [address] ([facility])    [address]  [address]  [facility] [address] [phone]            Jul 17, 2018  1:00 PM CDT   New Prenatal with Ammy Bustillo MD   [facility] ([facility])    [address] [place] [address] [phone]              Who to contact     If you have questions or need follow up information about today's clinic visit or your schedule please contact [facility] directly at [phone].  Normal or non-critical lab and imaging results will be communicated to you by [employer], letter or phone within 4 business days after the clinic has received the results. If you do not hear from us within 7 days, please contact the clinic through [employer] or phone. If you have a critical or abnormal lab result, we will notify you by phone as soon as possible.  Submit refill requests through [employer] or call your pharmacy and they will forward the refill request to us. Please allow 3 business days for your refill to be completed.          Additional Information About Your Visit        [employer] Information     [employer] gives you secure access to your electronic health record. If you see a primary care provider, you can also send messages to your care team and make appointments. If you have questions, please call your primary care clinic.  If you do not have a primary care provider, please call [phone] and they will assist  you.        Care EveryWhere ID     This is your Care EveryWhere ID. This could be used by other organizations to access your [facility] medical records  [id number]        Your Vitals Were     Last Period Breastfeeding? BMI (Body Mass Index)             04/26/2018 (Exact Date) No 25.45 kg/m2          Blood Pressure from Last 3 Encounters:   06/12/18 101/65   06/12/18 114/60   06/09/18 (!) 87/46    Weight from Last 3 Encounters:   06/12/18 127 lb 9.6 oz (57.9 kg)   06/12/18 126 lb (57.2 kg)   06/09/18 125 lb 10.6 oz (57 kg)              Today, you had the following     No orders found for display         Today's Medication Changes          These changes are accurate as of 6/12/18  4:21 PM.  If you have any questions, ask your nurse or doctor.               Start taking these medicines.        Dose/Directions    metoclopramide 5 MG tablet   Commonly known as:  REGLAN   Used for:  Hyperemesis gravidarum   Started by:  Ruthy Banda MD        Dose:  5 mg   Take 1 tablet (5 mg) by mouth 4 times daily as needed   Quantity:  120 tablet   Refills:  1       pyridOXINE 50 MG tablet   Commonly known as:  CVS VITAMIN B-6   Used for:  Hyperemesis gravidarum   Started by:  Ruthy Banda MD        Dose:  50 mg   Take 1 tablet (50 mg) by mouth 3 times daily   Quantity:  90 tablet   Refills:  1         These medicines have changed or have updated prescriptions.        Dose/Directions    * ZOFRAN ODT PO   This may have changed:  Another medication with the same name was added. Make sure you understand how and when to take each.   Changed by:  Ruthy Banda MD        Dose:  1 tablet   Take 1 tablet by mouth every 6 hours as needed for nausea   Refills:  0       * ondansetron 4 MG ODT tab   Commonly known as:  ZOFRAN ODT   This may have changed:  You were already taking a medication with the same name, and this prescription was added. Make sure you understand how and when to take each.   Used for:  Hyperemesis gravidarum    Changed by:  Ruthy Banda MD        Dose:  4 mg   Take 1 tablet (4 mg) by mouth every 8 hours as needed   Quantity:  30 tablet   Refills:  3       * Notice:  This list has 2 medication(s) that are the same as other medications prescribed for you. Read the directions carefully, and ask your doctor or other care provider to review them with you.         Where to get your medicines      These medications were sent to [facility] [address] [address] [place] [address]  [address] [facility] [address], [facility] [address]     Phone:  [phone]     metoclopramide 5 MG tablet    ondansetron 4 MG ODT tab    pyridOXINE 50 MG tablet                Primary Care Provider Office Phone # Fax #    Cindy Bajwa Mehran,  [phone]       [address] [facility] [address]        Equal Access to Services     [facility] Hospital[facility]ELISSA AH: Hadii aad ku hadasho Soomaali, waaxda luqadaha, qaybta kaalmada adeegyada, waxay idiin hayaan shahzad zayas . So [facility] [phone].    ATENCIÓN: Si habla español, tiene a pink disposición servicios gratuitos de asistencia lingüística. Mellisa al [phone].    We comply with applicable federal civil rights laws and [place] laws. We do not discriminate on the basis of race, color, national origin, age, disability, sex, sexual orientation, or gender identity.            Thank you!     Thank you for choosing [facility]  for your care. Our goal is always to provide you with excellent care. Hearing back from our patients is one way we can continue to improve our services. Please take a few minutes to complete the written survey that you may receive in the mail after your visit with us. Thank you!             Your Updated Medication List - Protect others around you: Learn how to safely use, store and throw away your medicines at www.disposemymeds.org.          This list is accurate as of 6/12/18  4:21 PM.  Always use your  most recent med list.                   Brand Name Dispense Instructions for use Diagnosis    cholecalciferol 1000 UNIT tablet    vitamin D3    90 tablet    Take 1 tablet (1,000 Units) by mouth daily    Vitamin D deficiency       doxylamine 25 MG Tabs tablet    UNISOM     Take 25 mg by mouth At Bedtime        metoclopramide 5 MG tablet    REGLAN    120 tablet    Take 1 tablet (5 mg) by mouth 4 times daily as needed    Hyperemesis gravidarum       prenatal multivitamin plus iron 27-0.8 MG Tabs per tablet     100 tablet    Take 1 tablet by mouth daily        pyridOXINE 50 MG tablet    CVS VITAMIN B-6    90 tablet    Take 1 tablet (50 mg) by mouth 3 times daily    Hyperemesis gravidarum       TYLENOL PO      Take 325 mg by mouth        * ZOFRAN ODT PO      Take 1 tablet by mouth every 6 hours as needed for nausea        * ondansetron 4 MG ODT tab    ZOFRAN ODT    30 tablet    Take 1 tablet (4 mg) by mouth every 8 hours as needed    Hyperemesis gravidarum       * Notice:  This list has 2 medication(s) that are the same as other medications prescribed for you. Read the directions carefully, and ask your doctor or other care provider to review them with you.

## 2018-06-12 NOTE — MR AVS SNAPSHOT
After Visit Summary   6/12/2018    Rizwana Plummer    MRN: [id number]           Patient Information     Date Of Birth          1980        Visit Information        Provider Department      6/12/2018 1:30 PM [facility] 1  Infusion Services        Today's Diagnoses     Hyperemesis gravidarum    -  1       Follow-ups after your visit        Your next 10 appointments already scheduled     Jun 19, 2018 11:15 AM CDT   Ultrasound with RIUS1   [facility] ([facility])    [address]  [address]  [facility] [address] [phone]            Jul 17, 2018  1:00 PM CDT   New Prenatal with Ammy Bustillo MD   [facility] ([facility])    [address] [place] [address] [phone]              Who to contact     If you have questions or need follow up information about today's clinic visit or your schedule please contact  INFUSION SERVICES directly at [phone].  Normal or non-critical lab and imaging results will be communicated to you by [employer], letter or phone within 4 business days after the clinic has received the results. If you do not hear from us within 7 days, please contact the clinic through [employer] or phone. If you have a critical or abnormal lab result, we will notify you by phone as soon as possible.  Submit refill requests through [employer] or call your pharmacy and they will forward the refill request to us. Please allow 3 business days for your refill to be completed.          Additional Information About Your Visit        MyChart Information     [employer] gives you secure access to your electronic health record. If you see a primary care provider, you can also send messages to your care team and make appointments. If you have questions, please call your primary care clinic.  If you do not have a primary care provider, please  call [phone] and they will assist you.        Care EveryWhere ID     This is your Care EveryWhere ID. This could be used by other organizations to access your [facility] medical records  [id number]        Your Vitals Were     Temperature Respirations Last Period Pulse Oximetry BMI (Body Mass Index)       99.5  F (37.5  C) (Tympanic) 18 04/26/2018 (Exact Date) 100% 25.77 kg/m2        Blood Pressure from Last 3 Encounters:   06/12/18 101/65   06/12/18 114/60   06/09/18 (!) 87/46    Weight from Last 3 Encounters:   06/12/18 57.9 kg (127 lb 9.6 oz)   06/12/18 57.2 kg (126 lb)   06/09/18 57 kg (125 lb 10.6 oz)              Today, you had the following     No orders found for display         Today's Medication Changes          These changes are accurate as of 6/12/18  4:06 PM.  If you have any questions, ask your nurse or doctor.               Start taking these medicines.        Dose/Directions    metoclopramide 5 MG tablet   Commonly known as:  REGLAN   Used for:  Hyperemesis gravidarum   Started by:  Ruthy Banda MD        Dose:  5 mg   Take 1 tablet (5 mg) by mouth 4 times daily as needed   Quantity:  120 tablet   Refills:  1       pyridOXINE 50 MG tablet   Commonly known as:  CVS VITAMIN B-6   Used for:  Hyperemesis gravidarum   Started by:  Ruthy Banda MD        Dose:  50 mg   Take 1 tablet (50 mg) by mouth 3 times daily   Quantity:  90 tablet   Refills:  1         These medicines have changed or have updated prescriptions.        Dose/Directions    * ZOFRAN ODT PO   This may have changed:  Another medication with the same name was added. Make sure you understand how and when to take each.   Changed by:  Ruthy Banda MD        Dose:  1 tablet   Take 1 tablet by mouth every 6 hours as needed for nausea   Refills:  0       * ondansetron 4 MG ODT tab   Commonly known as:  ZOFRAN ODT   This may have changed:  You were already taking a medication with the same name, and this prescription was added.  Make sure you understand how and when to take each.   Used for:  Hyperemesis gravidarum   Changed by:  Ruthy Banda MD        Dose:  4 mg   Take 1 tablet (4 mg) by mouth every 8 hours as needed   Quantity:  30 tablet   Refills:  3       * Notice:  This list has 2 medication(s) that are the same as other medications prescribed for you. Read the directions carefully, and ask your doctor or other care provider to review them with you.         Where to get your medicines      These medications were sent to [facility] [address] [place] [address]  [address] [facility] [address], [facility] [address]     Phone:  [phone]     metoclopramide 5 MG tablet    ondansetron 4 MG ODT tab    pyridOXINE 50 MG tablet                Primary Care Provider Office Phone # Fax #    Cindy Bajwa [place],  [phone]       [address] [facility] [address]        Equal Access to Services     JORGE HINES AH: Hadii aad ku hadasho Soomaali, waaxda luqadaha, qaybta kaalmada adeegyada, waxay idiin hayaan adeeg khekaterina zayas . So [facility] [phone].    ATENCIÓN: Si habla español, tiene a pink disposición servicios gratuitos de asistencia lingüística. [facility] [phone].    We comply with applicable federal civil rights laws and [place] laws. We do not discriminate on the basis of race, color, national origin, age, disability, sex, sexual orientation, or gender identity.            Thank you!     Thank you for choosing [facility]  for your care. Our goal is always to provide you with excellent care. Hearing back from our patients is one way we can continue to improve our services. Please take a few minutes to complete the written survey that you may receive in the mail after your visit with us. Thank you!             Your Updated Medication List - Protect others around you: Learn how to safely use, store and throw away your  medicines at www.disposemymeds.org.          This list is accurate as of 6/12/18  4:06 PM.  Always use your most recent med list.                   Brand Name Dispense Instructions for use Diagnosis    cholecalciferol 1000 UNIT tablet    vitamin D3    90 tablet    Take 1 tablet (1,000 Units) by mouth daily    Vitamin D deficiency       doxylamine 25 MG Tabs tablet    UNISOM     Take 25 mg by mouth At Bedtime        metoclopramide 5 MG tablet    REGLAN    120 tablet    Take 1 tablet (5 mg) by mouth 4 times daily as needed    Hyperemesis gravidarum       prenatal multivitamin plus iron 27-0.8 MG Tabs per tablet     100 tablet    Take 1 tablet by mouth daily        pyridOXINE 50 MG tablet    CVS VITAMIN B-6    90 tablet    Take 1 tablet (50 mg) by mouth 3 times daily    Hyperemesis gravidarum       TYLENOL PO      Take 325 mg by mouth        * ZOFRAN ODT PO      Take 1 tablet by mouth every 6 hours as needed for nausea        * ondansetron 4 MG ODT tab    ZOFRAN ODT    30 tablet    Take 1 tablet (4 mg) by mouth every 8 hours as needed    Hyperemesis gravidarum       * Notice:  This list has 2 medication(s) that are the same as other medications prescribed for you. Read the directions carefully, and ask your doctor or other care provider to review them with you.

## 2018-06-18 ENCOUNTER — HOSPITAL ENCOUNTER (EMERGENCY)
Facility: CLINIC | Age: 38
Discharge: HOME | End: 2018-06-18
Attending: EMERGENCY MEDICINE | Admitting: EMERGENCY MEDICINE
Payer: COMMERCIAL

## 2018-06-18 VITALS
BODY MASS INDEX: 25.04 KG/M2 | TEMPERATURE: 99.8 F | HEART RATE: 74 BPM | SYSTOLIC BLOOD PRESSURE: 85 MMHG | RESPIRATION RATE: 16 BRPM | OXYGEN SATURATION: 100 % | DIASTOLIC BLOOD PRESSURE: 49 MMHG | WEIGHT: 124 LBS

## 2018-06-18 DIAGNOSIS — O21.0 HYPEREMESIS GRAVIDARUM: ICD-10-CM

## 2018-06-18 LAB
ANION GAP SERPL CALCULATED.3IONS-SCNC: 7 MMOL/L (ref 3–14)
B-HCG SERPL-ACNC: ABNORMAL IU/L (ref 0–5)
BASOPHILS # BLD AUTO: 0 10E9/L (ref 0–0.2)
BASOPHILS NFR BLD AUTO: 0.4 %
BUN SERPL-MCNC: 4 MG/DL (ref 7–30)
CALCIUM SERPL-MCNC: 8.9 MG/DL (ref 8.5–10.1)
CHLORIDE SERPL-SCNC: 103 MMOL/L (ref 94–109)
CO2 SERPL-SCNC: 23 MMOL/L (ref 20–32)
CREAT SERPL-MCNC: 0.56 MG/DL (ref 0.52–1.04)
DIFFERENTIAL METHOD BLD: ABNORMAL
EOSINOPHIL # BLD AUTO: 0.1 10E9/L (ref 0–0.7)
EOSINOPHIL NFR BLD AUTO: 1.8 %
ERYTHROCYTE [DISTWIDTH] IN BLOOD BY AUTOMATED COUNT: 14.2 % (ref 10–15)
GFR SERPL CREATININE-BSD FRML MDRD: >90 ML/MIN/1.7M2
GLUCOSE SERPL-MCNC: 78 MG/DL (ref 70–99)
HCT VFR BLD AUTO: 39 % (ref 35–47)
HGB BLD-MCNC: 12 G/DL (ref 11.7–15.7)
IMM GRANULOCYTES # BLD: 0 10E9/L (ref 0–0.4)
IMM GRANULOCYTES NFR BLD: 0.4 %
LYMPHOCYTES # BLD AUTO: 1.6 10E9/L (ref 0.8–5.3)
LYMPHOCYTES NFR BLD AUTO: 27.5 %
MCH RBC QN AUTO: 25.8 PG (ref 26.5–33)
MCHC RBC AUTO-ENTMCNC: 30.8 G/DL (ref 31.5–36.5)
MCV RBC AUTO: 84 FL (ref 78–100)
MONOCYTES # BLD AUTO: 0.5 10E9/L (ref 0–1.3)
MONOCYTES NFR BLD AUTO: 8.5 %
NEUTROPHILS # BLD AUTO: 3.5 10E9/L (ref 1.6–8.3)
NEUTROPHILS NFR BLD AUTO: 61.4 %
NRBC # BLD AUTO: 0 10*3/UL
NRBC BLD AUTO-RTO: 0 /100
PLATELET # BLD AUTO: 290 10E9/L (ref 150–450)
POTASSIUM SERPL-SCNC: 4.1 MMOL/L (ref 3.4–5.3)
RBC # BLD AUTO: 4.66 10E12/L (ref 3.8–5.2)
SODIUM SERPL-SCNC: 133 MMOL/L (ref 133–144)
WBC # BLD AUTO: 5.6 10E9/L (ref 4–11)

## 2018-06-18 PROCEDURE — 76815 OB US LIMITED FETUS(S): CPT

## 2018-06-18 PROCEDURE — 96374 THER/PROPH/DIAG INJ IV PUSH: CPT

## 2018-06-18 PROCEDURE — 99284 EMERGENCY DEPT VISIT MOD MDM: CPT | Mod: 25

## 2018-06-18 PROCEDURE — 85025 COMPLETE CBC W/AUTO DIFF WBC: CPT | Performed by: EMERGENCY MEDICINE

## 2018-06-18 PROCEDURE — 80048 BASIC METABOLIC PNL TOTAL CA: CPT | Performed by: EMERGENCY MEDICINE

## 2018-06-18 PROCEDURE — 84702 CHORIONIC GONADOTROPIN TEST: CPT | Performed by: EMERGENCY MEDICINE

## 2018-06-18 PROCEDURE — 25000128 H RX IP 250 OP 636: Performed by: EMERGENCY MEDICINE

## 2018-06-18 PROCEDURE — 96361 HYDRATE IV INFUSION ADD-ON: CPT

## 2018-06-18 RX ORDER — ONDANSETRON 2 MG/ML
4 INJECTION INTRAMUSCULAR; INTRAVENOUS ONCE
Status: COMPLETED | OUTPATIENT
Start: 2018-06-18 | End: 2018-06-18

## 2018-06-18 RX ADMIN — ONDANSETRON 4 MG: 2 INJECTION INTRAMUSCULAR; INTRAVENOUS at 14:12

## 2018-06-18 RX ADMIN — SODIUM CHLORIDE 1000 ML: 9 INJECTION, SOLUTION INTRAVENOUS at 15:50

## 2018-06-18 RX ADMIN — SODIUM CHLORIDE 1000 ML: 9 INJECTION, SOLUTION INTRAVENOUS at 14:12

## 2018-06-18 ASSESSMENT — ENCOUNTER SYMPTOMS
VOMITING: 1
FEVER: 0
ABDOMINAL PAIN: 0
NAUSEA: 1

## 2018-06-18 NOTE — ED NOTES
Patient discharged to home. Patient received follow-up information with OB/GYN. Patient received discharge instructions and has no other questions at this time.

## 2018-06-18 NOTE — DISCHARGE INSTRUCTIONS
Severe Morning Sickness (Hyperemesis Gravidarum)    Nausea and vomiting are common during pregnancy. It is often called  morning sickness,  but it can happen at any time of day. But severe nausea and vomiting that doesn t let up is not normal. Dehydration and weight loss can result. This can be dangerous for the mother and baby. Hyperemesis gravidarum (HEG) is the medical term for severe nausea and vomiting during pregnancy, and it results in weight loss. If you have it, your healthcare provider can take steps to keep you and your baby safe. He or she can also help you find relief.   What are the symptoms of severe morning sickness?  Call your healthcare provider right away if you suspect that you have severe morning sickness. The symptoms include:    Inability to keep down liquids    Nausea that is severe and lasts beyond the first few months    Inability to empty the bladder     Urine that is dark and concentrated     Fainting spells  What causes severe morning sickness?  Nausea and vomiting during pregnancy are thought to be caused by an increase in certain hormone levels. It is not clear what causes severe morning sickness, but it may be more likely in women carrying twins or more. Your healthcare provider will do some tests to rule out certain health conditions that may lead to severe nausea and vomiting.  Getting relief from morning sickness  To help combat nausea, eat small amounts often. This helps prevent the stomach from being empty, which can make nausea worse. Choose dry foods such as crackers. Try sipping cold, clear drinks. And ask your healthcare provider about taking vitamin B-6 or bogdan to help ease nausea. Your provider may recommend that you try vitamin B-6 and a medicine called doxylamine to relieve the nausea. In some cases, alternative treatments such as acupuncture are effective in helping managing nausea during pregnancy.  Treating severe morning sickness  The focus of treatment for severe  morning sickness is to relieve symptoms and prevent weight loss and dehydration. If you are dehydrated or losing weight, steps are needed to protect you and your baby. You will most likely be admitted to the hospital for at least a short time. There, you can be given IV fluids to rehydrate you. You may also be prescribed medicines that relieve nausea. In very severe cases, a longer hospitalization may be needed. IV nutrition or tube feeding will then be used. If this becomes necessary, your healthcare provider can tell you more.  Recovery and follow-up  With treatment, severe morning sickness can be managed. Follow up with your healthcare provider to be sure you are keeping down fluids and gaining a healthy amount of weight.  When to seek medical care  Contact your healthcare provider right away if you have any of the following:    Signs of dehydration, including extreme thirst, headache, little urine, very dark urine, or a dry, sticky mouth.    Weight loss    Dizziness or fainting    Racing or pounding heart    Blood in your vomit   Date Last Reviewed: 5/1/2016 2000-2017 [employer]. [address] [place] [address], [place] [address]. All rights reserved. This information is not intended as a substitute for professional medical care. Always follow your healthcare professional's instructions.

## 2018-06-18 NOTE — ED AVS SNAPSHOT
[facility] Emergency Department    [address]    [facility] [address]    Phone:  [phone]    Fax:  [phone]                                       Rizwana Plummer   MRN: [id number]    Department:  [facility] Emergency Department   Date of Visit:  6/18/2018           Patient Information     Date Of Birth          1980        Your diagnoses for this visit were:     Hyperemesis gravidarum        You were seen by Kevin Thompson MD.      Follow-up Information     Follow up with Ruthy Banda MD.    Specialty:  OB/Gyn    Contact information:    [address]  [facility] [address]  [phone]          Follow up with [facility] Emergency Department.    Specialty:  EMERGENCY MEDICINE    Why:  As needed, If symptoms worsen    Contact information:    [address]  [facility] [address]  [phone]        Discharge Instructions         Severe Morning Sickness (Hyperemesis Gravidarum)    Nausea and vomiting are common during pregnancy. It is often called  morning sickness,  but it can happen at any time of day. But severe nausea and vomiting that doesn t let up is not normal. Dehydration and weight loss can result. This can be dangerous for the mother and baby. Hyperemesis gravidarum (HEG) is the medical term for severe nausea and vomiting during pregnancy, and it results in weight loss. If you have it, your healthcare provider can take steps to keep you and your baby safe. He or she can also help you find relief.   What are the symptoms of severe morning sickness?  Call your healthcare provider right away if you suspect that you have severe morning sickness. The symptoms include:    Inability to keep down liquids    Nausea that is severe and lasts beyond the first few months    Inability to empty the bladder     Urine that is dark and concentrated     Fainting spells  What causes severe morning sickness?  Nausea and vomiting  during pregnancy are thought to be caused by an increase in certain hormone levels. It is not clear what causes severe morning sickness, but it may be more likely in women carrying twins or more. Your healthcare provider will do some tests to rule out certain health conditions that may lead to severe nausea and vomiting.  Getting relief from morning sickness  To help combat nausea, eat small amounts often. This helps prevent the stomach from being empty, which can make nausea worse. Choose dry foods such as crackers. Try sipping cold, clear drinks. And ask your healthcare provider about taking vitamin B-6 or bogdan to help ease nausea. Your provider may recommend that you try vitamin B-6 and a medicine called doxylamine to relieve the nausea. In some cases, alternative treatments such as acupuncture are effective in helping managing nausea during pregnancy.  Treating severe morning sickness  The focus of treatment for severe morning sickness is to relieve symptoms and prevent weight loss and dehydration. If you are dehydrated or losing weight, steps are needed to protect you and your baby. You will most likely be admitted to the hospital for at least a short time. There, you can be given IV fluids to rehydrate you. You may also be prescribed medicines that relieve nausea. In very severe cases, a longer hospitalization may be needed. IV nutrition or tube feeding will then be used. If this becomes necessary, your healthcare provider can tell you more.  Recovery and follow-up  With treatment, severe morning sickness can be managed. Follow up with your healthcare provider to be sure you are keeping down fluids and gaining a healthy amount of weight.  When to seek medical care  Contact your healthcare provider right away if you have any of the following:    Signs of dehydration, including extreme thirst, headache, little urine, very dark urine, or a dry, sticky mouth.    Weight loss    Dizziness or fainting    Racing or  pounding heart    Blood in your vomit   Date Last Reviewed: 5/1/2016 2000-2017 [employer]. [address] [place] [address], [place] [address]. All rights reserved. This information is not intended as a substitute for professional medical care. Always follow your healthcare professional's instructions.          Your next 10 appointments already scheduled     Jun 19, 2018  9:00 AM CDT   SHORT with Cindy Laurent DO   [facility]    [address] [place] [address] [phone]            Jun 19, 2018 11:15 AM CDT   Ultrasound with RIUS1   [facility] ([facility]    [address]  [address]  [facility] [address] [phone]            Jul 17, 2018  1:00 PM CDT   New Prenatal with Ammy Bustillo MD   [facility] ([facility])    [address] [place] [address] [phone]              24 Hour Appointment Hotline       To make an appointment at any [facility], call [phone] ([phone]). If you don't have a family doctor or clinic, we will help you find one. [facility] are conveniently located to serve the needs of you and your family.             Review of your medicines      Our records show that you are taking the medicines listed below. If these are incorrect, please call your family doctor or clinic.        Dose / Directions Last dose taken    cholecalciferol 1000 UNIT tablet   Commonly known as:  vitamin D3   Dose:  1000 Units   Quantity:  90 tablet        Take 1 tablet (1,000 Units) by mouth daily   Refills:  0        doxylamine 25 MG Tabs tablet   Commonly known as:  UNISOM   Dose:  25 mg        Take 25 mg by mouth At Bedtime   Refills:  0        metoclopramide 5 MG tablet   Commonly known as:  REGLAN   Dose:  5 mg   Quantity:  120 tablet        Take 1 tablet (5 mg) by mouth 4 times daily as needed   Refills:   1        prenatal multivitamin plus iron 27-0.8 MG Tabs per tablet   Dose:  1 tablet   Quantity:  100 tablet        Take 1 tablet by mouth daily   Refills:  3        pyridOXINE 50 MG tablet   Commonly known as:  CVS VITAMIN B-6   Dose:  50 mg   Quantity:  90 tablet        Take 1 tablet (50 mg) by mouth 3 times daily   Refills:  1        TYLENOL PO   Dose:  325 mg        Take 325 mg by mouth   Refills:  0        * ZOFRAN ODT PO   Dose:  1 tablet        Take 1 tablet by mouth every 6 hours as needed for nausea   Refills:  0        * ondansetron 4 MG ODT tab   Commonly known as:  ZOFRAN ODT   Dose:  4 mg   Quantity:  30 tablet        Take 1 tablet (4 mg) by mouth every 8 hours as needed   Refills:  3        * Notice:  This list has 2 medication(s) that are the same as other medications prescribed for you. Read the directions carefully, and ask your doctor or other care provider to review them with you.            Procedures and tests performed during your visit     Basic metabolic panel    CBC with platelets differential    HCG QUANTitative pregnancy (blood)    Peripheral IV catheter      Orders Needing Specimen Collection     None      Pending Results     No orders found from 6/16/2018 to 6/19/2018.            Pending Culture Results     No orders found from 6/16/2018 to 6/19/2018.            Pending Results Instructions     If you had any lab results that were not finalized at the time of your Discharge, you can call the ED Lab Result RN at [phone]. You will be contacted by this team for any positive Lab results or changes in treatment. The nurses are available 7 days a week from 10A to 6:30P.  You can leave a message 24 hours per day and they will return your call.        Test Results From Your Hospital Stay        6/18/2018  2:12 PM      Component Results     Component Value Ref Range & Units Status    WBC 5.6 4.0 - 11.0 10e9/L Final    RBC Count 4.66 3.8 - 5.2 10e12/L Final    Hemoglobin 12.0 11.7 - 15.7  g/dL Final    Hematocrit 39.0 35.0 - 47.0 % Final    MCV 84 78 - 100 fl Final    MCH 25.8 (L) 26.5 - 33.0 pg Final    MCHC 30.8 (L) 31.5 - 36.5 g/dL Final    RDW 14.2 10.0 - 15.0 % Final    Platelet Count 290 150 - 450 10e9/L Final    Diff Method Automated Method  Final    % Neutrophils 61.4 % Final    % Lymphocytes 27.5 % Final    % Monocytes 8.5 % Final    % Eosinophils 1.8 % Final    % Basophils 0.4 % Final    % Immature Granulocytes 0.4 % Final    Nucleated RBCs 0 0 /100 Final    Absolute Neutrophil 3.5 1.6 - 8.3 10e9/L Final    Absolute Lymphocytes 1.6 0.8 - 5.3 10e9/L Final    Absolute Monocytes 0.5 0.0 - 1.3 10e9/L Final    Absolute Eosinophils 0.1 0.0 - 0.7 10e9/L Final    Absolute Basophils 0.0 0.0 - 0.2 10e9/L Final    Abs Immature Granulocytes 0.0 0 - 0.4 10e9/L Final    Absolute Nucleated RBC 0.0  Final         6/18/2018  2:32 PM      Component Results     Component Value Ref Range & Units Status    Sodium 133 133 - 144 mmol/L Final    Potassium 4.1 3.4 - 5.3 mmol/L Final    Chloride 103 94 - 109 mmol/L Final    Carbon Dioxide 23 20 - 32 mmol/L Final    Anion Gap 7 3 - 14 mmol/L Final    Glucose 78 70 - 99 mg/dL Final    Urea Nitrogen 4 (L) 7 - 30 mg/dL Final    Creatinine 0.56 0.52 - 1.04 mg/dL Final    GFR Estimate >90 >60 mL/min/1.7m2 Final    Non  GFR Calc    GFR Estimate If Black >90 >60 mL/min/1.7m2 Final    African American GFR Calc    Calcium 8.9 8.5 - 10.1 mg/dL Final         6/18/2018  3:27 PM      Component Results     Component Value Ref Range & Units Status    HCG Quantitative Serum 600737 (H) 0 - 5 IU/L Final    Specimen run with a dilution                Clinical Quality Measure: Blood Pressure Screening     Your blood pressure was checked while you were in the emergency department today. The last reading we obtained was  BP: (!) 85/49 . Please read the guidelines below about what these numbers mean and what you should do about them.  If your systolic blood pressure (the  top number) is less than 120 and your diastolic blood pressure (the bottom number) is less than 80, then your blood pressure is normal. There is nothing more that you need to do about it.  If your systolic blood pressure (the top number) is 120-139 or your diastolic blood pressure (the bottom number) is 80-89, your blood pressure may be higher than it should be. You should have your blood pressure rechecked within a year by a primary care provider.  If your systolic blood pressure (the top number) is 140 or greater or your diastolic blood pressure (the bottom number) is 90 or greater, you may have high blood pressure. High blood pressure is treatable, but if left untreated over time it can put you at risk for heart attack, stroke, or kidney failure. You should have your blood pressure rechecked by a primary care provider within the next 4 weeks.  If your provider in the emergency department today gave you specific instructions to follow-up with your doctor or provider even sooner than that, you should follow that instruction and not wait for up to 4 weeks for your follow-up visit.        Thank you for choosing [facility]       Thank you for choosing [facility] for your care. Our goal is always to provide you with excellent care. Hearing back from our patients is one way we can continue to improve our services. Please take a few minutes to complete the written survey that you may receive in the mail after you visit with us. Thank you!        [employer] Information     SET gives you secure access to your electronic health record. If you see a primary care provider, you can also send messages to your care team and make appointments. If you have questions, please call your primary care clinic.  If you do not have a primary care provider, please call [phone] and they will assist you.        Care EveryWhere ID     This is your Care EveryWhere ID. This could be used by other organizations to access your [facility] medical  records  [id number]        Equal Access to Services     JORGE HINES : Demond Burroughs, sincere monique, sebastien suero. So [facility] [phone].    ATENCIÓN: Si habla español, tiene a pink disposición servicios gratuitos de asistencia lingüística. Llame al [phone].    We comply with applicable federal civil rights laws and [place] laws. We do not discriminate on the basis of race, color, national origin, age, disability, sex, sexual orientation, or gender identity.            After Visit Summary       This is your record. Keep this with you and show to your community pharmacist(s) and doctor(s) at your next visit.

## 2018-06-18 NOTE — ED PROVIDER NOTES
History     Chief Complaint:  Nausea & Vomiting    HPI   Rizwana Plummer is a 8 week pregnant  38 year old female who presents to the emergency department for evaluation of nausea and vomiting. Per chart review, the patient has been seen many times in the emergency department for vomiting in the setting of pregnancy. She has been given IV fluids and Zofran with improvement in her symptoms. Recently, the patient reports she has been experiencing intermittent nausea and daily episodes of emesis after eating. She did have vomiting during her first pregnancy. She was seen by her primary care physician who prescribed her Zofran and Reglan. She notes that this did improve her nausea, but now has increased vomiting despite the medications. Additionally, she has been getting infusions without relief. Due to the persistence of her symptoms, she presents to the emergency department. She denies abdominal pain, vaginal discharge, and vaginal bleeding.     Allergies:  No Known Drug Allergies    Medications:    Acetaminophen (TYLENOL PO)  cholecalciferol (VITAMIN D3) 1000 UNIT tablet  doxylamine (UNISOM) 25 MG TABS tablet  metoclopramide (REGLAN) 5 MG tablet  Ondansetron (ZOFRAN ODT PO)  Prenatal Vit-Fe Fumarate-FA (PRENATAL MULTIVITAMIN PLUS IRON) 27-0.8 MG TABS per tablet  pyridOXINE (CVS VITAMIN B-6) 50 MG tablet    Past Medical History:    Varicella  Hyperemesis gravidarum     Past Surgical History:     section x2  Marsupialization bartholin cyst    Family History:    Cerebrovascular disease  Diabetes  Stroke  Hypertension    Social History:  The patient was alone.  Smoking Status: Never  Smokeless Tobacco: Never  Alcohol Use: No  Marital Status:       Review of Systems   Constitutional: Negative for fever.   Gastrointestinal: Positive for nausea and vomiting. Negative for abdominal pain.   Genitourinary: Negative for vaginal bleeding and vaginal discharge.   All other systems reviewed and are  negative.    Physical Exam     Patient Vitals for the past 24 hrs:   BP Temp Temp src Pulse Resp SpO2 Weight   06/18/18 1545 96/47 - - - - - -   06/18/18 1530 92/51 - - - - - -   06/18/18 1515 (!) 89/51 - - - - 100 % -   06/18/18 1500 93/45 - - - - 100 % -   06/18/18 1445 96/46 - - - - 100 % -   06/18/18 1415 90/56 - - - - - -   06/18/18 1400 - - - - - 100 % -   06/18/18 1359 96/41 - - - - 98 % -   06/18/18 1218 94/52 99.8  F (37.7  C) Temporal 74 16 100 % 56.2 kg (124 lb)       Physical Exam  Nursing note and vitals reviewed.  Constitutional: Cooperative.   HENT:   Mouth/Throat: Moist mucous membranes.   Eyes: EOMI, nonicteric sclera  Cardiovascular: Normal rate, regular rhythm, no murmurs, rubs, or gallops  Pulmonary/Chest: Effort normal and breath sounds normal. No respiratory distress. No wheezes. No rales.   Abdominal: Soft. Nontender, nondistended, no guarding or rigidity. BS present.   Musculoskeletal: Normal range of motion.   Neurological: Alert. Moves all extremities spontaneously.   Skin: Skin is warm and dry. No rash noted.   Psychiatric: Normal mood and affect.       Emergency Department Course   Laboratory:  Laboratory findings were communicated with the patient who voiced understanding of the findings.  CBC: WNL. (WBC 5.6, HGB 12.0, )   BMP: BUN 4 (L) o/w WNL (Creatinine 0.56)  HCG Quantitative: 649574 (H)    Procedures:  PROCEDURE: Limited Point of Care Bedside OB Ultrasound  PERFORMED BY: Dr. Kevin Thompson  INDICATIONS/SYMPTOM:  Vomiting  PROBE: Low Frequency Convex probe, transabdominal   BODY LOCATION: The ultrasound was performed using a low frequency probe, transabdominal technique.  Longitudinal and transverse views.  FINDINGS:   1.  Gestational sac seen: Yes  2.  Intrauterine pregnancy seen: Yes  3.  Fetal cardiac activity: Fetal heart tones 167  INTERPRETATION: The bedside US exam reveals viable intrauterine pregnancy at this time.  IMAGE DOCUMENTATION: Images were archived to the US  hard drive.    Interventions:  1412 NS Bolus 1,000mL IV  1412 Zofran 4mg IV  1550 NS Bolus 1,000mL IV    Emergency Department Course:  Nursing notes and vitals reviewed.  IV was inserted and blood was drawn for laboratory testing, results above.  The patient provided a urine sample here in the emergency department. This was sent for laboratory testing, findings above.  1334: I performed an exam of the patient as documented above.   1541: I performed a bedside ultrasound on the patient as noted above.   Findings and plan explained to the Patient. Patient discharged home with instructions regarding supportive care, medications, and reasons to return. The importance of close follow-up was reviewed.  I personally reviewed the laboratory and imaging results with the Patient and answered all related questions prior to discharge.    Impression & Plan    Medical Decision Making:  Rizwana Plummer is a 38 year old female who presents for evaluation of nausea and vomiting.  She is currently pregnant.  Nonetheless. I considered a broad differential diagnosis for this patient including viral gastroenteritis, food poisoning, bowel obstruction, intra-abdominal infection such as colitis, cholecystitis, UTI, pyelonephritis, appendicitis, etc.  There are no signs of worrisome intra-abdominal pathologies detected during the visit today.  The patient has a completely benign abdominal exam without rebound, guarding, or marked tenderness to palpation.  I doubt ectopic pregnancy.  I did confirm intrauterine pregnancy with normal fetal heart tones on bedside ultrasound.  Supportive outpatient management is therefore indicated.  Patient has follow-up with her OB/GYN tomorrow.  She may require outpatient IV fluid infusions. Feels much improved after interventions in ED.  See above for medications for home.  I believe all of her symptoms are at this point explained by the pregnancy and hyperemesis gravidarum.      Diagnosis:    ICD-10-CM    1.  Hyperemesis gravidarum O21.0        Disposition:  discharged to home    Scribe Disclosure:  I, Sonny Shasta, am serving as a scribe at 1:34 PM on 6/18/2018 to document services personally performed by Kevin Thompson MD based on my observations and the provider's statements to me.     6/18/2018   [facility] EMERGENCY DEPARTMENT       Kevin Thompson MD  06/19/18 7017

## 2018-06-18 NOTE — ED AVS SNAPSHOT
[facility] Emergency Department    [address]    [facility] [address]    Phone:  [phone]    Fax:  [phone]                                       Rizwana Plummer   MRN: [id number]    Department:  [facility] Emergency Department   Date of Visit:  6/18/2018           After Visit Summary Signature Page     I have received my discharge instructions, and my questions have been answered. I have discussed any challenges I see with this plan with the nurse or doctor.    ..........................................................................................................................................  Patient/Patient Representative Signature      ..........................................................................................................................................  Patient Representative Print Name and Relationship to Patient    ..................................................               ................................................  Date                                            Time    ..........................................................................................................................................  Reviewed by Signature/Title    ...................................................              ..............................................  Date                                                            Time

## 2018-06-19 ENCOUNTER — OFFICE VISIT (OUTPATIENT)
Dept: OBGYN | Facility: CLINIC | Age: 38
End: 2018-06-19
Payer: COMMERCIAL

## 2018-06-19 VITALS
HEIGHT: 59 IN | DIASTOLIC BLOOD PRESSURE: 58 MMHG | SYSTOLIC BLOOD PRESSURE: 98 MMHG | WEIGHT: 128.3 LBS | BODY MASS INDEX: 25.87 KG/M2

## 2018-06-19 DIAGNOSIS — O21.0 HYPEREMESIS GRAVIDARUM: ICD-10-CM

## 2018-06-19 DIAGNOSIS — Z34.90 SUPERVISION OF NORMAL PREGNANCY: ICD-10-CM

## 2018-06-19 PROCEDURE — 99207 ZZC PRENATAL VISIT: CPT | Performed by: FAMILY MEDICINE

## 2018-06-19 RX ORDER — ONDANSETRON 2 MG/ML
8 INJECTION INTRAMUSCULAR; INTRAVENOUS EVERY 6 HOURS PRN
Status: CANCELLED
Start: 2018-06-19

## 2018-06-19 NOTE — NURSING NOTE
"7w5d    Chief Complaint   Patient presents with     Prenatal Care     f/u per [facility]--hyperemesis--seeing Dr. Bustillo for new prenatal visit--had one IV infusion--feeling the same--went back to ER--infusion helps       Initial BP 98/58  Ht 4' 11\" (1.499 m)  Wt 128 lb 4.8 oz (58.2 kg)  LMP 2018 (Exact Date)  BMI 25.91 kg/m2 Estimated body mass index is 25.91 kg/(m^2) as calculated from the following:    Height as of this encounter: 4' 11\" (1.499 m).    Weight as of this encounter: 128 lb 4.8 oz (58.2 kg).  BP completed using cuff size: regular             "

## 2018-06-19 NOTE — PROGRESS NOTES
"CC: Here for HG, prenatal visit   38 year old y/o  @ 7w5d with Estimated Date of Delivery: 2019   HPI: hyperemesis  BP 98/58  Ht 4' 11\" (1.499 m)  Wt 128 lb 4.8 oz (58.2 kg)  LMP 2018 (Exact Date)  BMI 25.91 kg/m2  See OB flowsheet  no fetal movement, no contractions, no bleeding, no loss of fluid         1) concerns: Hyperemesis Gravidarum on reglan and zofran,  IV fluids and IV zofran at infusion center   2) Routine: us today with 165 FHT's  3) Return: in 2-3 weeks, set up prenatal    [place]    "

## 2018-06-19 NOTE — MR AVS SNAPSHOT
After Visit Summary   6/19/2018    Rizwana Plummer    MRN: [id number]           Patient Information     Date Of Birth          1980        Visit Information        Provider Department      6/19/2018 9:00 AM Cindy Laurent,  [facility]        Today's Diagnoses     Hyperemesis gravidarum          Care Instructions    Call [phone] for every other day IV fluids   Return for ob visit     Dr. Cindy Laurent, DO    Obstetrics and Gynecology  [facility]                 Follow-ups after your visit        Your next 10 appointments already scheduled     Jun 19, 2018 11:15 AM CDT   Ultrasound with RIUS1   [facility]    [address]  [address]  [facility] [address] [phone]            Jul 17, 2018  1:00 PM CDT   New Prenatal with Ammy Bustillo MD   [facility]    [address] [place] [address] [phone]              Who to contact     If you have questions or need follow up information about today's clinic visit or your schedule please contact [facility] directly at [phone].  Normal or non-critical lab and imaging results will be communicated to you by MyChart, letter or phone within 4 business days after the clinic has received the results. If you do not hear from us within 7 days, please contact the clinic through MyChart or phone. If you have a critical or abnormal lab result, we will notify you by phone as soon as possible.  Submit refill requests through [email or web address] or call your pharmacy and they will forward the refill request to us. Please allow 3 business days for your refill to be completed.          Additional Information About Your Visit        [employer] Information     [email or web address] gives you secure access to your electronic health record. If you see a primary care provider, you can also  "send messages to your care team and make appointments. If you have questions, please call your primary care clinic.  If you do not have a primary care provider, please call [phone] and they will assist you.        Care EveryWhere ID     This is your Care EveryWhere ID. This could be used by other organizations to access your [facility] medical records  [id number]        Your Vitals Were     Height Last Period BMI (Body Mass Index)             4' 11\" (1.499 m) 04/26/2018 (Exact Date) 25.91 kg/m2          Blood Pressure from Last 3 Encounters:   06/19/18 98/58   06/18/18 (!) 85/49   06/12/18 101/65    Weight from Last 3 Encounters:   06/19/18 128 lb 4.8 oz (58.2 kg)   06/18/18 124 lb (56.2 kg)   06/12/18 127 lb 9.6 oz (57.9 kg)              Today, you had the following     No orders found for display       Primary Care Provider Office Phone # Fax #    Cindy Novakkaterina Laurent,  [phone]       [address] [facility] [address]        Equal Access to Services     [facility]: Hadii aad ku hadasho Soomaali, waaxda luqadaha, qaybta kaalmada adeegyada, sebastien minor hayjamesn shahzad zayas . So [facility] [phone].    ATENCIÓN: Si habla español, tiene a pink disposición servicios gratuitos de asistencia lingüística. Llame al [phone].    We comply with applicable federal civil rights laws and [place] laws. We do not discriminate on the basis of race, color, national origin, age, disability, sex, sexual orientation, or gender identity.            Thank you!     Thank you for choosing [facility]  for your care. Our goal is always to provide you with excellent care. Hearing back from our patients is one way we can continue to improve our services. Please take a few minutes to complete the written survey that you may receive in the mail after your visit with us. Thank you!             Your Updated Medication List - Protect others around you: Learn how to safely use, store and " throw away your medicines at www.disposemymeds.org.          This list is accurate as of 6/19/18  9:23 AM.  Always use your most recent med list.                   Brand Name Dispense Instructions for use Diagnosis    cholecalciferol 1000 UNIT tablet    vitamin D3    90 tablet    Take 1 tablet (1,000 Units) by mouth daily    Vitamin D deficiency       doxylamine 25 MG Tabs tablet    UNISOM     Take 25 mg by mouth At Bedtime        metoclopramide 5 MG tablet    REGLAN    120 tablet    Take 1 tablet (5 mg) by mouth 4 times daily as needed    Hyperemesis gravidarum       prenatal multivitamin plus iron 27-0.8 MG Tabs per tablet     100 tablet    Take 1 tablet by mouth daily        pyridOXINE 50 MG tablet    CVS VITAMIN B-6    90 tablet    Take 1 tablet (50 mg) by mouth 3 times daily    Hyperemesis gravidarum       TYLENOL PO      Take 325 mg by mouth        * ZOFRAN ODT PO      Take 1 tablet by mouth every 6 hours as needed for nausea        * ondansetron 4 MG ODT tab    ZOFRAN ODT    30 tablet    Take 1 tablet (4 mg) by mouth every 8 hours as needed    Hyperemesis gravidarum       * Notice:  This list has 2 medication(s) that are the same as other medications prescribed for you. Read the directions carefully, and ask your doctor or other care provider to review them with you.

## 2018-06-19 NOTE — PATIENT INSTRUCTIONS
Call [phone] for every other day IV fluids   Return for ob visit     Dr. Cindy Laurent, DO    Obstetrics and Gynecology  [facility] [place]

## 2018-06-21 ENCOUNTER — INFUSION THERAPY VISIT (OUTPATIENT)
Dept: INFUSION THERAPY | Facility: CLINIC | Age: 38
End: 2018-06-21
Attending: FAMILY MEDICINE
Payer: COMMERCIAL

## 2018-06-21 VITALS
HEART RATE: 69 BPM | TEMPERATURE: 98.1 F | RESPIRATION RATE: 16 BRPM | DIASTOLIC BLOOD PRESSURE: 50 MMHG | SYSTOLIC BLOOD PRESSURE: 87 MMHG

## 2018-06-21 DIAGNOSIS — O21.0 HYPEREMESIS GRAVIDARUM: Primary | ICD-10-CM

## 2018-06-21 DIAGNOSIS — E86.0 DEHYDRATION: ICD-10-CM

## 2018-06-21 PROCEDURE — 25000128 H RX IP 250 OP 636: Performed by: FAMILY MEDICINE

## 2018-06-21 PROCEDURE — 96374 THER/PROPH/DIAG INJ IV PUSH: CPT

## 2018-06-21 PROCEDURE — 96361 HYDRATE IV INFUSION ADD-ON: CPT

## 2018-06-21 RX ORDER — ONDANSETRON 2 MG/ML
8 INJECTION INTRAMUSCULAR; INTRAVENOUS EVERY 6 HOURS PRN
Status: DISCONTINUED | OUTPATIENT
Start: 2018-06-21 | End: 2018-06-21 | Stop reason: HOSPADM

## 2018-06-21 RX ORDER — ONDANSETRON 2 MG/ML
8 INJECTION INTRAMUSCULAR; INTRAVENOUS EVERY 6 HOURS PRN
Status: CANCELLED
Start: 2018-06-21

## 2018-06-21 RX ADMIN — ONDANSETRON 8 MG: 2 INJECTION INTRAMUSCULAR; INTRAVENOUS at 14:46

## 2018-06-21 RX ADMIN — DEXTROSE MONOHYDRATE 500 ML: 50 INJECTION, SOLUTION INTRAVENOUS at 14:55

## 2018-06-21 RX ADMIN — SODIUM CHLORIDE 500 ML: 9 INJECTION, SOLUTION INTRAVENOUS at 14:23

## 2018-06-21 NOTE — PROGRESS NOTES
Infusion Nursing Note:  Rizwana Plummer presents today for IVF, zofran.    Patient seen by provider today: No   present during visit today: Not Applicable.    Note: Hydration orders are for  ml over 30 minutes and D5 500ml over 2 hours, previous orders were for 2 liters over two hours.  Patient was unaware of the reason for the change.  Unable to do the D5 over two hours as appointment was too late in the day.  In basket message sent to Dr. Laurent.    Intravenous Access:  Peripheral IV placed.    Treatment Conditions:  Not Applicable.      Post Infusion Assessment:  Patient tolerated infusion without incident.  Blood return noted pre and post infusion.  Site patent and intact, free from redness, edema or discomfort.  No evidence of extravasations.  Access discontinued per protocol.    Discharge Plan:   Copy of AVS reviewed with patient and/or family.  Patient will return 6/28/18 for next appointment.  Patient discharged in stable condition accompanied by: self.  Departure Mode: Ambulatory.    Lluvia Mosquera RN

## 2018-06-21 NOTE — MR AVS SNAPSHOT
After Visit Summary   6/21/2018    Rizwana Plummer    MRN: [id number]           Patient Information     Date Of Birth          1980        Visit Information        Provider Department      6/21/2018 2:00 PM RH INFUSION CHAIR 2 [facility]        Today's Diagnoses     Hyperemesis gravidarum    -  1    Dehydration           Follow-ups after your visit        Your next 10 appointments already scheduled     Jun 28, 2018  2:00 PM CDT   Level 2 with RH INFUSION CHAIR 11   [facility]fusion [facility] ([facility] [place] [facility])    [facility]  [address]  [facility] [address]   [phone]            Jul 06, 2018  2:00 PM CDT   Level 2 with RH INFUSION CHAIR 8   [facility] ([facility] [place] [facility])    [facility]  [address]  [facility] [address]   [phone]            Jul 17, 2018  1:00 PM CDT   New Prenatal with Ammy [facility]ustillo MD   [facility]    [address]  [facility] [id number] [phone]            Aug 14, 2018 10:45 AM CDT   ESTABLISHED PRENATAL with Cindy Laurent DO   [facility]    [address]  [facility] [address] [phone]              Who to contact     If you have questions or need follow up information about today's clinic visit or your schedule please contact [facility] directly at [phone].  Normal or non-critical lab and imaging results will be communicated to you by MyChart, letter or phone within 4 business days after the clinic has received the results. If you do not hear from us within 7 days, please contact the clinic through MyChart or phone. If you have a critical or abnormal lab result, we will notify you by phone as soon as  possible.  Submit refill requests through [employer] or call your pharmacy and they will forward the refill request to us. Please allow 3 business days for your refill to be completed.          Additional Information About Your Visit        [employer] Information     [employer] gives you secure access to your electronic health record. If you see a primary care provider, you can also send messages to your care team and make appointments. If you have questions, please call your primary care clinic.  If you do not have a primary care provider, please call [phone] and they will assist you.        Care EveryWhere ID     This is your Care EveryWhere ID. This could be used by other organizations to access your [facility] medical records  [id number]        Your Vitals Were     Pulse Temperature Respirations Last Period          69 98.1  F (36.7  C) (Tympanic) 16 04/26/2018 (Exact Date)         Blood Pressure from Last 3 Encounters:   06/21/18 (!) 87/50   06/19/18 98/58   06/18/18 (!) 85/49    Weight from Last 3 Encounters:   06/19/18 58.2 kg (128 lb 4.8 oz)   06/18/18 56.2 kg (124 lb)   06/12/18 57.9 kg (127 lb 9.6 oz)              Today, you had the following     No orders found for display       Primary Care Provider Office Phone # Fax #    Cindy Bajwa Mehran,  [phone]       [address] [facility] [address]        Equal Access to Services     [facility] : Hadii aad ku hadasho Soomaali, waaxda luqadaha, qaybta kaalmada adeegyada, sebastien zayas . So [facility] [phone].    ATENCIÓN: Si habla español, tiene a pink disposición servicios gratuitos de asistencia lingüística. Llame al [phone].    We comply with applicable federal civil rights laws and [place] laws. We do not discriminate on the basis of race, color, national origin, age, disability, sex, sexual orientation, or gender identity.            Thank you!     Thank you for choosing [facility]  INFUSION SERVICES  for your care. Our goal is always to provide you with excellent care. Hearing back from our patients is one way we can continue to improve our services. Please take a few minutes to complete the written survey that you may receive in the mail after your visit with us. Thank you!             Your Updated Medication List - Protect others around you: Learn how to safely use, store and throw away your medicines at www.disposemymeds.org.          This list is accurate as of 6/21/18  4:00 PM.  Always use your most recent med list.                   Brand Name Dispense Instructions for use Diagnosis    cholecalciferol 1000 UNIT tablet    vitamin D3    90 tablet    Take 1 tablet (1,000 Units) by mouth daily    Vitamin D deficiency       doxylamine 25 MG Tabs tablet    UNISOM     Take 25 mg by mouth At Bedtime        metoclopramide 5 MG tablet    REGLAN    120 tablet    Take 1 tablet (5 mg) by mouth 4 times daily as needed    Hyperemesis gravidarum       prenatal multivitamin plus iron 27-0.8 MG Tabs per tablet     100 tablet    Take 1 tablet by mouth daily        pyridOXINE 50 MG tablet    CVS VITAMIN B-6    90 tablet    Take 1 tablet (50 mg) by mouth 3 times daily    Hyperemesis gravidarum       TYLENOL PO      Take 325 mg by mouth        * ZOFRAN ODT PO      Take 1 tablet by mouth every 6 hours as needed for nausea        * ondansetron 4 MG ODT tab    ZOFRAN ODT    30 tablet    Take 1 tablet (4 mg) by mouth every 8 hours as needed    Hyperemesis gravidarum       * Notice:  This list has 2 medication(s) that are the same as other medications prescribed for you. Read the directions carefully, and ask your doctor or other care provider to review them with you.

## 2018-06-26 ENCOUNTER — TELEPHONE (OUTPATIENT)
Dept: OBGYN | Facility: CLINIC | Age: 38
End: 2018-06-26

## 2018-06-26 ENCOUNTER — HOSPITAL ENCOUNTER (EMERGENCY)
Facility: CLINIC | Age: 38
Discharge: HOME | End: 2018-06-26
Attending: EMERGENCY MEDICINE | Admitting: EMERGENCY MEDICINE
Payer: COMMERCIAL

## 2018-06-26 VITALS
SYSTOLIC BLOOD PRESSURE: 102 MMHG | OXYGEN SATURATION: 98 % | DIASTOLIC BLOOD PRESSURE: 63 MMHG | RESPIRATION RATE: 18 BRPM | TEMPERATURE: 97.6 F

## 2018-06-26 DIAGNOSIS — O20.9 VAGINAL BLEEDING IN PREGNANCY, FIRST TRIMESTER: ICD-10-CM

## 2018-06-26 LAB
ABO + RH BLD: NORMAL
ABO + RH BLD: NORMAL
B-HCG SERPL-ACNC: ABNORMAL IU/L (ref 0–5)
HGB BLD-MCNC: 10.9 G/DL (ref 11.7–15.7)
SPECIMEN EXP DATE BLD: NORMAL

## 2018-06-26 PROCEDURE — 85018 HEMOGLOBIN: CPT | Performed by: EMERGENCY MEDICINE

## 2018-06-26 PROCEDURE — 99283 EMERGENCY DEPT VISIT LOW MDM: CPT | Mod: 25

## 2018-06-26 PROCEDURE — 84702 CHORIONIC GONADOTROPIN TEST: CPT | Performed by: EMERGENCY MEDICINE

## 2018-06-26 PROCEDURE — 25000128 H RX IP 250 OP 636: Performed by: EMERGENCY MEDICINE

## 2018-06-26 PROCEDURE — 96361 HYDRATE IV INFUSION ADD-ON: CPT

## 2018-06-26 PROCEDURE — 86901 BLOOD TYPING SEROLOGIC RH(D): CPT | Performed by: EMERGENCY MEDICINE

## 2018-06-26 PROCEDURE — 96360 HYDRATION IV INFUSION INIT: CPT

## 2018-06-26 RX ADMIN — SODIUM CHLORIDE 1000 ML: 9 INJECTION, SOLUTION INTRAVENOUS at 12:29

## 2018-06-26 NOTE — ED AVS SNAPSHOT
[facility] Emergency Department    [address]    [facility] [address]    Phone:  [phone]    Fax:  [phone]                                       Rizwana Plummer   MRN: [id number]    Department:  [facility] Emergency Department   Date of Visit:  6/26/2018           Patient Information     Date Of Birth          1980        Your diagnoses for this visit were:     Vaginal bleeding in pregnancy, first trimester        You were seen by Ranjit Strickland MD.      Follow-up Information     Follow up with Cindy Laurent DO.    Specialty:  OB/Gyn    Why:  As needed    Contact information:    [address]  [place] [address]  [phone]          Discharge Instructions         Your lab tests show a climbing hCG level and your blood type is O+.  We have discussed her care with Dr. Banda from the clinic and do not recommend a third ultrasound this month for evaluation of spotting in pregnancy.  I understand that you are not happy with my care.  I have tried to explain this to you but you have refused to listen to me I encourage you to follow-up with your OB/GYN and call them to discuss her obstetrical care prior to coming to the emergency room.    Bleeding During Early Pregnancy     Ultrasound can help check the health of your fetus.     If you ve had bleeding early in your pregnancy, you re not alone. Many other pregnant women have had early bleeding, too. And in most cases, nothing is wrong. But your healthcare provider still needs to know about it. He or she may want to do tests to find out why you re bleeding. Call your healthcare provider if you notice bleeding during pregnancy.  What causes early bleeding?  The cause of bleeding early in pregnancy is often unknown. But many factors early on in pregnancy may lead to bleeding or spotting. These include sexual intercourse, which may cause bleeding in any trimester. Here are some other  causes:    Implantation of the embryo on the uterine wall    Subchorionic hemorrhage (bleeding between the sac membrane and the uterus)    Miscarriage    Ectopic (tubal) pregnancy  If you notice spotting  Spotting (very light bleeding) is the most common type of bleeding in early pregnancy. If you notice it, call your healthcare provider. Chances are, he or she will tell you that you can care for yourself at home.  If tests are needed  Depending on how much you bleed, your healthcare provider may ask you to come in for some tests. A pelvic exam, for instance, can help see how far along your pregnancy is. You also may have an ultrasound or a Doppler test. These imaging tests use sound waves to check the health of your fetus. The ultrasound may be done on your belly or inside your vagina. Your healthcare provider also may order a special blood test. This test compares your hormone levels in blood samples taken 2 days apart. The results can help your healthcare provider learn more about the implantation of the embryo. Your blood type will also need to be checked to evaluate whether you will need to be treated for Rh sensitization.   Warning signs  If your bleeding doesn t stop or if you notice any of the following, seek medical help right away:    Soaking a sanitary pad each hour    Bleeding like you re having a period    Cramping or severe belly pain    Feeling dizzy or faint    Tissue passing through your vagina    Bleeding at any time after the first trimester  Questions you may be asked  Though not normal, bleeding early in pregnancy is common. If you ve noticed any bleeding, you may be concerned. But keep in mind that bleeding alone doesn t mean something is wrong. Call your healthcare provider right away, though. He or she may ask you questions like these to help find the cause of your bleeding:    When did your bleeding start?    Is your bleeding very light (spotting) or is it like a period?    Is the blood  bright red or brownish?    Have you had sexual intercourse recently?    Have you had pain or cramping?    Have you felt dizzy or faint?  Monitoring your pregnancy  Bleeding will often stop as quickly as it began. Your pregnancy may go on a normal path again. You may need to make a few extra prenatal visits. But you and your baby will most likely be fine.  Date Last Reviewed: 6/1/2016 2000-2017 [employer] [address] [place] [address], [place] [address]. All rights reserved. This information is not intended as a substitute for professional medical care. Always follow your healthcare professional's instructions.      Discharge Instructions  Vaginal Bleeding in Pregnancy    Bleeding in early pregnancy can be a sign of a miscarriage in process or an abnormal pregnancy, but often is innocent and the pregnancy will continue normally. We may do blood pregnancy tests and ultrasound to try to determine what is causing the bleeding in your case, but sometimes we can't tell and need to follow you with time, more blood tests, and another ultrasound.     Return to the Emergency Department if:    You have severe abdominal or pelvic pain.    You faint, or feel lightheaded or dizzy.     Your bleeding is gets much worse, and is heavier than a heavy period or if you pass any blood clots larger than a quarter.    You pass any tissue--solid material that doesn't appear smooth and even like a blood clot. If you pass tissue, save it (even if you have to pull it out of the toilet) and put it in a plastic bag or jar and bring it in.      You have a fever of 100.5 degrees or higher.  If no pregnancy could be seen:    It may be that everything is normal and it is just too early to see the pregnancy, but you could have an ectopic pregnancy, which is a pregnancy in an abnormal location, such as in the tube. An ectopic pregnancy can cause severe internal bleeding or death.    You need to be seen by your regular doctor, or an OB/GYN  doctor, in 48-72 hours for a repeat blood pregnancy test.    You should not be alone, in case you suddenly become very sick.     You should not have sex or put anything in your vagina.     If a pregnancy was seen in your uterus:    If a heartbeat could be seen, the chance of miscarriage is much lower.    You need to see your regular doctor, or an OB/GYN doctor, within 2-3 days.    You should not have sex or put anything in your vagina.     Facts about miscarriage: We hope you don't have a miscarriage, but if you do, here are important things to know:    Early miscarriage is very common, and having one miscarriage doesn't mean you will have problems with another pregnancy.    Nothing you did caused it. Taking medicine, drinking alcohol, having sex, exercising, or falling down won't cause a miscarriage.     If you were given a prescription for medicine here today, be sure to read all of the information (including the package insert) that comes with your prescription.  This will include important information about the medicine, its side effects, and any warnings that you need to know about.  The pharmacist who fills the prescription can provide more information and answer questions you may have about the medicine.  If you have questions or concerns that the pharmacist cannot address, please call or return to the Emergency Department.     Opioid Medication Information    Pain medications are among the most commonly prescribed medicines, so we are including this information for all our patients. If you did not receive pain medication or get a prescription for pain medicine, you can ignore it.     You may have been given a prescription for an opioid (narcotic) pain medicine and/or have received a pain medicine while here in the Emergency Department. These medicines can make you drowsy or impaired. You must not drive, operate dangerous equipment, or engage in any other dangerous activities while taking these medications.  If you drive while taking these medications, you could be arrested for DUI, or driving under the influence. Do not drink any alcohol while you are taking these medications.     Opioid pain medications can cause addiction. If you have a history of chemical dependency of any type, you are at a higher risk of becoming addicted to pain medications.  Only take these prescribed medications to treat your pain when all other options have been tried. Take it for as short a time and as few doses as possible. Store your pain pills in a secure place, as they are frequently stolen and provide a dangerous opportunity for children or visitors in your house to start abusing these powerful medications. We will not replace any lost or stolen medicine.  As soon as your pain is better, you should flush all your remaining medication.     Many prescription pain medications contain Tylenol  (acetaminophen), including Vicodin , Tylenol #3 , Norco , Lortab , and Percocet .  You should not take any extra pills of Tylenol  if you are using these prescription medications or you can get very sick.  Do not ever take more than 3000 mg of acetaminophen in any 24 hour period.    All opioids tend to cause constipation. Drink plenty of water and eat foods that have a lot of fiber, such as fruits, vegetables, prune juice, apple juice and high fiber cereal.  Take a laxative if you don t move your bowels at least every other day. Miralax , Milk of Magnesia, Colace , or Senna  can be used to keep you regular.      Remember that you can always come back to the Emergency Department if you are not able to see your regular doctor in the amount of time listed above, if you get any new symptoms, or if there is anything that worries you.          Your next 10 appointments already scheduled     Jun 27, 2018  2:30 PM CDT   ESTABL[facility] PRENATAL with Cindy Laurent,    [facility]    [address]  [place]  [facility] [address]   [phone]            Jun 28, 2018  2:00 PM CDT   Level 2 with RH INFUSION CHAIR 11   [facility] ([facility])    [facility]  [address]  [facility] [address]   [phone]            Jul 02, 2018  1:30 PM CDT   New Prenatal with Sonia Martinez, DO   [facility]    [address]  [facility] [address]   [phone]            Jul 03, 2018  2:00 PM CDT   Level 2 with RH INFUSION CHAIR 5   [facility] ([facility])    [facility]  [address]  [facility] [address]   [phone]            Jul 06, 2018  2:00 PM CDT   Level 2 with RH INFUSION CHAIR 8   [facility] ([facility])    [facility]  [address]  [facility] [address]   [phone]            Aug 14, 2018 10:45 AM CDT   ESTABLISHED PRENATAL with Cindy Laurent, DO   [facility]    [address]  [facility] [address]   [phone]              24 Hour Appointment Hotline       To make an appointment at any [facility], call [phone] ([phone]). If you don't have a family doctor or clinic, we will help you find one. [facility] are conveniently located to serve the needs of you and your family.             Review of your medicines      Our records show that you are taking the medicines listed below. If these are incorrect, please call your family doctor or clinic.        Dose / Directions Last dose taken    cholecalciferol 1000 UNIT tablet   Commonly known as:  vitamin D3   Dose:  1000 Units   Quantity:  90 tablet        Take 1 tablet (1,000 Units) by mouth daily   Refills:  0        doxylamine 25 MG Tabs tablet    Commonly known as:  UNISOM   Dose:  25 mg        Take 25 mg by mouth At Bedtime   Refills:  0        metoclopramide 5 MG tablet   Commonly known as:  REGLAN   Dose:  5 mg   Quantity:  120 tablet        Take 1 tablet (5 mg) by mouth 4 times daily as needed   Refills:  1        prenatal multivitamin plus iron 27-0.8 MG Tabs per tablet   Dose:  1 tablet   Quantity:  100 tablet        Take 1 tablet by mouth daily   Refills:  3        pyridOXINE 50 MG tablet   Commonly known as:  [employer] VITAMIN B-6   Dose:  50 mg   Quantity:  90 tablet        Take 1 tablet (50 mg) by mouth 3 times daily   Refills:  1        TYLENOL PO   Dose:  325 mg        Take 325 mg by mouth   Refills:  0        * ZOFRAN ODT PO   Dose:  1 tablet        Take 1 tablet by mouth every 6 hours as needed for nausea   Refills:  0        * ondansetron 4 MG ODT tab   Commonly known as:  ZOFRAN ODT   Dose:  4 mg   Quantity:  30 tablet        Take 1 tablet (4 mg) by mouth every 8 hours as needed   Refills:  3        * Notice:  This list has 2 medication(s) that are the same as other medications prescribed for you. Read the directions carefully, and ask your doctor or other care provider to review them with you.            Procedures and tests performed during your visit     HCG quantitative pregnancy    Hemoglobin    Peripheral IV: Standard    Rh type      Orders Needing Specimen Collection     None      Pending Results     No orders found from 6/24/2018 to 6/27/2018.            Pending Culture Results     No orders found from 6/24/2018 to 6/27/2018.            Pending Results Instructions     If you had any lab results that were not finalized at the time of your Discharge, you can call the ED Lab Result RN at [phone]. You will be contacted by this team for any positive Lab results or changes in treatment. The nurses are available 7 days a week from 10A to 6:30P.  You can leave a message 24 hours per day and they will return your call.        Test Results  From Your Hospital Stay        6/26/2018 12:58 PM      Component Results     Component Value Ref Range & Units Status    Hemoglobin 10.9 (L) 11.7 - 15.7 g/dL Final         6/26/2018  1:12 PM      Component Results     Component    ABO    O    RH(D)    Pos    Specimen Expires    06/29/2018 6/26/2018  2:22 PM      Component Results     Component Value Ref Range & Units Status    HCG Quantitative Serum >346050 (H) 0 - 5 IU/L Final    Specimen run with a dilution                Clinical Quality Measure: Blood Pressure Screening     Your blood pressure was checked while you were in the emergency department today. The last reading we obtained was  BP: 93/54 . Please read the guidelines below about what these numbers mean and what you should do about them.  If your systolic blood pressure (the top number) is less than 120 and your diastolic blood pressure (the bottom number) is less than 80, then your blood pressure is normal. There is nothing more that you need to do about it.  If your systolic blood pressure (the top number) is 120-139 or your diastolic blood pressure (the bottom number) is 80-89, your blood pressure may be higher than it should be. You should have your blood pressure rechecked within a year by a primary care provider.  If your systolic blood pressure (the top number) is 140 or greater or your diastolic blood pressure (the bottom number) is 90 or greater, you may have high blood pressure. High blood pressure is treatable, but if left untreated over time it can put you at risk for heart attack, stroke, or kidney failure. You should have your blood pressure rechecked by a primary care provider within the next 4 weeks.  If your provider in the emergency department today gave you specific instructions to follow-up with your doctor or provider even sooner than that, you should follow that instruction and not wait for up to 4 weeks for your follow-up visit.        Thank you for choosing [facility]        Thank you for choosing [facility] for your care. Our goal is always to provide you with excellent care. Hearing back from our patients is one way we can continue to improve our services. Please take a few minutes to complete the written survey that you may receive in the mail after you visit with us. Thank you!        [facility] Information     [facility] gives you secure access to your electronic health record. If you see a primary care provider, you can also send messages to your care team and make appointments. If you have questions, please call your primary care clinic.  If you do not have a primary care provider, please call [phone] and they will assist you.        Care EveryWhere ID     This is your Care EveryWhere ID. This could be used by other organizations to access your [facility] medical records  [id number]        Equal Access to Services     JORGE HINES : Demond Burroughs, sincere monique, bia hernandez, sebastien morales. So [facility] [phone].    ATENCIÓN: Si habla español, tiene a pink disposición servicios gratuitos de asistencia lingüística. Llame al [phone].    We comply with applicable federal civil rights laws and [place] laws. We do not discriminate on the basis of race, color, national origin, age, disability, sex, sexual orientation, or gender identity.            After Visit Summary       This is your record. Keep this with you and show to your community pharmacist(s) and doctor(s) at your next visit.

## 2018-06-26 NOTE — TELEPHONE ENCOUNTER
Spoke to Kavitha.  They are still in ED currently.  Scheduled with KT tomorrow for f/u.  Advised to call clinic with spotting or cramping.      Advised to talk with pt services with concerns.    Lakia GERMAN R.N.  [facility]

## 2018-06-26 NOTE — TELEPHONE ENCOUNTER
"Pt's  calls.  8w5d    Pt is in ED for vaginal bleeding, they had a bad experience with their doctor.  The doctor they had spoken to was being \"insensitive\" per the pt's  Kavitha.  This doctor (Dr Strickland) said he spoke with Dr Banda.      Ahmed and pt call to the clinic to speak with Dr Banda.  They are still in the ED but have requested a new doctor as the previous one \"is treating us like we shouldn't be here.\"  They are wondering what they should do and are stating that it is important that they talk to Dr Banda.    Advised to wait for other doctor and told them I would send message to Dr Banda.    Are you able to call them or is there anything I can tell them that you advise currently?  It seemed like they wanted to know what you and the previous doctor talked about.      Lakia GERMAN R.N.  [facility]    "

## 2018-06-26 NOTE — ED AVS SNAPSHOT
[facility] Emergency Department    [address]    [facility] [address]    Phone:  [phone]    Fax:  [phone]                                       Rizwana Plummer   MRN: [id number]    Department:  [facility] Emergency Department   Date of Visit:  6/26/2018           After Visit Summary Signature Page     I have received my discharge instructions, and my questions have been answered. I have discussed any challenges I see with this plan with the nurse or doctor.    ..........................................................................................................................................  Patient/Patient Representative Signature      ..........................................................................................................................................  Patient Representative Print Name and Relationship to Patient    ..................................................               ................................................  Date                                            Time    ..........................................................................................................................................  Reviewed by Signature/Title    ...................................................              ..............................................  Date                                                            Time

## 2018-06-26 NOTE — TELEPHONE ENCOUNTER
Her US 7 days ago was normal, and so the MD asked if I thought she needed another US. Since she was having just vaginal spotting (I was told), I didn't think it was necessary, but did recommend that she follow up with Dr. Laurent, who I believe is her primary OB (?), this week. She is having every other day infusions at the infusion center from what I can tell.    I do think that if she has bleeding or other issues, it's a good idea to call us (you/triage) first as we can often help her avoid the need to go to the ED at all, since that can be time consuming and tire her out even more. Sometimes we may send her there as it is the most appropriate place, but often we can help her without the need for that long visit.    I would remind her  that they can talk to patient services if they have a concern about their care/this visit, and that's what I would recommend as it's important for us to know what we can all do better.    Thanks.  Ruthy Banda MD

## 2018-06-26 NOTE — PROGRESS NOTES
"I was asked to speak with this patient in regards to her dissatisfaction with her treatment here in the ED. She and her  feel that they were \"talked down to like we are criminals\" and wish to speak with someone in patient relations. I provided empathy and the number for [facility] Patient Relations. They stated they will call later.   "

## 2018-06-26 NOTE — ED TRIAGE NOTES
Patient presents with spouse for vaginal bleeding that started about 20 minutes ago. Patient denies any pain. Patient is 9 weeks pregnant. Patient has been seen multiple times for nausea/vomiting, but is now having bleeding, bright red, no clots. ABCDs intact, alert and oriented x 4.

## 2018-06-26 NOTE — ED PROVIDER NOTES
History     Chief Complaint:  Vaginal Bleeding    HPI   Rizwana Plummer is a 38 year old female who is 9 weeks pregnant () who presents to the emergency department today for evaluation of vaginal bleeding. The patient has been seen in the ED multiple times over the last month for vomiting during pregnancy. She states that because of this she is scheduled for IV fluid infusion at her OB office later today. The patient reports that around 1145 today she noted vaginal bleeding while wiping, prompting her to present to the ED. She states that she has not had bleeding during this pregnancy up until today. The patient denies any pain or cramping. Of note, the patient has a history of miscarriage.    Allergies:  No Known Allergies     Medications:    Unisom  Reglan  Zofran    Past Medical History:    Varicella  Hyperemesis gravidarum  Genital infibulation    Past Surgical History:    C section x2  Marsupialization bartholin cyst    Family History:    Mother: cerebrovascular disease, diabetes, stroke, hypertension  Father: hypertension    Social History:  The patient was accompanied to the ED by her .  Smoking Status: Never Smoker  Smokeless Tobacco: Never Used  Alcohol Use: Negative  Marital Status:        Review of Systems   Genitourinary: Positive for vaginal bleeding.        No pain or cramping   All other systems reviewed and are negative.    Physical Exam     Patient Vitals for the past 24 hrs:   BP Temp Temp src Heart Rate Resp SpO2   18 1437 102/63 - - 74 18 98 %   18 1206 93/54 97.6  F (36.4  C) Oral 84 18 100 %     Physical Exam   Constitutional: She is oriented to person, place, and time. She appears well-developed.   HENT:   Head: Normocephalic.   Cardiovascular: Normal rate.    Pulmonary/Chest: Effort normal.   Abdominal: Soft. Bowel sounds are normal.   Musculoskeletal: Normal range of motion.   Neurological: She is alert and oriented to person, place, and time.   Nursing note and  vitals reviewed.      Emergency Department Course     Laboratory:  Laboratory findings were communicated with the patient who voiced understanding of the findings.    Hemoglobin 10.9 (L)  Rh type: O positive  HCG Quantitative Pregnancy: >364953 (H)    Interventions:  1229 NS 1000 ml IV    Emergency Department Course:    1210 Nursing notes and vitals reviewed.    1213 I performed an exam of the patient as documented above.     1227 I spoke with Dr. Banda of the OBGYN service from [facility] regarding patient's presentation, findings, and plan of care.    1231 IV was inserted and blood was drawn for laboratory testing, results above.    1438 I personally reviewed the laboratory results with the patient and answered all related questions prior to discharge.    Impression & Plan      Medical Decision Making:  Rizwana Plummer is a 38 year old female who presents to the emergency department today for evaluation of vaginal bleeding and pregnancy.  Patient has had 5 prior visits the emergency room since May.  These are mostly been related to vomiting and pregnancy.  Patient is currently on multiple medications for nausea.  Patient states that that she is due for an infusion at [facility] today and is requesting that I give her IV fluids here.  Patient had a small amount of bleeding that started 20 minutes prior to arrival her vital signs are negative.  Patient has had 2 ultrasounds of the pregnancy this month that showed normal intrauterine pregnancy.  In this case ectopic pregnancy is ruled out.  I cannot rule out early miscarriage or threatened miscarriage without doing another ultrasound to feel this would be an appropriate.  Due to the very slight amount of bleeding that she has had and the symptoms have been going on for only 20 minutes.  Care was discussed with Dr. Banda who is agreed.  I did send another hCG level to confirm climbing of her hCG and this was normal.  When I returned to the room to discuss with them  the thought of calling the clinic before coming to the emergency room.  The  became very upset with me and asked me to leave the room.  He felt like I was not listening to them and was very rude.  I did try to explain to them on multiple occasions that I was trying to assist them and not reporting to the emergency room with complications during the pregnancy without necessity.  And that the gynecology clinic was available to them and interested in their phone call rather than coming to the emergency room.  Patient continued to feel like I was rude and not responsive to his needs and asked me to leave.  I did have the nursing representative and the ER manager come to see the patient.  Patient was given patient representative information.  I did try to communicate through to through to the patient through the nurse that her hCG level is climbing and this is all reassuring but patient failed to be want to speak to me and left without satisfaction in his ER visit.    Diagnosis:    ICD-10-CM    1. Vaginal bleeding in pregnancy, first trimester O20.9      Disposition:   The patient is discharged to home.    Discharge Medications:  No discharge medications.    Scribe Disclosure:  I, Dedra Solomon, am serving as a scribe at 12:11 PM on 6/26/2018 to document services personally performed by Ranjit Strickland MD based on my observations and the provider's statements to me.    [facility] EMERGENCY DEPARTMENT       Ranjit Strickland MD  06/26/18 1737

## 2018-06-26 NOTE — DISCHARGE INSTRUCTIONS
Your lab tests show a climbing hCG level and your blood type is O+.  We have discussed her care with Dr. Banda from the clinic and do not recommend a third ultrasound this month for evaluation of spotting in pregnancy.  I understand that you are not happy with my care.  I have tried to explain this to you but you have refused to listen to me I encourage you to follow-up with your OB/GYN and call them to discuss her obstetrical care prior to coming to the emergency room.    Bleeding During Early Pregnancy     Ultrasound can help check the health of your fetus.     If you ve had bleeding early in your pregnancy, you re not alone. Many other pregnant women have had early bleeding, too. And in most cases, nothing is wrong. But your healthcare provider still needs to know about it. He or she may want to do tests to find out why you re bleeding. Call your healthcare provider if you notice bleeding during pregnancy.  What causes early bleeding?  The cause of bleeding early in pregnancy is often unknown. But many factors early on in pregnancy may lead to bleeding or spotting. These include sexual intercourse, which may cause bleeding in any trimester. Here are some other causes:    Implantation of the embryo on the uterine wall    Subchorionic hemorrhage (bleeding between the sac membrane and the uterus)    Miscarriage    Ectopic (tubal) pregnancy  If you notice spotting  Spotting (very light bleeding) is the most common type of bleeding in early pregnancy. If you notice it, call your healthcare provider. Chances are, he or she will tell you that you can care for yourself at home.  If tests are needed  Depending on how much you bleed, your healthcare provider may ask you to come in for some tests. A pelvic exam, for instance, can help see how far along your pregnancy is. You also may have an ultrasound or a Doppler test. These imaging tests use sound waves to check the health of your fetus. The ultrasound may be done on  your belly or inside your vagina. Your healthcare provider also may order a special blood test. This test compares your hormone levels in blood samples taken 2 days apart. The results can help your healthcare provider learn more about the implantation of the embryo. Your blood type will also need to be checked to evaluate whether you will need to be treated for Rh sensitization.   Warning signs  If your bleeding doesn t stop or if you notice any of the following, seek medical help right away:    Soaking a sanitary pad each hour    Bleeding like you re having a period    Cramping or severe belly pain    Feeling dizzy or faint    Tissue passing through your vagina    Bleeding at any time after the first trimester  Questions you may be asked  Though not normal, bleeding early in pregnancy is common. If you ve noticed any bleeding, you may be concerned. But keep in mind that bleeding alone doesn t mean something is wrong. Call your healthcare provider right away, though. He or she may ask you questions like these to help find the cause of your bleeding:    When did your bleeding start?    Is your bleeding very light (spotting) or is it like a period?    Is the blood bright red or brownish?    Have you had sexual intercourse recently?    Have you had pain or cramping?    Have you felt dizzy or faint?  Monitoring your pregnancy  Bleeding will often stop as quickly as it began. Your pregnancy may go on a normal path again. You may need to make a few extra prenatal visits. But you and your baby will most likely be fine.  Date Last Reviewed: 6/1/2016 2000-2017 [employer]. [address] [place] [address], [place] [address]. All rights reserved. This information is not intended as a substitute for professional medical care. Always follow your healthcare professional's instructions.      Discharge Instructions  Vaginal Bleeding in Pregnancy    Bleeding in early pregnancy can be a sign of a miscarriage in process or an  abnormal pregnancy, but often is innocent and the pregnancy will continue normally. We may do blood pregnancy tests and ultrasound to try to determine what is causing the bleeding in your case, but sometimes we can't tell and need to follow you with time, more blood tests, and another ultrasound.     Return to the Emergency Department if:    You have severe abdominal or pelvic pain.    You faint, or feel lightheaded or dizzy.     Your bleeding is gets much worse, and is heavier than a heavy period or if you pass any blood clots larger than a quarter.    You pass any tissue--solid material that doesn't appear smooth and even like a blood clot. If you pass tissue, save it (even if you have to pull it out of the toilet) and put it in a plastic bag or jar and bring it in.      You have a fever of 100.5 degrees or higher.  If no pregnancy could be seen:    It may be that everything is normal and it is just too early to see the pregnancy, but you could have an ectopic pregnancy, which is a pregnancy in an abnormal location, such as in the tube. An ectopic pregnancy can cause severe internal bleeding or death.    You need to be seen by your regular doctor, or an OB/GYN doctor, in 48-72 hours for a repeat blood pregnancy test.    You should not be alone, in case you suddenly become very sick.     You should not have sex or put anything in your vagina.     If a pregnancy was seen in your uterus:    If a heartbeat could be seen, the chance of miscarriage is much lower.    You need to see your regular doctor, or an OB/GYN doctor, within 2-3 days.    You should not have sex or put anything in your vagina.     Facts about miscarriage: We hope you don't have a miscarriage, but if you do, here are important things to know:    Early miscarriage is very common, and having one miscarriage doesn't mean you will have problems with another pregnancy.    Nothing you did caused it. Taking medicine, drinking alcohol, having sex,  exercising, or falling down won't cause a miscarriage.     If you were given a prescription for medicine here today, be sure to read all of the information (including the package insert) that comes with your prescription.  This will include important information about the medicine, its side effects, and any warnings that you need to know about.  The pharmacist who fills the prescription can provide more information and answer questions you may have about the medicine.  If you have questions or concerns that the pharmacist cannot address, please call or return to the Emergency Department.     Opioid Medication Information    Pain medications are among the most commonly prescribed medicines, so we are including this information for all our patients. If you did not receive pain medication or get a prescription for pain medicine, you can ignore it.     You may have been given a prescription for an opioid (narcotic) pain medicine and/or have received a pain medicine while here in the Emergency Department. These medicines can make you drowsy or impaired. You must not drive, operate dangerous equipment, or engage in any other dangerous activities while taking these medications. If you drive while taking these medications, you could be arrested for DUI, or driving under the influence. Do not drink any alcohol while you are taking these medications.     Opioid pain medications can cause addiction. If you have a history of chemical dependency of any type, you are at a higher risk of becoming addicted to pain medications.  Only take these prescribed medications to treat your pain when all other options have been tried. Take it for as short a time and as few doses as possible. Store your pain pills in a secure place, as they are frequently stolen and provide a dangerous opportunity for children or visitors in your house to start abusing these powerful medications. We will not replace any lost or stolen medicine.  As soon as  your pain is better, you should flush all your remaining medication.     Many prescription pain medications contain Tylenol  (acetaminophen), including Vicodin , Tylenol #3 , Norco , Lortab , and Percocet .  You should not take any extra pills of Tylenol  if you are using these prescription medications or you can get very sick.  Do not ever take more than 3000 mg of acetaminophen in any 24 hour period.    All opioids tend to cause constipation. Drink plenty of water and eat foods that have a lot of fiber, such as fruits, vegetables, prune juice, apple juice and high fiber cereal.  Take a laxative if you don t move your bowels at least every other day. Miralax , Milk of Magnesia, Colace , or Senna  can be used to keep you regular.      Remember that you can always come back to the Emergency Department if you are not able to see your regular doctor in the amount of time listed above, if you get any new symptoms, or if there is anything that worries you.

## 2018-06-27 ENCOUNTER — PRENATAL OFFICE VISIT (OUTPATIENT)
Dept: OBGYN | Facility: CLINIC | Age: 38
End: 2018-06-27
Payer: COMMERCIAL

## 2018-06-27 VITALS — DIASTOLIC BLOOD PRESSURE: 50 MMHG | WEIGHT: 125 LBS | SYSTOLIC BLOOD PRESSURE: 92 MMHG | BODY MASS INDEX: 25.25 KG/M2

## 2018-06-27 DIAGNOSIS — O21.0 HYPEREMESIS GRAVIDARUM: ICD-10-CM

## 2018-06-27 DIAGNOSIS — Z34.91 PRENATAL CARE IN FIRST TRIMESTER: Primary | ICD-10-CM

## 2018-06-27 PROCEDURE — 99207 ZZC PRENATAL VISIT: CPT | Performed by: FAMILY MEDICINE

## 2018-06-27 NOTE — PROGRESS NOTES
CC: Here for routine prenatal visit   38 year old y/o  @ 8w6d with Estimated Date of Delivery: 2019   HPI: Pt reports vaginal bleeding beginning yesterday around noon, she went in to the ED & was given IV fluids -- states she was not treated well at ED & they refused to perform an US to check on the fetus. The bleeding is now dissipating [now brown discharge instead of blood], but Pt is still not feeling well and would like an US in clinic today to check her baby.         This document serves as a record of the services and decisions personally performed and made by Cindy Laurent DO. It was created on her behalf by Danisha Wing, a trained medical scribe. The creation of this document is based the provider's statements to the medical scribe.  Scribkaterina Wing 2:59 PM, 2018    BP 92/50 (BP Location: Left arm, Patient Position: Sitting, Cuff Size: Adult Regular)  Wt 56.7 kg (125 lb)  LMP 2018 (Exact Date)  BMI 25.25 kg/m2  See OB flowsheet  + fetal movement, no contractions, mild bleeding, no loss of fluid   Discussed monitoring fetal movement - has not begun feeling movement, informed typically begins @ 18-20w      1) concerns: Vaginal bleeding - US performed in clinic & FHT seen, formal US ordered to check anatomy  2) Routine care: has not had 1st OB visit, scheduled & will attend  3) Return: 4 weeks        The information in this document, created by the medical scribe for me, accurately reflects the services I personally performed and the decisions made by me. I have reviewed and approved this document for accuracy prior to leaving the patient care area.  2:59 PM, 18    Mehran

## 2018-06-27 NOTE — PATIENT INSTRUCTIONS
Return as scheduled    Phone numbers [place]:  Day/ night [phone] ask for ob triage  Emergency:  Call labor and delivery:  [phone]    What should I call about??    Contraction every 5 minutes for 1 hour 1 minute long (511), bleeding, loss of fluid, headache that doesn't resolve with tylenol, and decreased fetal movement     Start kick counts @ 26-28 weeks   Keep track of movement and discover your normal baby movement pattern   guideline is listed below  Please call if you do not feel the baby move!  We will have you come in for fetal heart rate monitoring:   Perception of at least 10 FMs during 12 hours of normal maternal activity   Perception of least 10 FMs over two hours when the mother is at rest and focused on [facility] Address   [address], [place], [place] [id number] [phone]    Dr. Cindy Laurent, DO    OB/GYN   [facility]

## 2018-06-27 NOTE — MR AVS SNAPSHOT
After Visit Summary   6/27/2018    Rizwana Plummer    MRN: [id number]           Patient Information     Date Of Birth          1980        Visit Information        Provider Department      6/27/2018 2:30 PM Cindy Laurent, DO [facility]        Today's Diagnoses     Prenatal care in first trimester    -  1    Hyperemesis gravidarum          Care Instructions    Return as scheduled    Phone numbers [place]:  Day/ night [phone] ask for ob triage  Emergency:  Call labor and delivery:  [phone]    What should I call about??    Contraction every 5 minutes for 1 hour 1 minute long (511), bleeding, loss of fluid, headache that doesn't resolve with tylenol, and decreased fetal movement     Start kick counts @ 26-28 weeks   Keep track of movement and discover your normal baby movement pattern   guideline is listed below  Please call if you do not feel the baby move!  We will have you come in for fetal heart rate monitoring:   Perception of at least 10 FMs during 12 hours of normal maternal activity   Perception of least 10 FMs over two hours when the mother is at rest and focused on counting       [facility] Address   [address], [place] [address]  [phone]    Dr. Cindy Laurent, DO    OB/GYN   [facility]                                      Follow-ups after your visit        Your next 10 appointments already scheduled     Jun 28, 2018  2:00 PM CDT   Level 2 with RH INFUSION CHAIR 11   [facility] ([facility])    [facility]  [address]  [facility] [address] [phone]            Jul 02, 2018  1:30 PM CDT   New Prenatal with Sonia Martinez, DO   [facility]    [address]  [facility] [id number] [phone]            Jul 03, 2018  2:00 PM CDT   Level 2 with RH  INFUSION CHAIR 5   [facility] ([facility])    [facility]  [address]  [facility] [address] [phone]            Jul 06, 2018  2:00 PM CDT   Level 2 with RH INFUSION CHAIR 8   [facility] ([facility])    [facility]  [address]  [facility] [id number] [phone]            Aug 14, 2018 10:45 AM CDT   ESTABLISHED PRENATAL with Cindy Laurent, DO   [facility]    [address]  [facility] [address] [phone]              Who to contact     If you have questions or need follow up information about today's clinic visit or your schedule please contact [facility] directly at [phone].  Normal or non-critical lab and imaging results will be communicated to you by MyChart, letter or phone within 4 business days after the clinic has received the results. If you do not hear from us within 7 days, please contact the clinic through [employer] or phone. If you have a critical or abnormal lab result, we will notify you by phone as soon as possible.  Submit refill requests through [employer] or call your pharmacy and they will forward the refill request to us. Please allow 3 business days for your refill to be completed.          Additional Information About Your Visit        [employer] Information     [employer] gives you secure access to your electronic health record. If you see a primary care provider, you can also send messages to your care team and make appointments. If you have questions, please call your primary care clinic.  If you do not have a primary care provider, please call [phone] and they will assist you.        Care EveryWhere ID     This is your Care EveryWhere ID. This could be used by other organizations to access your [facility]  records  [id number]        Your Vitals Were     Last Period BMI (Body Mass Index)                04/26/2018 (Exact Date) 25.25 kg/m2           Blood Pressure from Last 3 Encounters:   06/27/18 92/50   06/26/18 102/63   06/21/18 (!) 87/50    Weight from Last 3 Encounters:   06/27/18 125 lb (56.7 kg)   06/19/18 128 lb 4.8 oz (58.2 kg)   06/18/18 124 lb (56.2 kg)              Today, you had the following     No orders found for display       Primary Care Provider Office Phone # Fax #    Cindy Laurent, [phone]       [address] [facility] [address]        Equal Access to Services     JORGE HINES : Demond christieo Socathy, waaxda luqadaha, qaybta kaalmada adeegyada, sebastien zayas . So [facility] [phone].    ATENCIÓN: Si habla español, tiene a pink disposición servicios gratuitos de asistencia lingüística. Llame al [phone].    We comply with applicable federal civil rights laws and [place] laws. We do not discriminate on the basis of race, color, national origin, age, disability, sex, sexual orientation, or gender identity.            Thank you!     Thank you for choosing [facility]  for your care. Our goal is always to provide you with excellent care. Hearing back from our patients is one way we can continue to improve our services. Please take a few minutes to complete the written survey that you may receive in the mail after your visit with us. Thank you!             Your Updated Medication List - Protect others around you: Learn how to safely use, store and throw away your medicines at www.disposemymeds.org.          This list is accurate as of 6/27/18  3:10 PM.  Always use your most recent med list.                   Brand Name Dispense Instructions for use Diagnosis    cholecalciferol 1000 UNIT tablet    vitamin D3    90 tablet    Take 1 tablet (1,000 Units) by mouth daily    Vitamin D deficiency       doxylamine 25 MG Tabs  tablet    UNISOM     Take 25 mg by mouth At Bedtime        metoclopramide 5 MG tablet    REGLAN    120 tablet    Take 1 tablet (5 mg) by mouth 4 times daily as needed    Hyperemesis gravidarum       prenatal multivitamin plus iron 27-0.8 MG Tabs per tablet     100 tablet    Take 1 tablet by mouth daily        pyridOXINE 50 MG tablet    [employer] VITAMIN B-6    90 tablet    Take 1 tablet (50 mg) by mouth 3 times daily    Hyperemesis gravidarum       TYLENOL PO      Take 325 mg by mouth        * ZOFRAN ODT PO      Take 1 tablet by mouth every 6 hours as needed for nausea        * ondansetron 4 MG ODT tab    ZOFRAN ODT    30 tablet    Take 1 tablet (4 mg) by mouth every 8 hours as needed    Hyperemesis gravidarum       * Notice:  This list has 2 medication(s) that are the same as other medications prescribed for you. Read the directions carefully, and ask your doctor or other care provider to review them with you.

## 2018-06-27 NOTE — NURSING NOTE
"Chief Complaint   Patient presents with     Prenatal Care     F/U E/D- very nauseas        Initial BP 92/50 (BP Location: Left arm, Patient Position: Sitting, Cuff Size: Adult Regular)  Wt 125 lb (56.7 kg)  LMP 2018 (Exact Date)  BMI 25.25 kg/m2 Estimated body mass index is 25.25 kg/(m^2) as calculated from the following:    Height as of 18: 4' 11\" (1.499 m).    Weight as of this encounter: 125 lb (56.7 kg).  BP completed using cuff size: regular        The following HM Due: NONE    patient has appointment for today.  Devon Frost CMA                 "

## 2018-06-28 ENCOUNTER — INFUSION THERAPY VISIT (OUTPATIENT)
Dept: INFUSION THERAPY | Facility: CLINIC | Age: 38
End: 2018-06-28
Attending: FAMILY MEDICINE
Payer: COMMERCIAL

## 2018-06-28 VITALS — TEMPERATURE: 99.3 F | RESPIRATION RATE: 16 BRPM | SYSTOLIC BLOOD PRESSURE: 87 MMHG | DIASTOLIC BLOOD PRESSURE: 48 MMHG

## 2018-06-28 DIAGNOSIS — E86.0 DEHYDRATION: ICD-10-CM

## 2018-06-28 DIAGNOSIS — O21.0 HYPEREMESIS GRAVIDARUM: Primary | ICD-10-CM

## 2018-06-28 PROCEDURE — 96361 HYDRATE IV INFUSION ADD-ON: CPT

## 2018-06-28 PROCEDURE — 25000128 H RX IP 250 OP 636: Performed by: FAMILY MEDICINE

## 2018-06-28 PROCEDURE — 96374 THER/PROPH/DIAG INJ IV PUSH: CPT

## 2018-06-28 RX ORDER — ONDANSETRON 2 MG/ML
8 INJECTION INTRAMUSCULAR; INTRAVENOUS EVERY 6 HOURS PRN
Status: DISCONTINUED | OUTPATIENT
Start: 2018-06-28 | End: 2018-06-28 | Stop reason: HOSPADM

## 2018-06-28 RX ORDER — ONDANSETRON 2 MG/ML
8 INJECTION INTRAMUSCULAR; INTRAVENOUS EVERY 6 HOURS PRN
Status: CANCELLED
Start: 2018-06-28

## 2018-06-28 RX ADMIN — ONDANSETRON 8 MG: 2 INJECTION INTRAMUSCULAR; INTRAVENOUS at 14:44

## 2018-06-28 RX ADMIN — DEXTROSE MONOHYDRATE 500 ML: 50 INJECTION, SOLUTION INTRAVENOUS at 15:06

## 2018-06-28 RX ADMIN — SODIUM CHLORIDE 500 ML: 9 INJECTION, SOLUTION INTRAVENOUS at 14:35

## 2018-06-28 NOTE — PROGRESS NOTES
Infusion Nursing Note:   Rizwana Plummer presents today for IVF and zofran.    Patient seen by provider today: No   present during visit today: Not Applicable.    Note: she says it helping..    Intravenous Access:  Peripheral IV placed.    Treatment Conditions:  Not Applicable.      Post Infusion Assessment:  Patient tolerated infusion without incident.  Blood return noted pre and post infusion.  Site patent and intact, free from redness, edema or discomfort.  Access discontinued per protocol.    Discharge Plan:   Discharge instructions reviewed with: Patient.  Patient and/or family verbalized understanding of discharge instructions and all questions answered.  AVS to patient via [employer].  Patient will return 7/3 for next appointment.   Patient discharged in stable condition accompanied by: self.  Departure Mode: Ambulatory.    Johanna Wilson RN

## 2018-06-28 NOTE — MR AVS SNAPSHOT
After Visit Summary   6/28/2018    Rizwana Plummer    MRN: [id number]           Patient Information     Date Of Birth          1980        Visit Information        Provider Department      6/28/2018 2:00 PM RH INFUSION CHAIR 11 [facility]fusion [facility]        Today's Diagnoses     Hyperemesis gravidarum    -  1    Dehydration           Follow-ups after your visit        Your next 10 appointments already scheduled     Jul 02, 2018  1:30 PM CDT   ESTABL[facility] PRENATAL [facility] Laurent DO   [facility]    [address] [facility] [address]   [phone]            Jul 03, 2018  2:00 PM CDT   Level 2 with RH INFUSION CHAIR 5   [facility] Infusion [facility] ([facility])    [facility]  [address]  [facility] [address]   [phone]            Jul 06, 2018  2:00 PM CDT   Level 2 with RH INFUSION CHAIR 8   [facility] Infusion [facility] ([facility])    [facility]  [address]  [facility] [address] [phone]            Aug 14, 2018 10:45 AM CDT   ESTABLISHED PRENATAL with Cindy Laurent DO   [facility] ([facility]    [address]  [facility] [address] [phone]              Who to contact     If you have questions or need follow up information about today's clinic visit or your schedule please contact [facility] INFUSION SERVICES directly at [phone].  Normal or non-critical lab and imaging results will be communicated to you by MyChart, letter or phone within 4 business days after the clinic has received the results. If you do not hear from us within 7 days, please contact the clinic through MyChart or phone. If you have a critical or abnormal lab result, we will notify you by phone as soon as  possible.  Submit refill requests through [employer] or call your pharmacy and they will forward the refill request to us. Please allow 3 business days for your refill to be completed.          Additional Information About Your Visit        [employer] Information     [employer] gives you secure access to your electronic health record. If you see a primary care provider, you can also send messages to your care team and make appointments. If you have questions, please call your primary care clinic.  If you do not have a primary care provider, please call [phone] and they will assist you.        Care EveryWhere ID     This is your Care EveryWhere ID. This could be used by other organizations to access your [place] medical records  [id number]        Your Vitals Were     Temperature Respirations Last Period             99.3  F (37.4  C) (Tympanic) 16 04/26/2018 (Exact Date)          Blood Pressure from Last 3 Encounters:   06/28/18 (!) 87/48   06/27/18 92/50   06/26/18 102/63    Weight from Last 3 Encounters:   06/27/18 56.7 kg (125 lb)   06/19/18 58.2 kg (128 lb 4.8 oz)   06/18/18 56.2 kg (124 lb)              Today, you had the following     No orders found for display       Primary Care Provider Office Phone # Fax #    Cindy Bajwa Mehran,  [phone]       [address] [facility] [address]        Equal Access to Services     [facility] : Hadii eyal ku hadasho Soomaali, waaxda luqadaha, qaybta kaalmada adeyousifyada, sebastien zayas . So [facility] [phone].    ATENCIÓN: Si habla español, tiene a pink disposición servicios gratuitos de asistencia lingüística. Llame al [phone].    We comply with applicable federal civil rights laws and [place] laws. We do not discriminate on the basis of race, color, national origin, age, disability, sex, sexual orientation, or gender identity.            Thank you!     Thank you for choosing [facility] INFUSION SERVICES   for your care. Our goal is always to provide you with excellent care. Hearing back from our patients is one way we can continue to improve our services. Please take a few minutes to complete the written survey that you may receive in the mail after your visit with us. Thank you!             Your Updated Medication List - Protect others around you: Learn how to safely use, store and throw away your medicines at www.disposemymeds.org.          This list is accurate as of 6/28/18  4:28 PM.  Always use your most recent med list.                   Brand Name Dispense Instructions for use Diagnosis    cholecalciferol 1000 UNIT tablet    vitamin D3    90 tablet    Take 1 tablet (1,000 Units) by mouth daily    Vitamin D deficiency       doxylamine 25 MG Tabs tablet    UNISOM     Take 25 mg by mouth At Bedtime        metoclopramide 5 MG tablet    REGLAN    120 tablet    Take 1 tablet (5 mg) by mouth 4 times daily as needed    Hyperemesis gravidarum       prenatal multivitamin plus iron 27-0.8 MG Tabs per tablet     100 tablet    Take 1 tablet by mouth daily        pyridOXINE 50 MG tablet    CVS VITAMIN B-6    90 tablet    Take 1 tablet (50 mg) by mouth 3 times daily    Hyperemesis gravidarum       TYLENOL PO      Take 325 mg by mouth        * ZOFRAN ODT PO      Take 1 tablet by mouth every 6 hours as needed for nausea        * ondansetron 4 MG ODT tab    ZOFRAN ODT    30 tablet    Take 1 tablet (4 mg) by mouth every 8 hours as needed    Hyperemesis gravidarum       * Notice:  This list has 2 medication(s) that are the same as other medications prescribed for you. Read the directions carefully, and ask your doctor or other care provider to review them with you.

## 2018-07-02 ENCOUNTER — TELEPHONE (OUTPATIENT)
Dept: OBGYN | Facility: CLINIC | Age: 38
End: 2018-07-02

## 2018-07-02 ENCOUNTER — INFUSION THERAPY VISIT (OUTPATIENT)
Dept: INFUSION THERAPY | Facility: CLINIC | Age: 38
End: 2018-07-02
Attending: FAMILY MEDICINE
Payer: COMMERCIAL

## 2018-07-02 ENCOUNTER — PRENATAL OFFICE VISIT (OUTPATIENT)
Dept: OBGYN | Facility: CLINIC | Age: 38
End: 2018-07-02
Payer: COMMERCIAL

## 2018-07-02 VITALS
DIASTOLIC BLOOD PRESSURE: 52 MMHG | SYSTOLIC BLOOD PRESSURE: 80 MMHG | WEIGHT: 126.2 LBS | HEIGHT: 59 IN | BODY MASS INDEX: 25.44 KG/M2

## 2018-07-02 DIAGNOSIS — O21.0 HYPEREMESIS GRAVIDARUM: Primary | ICD-10-CM

## 2018-07-02 DIAGNOSIS — Z98.891 HISTORY OF CESAREAN SECTION: ICD-10-CM

## 2018-07-02 DIAGNOSIS — O21.0 HYPEREMESIS GRAVIDARUM: ICD-10-CM

## 2018-07-02 DIAGNOSIS — Z34.91 PRENATAL CARE IN FIRST TRIMESTER: Primary | ICD-10-CM

## 2018-07-02 DIAGNOSIS — E86.0 DEHYDRATION: ICD-10-CM

## 2018-07-02 PROCEDURE — 96361 HYDRATE IV INFUSION ADD-ON: CPT

## 2018-07-02 PROCEDURE — 87591 N.GONORRHOEAE DNA AMP PROB: CPT | Performed by: FAMILY MEDICINE

## 2018-07-02 PROCEDURE — 99207 ZZC FIRST OB VISIT: CPT | Performed by: FAMILY MEDICINE

## 2018-07-02 PROCEDURE — 96374 THER/PROPH/DIAG INJ IV PUSH: CPT

## 2018-07-02 PROCEDURE — 25000128 H RX IP 250 OP 636: Performed by: FAMILY MEDICINE

## 2018-07-02 PROCEDURE — 87491 CHLMYD TRACH DNA AMP PROBE: CPT | Performed by: FAMILY MEDICINE

## 2018-07-02 RX ORDER — ONDANSETRON 2 MG/ML
8 INJECTION INTRAMUSCULAR; INTRAVENOUS EVERY 6 HOURS PRN
Status: CANCELLED
Start: 2018-07-02

## 2018-07-02 RX ORDER — ONDANSETRON 2 MG/ML
8 INJECTION INTRAMUSCULAR; INTRAVENOUS EVERY 6 HOURS PRN
Status: DISCONTINUED | OUTPATIENT
Start: 2018-07-02 | End: 2018-07-02 | Stop reason: HOSPADM

## 2018-07-02 RX ADMIN — SODIUM CHLORIDE 500 ML: 9 INJECTION, SOLUTION INTRAVENOUS at 14:53

## 2018-07-02 RX ADMIN — ONDANSETRON 8 MG: 2 INJECTION INTRAMUSCULAR; INTRAVENOUS at 15:23

## 2018-07-02 RX ADMIN — DEXTROSE MONOHYDRATE 500 ML: 50 INJECTION, SOLUTION INTRAVENOUS at 15:31

## 2018-07-02 NOTE — PATIENT INSTRUCTIONS
Return every 4 weeks     Phone numbers [place]:  Day/ night [phone] ask for ob triage  Emergency:  Call labor and delivery:  [phone]    What should I call about??  Vaginal bleeding     [facility] Address   [address], [place] [address] [phone]    Dr. Cindy Laurent, DO    OB/GYN   [facility]

## 2018-07-02 NOTE — TELEPHONE ENCOUNTER
Procedure name(s) - multi select repeat  section       Reason for procedure history of cs x      Surgeon: Mehran      Is this a multi surgeon case? No      Laterality N/A      Request for additional equipment Other (see comments) None     Anesthesia Spinal      Initiate Pre-op orders for above procedure: Yes, as ordered in Epic Additional orders noted there also     Location of Case: [facility]      Surgeon Procedure Time (incision to closure) in minutes (per procedure as applicable) 60      Note:  Surgical Case Time Needed (in minutes)     Patient Class (for admit prior to surgery, specify number of days in comments): Surgery admit      H&P To Be Completed By: Surgeon       needed? No      Post-Op Appointment 1.5 week      Bowel Prep: No      Vendor Needed? No        
Type of surgery: repeat  section  Location of surgery: [facility]  Date and time of surgery: 19 @ 9:00 am  Surgeon: Dr. Laurent  Pre-Op Appt Date: @ prenatal appointment  Post-Op Appt Date: 19   Packet sent out: Yes  Pre-cert/Authorization completed:  No  Date: 18    
Name band;

## 2018-07-02 NOTE — MR AVS SNAPSHOT
After Visit Summary   7/2/2018    Rizwana Plumemr    MRN: [id number]           Patient Information     Date Of Birth          1980        Visit Information        Provider Department      7/2/2018 2:30 PM RH INFUSION CHAIR 11 [facility] Infusion Services        Today's Diagnoses     Hyperemesis gravidarum    -  1    Dehydration           Follow-ups after your visit        Your next 10 appointments already scheduled     Jul 03, 2018  2:00 PM CDT   Level 2 with RH INFUSION CHAIR 5   [facility] Infusion [facility] ([facility])    [facility]  [address]  [facility] [address]   [phone]            Jul 06, 2018  2:00 PM CDT   Level 2 with RH INFUSION CHAIR 8   [facility] Infusion [facility] ([facility])    [facility]  [address]  [facility] [address]   [phone]            Aug 14, 2018 10:45 AM CDT   ESTABLISHED PRENATAL with Cindy Laurent DO   [facility] ([facility])    [address]  [facility] [id number] [phone]            Jan 24, 2019   Procedure with Cindy Laurent DO   [facility] Labor and Delivery (--)    [address]  [facility] [address] [phone]            Feb 07, 2019  1:00 PM CST   SHORT with Cindy Laurent DO   [facility] ([facility])    [address]  [facility] [address] [phone]              Who to contact     If you have questions or need follow up information about today's clinic visit or your schedule please contact [facility] INFUSION SERVICES directly at [phone].  Normal or non-critical lab and imaging results will be communicated to you by MyChart, letter or phone within 4 business days after the clinic has received the results. If you do not hear from us  within 7 days, please contact the clinic through [employer] or phone. If you have a critical or abnormal lab result, we will notify you by phone as soon as possible.  Submit refill requests through [employer] or call your pharmacy and they will forward the refill request to us. Please allow 3 business days for your refill to be completed.          Additional Information About Your Visit        [employer] Information     [employer] gives you secure access to your electronic health record. If you see a primary care provider, you can also send messages to your care team and make appointments. If you have questions, please call your primary care clinic.  If you do not have a primary care provider, please call [phone] and they will assist you.        Care EveryWhere ID     This is your Care EveryWhere ID. This could be used by other organizations to access your [place] medical records  [id number]        Your Vitals Were     Last Period                   04/26/2018 (Exact Date)            Blood Pressure from Last 3 Encounters:   07/02/18 (!) 80/52   06/28/18 (!) 87/48   06/27/18 92/50    Weight from Last 3 Encounters:   07/02/18 57.2 kg (126 lb 3.2 oz)   06/27/18 56.7 kg (125 lb)   06/19/18 58.2 kg (128 lb 4.8 oz)              Today, you had the following     No orders found for display       Primary Care Provider Office Phone # Fax #    Cindy Bajwa DO Mehran [phone]       [address] [facility] [address]        Equal Access to Services     OSMEL HINES : Hadii aad ku hadasho Soomaali, waaxda luqadaha, qaybta kaalmada adeegyada, sebastien zayas . So [facility] [phone].    ATENCIÓN: Si habla español, tiene a pink disposición servicios gratuitos de asistencia lingüística. Llame al [phone].    We comply with applicable federal civil rights laws and [place] laws. We do not discriminate on the basis of race, color, national origin, age, disability, sex, sexual orientation,  or gender identity.            Thank you!     Thank you for choosing [facility] INFUSION SERVICES  for your care. Our goal is always to provide you with excellent care. Hearing back from our patients is one way we can continue to improve our services. Please take a few minutes to complete the written survey that you may receive in the mail after your visit with us. Thank you!             Your Updated Medication List - Protect others around you: Learn how to safely use, store and throw away your medicines at www.disposemymeds.org.          This list is accurate as of 7/2/18  4:30 PM.  Always use your most recent med list.                   Brand Name Dispense Instructions for use Diagnosis    cholecalciferol 1000 UNIT tablet    vitamin D3    90 tablet    Take 1 tablet (1,000 Units) by mouth daily    Vitamin D deficiency       doxylamine 25 MG Tabs tablet    UNISOM     Take 25 mg by mouth At Bedtime        metoclopramide 5 MG tablet    REGLAN    120 tablet    Take 1 tablet (5 mg) by mouth 4 times daily as needed    Hyperemesis gravidarum       prenatal multivitamin plus iron 27-0.8 MG Tabs per tablet     100 tablet    Take 1 tablet by mouth daily        pyridOXINE 50 MG tablet    CVS VITAMIN B-6    90 tablet    Take 1 tablet (50 mg) by mouth 3 times daily    Hyperemesis gravidarum       TYLENOL PO      Take 325 mg by mouth        * ZOFRAN ODT PO      Take 1 tablet by mouth every 6 hours as needed for nausea        * ondansetron 4 MG ODT tab    ZOFRAN ODT    30 tablet    Take 1 tablet (4 mg) by mouth every 8 hours as needed    Hyperemesis gravidarum       * Notice:  This list has 2 medication(s) that are the same as other medications prescribed for you. Read the directions carefully, and ask your doctor or other care provider to review them with you.

## 2018-07-02 NOTE — PROGRESS NOTES
Infusion Nursing Note:  Rizwana Plummer presents today for IV hydration and Zofran.    Patient seen by provider today: Yes: OB   present during visit today: Not Applicable.    Note: Assessment done by MD.    Intravenous Access:  Peripheral IV placed.    Treatment Conditions:  Not Applicable.      Post Infusion Assessment:  Patient tolerated infusion without incident.  Blood return noted pre and post infusion.  Site patent and intact, free from redness, edema or discomfort.  No evidence of extravasations.  Access discontinued per protocol.    Discharge Plan:   Discharge instructions reviewed with: Patient.  Patient discharged in stable condition accompanied by: self.  Departure Mode: Ambulatory.  Scheduled for fluids again tomorrow.    GALE NINA RN

## 2018-07-02 NOTE — NURSING NOTE
"9w4d    Chief Complaint   Patient presents with     Prenatal Care     NPN--pap not due--pt is supposed infusion 3 times a week and has only had 1 infusion last week--infusion stated that they are full--sometimes feels good but sometimes throws up--pt had some spotting last week and still spotting--darker discharge       Initial BP (!) 80/52  Ht 4' 11\" (1.499 m)  Wt 126 lb 3.2 oz (57.2 kg)  LMP 2018 (Exact Date)  BMI 25.49 kg/m2 Estimated body mass index is 25.49 kg/(m^2) as calculated from the following:    Height as of this encounter: 4' 11\" (1.499 m).    Weight as of this encounter: 126 lb 3.2 oz (57.2 kg).  BP completed using cuff size: regular        The following HM Due: NONE      "

## 2018-07-02 NOTE — MR AVS SNAPSHOT
After Visit Summary   7/2/2018    Rizwana Plummer    MRN: [id number]           Patient Information     Date Of Birth          1980        Visit Information        Provider Department      7/2/2018 1:30 PM Cindy Laurent,  [facility]        Today's Diagnoses     Prenatal care in first trimester    -  1    Hyperemesis gravidarum          Care Instructions    Return every 4 weeks     Phone numbers [place]:  Day/ night [phone] ask for ob triage  Emergency:  Call labor and delivery:  [phone]    What should I call about??  Vaginal bleeding     [facility] Address   [address], [place], [place] [address] [phone]    Dr. Cindy Laurent, DO    OB/GYN   [facility]                                      Follow-ups after your visit        Your next 10 appointments already scheduled     Jul 03, 2018  2:00 PM CDT   Level 2 with RH INFUSION CHAIR 5   [facility] Infusion Services ([facility])    [facility]  [address]  [facility] [address] [phone]            Jul 06, 2018  2:00 PM CDT   Level 2 with RH INFUSION CHAIR 8   [facility] Infusion [facility] ([facility])    [facility]  [address]  [facility] [address] [phone]            Aug 14, 2018 10:45 AM CDT   ESTABLISHED PRENATAL with Cindy Laurent DO   [facility]    [address]  [facility] [address] [phone]              Who to contact     If you have questions or need follow up information about today's clinic visit or your schedule please contact [facility] directly at [phone].  Normal or non-critical lab and imaging results will be communicated to you by MyChart, letter or phone within 4 business days after the clinic has received  "the results. If you do not hear from us within 7 days, please contact the clinic through [employer] or phone. If you have a critical or abnormal lab result, we will notify you by phone as soon as possible.  Submit refill requests through [employer] or call your pharmacy and they will forward the refill request to us. Please allow 3 business days for your refill to be completed.          Additional Information About Your Visit        [employer] Information     [employer] gives you secure access to your electronic health record. If you see a primary care provider, you can also send messages to your care team and make appointments. If you have questions, please call your primary care clinic.  If you do not have a primary care provider, please call [phone] and they will assist you.        Care EveryWhere ID     This is your Care EveryWhere ID. This could be used by other organizations to access your [place] medical records  [id number]        Your Vitals Were     Height Last Period BMI (Body Mass Index)             4' 11\" (1.499 m) 04/26/2018 (Exact Date) 25.49 kg/m2          Blood Pressure from Last 3 Encounters:   07/02/18 (!) 80/52   06/28/18 (!) 87/48   06/27/18 92/50    Weight from Last 3 Encounters:   07/02/18 126 lb 3.2 oz (57.2 kg)   06/27/18 125 lb (56.7 kg)   06/19/18 128 lb 4.8 oz (58.2 kg)              We Performed the Following     CHLAMYDIA TRACHOMATIS PCR     NEISSERIA GONORRHOEA PCR        Primary Care Provider Office Phone # Fax #    Cindy Aydee Laurent,  [phone]       [address] [facility] [address]        Equal Access to Services     [facility] AH: Hadii aad brandon hadasho Soomaali, waaxda luqadaha, qaybta kaalmada mary, sebastien morales. So [facility] [phone].    ATENCIÓN: Si habla español, tiene a pink disposición servicios gratuitos de asistencia lingüística. Mellisa al [phone].    We comply with applicable federal civil rights laws and [place] " laws. We do not discriminate on the basis of race, color, national origin, age, disability, sex, sexual orientation, or gender identity.            Thank you!     Thank you for choosing [facility]  for your care. Our goal is always to provide you with excellent care. Hearing back from our patients is one way we can continue to improve our services. Please take a few minutes to complete the written survey that you may receive in the mail after your visit with us. Thank you!             Your Updated Medication List - Protect others around you: Learn how to safely use, store and throw away your medicines at www.disposemymeds.org.          This list is accurate as of 7/2/18  1:57 PM.  Always use your most recent med list.                   Brand Name Dispense Instructions for use Diagnosis    cholecalciferol 1000 UNIT tablet    vitamin D3    90 tablet    Take 1 tablet (1,000 Units) by mouth daily    Vitamin D deficiency       doxylamine 25 MG Tabs tablet    UNISOM     Take 25 mg by mouth At Bedtime        metoclopramide 5 MG tablet    REGLAN    120 tablet    Take 1 tablet (5 mg) by mouth 4 times daily as needed    Hyperemesis gravidarum       prenatal multivitamin plus iron 27-0.8 MG Tabs per tablet     100 tablet    Take 1 tablet by mouth daily        pyridOXINE 50 MG tablet    CVS VITAMIN B-6    90 tablet    Take 1 tablet (50 mg) by mouth 3 times daily    Hyperemesis gravidarum       TYLENOL PO      Take 325 mg by mouth        * ZOFRAN ODT PO      Take 1 tablet by mouth every 6 hours as needed for nausea        * ondansetron 4 MG ODT tab    ZOFRAN ODT    30 tablet    Take 1 tablet (4 mg) by mouth every 8 hours as needed    Hyperemesis gravidarum       * Notice:  This list has 2 medication(s) that are the same as other medications prescribed for you. Read the directions carefully, and ask your doctor or other care provider to review them with you.

## 2018-07-02 NOTE — PROGRESS NOTES
Rizwana Plummer is a 38 year old  @ 9w4d wks EGA with 2019 who presents to the clinic for an new ob visit.         Estimated Date of Delivery: 2019  Reviewed nurse intake visit on 2018  Concerns:   - Pt is doing well, states she is only experiencing mild vaginal spotting at this time.      Patient Active Problem List   Diagnosis     Female genital infibulation     Epidermal inclusion cyst of infibulation scar     Influenza A     Dehydration     Hyperemesis gravidarum     Past Medical History:   Diagnosis Date     Varicella age 5     Past Surgical History:   Procedure Laterality Date      SECTION  2014    Procedure:  SECTION;   Primary  section         SECTION N/A 2017    Procedure:  SECTION;  Surgeon: Cindy Laurent DO;  Location: [facility] OR     Infibulation       MARSUPIALIZATION BARTHOLIN CYST  2013    Procedure: MARSUPIALIZATION BARTHOLIN CYST;  Removal of Inclusion Cyst.   Fetal heart tones 180's;  Surgeon: Rohit Parks MD;  Location:  OR     Current Outpatient Prescriptions   Medication Sig Dispense Refill     Acetaminophen (TYLENOL PO) Take 325 mg by mouth       cholecalciferol (VITAMIN D3) 1000 UNIT tablet Take 1 tablet (1,000 Units) by mouth daily (Patient not taking: Reported on 2018) 90 tablet 0     doxylamine (UNISOM) 25 MG TABS tablet Take 25 mg by mouth At Bedtime       metoclopramide (REGLAN) 5 MG tablet Take 1 tablet (5 mg) by mouth 4 times daily as needed 120 tablet 1     Ondansetron (ZOFRAN ODT PO) Take 1 tablet by mouth every 6 hours as needed for nausea       ondansetron (ZOFRAN ODT) 4 MG ODT tab Take 1 tablet (4 mg) by mouth every 8 hours as needed 30 tablet 3     Prenatal Vit-Fe Fumarate-FA (PRENATAL MULTIVITAMIN PLUS IRON) 27-0.8 MG TABS per tablet Take 1 tablet by mouth daily 100 tablet 3     pyridOXINE (CVS VITAMIN B-6) 50 MG tablet Take 1 tablet (50 mg) by mouth 3 times daily 90 tablet 1  "      ====================================================  PERSONAL/SOCIAL HISTORY  Social History     Social History     Marital status:      Spouse name: Kavitha     Number of children: 2     Years of education: N/A     Occupational History      Unemployed     Social History Main Topics     Smoking status: Never Smoker     Smokeless tobacco: Never Used     Alcohol use No     Drug use: No     Sexual activity: Yes     Partners: Male     Birth control/ protection: None     Other Topics Concern     Not on file     Social History Narrative     =====================================================   REVIEW OF SYSTEMS  Constitutional, HEENT, cardiovascular, pulmonary, GI, , musculoskeletal, neuro, skin, endocrine and psych systems are negative, except as otherwise noted.      This document serves as a record of the services and decisions personally performed and made by Cindy Laurent DO. It was created on her behalf by Danisha Wing, a trained medical scribe. The creation of this document is based the provider's statements to the medical scribe.  Scribe Danisha Wing 1:56 PM, 2018    ====================================================  PHYSICAL EXAM:  BP (!) 80/52  Ht 1.499 m (4' 11\")  Wt 57.2 kg (126 lb 3.2 oz)  LMP 2018 (Exact Date)  BMI 25.49 kg/m2   GENERAL:  Pleasant pregnant female, alert, well groomed.  SKIN:  Warm and dry, without lesions or rashes  HEAD: Symmetrical features.  EYES:  PERRLA,   MOUTH:  Buccal mucosa pink, moist without lesions.    NECK:  Thyroid without enlargement and nodules.  Lymph nodes not palpable.   LUNGS:  Clear to auscultation.  BREAST:  Symmetrical.  No dominant, fixed or suspicious masses are noted.  No skin or nipple changes or axillary nodes.  Self exam is taught and encouraged.  Nipples everted.      HEART:  RRR without murmur.  ABDOMEN: Soft without masses , tenderness or organomegaly.  No CVA tenderness. Well healed scar from  section. FHT seen on " US visually   MUSCULOSKELETAL:  Full range of motion  EXTREMITIES:  No edema. No significant varicosities.   GENITALIA:  BUS WNL, no lesions noted   VAGINA:  Pink, normal rugae and discharge normal and physiologic,   CERVIX:  smooth, without discharge or CMT and parous os,   firm/ closed 4 cm long.  UTERUS: Anteverted, nontender 12 weeks in size.  ADNEXA: Without masses or tenderness  PELVIS:   Adequate, Pelvis proven to -pelvis not tested.    =========================================  ICSI Routine Prenatal Carfe Guideline  ASSESSMENT/PLAN:  Rizwana Plummer is a 38 year old  @ 9w4d  wks EGA with EDC Estimated Date of Delivery: 2019 who presents to the clinic for an new ob visit.          1) Intrauterine pregnancy:  Still receiving IV infusions, finds this is helping her nausea  2) EDUCATION :    RECOMMENDED WEIGHT GAIN: 25-35 lbs.  Instructed on best evidence for: weight gain for her BMI for pregnancy; healthy diet and foods to avoid; exercise and activity during pregnancy;avoiding exposure to toxoplasmosis; and maintenance of a generally healthy lifestyle.   Discussed the harms, benefits, side effects and alternative therapies for current prescribed and OTC medications.  3) Counseled about genetic screening tests (Innatal, preparent with options, discussed standard panel and other options, 1st trimester screen and targeted anatomy scan and AFP and quad screen if first trimester screening not done) and invasive definitive testing (chorionic villus sampling and genetic amniocentesis).  Patient does not wish to undergo any genetic screening.  4) Routine care: Declines genetic screening; GC - pending  5) AMA: US with MFM @ 20w, Growth US @ 36w & Biweekly/Weekly BPP's @ 36w   - Delivery @ 39w, repeat c/s scheduled 2018   - Hx of oligohydramnios, will monitor beginning @ 32w  6) Return: 4 weeks        The information in this document, created by the medical scribe for me, accurately reflects the  services I personally performed and the decisions made by me. I have reviewed and approved this document for accuracy prior to leaving the patient care area.  1:56 PM, 07/02/18    Dr. Cindy Laurent, DO    OB/GYN   [facility]

## 2018-07-03 ENCOUNTER — INFUSION THERAPY VISIT (OUTPATIENT)
Dept: INFUSION THERAPY | Facility: CLINIC | Age: 38
End: 2018-07-03
Attending: FAMILY MEDICINE
Payer: COMMERCIAL

## 2018-07-03 VITALS — TEMPERATURE: 97.6 F | SYSTOLIC BLOOD PRESSURE: 88 MMHG | RESPIRATION RATE: 16 BRPM | DIASTOLIC BLOOD PRESSURE: 50 MMHG

## 2018-07-03 DIAGNOSIS — E86.0 DEHYDRATION: ICD-10-CM

## 2018-07-03 DIAGNOSIS — O21.0 HYPEREMESIS GRAVIDARUM: Primary | ICD-10-CM

## 2018-07-03 LAB
C TRACH DNA SPEC QL NAA+PROBE: NEGATIVE
N GONORRHOEA DNA SPEC QL NAA+PROBE: NEGATIVE
SPECIMEN SOURCE: NORMAL
SPECIMEN SOURCE: NORMAL

## 2018-07-03 PROCEDURE — 25000128 H RX IP 250 OP 636: Performed by: FAMILY MEDICINE

## 2018-07-03 PROCEDURE — 96374 THER/PROPH/DIAG INJ IV PUSH: CPT

## 2018-07-03 PROCEDURE — 96361 HYDRATE IV INFUSION ADD-ON: CPT

## 2018-07-03 RX ORDER — ONDANSETRON 2 MG/ML
8 INJECTION INTRAMUSCULAR; INTRAVENOUS EVERY 6 HOURS PRN
Status: DISCONTINUED | OUTPATIENT
Start: 2018-07-03 | End: 2018-07-03 | Stop reason: HOSPADM

## 2018-07-03 RX ORDER — ONDANSETRON 2 MG/ML
8 INJECTION INTRAMUSCULAR; INTRAVENOUS EVERY 6 HOURS PRN
Status: CANCELLED
Start: 2018-07-03

## 2018-07-03 RX ADMIN — SODIUM CHLORIDE 500 ML: 9 INJECTION, SOLUTION INTRAVENOUS at 14:22

## 2018-07-03 RX ADMIN — ONDANSETRON 8 MG: 2 INJECTION INTRAMUSCULAR; INTRAVENOUS at 14:23

## 2018-07-03 RX ADMIN — DEXTROSE MONOHYDRATE 500 ML: 50 INJECTION, SOLUTION INTRAVENOUS at 14:55

## 2018-07-03 NOTE — PROGRESS NOTES
Infusion Nursing Note:  Rizwana Plummer presents today for IVF and zofran.    Patient seen by provider today: No   present during visit today: Not Applicable.    Note: N/A.    Intravenous Access:  Peripheral IV placed.    Treatment Conditions:  Not Applicable.      Post Infusion Assessment:  Patient tolerated infusion without incident.  Blood return noted pre and post infusion.  Site patent and intact, free from redness, edema or discomfort.  No evidence of extravasations.  Access discontinued per protocol.    Discharge Plan:   Copy of AVS reviewed with patient and/or family.  Patient will return 7/6/18 for next appointment.  Patient discharged in stable condition accompanied by: self.  Departure Mode: Ambulatory.    Lluvia Mosquera RN

## 2018-07-03 NOTE — MR AVS SNAPSHOT
After Visit Summary   7/3/2018    Rizwana Plummer    MRN: [id number]           Patient Information     Date Of Birth          1980        Visit Information        Provider Department      7/3/2018 2:00 PM [facility] INFUSION CHAIR 5 [facility]fusion [facility]        Today's Diagnoses     Hyperemesis gravidarum    -  1    Dehydration           Follow-ups after your visit        Your next 10 appointments already scheduled     Jul 06, 2018  2:00 PM CDT   Level 2 with [facility] INFUSION CHAIR 8   [facility] Infusion [facility] ([facility])    [facility]  [address]  [facility] [address]   [phone]            Aug 14, 2018 10:45 AM CDT   ESTABL[facility]ATAL [facility] DO   [facility]    [address]  [facility] [id number] [phone]            Jan 24, 2019   Procedure with DO [facility] Labor and Delivery (--)    [address]  [facility] [id number] [phone]            Feb 07, 2019  1:00 PM CST   SHORT [facility] C[facility] Laurent DO   [facility]    [address]  [facility] [id number] [phone]              Who to contact     If you have questions or need follow up information about today's clinic visit or your schedule please contact [facility] INFUSION SERVICES directly at [phone].  Normal or non-critical lab and imaging results will be communicated to you by Savvifyhart, letter or phone within 4 business days after the clinic has received the results. If you do not hear from us within 7 days, please contact the clinic through Savvifyhart or phone. If you have a critical or abnormal lab result, we will notify you by phone as soon as possible.  Submit refill requests through Wistia or call your pharmacy and they will forward the  refill request to us. Please allow 3 business days for your refill to be completed.          Additional Information About Your Visit        [employer]bey Ph[employer] Information     J[employer]Cents gives you secure access to your electronic health record. If you see a primary care provider, you can also send messages to your care team and make appointments. If you have questions, please call your primary care clinic.  If you do not have a primary care provider, please call [phone] and they will assist you.        Care EveryWhere ID     This is your Care EveryWhere ID. This could be used by other organizations to access your [place] medical records  [id number]        Your Vitals Were     Temperature Respirations Last Period             97.6  F (36.4  C) (Tympanic) 16 04/26/2018 (Exact Date)          Blood Pressure from Last 3 Encounters:   07/03/18 (!) 88/50   07/02/18 (!) 80/52   06/28/18 (!) 87/48    Weight from Last 3 Encounters:   07/02/18 57.2 kg (126 lb 3.2 oz)   06/27/18 56.7 kg (125 lb)   06/19/18 58.2 kg (128 lb 4.8 oz)              Today, you had the following     No orders found for display       Primary Care Provider Office Phone # Fax #    Cindy Bajwa Mehran,  [phone]       [address] [facility] [address]        Equal Access to Services     [facility] DONNY : Hadii aad ku hadasho Soomaali, waaxda luqadaha, qaybta kaalmada adeegyada, sebastien zayas . So [facility] [phone].    ATENCIÓN: Si habla español, tiene a pink disposición servicios gratuitos de asistencia lingüística. Llame al [phone].    We comply with applicable federal civil rights laws and [place] laws. We do not discriminate on the basis of race, color, national origin, age, disability, sex, sexual orientation, or gender identity.            Thank you!     Thank you for choosing [facility] INFUSION SERVICES  for your care. Our goal is always to provide you with excellent care. Hearing back  from our patients is one way we can continue to improve our services. Please take a few minutes to complete the written survey that you may receive in the mail after your visit with us. Thank you!             Your Updated Medication List - Protect others around you: Learn how to safely use, store and throw away your medicines at www.disposemymeds.org.          This list is accurate as of 7/3/18  3:59 PM.  Always use your most recent med list.                   Brand Name Dispense Instructions for use Diagnosis    cholecalciferol 1000 UNIT tablet    vitamin D3    90 tablet    Take 1 tablet (1,000 Units) by mouth daily    Vitamin D deficiency       doxylamine 25 MG Tabs tablet    UNISOM     Take 25 mg by mouth At Bedtime        metoclopramide 5 MG tablet    REGLAN    120 tablet    Take 1 tablet (5 mg) by mouth 4 times daily as needed    Hyperemesis gravidarum       prenatal multivitamin plus iron 27-0.8 MG Tabs per tablet     100 tablet    Take 1 tablet by mouth daily        pyridOXINE 50 MG tablet    CVS VITAMIN B-6    90 tablet    Take 1 tablet (50 mg) by mouth 3 times daily    Hyperemesis gravidarum       TYLENOL PO      Take 325 mg by mouth        * ZOFRAN ODT PO      Take 1 tablet by mouth every 6 hours as needed for nausea        * ondansetron 4 MG ODT tab    ZOFRAN ODT    30 tablet    Take 1 tablet (4 mg) by mouth every 8 hours as needed    Hyperemesis gravidarum       * Notice:  This list has 2 medication(s) that are the same as other medications prescribed for you. Read the directions carefully, and ask your doctor or other care provider to review them with you.

## 2018-07-06 ENCOUNTER — INFUSION THERAPY VISIT (OUTPATIENT)
Dept: INFUSION THERAPY | Facility: CLINIC | Age: 38
End: 2018-07-06
Attending: FAMILY MEDICINE
Payer: COMMERCIAL

## 2018-07-06 VITALS — TEMPERATURE: 97.4 F | DIASTOLIC BLOOD PRESSURE: 47 MMHG | RESPIRATION RATE: 16 BRPM | SYSTOLIC BLOOD PRESSURE: 76 MMHG

## 2018-07-06 DIAGNOSIS — E86.0 DEHYDRATION: ICD-10-CM

## 2018-07-06 DIAGNOSIS — O21.0 HYPEREMESIS GRAVIDARUM: Primary | ICD-10-CM

## 2018-07-06 PROCEDURE — 25000128 H RX IP 250 OP 636: Performed by: FAMILY MEDICINE

## 2018-07-06 PROCEDURE — 96374 THER/PROPH/DIAG INJ IV PUSH: CPT

## 2018-07-06 PROCEDURE — 96361 HYDRATE IV INFUSION ADD-ON: CPT

## 2018-07-06 RX ORDER — ONDANSETRON 2 MG/ML
8 INJECTION INTRAMUSCULAR; INTRAVENOUS EVERY 6 HOURS PRN
Status: CANCELLED
Start: 2018-07-06

## 2018-07-06 RX ORDER — ONDANSETRON 2 MG/ML
8 INJECTION INTRAMUSCULAR; INTRAVENOUS EVERY 6 HOURS PRN
Status: DISCONTINUED | OUTPATIENT
Start: 2018-07-06 | End: 2018-07-06 | Stop reason: HOSPADM

## 2018-07-06 RX ADMIN — SODIUM CHLORIDE 500 ML: 9 INJECTION, SOLUTION INTRAVENOUS at 14:14

## 2018-07-06 RX ADMIN — ONDANSETRON 8 MG: 2 INJECTION INTRAMUSCULAR; INTRAVENOUS at 14:36

## 2018-07-06 RX ADMIN — DEXTROSE MONOHYDRATE 500 ML: 50 INJECTION, SOLUTION INTRAVENOUS at 14:14

## 2018-07-06 NOTE — MR AVS SNAPSHOT
After Visit Summary   7/6/2018    Rizwana Plummer    MRN: [id number]           Patient Information     Date Of Birth          1980        Visit Information        Provider Department      7/6/2018 2:00 PM [facility] INFUSION CHAIR 8 [facility] Infusion [facility]        Today's Diagnoses     Hyperemesis gravidarum    -  1    Dehydration           Follow-ups after your visit        Your next 10 appointments already scheduled     Aug 14, 2018 10:45 AM CDT   ESTABL[facility]    [address]  [facility] [address] [phone]            Jan 24, 2019   Procedure with Cindy Laurent DO   [facility] [place] [facility] (--)    [address]  [facility] [address] [phone]            Feb 07, 2019  1:00 PM CST   SHORT [facility]   [facility]    [address]  [facility] [address] [phone]              Who to contact     If you have questions or need follow up information about today's clinic visit or your schedule please contact [facility] INFUSION SERVICES directly at [phone].  Normal or non-critical lab and imaging results will be communicated to you by NetSparkhart, letter or phone within 4 business days after the clinic has received the results. If you do not hear from us within 7 days, please contact the clinic through NetSparkhart or phone. If you have a critical or abnormal lab result, we will notify you by phone as soon as possible.  Submit refill requests through AdverCar or call your pharmacy and they will forward the refill request to us. Please allow 3 business days for your refill to be completed.          Additional Information About Your Visit        NetSparkharCliqset Information     [employer] gives you secure access to your electronic health record. If you see a primary  care provider, you can also send messages to your care team and make appointments. If you have questions, please call your primary care clinic.  If you do not have a primary care provider, please call [phone] and they will assist you.        Care EveryWhere ID     This is your Care EveryWhere ID. This could be used by other organizations to access your [place] medical records  [id number]        Your Vitals Were     Temperature Respirations Last Period             97.4  F (36.3  C) (Tympanic) 16 04/26/2018 (Exact Date)          Blood Pressure from Last 3 Encounters:   07/06/18 (!) 76/47   07/03/18 (!) 88/50   07/02/18 (!) 80/52    Weight from Last 3 Encounters:   07/02/18 57.2 kg (126 lb 3.2 oz)   06/27/18 56.7 kg (125 lb)   06/19/18 58.2 kg (128 lb 4.8 oz)              Today, you had the following     No orders found for display       Primary Care Provider Office Phone # Fax #    Cindy Aydee Laurent,  [phone]       [address] [facility] [address]        Equal Access to Services     [facility] : Hadii aad ku hadasho Soomaali, waaxda luqadaha, qaybta kaalmada adeegyada, sebastien minor haypoppy zayas . So [facility] [phone].    ATENCIÓN: Si habla español, tiene a pink disposición servicios gratuitos de asistencia lingüística. Benitoame al [phone].    We comply with applicable federal civil rights laws and [place] laws. We do not discriminate on the basis of race, color, national origin, age, disability, sex, sexual orientation, or gender identity.            Thank you!     Thank you for choosing [facility] INFUSION SERVICES  for your care. Our goal is always to provide you with excellent care. Hearing back from our patients is one way we can continue to improve our services. Please take a few minutes to complete the written survey that you may receive in the mail after your visit with us. Thank you!             Your Updated Medication List - Protect  others around you: Learn how to safely use, store and throw away your medicines at www.disposemymeds.org.          This list is accurate as of 7/6/18  3:38 PM.  Always use your most recent med list.                   Brand Name Dispense Instructions for use Diagnosis    cholecalciferol 1000 UNIT tablet    vitamin D3    90 tablet    Take 1 tablet (1,000 Units) by mouth daily    Vitamin D deficiency       doxylamine 25 MG Tabs tablet    UNISOM     Take 25 mg by mouth At Bedtime        metoclopramide 5 MG tablet    REGLAN    120 tablet    Take 1 tablet (5 mg) by mouth 4 times daily as needed    Hyperemesis gravidarum       prenatal multivitamin plus iron 27-0.8 MG Tabs per tablet     100 tablet    Take 1 tablet by mouth daily        pyridOXINE 50 MG tablet    CVS VITAMIN B-6    90 tablet    Take 1 tablet (50 mg) by mouth 3 times daily    Hyperemesis gravidarum       TYLENOL PO      Take 325 mg by mouth        * ZOFRAN ODT PO      Take 1 tablet by mouth every 6 hours as needed for nausea        * ondansetron 4 MG ODT tab    ZOFRAN ODT    30 tablet    Take 1 tablet (4 mg) by mouth every 8 hours as needed    Hyperemesis gravidarum       * Notice:  This list has 2 medication(s) that are the same as other medications prescribed for you. Read the directions carefully, and ask your doctor or other care provider to review them with you.

## 2018-07-06 NOTE — PROGRESS NOTES
Infusion Nursing Note:  Rizwana Plummer presents today for IVF.    Patient seen by provider today: No   present during visit today: Not Applicable.    Note: N/A.    Intravenous Access:  Peripheral IV placed.    Treatment Conditions:  Not Applicable.      Post Infusion Assessment:  Patient tolerated infusion without incident.  Blood return noted pre and post infusion.  Site patent and intact, free from redness, edema or discomfort.  No evidence of extravasations.  Access discontinued per protocol.    Discharge Plan:   Patient declined prescription refills.  Discharge instructions reviewed with: Patient.  Patient and/or family verbalized understanding of discharge instructions and all questions answered.  Patient discharged in stable condition accompanied by: self.  Departure Mode: Ambulatory.    Natalie Carr RN

## 2018-07-09 ENCOUNTER — INFUSION THERAPY VISIT (OUTPATIENT)
Dept: INFUSION THERAPY | Facility: CLINIC | Age: 38
End: 2018-07-09
Attending: FAMILY MEDICINE
Payer: COMMERCIAL

## 2018-07-09 VITALS — RESPIRATION RATE: 16 BRPM | TEMPERATURE: 98.6 F | SYSTOLIC BLOOD PRESSURE: 82 MMHG | DIASTOLIC BLOOD PRESSURE: 48 MMHG

## 2018-07-09 DIAGNOSIS — O21.0 HYPEREMESIS GRAVIDARUM: Primary | ICD-10-CM

## 2018-07-09 DIAGNOSIS — E86.0 DEHYDRATION: ICD-10-CM

## 2018-07-09 PROCEDURE — 25000128 H RX IP 250 OP 636: Performed by: FAMILY MEDICINE

## 2018-07-09 PROCEDURE — 96374 THER/PROPH/DIAG INJ IV PUSH: CPT

## 2018-07-09 PROCEDURE — 96361 HYDRATE IV INFUSION ADD-ON: CPT

## 2018-07-09 RX ORDER — ONDANSETRON 2 MG/ML
8 INJECTION INTRAMUSCULAR; INTRAVENOUS EVERY 6 HOURS PRN
Status: DISCONTINUED | OUTPATIENT
Start: 2018-07-09 | End: 2018-07-09 | Stop reason: HOSPADM

## 2018-07-09 RX ORDER — ONDANSETRON 2 MG/ML
8 INJECTION INTRAMUSCULAR; INTRAVENOUS EVERY 6 HOURS PRN
Status: CANCELLED
Start: 2018-07-09

## 2018-07-09 RX ADMIN — SODIUM CHLORIDE: 9 INJECTION, SOLUTION INTRAVENOUS at 10:10

## 2018-07-09 RX ADMIN — DEXTROSE MONOHYDRATE 500 ML: 50 INJECTION, SOLUTION INTRAVENOUS at 10:42

## 2018-07-09 RX ADMIN — ONDANSETRON 8 MG: 2 INJECTION INTRAMUSCULAR; INTRAVENOUS at 10:10

## 2018-07-09 NOTE — MR AVS SNAPSHOT
After Visit Summary   7/9/2018    Rizwana Plummer    MRN: [id number]           Patient Information     Date Of Birth          1980        Visit Information        Provider Department      7/9/2018 10:00 AM RH INFUSION CHAIR 2 [facility]        Today's Diagnoses     Hyperemesis gravidarum    -  1    Dehydration           Follow-ups after your visit        Your next 10 appointments already scheduled     Jul 12, 2018  2:30 PM CDT   Level 2 with RH INFUSION CHAIR 3   [facility] ([facility])    [facility]  [address]  [facility] [address]   [phone]            Jul 16, 2018  2:00 PM CDT   Level 2 with RH INFUSION CHAIR 12   [facility] ([facility])    [facility]  [address]  [facility] [address]   [phone]            Jul 19, 2018  2:00 PM CDT   Level 2 with RH INFUSION CHAIR 9   [facility] ([facility])    [facility]  [address]  [facility] [address]   [phone]            Aug 14, 2018 10:45 AM CDT   ESTABLISHED PRENATAL with Cindy Laurent DO   [facility]    [address]  [facility] [address]   [phone]            Jan 24, 2019   Procedure with Cindy Laurent DO   [facility] Labor and Delivery (--)    [address]  [facility] [address]   [phone]            Feb 07, 2019  1:00 PM CST   SHORT with Cindy Laurent DO   [facility] ([facility])    [address]  [facility] [address]   [phone]              Who to contact     If you have questions or need follow up information about today's clinic visit or your schedule please contact [facility]  [facility] INFUSION SERVICES directly at [phone].  Normal or non-critical lab and imaging results will be communicated to you by MyChart, letter or phone within 4 business days after the clinic has received the results. If you do not hear from us within 7 days, please contact the clinic through NuPathehart or phone. If you have a critical or abnormal lab result, we will notify you by phone as soon as possible.  Submit refill requests through Soxiable or call your pharmacy and they will forward the refill request to us. Please allow 3 business days for your refill to be completed.          Additional Information About Your Visit        NuPatheharbatterii Information     Soxiable gives you secure access to your electronic health record. If you see a primary care provider, you can also send messages to your care team and make appointments. If you have questions, please call your primary care clinic.  If you do not have a primary care provider, please call [phone] and they will assist you.        Care EveryWhere ID     This is your Care EveryWhere ID. This could be used by other organizations to access your [place] medical records  [id number]        Your Vitals Were     Temperature Respirations Last Period             98.6  F (37  C) (Tympanic) 16 04/26/2018 (Exact Date)          Blood Pressure from Last 3 Encounters:   07/09/18 (!) 82/48   07/06/18 (!) 76/47   07/03/18 (!) 88/50    Weight from Last 3 Encounters:   07/02/18 57.2 kg (126 lb 3.2 oz)   06/27/18 56.7 kg (125 lb)   06/19/18 58.2 kg (128 lb 4.8 oz)              Today, you had the following     No orders found for display       Primary Care Provider Office Phone # Fax #    Cindy Laurent [phone]       [address] [facility] [address]        Equal Access to Services     JORGE HINES : Hadii eyal christieo Socathy, waaxda luqadaha, qaybta kaalmada adeegyada, sebastien morales. So [facility]  [phone].    ATENCIÓN: Si wilton ortez, tiene a pink disposición servicios gratuitos de asistencia lingüística. Mellisa delgado [phone].    We comply with applicable federal civil rights laws and [place] laws. We do not discriminate on the basis of race, color, national origin, age, disability, sex, sexual orientation, or gender identity.            Thank you!     Thank you for choosing [facility] INFUSION SERVICES  for your care. Our goal is always to provide you with excellent care. Hearing back from our patients is one way we can continue to improve our services. Please take a few minutes to complete the written survey that you may receive in the mail after your visit with us. Thank you!             Your Updated Medication List - Protect others around you: Learn how to safely use, store and throw away your medicines at www.disposemymeds.org.          This list is accurate as of 7/9/18  1:03 PM.  Always use your most recent med list.                   Brand Name Dispense Instructions for use Diagnosis    cholecalciferol 1000 UNIT tablet    vitamin D3    90 tablet    Take 1 tablet (1,000 Units) by mouth daily    Vitamin D deficiency       doxylamine 25 MG Tabs tablet    UNISOM     Take 25 mg by mouth At Bedtime        metoclopramide 5 MG tablet    REGLAN    120 tablet    Take 1 tablet (5 mg) by mouth 4 times daily as needed    Hyperemesis gravidarum       prenatal multivitamin plus iron 27-0.8 MG Tabs per tablet     100 tablet    Take 1 tablet by mouth daily        pyridOXINE 50 MG tablet    CVS VITAMIN B-6    90 tablet    Take 1 tablet (50 mg) by mouth 3 times daily    Hyperemesis gravidarum       TYLENOL PO      Take 325 mg by mouth        * ZOFRAN ODT PO      Take 1 tablet by mouth every 6 hours as needed for nausea        * ondansetron 4 MG ODT tab    ZOFRAN ODT    30 tablet    Take 1 tablet (4 mg) by mouth every 8 hours as needed    Hyperemesis gravidarum       * Notice:  This list has 2  medication(s) that are the same as other medications prescribed for you. Read the directions carefully, and ask your doctor or other care provider to review them with you.

## 2018-07-09 NOTE — PROGRESS NOTES
Infusion Nursing Note:  Rizwana Plummer presents today for IV fluids and zofran.    Patient seen by provider today: No   present during visit today: Not Applicable.    Note: Still with intermittant nausea.    Intravenous Access:  Peripheral IV placed.    Treatment Conditions:  Not Applicable.      Post Infusion Assessment:  Patient tolerated infusion without incident.  Blood return noted pre and post infusion.  Site patent and intact, free from redness, edema or discomfort.  Access discontinued per protocol.    Discharge Plan:   Discharge instructions reviewed with: Patient.  Patient and/or family verbalized understanding of discharge instructions and all questions answered.  AVS to patient via "[employer]T.  Patient will return 7/12 for next appointment.   Patient discharged in stable condition accompanied by: self.  Departure Mode: Ambulatory.    Johanna Wilson RN

## 2018-07-12 ENCOUNTER — INFUSION THERAPY VISIT (OUTPATIENT)
Dept: INFUSION THERAPY | Facility: CLINIC | Age: 38
End: 2018-07-12
Attending: FAMILY MEDICINE
Payer: COMMERCIAL

## 2018-07-12 VITALS — TEMPERATURE: 99.7 F | DIASTOLIC BLOOD PRESSURE: 50 MMHG | SYSTOLIC BLOOD PRESSURE: 78 MMHG | RESPIRATION RATE: 16 BRPM

## 2018-07-12 DIAGNOSIS — O21.0 HYPEREMESIS GRAVIDARUM: Primary | ICD-10-CM

## 2018-07-12 DIAGNOSIS — E86.0 DEHYDRATION: ICD-10-CM

## 2018-07-12 PROCEDURE — 25000128 H RX IP 250 OP 636: Performed by: FAMILY MEDICINE

## 2018-07-12 PROCEDURE — 96361 HYDRATE IV INFUSION ADD-ON: CPT

## 2018-07-12 PROCEDURE — 96374 THER/PROPH/DIAG INJ IV PUSH: CPT

## 2018-07-12 RX ORDER — ONDANSETRON 2 MG/ML
8 INJECTION INTRAMUSCULAR; INTRAVENOUS EVERY 6 HOURS PRN
Status: DISCONTINUED | OUTPATIENT
Start: 2018-07-12 | End: 2018-07-12 | Stop reason: HOSPADM

## 2018-07-12 RX ORDER — ONDANSETRON 2 MG/ML
8 INJECTION INTRAMUSCULAR; INTRAVENOUS EVERY 6 HOURS PRN
Status: CANCELLED
Start: 2018-07-12

## 2018-07-12 RX ADMIN — SODIUM CHLORIDE 500 ML: 9 INJECTION, SOLUTION INTRAVENOUS at 14:57

## 2018-07-12 RX ADMIN — DEXTROSE MONOHYDRATE 500 ML: 50 INJECTION, SOLUTION INTRAVENOUS at 15:30

## 2018-07-12 RX ADMIN — ONDANSETRON 8 MG: 2 INJECTION INTRAMUSCULAR; INTRAVENOUS at 15:06

## 2018-07-12 NOTE — MR AVS SNAPSHOT
After Visit Summary   7/12/2018    Rizwana Plummer    MRN: [id number]           Patient Information     Date Of Birth          1980        Visit Information        Provider Department      7/12/2018 2:30 PM RH INFUSION CHAIR 3 [facility]fusion [facility]        Today's Diagnoses     Hyperemesis gravidarum    -  1    Dehydration           Follow-ups after your visit        Your next 10 appointments already scheduled     Jul 16, 2018  2:00 PM CDT   Level 2 with RH INFUSION CHAIR 12   [facility] ([facility])    [facility]  [address]  [facility] [address]   [phone]            Jul 19, 2018  2:00 PM CDT   Level 2 with RH INFUSION CHAIR 9   [facility] ([facility])    [facility]  [address]  [facility] [address]   [phone]            Aug 14, 2018 10:45 AM CDT   ESTABLISHED PRENATAL with Cindy Laurent DO   [facility]    [address]  [facility] [address] [phone]            Jan 24, 2019   Procedure with Cindy Laurent DO   [facility] Labor and Delivery (--)    [address]  [facility] [address] [phone]            Feb 07, 2019  1:00 PM CST   SHORT with Cindy Laurent DO   [facility]    [address]  [facility] [address] [phone]              Who to contact     If you have questions or need follow up information about today's clinic visit or your schedule please contact [facility] INFUSION SERVICES directly at [phone].  Normal or non-critical lab and imaging results will be communicated to you by MyChart, letter or phone within 4 business days after the clinic has received the results. If you do not hear from  us within 7 days, please contact the clinic through autoGraph or phone. If you have a critical or abnormal lab result, we will notify you by phone as soon as possible.  Submit refill requests through autoGraph or call your pharmacy and they will forward the refill request to us. Please allow 3 business days for your refill to be completed.          Additional Information About Your Visit        "[employer]harRedVision System Information     autoGraph gives you secure access to your electronic health record. If you see a primary care provider, you can also send messages to your care team and make appointments. If you have questions, please call your primary care clinic.  If you do not have a primary care provider, please call [phone] and they will assist you.        Care EveryWhere ID     This is your Care EveryWhere ID. This could be used by other organizations to access your [place] medical records  [id number]        Your Vitals Were     Temperature Respirations Last Period             99.7  F (37.6  C) (Tympanic) 16 04/26/2018 (Exact Date)          Blood Pressure from Last 3 Encounters:   07/12/18 (!) 78/50   07/09/18 (!) 82/48   07/06/18 (!) 76/47    Weight from Last 3 Encounters:   07/02/18 57.2 kg (126 lb 3.2 oz)   06/27/18 56.7 kg (125 lb)   06/19/18 58.2 kg (128 lb 4.8 oz)              Today, you had the following     No orders found for display       Primary Care Provider Office Phone # Fax #    Cindy Aydee Laurent,  [phone]       [address] [facility] [address]        Equal Access to Services     [facility]: Hadii aad ku hadasho Soomaali, waaxda luqadaha, qaybta kaalmada adeegyada, sebastien zayas . So [facility] [phone].    ATENCIÓN: Si habla español, tiene a pink disposición servicios gratuitos de asistencia lingüística. Llame al [phone].    We comply with applicable federal civil rights laws and [place] laws. We do not discriminate on the basis of race, color,  national origin, age, disability, sex, sexual orientation, or gender identity.            Thank you!     Thank you for choosing [facility] INFUSION SERVICES  for your care. Our goal is always to provide you with excellent care. Hearing back from our patients is one way we can continue to improve our services. Please take a few minutes to complete the written survey that you may receive in the mail after your visit with us. Thank you!             Your Updated Medication List - Protect others around you: Learn how to safely use, store and throw away your medicines at www.disposemymeds.org.          This list is accurate as of 7/12/18  4:33 PM.  Always use your most recent med list.                   Brand Name Dispense Instructions for use Diagnosis    cholecalciferol 1000 UNIT tablet    vitamin D3    90 tablet    Take 1 tablet (1,000 Units) by mouth daily    Vitamin D deficiency       doxylamine 25 MG Tabs tablet    UNISOM     Take 25 mg by mouth At Bedtime        metoclopramide 5 MG tablet    REGLAN    120 tablet    Take 1 tablet (5 mg) by mouth 4 times daily as needed    Hyperemesis gravidarum       prenatal multivitamin plus iron 27-0.8 MG Tabs per tablet     100 tablet    Take 1 tablet by mouth daily        pyridOXINE 50 MG tablet    CVS VITAMIN B-6    90 tablet    Take 1 tablet (50 mg) by mouth 3 times daily    Hyperemesis gravidarum       TYLENOL PO      Take 325 mg by mouth        * ZOFRAN ODT PO      Take 1 tablet by mouth every 6 hours as needed for nausea        * ondansetron 4 MG ODT tab    ZOFRAN ODT    30 tablet    Take 1 tablet (4 mg) by mouth every 8 hours as needed    Hyperemesis gravidarum       * Notice:  This list has 2 medication(s) that are the same as other medications prescribed for you. Read the directions carefully, and ask your doctor or other care provider to review them with you.

## 2018-07-12 NOTE — PROGRESS NOTES
Infusion Nursing Note:  Rizwana Plummer presents today for IVF and zofran.    Patient seen by provider today: No   present during visit today: Not Applicable.    Note: Patient c/o dizziness with standing x2 days. She has vomited about 4x today and 4-6x yesterday which is new for her (usually just nausea). She says she is urinating as normal but has urinated only once today and about twice yesterday. Hypotensive- 78/50- within range of what she has been running infusion. Low grade temp today. Denies diarrhea or constipation. She has been taking reglan, zofran, unisom, and B6 at home without relief. Notified [facility] clinic: Instructed patient to call and make an appt for sometime soon (otherwise not scheduled until August 14th). Instructed patient to go to ER or follow up sooner if symptoms worsen or if she develops any new symptoms or fever over 101 develops.     Intravenous Access:  Peripheral IV placed.    Treatment Conditions:  Not Applicable.      Post Infusion Assessment:  Patient tolerated infusion without incident.  Site patent and intact, free from redness, edema or discomfort.  Access discontinued per protocol.    Discharge Plan:   Discharge instructions reviewed with: Patient.  Patient and/or family verbalized understanding of discharge instructions and all questions answered.  Patient discharged in stable condition accompanied by: self.  Departure Mode: Ambulatory.    Simi Krishnan RN

## 2018-07-16 ENCOUNTER — INFUSION THERAPY VISIT (OUTPATIENT)
Dept: INFUSION THERAPY | Facility: CLINIC | Age: 38
End: 2018-07-16
Attending: FAMILY MEDICINE
Payer: COMMERCIAL

## 2018-07-16 VITALS
RESPIRATION RATE: 18 BRPM | TEMPERATURE: 98.4 F | HEART RATE: 80 BPM | SYSTOLIC BLOOD PRESSURE: 93 MMHG | DIASTOLIC BLOOD PRESSURE: 59 MMHG

## 2018-07-16 DIAGNOSIS — O21.0 HYPEREMESIS GRAVIDARUM: Primary | ICD-10-CM

## 2018-07-16 DIAGNOSIS — E86.0 DEHYDRATION: ICD-10-CM

## 2018-07-16 PROCEDURE — 96374 THER/PROPH/DIAG INJ IV PUSH: CPT

## 2018-07-16 PROCEDURE — 96361 HYDRATE IV INFUSION ADD-ON: CPT

## 2018-07-16 PROCEDURE — 25000128 H RX IP 250 OP 636: Performed by: FAMILY MEDICINE

## 2018-07-16 RX ORDER — ONDANSETRON 2 MG/ML
8 INJECTION INTRAMUSCULAR; INTRAVENOUS EVERY 6 HOURS PRN
Status: CANCELLED
Start: 2018-07-16

## 2018-07-16 RX ORDER — ONDANSETRON 2 MG/ML
8 INJECTION INTRAMUSCULAR; INTRAVENOUS EVERY 6 HOURS PRN
Status: DISCONTINUED | OUTPATIENT
Start: 2018-07-16 | End: 2018-07-16 | Stop reason: HOSPADM

## 2018-07-16 RX ADMIN — ONDANSETRON 8 MG: 2 INJECTION INTRAMUSCULAR; INTRAVENOUS at 14:36

## 2018-07-16 RX ADMIN — SODIUM CHLORIDE 500 ML: 9 INJECTION, SOLUTION INTRAVENOUS at 14:36

## 2018-07-16 RX ADMIN — DEXTROSE MONOHYDRATE 500 ML: 50 INJECTION, SOLUTION INTRAVENOUS at 15:06

## 2018-07-16 ASSESSMENT — PAIN SCALES - GENERAL: PAINLEVEL: NO PAIN (0)

## 2018-07-16 NOTE — PROGRESS NOTES
Infusion Nursing Note:  Rizwana Plummer presents today for IVF, zofran.    Patient seen by provider today: No   present during visit today: Not Applicable.    Note: Feels that she has slightly more energy today. Nausea continues. Did have two episodes of dry heaves today. Has kept down a small amount of food and drink twice today.     Intravenous Access:  Peripheral IV placed.    Treatment Conditions:  Not Applicable.    Post Infusion Assessment:  Patient tolerated infusion without incident.  Blood return noted pre and post infusion.  Site patent and intact, free from redness, edema or discomfort.  No evidence of extravasations.  Access discontinued per protocol.    Discharge Plan:   Discharge instructions reviewed with: Patient.  Patient and/or family verbalized understanding of discharge instructions and all questions answered.  AVS to patient via Revinate.  Patient will return 7/19/18 for next IVF appointment.   Patient discharged in stable condition accompanied by: self.  Departure Mode: Ambulatory.    Erica Shah RN

## 2018-07-17 ENCOUNTER — PRENATAL OFFICE VISIT (OUTPATIENT)
Dept: OBGYN | Facility: CLINIC | Age: 38
End: 2018-07-17
Payer: COMMERCIAL

## 2018-07-17 VITALS
SYSTOLIC BLOOD PRESSURE: 78 MMHG | HEIGHT: 59 IN | BODY MASS INDEX: 25.16 KG/M2 | WEIGHT: 124.8 LBS | DIASTOLIC BLOOD PRESSURE: 52 MMHG

## 2018-07-17 DIAGNOSIS — O21.0 HYPEREMESIS GRAVIDARUM: ICD-10-CM

## 2018-07-17 DIAGNOSIS — Z34.91 PRENATAL CARE IN FIRST TRIMESTER: Primary | ICD-10-CM

## 2018-07-17 PROCEDURE — 99207 ZZC PRENATAL VISIT: CPT | Performed by: FAMILY MEDICINE

## 2018-07-17 NOTE — MR AVS SNAPSHOT
After Visit Summary   7/17/2018    Rizwana Plummer    MRN: [id number]           Patient Information     Date Of Birth          1980        Visit Information        Provider Department      7/17/2018 9:15 AM Cindy Laurent,  [facility]        Today's Diagnoses     Prenatal care in first trimester    -  1    Hyperemesis gravidarum          Care Instructions    Return in 4 weeks     Phone numbers Prim:  Day/ night [phone] ask for ob triage  Emergency:  Call labor and delivery:  [phone]    What should I call about??    Contraction every 5 minutes for 1 hour 1 minute long (511), bleeding, loss of fluid, headache that doesn't resolve with tylenol, and decreased fetal movement     Start kick counts @ 26-28 weeks   Keep track of movement and discover your normal baby movement pattern   guideline is listed below  Please call if you do not feel the baby move!  We will have you come in for fetal heart rate monitoring:   Perception of at least 10 FMs during 12 hours of normal maternal activity   Perception of least 10 FMs over two hours when the mother is at rest and focused on counting       [facility] Address   [address], [place], [place] [address] [phone]    Dr. Cindy Laurent, DO    OB/GYN   [facility]                                      Follow-ups after your visit        Your next 10 appointments already scheduled     Jul 19, 2018  2:00 PM CDT   Level 2 with RH INFUSION CHAIR 9   [facility] Infusion Services ([facility])    [facility]  [address]  [facility] [address] [phone]            Aug 14, 2018 10:45 AM CDT   ESTABLISHED PRENATAL with Cindy Laurent DO   [facility] ([facility])    [address]  [facility] [address] [phone]            Jan 24, 2019   Procedure with Cindy  "Aydee Laurent, DO   [facility] Labor and Delivery (--)    [address]  [facility] [address] [phone]            Feb 07, 2019  1:00 PM CST   SHORT with Cindy Laurent, DO   [facility] ([facility])    [address]  [facility] [address] [phone]              Who to contact     If you have questions or need follow up information about today's clinic visit or your schedule please contact [facility] directly at [phone].  Normal or non-critical lab and imaging results will be communicated to you by MyChart, letter or phone within 4 business days after the clinic has received the results. If you do not hear from us within 7 days, please contact the clinic through Heliospectrahart or phone. If you have a critical or abnormal lab result, we will notify you by phone as soon as possible.  Submit refill requests through RedCritter or call your pharmacy and they will forward the refill request to us. Please allow 3 business days for your refill to be completed.          Additional Information About Your Visit        [employer] Information     RedCritter gives you secure access to your electronic health record. If you see a primary care provider, you can also send messages to your care team and make appointments. If you have questions, please call your primary care clinic.  If you do not have a primary care provider, please call [phone] and they will assist you.        Care EveryWhere ID     This is your Care EveryWhere ID. This could be used by other organizations to access your [place] medical records  [id number]        Your Vitals Were     Height Last Period BMI (Body Mass Index)             4' 11\" (1.499 m) 04/26/2018 (Exact Date) 25.21 kg/m2          Blood Pressure from Last 3 Encounters:   07/17/18 (!) 78/52   07/16/18 93/59   07/12/18 (!) 78/50    Weight from Last 3 Encounters:   07/17/18 124 lb 12.8 oz (56.6 kg)   07/02/18 126 " lb 3.2 oz (57.2 kg)   06/27/18 125 lb (56.7 kg)              Today, you had the following     No orders found for display       Primary Care Provider Office Phone # Fax #    Cindy Laurent,  [phone]       [address] [facility] [address]        Equal Access to Services     OSMEL HINES : Hadii aad ku hadasho Soomaali, waaxda luqadaha, qaybta kaalmada adeegyada, waxay idiin hayaan adeeg kharash laluli . So [facility] [phone].    ATENCIÓN: Si habla español, tiene a pink disposición servicios gratuitos de asistencia lingüística. Llame al [phone].    We comply with applicable federal civil rights laws and [place] laws. We do not discriminate on the basis of race, color, national origin, age, disability, sex, sexual orientation, or gender identity.            Thank you!     Thank you for choosing [facility]  for your care. Our goal is always to provide you with excellent care. Hearing back from our patients is one way we can continue to improve our services. Please take a few minutes to complete the written survey that you may receive in the mail after your visit with us. Thank you!             Your Updated Medication List - Protect others around you: Learn how to safely use, store and throw away your medicines at www.disposemymeds.org.          This list is accurate as of 7/17/18 10:14 AM.  Always use your most recent med list.                   Brand Name Dispense Instructions for use Diagnosis    cholecalciferol 1000 UNIT tablet    vitamin D3    90 tablet    Take 1 tablet (1,000 Units) by mouth daily    Vitamin D deficiency       doxylamine 25 MG Tabs tablet    UNISOM     Take 25 mg by mouth At Bedtime        metoclopramide 5 MG tablet    REGLAN    120 tablet    Take 1 tablet (5 mg) by mouth 4 times daily as needed    Hyperemesis gravidarum       prenatal multivitamin plus iron 27-0.8 MG Tabs per tablet     100 tablet    Take 1 tablet by mouth daily         pyridOXINE 50 MG tablet    CVS VITAMIN B-6    90 tablet    Take 1 tablet (50 mg) by mouth 3 times daily    Hyperemesis gravidarum       TYLENOL PO      Take 325 mg by mouth        * ZOFRAN ODT PO      Take 1 tablet by mouth every 6 hours as needed for nausea        * ondansetron 4 MG ODT tab    ZOFRAN ODT    30 tablet    Take 1 tablet (4 mg) by mouth every 8 hours as needed    Hyperemesis gravidarum       * Notice:  This list has 2 medication(s) that are the same as other medications prescribed for you. Read the directions carefully, and ask your doctor or other care provider to review them with you.

## 2018-07-17 NOTE — NURSING NOTE
"11w5d    Chief Complaint   Patient presents with     Prenatal Care     feeling weak after infusion--low BP--feels dizzy when she wakes up sometimes--nausea and no appetite       Initial BP (!) 78/52  Ht 4' 11\" (1.499 m)  Wt 124 lb 12.8 oz (56.6 kg)  LMP 2018 (Exact Date)  BMI 25.21 kg/m2 Estimated body mass index is 25.21 kg/(m^2) as calculated from the following:    Height as of this encounter: 4' 11\" (1.499 m).    Weight as of this encounter: 124 lb 12.8 oz (56.6 kg).  BP completed using cuff size: regular        The following HM Due: NONE      "

## 2018-07-17 NOTE — PROGRESS NOTES
"CC: Here for routine prenatal visit   38 year old y/o  @ 11w5d with Estimated Date of Delivery: 2019   HPI: still feels dizzy  BP (!) 78/52  Ht 4' 11\" (1.499 m)  Wt 124 lb 12.8 oz (56.6 kg)  LMP 2018 (Exact Date)  BMI 25.21 kg/m2  See OB flowsheet  No bleeding       1) concerns: hyperemesis gravidarum:  infusions still occurring, feels like not getting same amount as was previously, increase to 2 liters per time  On reglan and zofran and vit b 6 but not helping   2) Routine care: Declines genetic screening;  3) AMA: US with MFM @ 20w, Growth US @ 36w & Biweekly/Weekly BPP's @ 36w                        - Delivery @ 39w, repeat c/s scheduled 2018                        - Hx of oligohydramnios, will monitor beginning @ 32w  4) Return: 4 weeks  5) has bleeding, now brown spotting, reassurance given     Mehran    "

## 2018-07-17 NOTE — PATIENT INSTRUCTIONS
Return in 4 weeks     Phone numbers Bobbi:  Day/ night [phone] ask for ob triage  Emergency:  Call labor and delivery:  [phone]    What should I call about??    Contraction every 5 minutes for 1 hour 1 minute long (511), bleeding, loss of fluid, headache that doesn't resolve with tylenol, and decreased fetal movement     Start kick counts @ 26-28 weeks   Keep track of movement and discover your normal baby movement pattern   guideline is listed below  Please call if you do not feel the baby move!  We will have you come in for fetal heart rate monitoring:   Perception of at least 10 FMs during 12 hours of normal maternal activity   Perception of least 10 FMs over two hours when the mother is at rest and focused on [facility] Address   [address], [place] [address] [phone]    Dr. Cindy Laurent, DO    OB/GYN   [facility]

## 2018-07-19 ENCOUNTER — INFUSION THERAPY VISIT (OUTPATIENT)
Dept: INFUSION THERAPY | Facility: CLINIC | Age: 38
End: 2018-07-19
Attending: FAMILY MEDICINE
Payer: COMMERCIAL

## 2018-07-19 VITALS
SYSTOLIC BLOOD PRESSURE: 88 MMHG | TEMPERATURE: 98.6 F | DIASTOLIC BLOOD PRESSURE: 42 MMHG | RESPIRATION RATE: 16 BRPM | OXYGEN SATURATION: 98 %

## 2018-07-19 DIAGNOSIS — E86.0 DEHYDRATION: ICD-10-CM

## 2018-07-19 DIAGNOSIS — O21.0 HYPEREMESIS GRAVIDARUM: Primary | ICD-10-CM

## 2018-07-19 PROCEDURE — 25000128 H RX IP 250 OP 636: Performed by: FAMILY MEDICINE

## 2018-07-19 PROCEDURE — 96361 HYDRATE IV INFUSION ADD-ON: CPT

## 2018-07-19 PROCEDURE — 96360 HYDRATION IV INFUSION INIT: CPT

## 2018-07-19 RX ORDER — ONDANSETRON 2 MG/ML
8 INJECTION INTRAMUSCULAR; INTRAVENOUS EVERY 6 HOURS PRN
Status: CANCELLED
Start: 2018-07-19

## 2018-07-19 RX ADMIN — SODIUM CHLORIDE 2000 ML: 9 INJECTION, SOLUTION INTRAVENOUS at 14:28

## 2018-07-19 NOTE — PROGRESS NOTES
Infusion Nursing Note:  Rizwana Plummer presents today for IVF.    Patient seen by provider today: No   present during visit today: Not Applicable.    Note: 1 liter of fluid scheduled for today, 2.5 liters ordered and per Dr. Lynne's notes, patient to receive 2 liters. Dr. Lynne paged to clarify and she stated to change the orders to 2 liters of NS and she will reassess this with the patient at another appt.    Patient stating she vomited once this am. Did not want zofran while in infusion today.    Intravenous Access:  Peripheral IV placed.    Treatment Conditions:  Not Applicable.      Post Infusion Assessment:  Patient tolerated infusion without incident.  Blood return noted pre and post infusion.  Site patent and intact, free from redness, edema or discomfort.  No evidence of extravasations.    Discharge Plan:   Discharge instructions reviewed with: Patient.  Patient and/or family verbalized understanding of discharge instructions and all questions answered.  Patient stating she will make an appt on the way out of infusion today.  Patient discharged in stable condition accompanied by: self.  Departure Mode: Ambulatory.    Zehra Wood RN

## 2018-07-24 ENCOUNTER — INFUSION THERAPY VISIT (OUTPATIENT)
Dept: INFUSION THERAPY | Facility: CLINIC | Age: 38
End: 2018-07-24
Attending: FAMILY MEDICINE
Payer: COMMERCIAL

## 2018-07-24 VITALS — TEMPERATURE: 98.1 F | RESPIRATION RATE: 16 BRPM | DIASTOLIC BLOOD PRESSURE: 48 MMHG | SYSTOLIC BLOOD PRESSURE: 89 MMHG

## 2018-07-24 DIAGNOSIS — E86.0 DEHYDRATION: ICD-10-CM

## 2018-07-24 DIAGNOSIS — O21.0 HYPEREMESIS GRAVIDARUM: Primary | ICD-10-CM

## 2018-07-24 PROCEDURE — 96361 HYDRATE IV INFUSION ADD-ON: CPT

## 2018-07-24 PROCEDURE — 25000128 H RX IP 250 OP 636: Performed by: FAMILY MEDICINE

## 2018-07-24 PROCEDURE — 96360 HYDRATION IV INFUSION INIT: CPT

## 2018-07-24 RX ORDER — ONDANSETRON 2 MG/ML
8 INJECTION INTRAMUSCULAR; INTRAVENOUS EVERY 6 HOURS PRN
Status: CANCELLED
Start: 2018-07-24

## 2018-07-24 RX ADMIN — SODIUM CHLORIDE 2000 ML: 9 INJECTION, SOLUTION INTRAVENOUS at 08:25

## 2018-07-24 NOTE — MR AVS SNAPSHOT
After Visit Summary   7/24/2018    Rizwana Plummer    MRN: [id number]           Patient Information     Date Of Birth          1980        Visit Information        Provider Department      7/24/2018 8:00 AM RH INFUSION CHAIR 3 [facility] Infusion Services        Today's Diagnoses     Hyperemesis gravidarum    -  1    Dehydration           Follow-ups after your visit        Your next 10 appointments already scheduled     Jul 27, 2018  8:00 AM CDT   Level 2 with RH INFUSION CHAIR 3   [facility] Infusion Services ([facility])    [facility]  [address]  [facility] [address]   [phone]            Aug 14, 2018 10:45 AM CDT   ESTABLISHED PRENATAL with Cindy Laurent DO   [facility]    [address]  [facility] [id number] [phone]            Jan 24, 2019   Procedure with DO Glory Florez Labor and Delivery (--)    [address]  [facility] [id number] [phone]            Feb 07, 2019  1:00 PM CST   SHORT with Cindy Laurent DO   [facility]    [address]  [facility] [address] [phone]              Who to contact     If you have questions or need follow up information about today's clinic visit or your schedule please contact [facility] INFUSION SERVICES directly at [phone].  Normal or non-critical lab and imaging results will be communicated to you by MyChart, letter or phone within 4 business days after the clinic has received the results. If you do not hear from us within 7 days, please contact the clinic through [employer] or phone. If you have a critical or abnormal lab result, we will notify you by phone as soon as possible.  Submit refill requests through [employer] or call your pharmacy and they will forward the  refill request to us. Please allow 3 business days for your refill to be completed.          Additional Information About Your Visit        MyChart Information     [employer] gives you secure access to your electronic health record. If you see a primary care provider, you can also send messages to your care team and make appointments. If you have questions, please call your primary care clinic.  If you do not have a primary care provider, please call [phone] and they will assist you.        Care EveryWhere ID     This is your Care EveryWhere ID. This could be used by other organizations to access your [place] medical records  [id number]        Your Vitals Were     Temperature Respirations Last Period             98.1  F (36.7  C) (Tympanic) 16 04/26/2018 (Exact Date)          Blood Pressure from Last 3 Encounters:   07/24/18 (!) 89/48   07/19/18 (!) 88/42   07/17/18 (!) 78/52    Weight from Last 3 Encounters:   07/17/18 56.6 kg (124 lb 12.8 oz)   07/02/18 57.2 kg (126 lb 3.2 oz)   06/27/18 56.7 kg (125 lb)              Today, you had the following     No orders found for display       Primary Care Provider Office Phone # Fax #    Cindy Bajwa Mehran,  [phone]       [address] [facility] [address]        Equal Access to Services     OSMEL HINES : Hadii aad ku hadasho Soomaali, waaxda luqadaha, qaybta kaalmada adeegyada, sebastien zayas . So [facility] [phone].    ATENCIÓN: Si habla español, tiene a pink disposición servicios gratuitos de asistencia lingüística. Llame al [phone].    We comply with applicable federal civil rights laws and [place] laws. We do not discriminate on the basis of race, color, national origin, age, disability, sex, sexual orientation, or gender identity.            Thank you!     Thank you for choosing [facility] INFUSION SERVICES  for your care. Our goal is always to provide you with excellent care. Hearing  back from our patients is one way we can continue to improve our services. Please take a few minutes to complete the written survey that you may receive in the mail after your visit with us. Thank you!             Your Updated Medication List - Protect others around you: Learn how to safely use, store and throw away your medicines at www.disposemymeds.org.          This list is accurate as of 7/24/18 12:35 PM.  Always use your most recent med list.                   Brand Name Dispense Instructions for use Diagnosis    cholecalciferol 1000 UNIT tablet    vitamin D3    90 tablet    Take 1 tablet (1,000 Units) by mouth daily    Vitamin D deficiency       doxylamine 25 MG Tabs tablet    UNISOM     Take 25 mg by mouth At Bedtime        metoclopramide 5 MG tablet    REGLAN    120 tablet    Take 1 tablet (5 mg) by mouth 4 times daily as needed    Hyperemesis gravidarum       prenatal multivitamin plus iron 27-0.8 MG Tabs per tablet     100 tablet    Take 1 tablet by mouth daily        pyridOXINE 50 MG tablet    [employer] VITAMIN B-6    90 tablet    Take 1 tablet (50 mg) by mouth 3 times daily    Hyperemesis gravidarum       TYLENOL PO      Take 325 mg by mouth        * ZOFRAN ODT PO      Take 1 tablet by mouth every 6 hours as needed for nausea        * ondansetron 4 MG ODT tab    ZOFRAN ODT    30 tablet    Take 1 tablet (4 mg) by mouth every 8 hours as needed    Hyperemesis gravidarum       * Notice:  This list has 2 medication(s) that are the same as other medications prescribed for you. Read the directions carefully, and ask your doctor or other care provider to review them with you.

## 2018-07-24 NOTE — PROGRESS NOTES
Infusion Nursing Note:  Rizwana Plummer presents today for Iv fluids.    Patient seen by provider today: No   present during visit today: Not Applicable.    Note: getting IV fluids, extra liter now, no Zofran today, took oral before coming.    Intravenous Access:  Peripheral IV placed.    Treatment Conditions:  Not Applicable.      Post Infusion Assessment:  Patient tolerated infusion without incident.  Blood return noted pre and post infusion.  Site patent and intact, free from redness, edema or discomfort.  Access discontinued per protocol.    Discharge Plan:   Discharge instructions reviewed with: Patient.  Patient and/or family verbalized understanding of discharge instructions and all questions answered.  Patient discharged in stable condition accompanied by: self.  Departure Mode: Ambulatory.  Returning July 27th    Johanna Wilson RN

## 2018-07-27 ENCOUNTER — INFUSION THERAPY VISIT (OUTPATIENT)
Dept: INFUSION THERAPY | Facility: CLINIC | Age: 38
End: 2018-07-27
Attending: FAMILY MEDICINE
Payer: COMMERCIAL

## 2018-07-27 DIAGNOSIS — O21.0 HYPEREMESIS GRAVIDARUM: Primary | ICD-10-CM

## 2018-07-27 DIAGNOSIS — E86.0 DEHYDRATION: ICD-10-CM

## 2018-07-27 PROCEDURE — 96374 THER/PROPH/DIAG INJ IV PUSH: CPT

## 2018-07-27 PROCEDURE — 96361 HYDRATE IV INFUSION ADD-ON: CPT

## 2018-07-27 PROCEDURE — 25000128 H RX IP 250 OP 636: Performed by: FAMILY MEDICINE

## 2018-07-27 RX ORDER — ONDANSETRON 2 MG/ML
8 INJECTION INTRAMUSCULAR; INTRAVENOUS EVERY 6 HOURS PRN
Status: CANCELLED
Start: 2018-07-27

## 2018-07-27 RX ORDER — ONDANSETRON 2 MG/ML
8 INJECTION INTRAMUSCULAR; INTRAVENOUS EVERY 6 HOURS PRN
Status: DISCONTINUED | OUTPATIENT
Start: 2018-07-27 | End: 2018-07-27 | Stop reason: HOSPADM

## 2018-07-27 RX ADMIN — SODIUM CHLORIDE 2000 ML: 9 INJECTION, SOLUTION INTRAVENOUS at 08:30

## 2018-07-27 RX ADMIN — ONDANSETRON 8 MG: 2 INJECTION INTRAMUSCULAR; INTRAVENOUS at 08:31

## 2018-07-27 NOTE — PROGRESS NOTES
Infusion Nursing Note:  Rizwana Plummer presents today for IVF, 2 liters.    Patient seen by provider today: No   present during visit today: Not Applicable.    Note: N/A.    Intravenous Access:  Peripheral IV placed.    Treatment Conditions:  Not Applicable.      Post Infusion Assessment:  Patient tolerated infusion without incident.  Site patent and intact, free from redness, edema or discomfort.  No evidence of extravasations.  Access discontinued per protocol.    Discharge Plan:   Discharge instructions reviewed with: Patient.  AVS to patient via [employer].  Patient will return 7/31 for next appointment.   Patient discharged in stable condition accompanied by: self.  Departure Mode: Ambulatory.    Nelly Harvey RN

## 2018-07-31 ENCOUNTER — INFUSION THERAPY VISIT (OUTPATIENT)
Dept: INFUSION THERAPY | Facility: CLINIC | Age: 38
End: 2018-07-31
Attending: FAMILY MEDICINE
Payer: COMMERCIAL

## 2018-07-31 VITALS
OXYGEN SATURATION: 100 % | DIASTOLIC BLOOD PRESSURE: 42 MMHG | SYSTOLIC BLOOD PRESSURE: 79 MMHG | TEMPERATURE: 97.7 F | RESPIRATION RATE: 16 BRPM

## 2018-07-31 DIAGNOSIS — O21.0 HYPEREMESIS GRAVIDARUM: Primary | ICD-10-CM

## 2018-07-31 DIAGNOSIS — E86.0 DEHYDRATION: ICD-10-CM

## 2018-07-31 PROCEDURE — 96361 HYDRATE IV INFUSION ADD-ON: CPT

## 2018-07-31 PROCEDURE — 25000128 H RX IP 250 OP 636: Performed by: FAMILY MEDICINE

## 2018-07-31 PROCEDURE — 96360 HYDRATION IV INFUSION INIT: CPT

## 2018-07-31 RX ORDER — ONDANSETRON 2 MG/ML
8 INJECTION INTRAMUSCULAR; INTRAVENOUS EVERY 6 HOURS PRN
Status: CANCELLED
Start: 2018-07-31

## 2018-07-31 RX ADMIN — SODIUM CHLORIDE 2000 ML: 9 INJECTION, SOLUTION INTRAVENOUS at 08:10

## 2018-07-31 NOTE — MR AVS SNAPSHOT
After Visit Summary   7/31/2018    Rizwana Plummer    MRN: [id number]           Patient Information     Date Of Birth          1980        Visit Information        Provider Department      7/31/2018 8:00 AM RH INFUSION CHAIR 11 [facility] Infusion Services        Today's Diagnoses     Hyperemesis gravidarum    -  1    Dehydration           Follow-ups after your visit        Your next 10 appointments already scheduled     Aug 03, 2018  8:30 AM CDT   Level 3 with RH INFUSION CHAIR 3   [facility] Infusion Services ([facility])    [facility] [address]  [address]  [facility] [address]   [phone]            Aug 06, 2018  1:30 PM CDT   Level 3 with RH INFUSION CHAIR 4   [facility] Infusion Services ([facility])    [facility] [address]  [address]  [facility] [address]   [phone]            Aug 08, 2018  1:30 PM CDT   Level 3 with RH INFUSION CHAIR 7   [facility] Infusion Services ([facility])    [facility] [address]  [address]  [facility] [address]   [phone]            Aug 13, 2018  1:00 PM CDT   Level 3 with RH INFUSION CHAIR 10   [facility] Infusion Services ([facility])    [facility] [address]  [address]  [facility] [address]   [phone]            Aug 14, 2018 10:45 AM CDT   ESTABLISHED PRENATAL with Cindy Laurent,    [facility]    [address]  [facility] [address]   [phone]            Aug 15, 2018  8:30 AM CDT   Level 3 with RH INFUSION CHAIR 8   [facility] Infusion Services ([facility])    [facility]  [address]  [facility] [address]   [phone]            Aug 20, 2018  8:30  AM CDT   Level 3 with RH INFUSION CHAIR 6   [facility] Infusion Services ([facility])    [facility]  [address]  [place] [address]   [phone]            Aug 22, 2018  8:30 AM CDT   Level 3 with RH INFUSION CHAIR 7   [facility] Infusion Services ([facility])    [facility]  [address]  [place] [address]   [phone]            Aug 27, 2018  8:30 AM CDT   Level 3 with RH INFUSION CHAIR 2   [facility] Infusion Services ([facility])    [facility]  [address]  [place] [address]   [phone]            Aug 29, 2018  8:30 AM CDT   Level 3 with RH INFUSION CHAIR 8   [facility] Infusion Services ([facility])    [facility]  [address]  [place] [address]   [phone]              Who to contact     If you have questions or need follow up information about today's clinic visit or your schedule please contact [facility] INFUSION SERVICES directly at [phone].  Normal or non-critical lab and imaging results will be communicated to you by [employer], letter or phone within 4 business days after the clinic has received the results. If you do not hear from us within 7 days, please contact the clinic through [employer] or phone. If you have a critical or abnormal lab result, we will notify you by phone as soon as possible.  Submit refill requests through [employer] or call your pharmacy and they will forward the refill request to us. Please allow 3 business days for your refill to be completed.          Additional Information About Your Visit        [employer] Information     [employer] gives you secure access to your electronic health record. If you see a primary care provider, you can also send messages to your care team  and make appointments. If you have questions, please call your primary care clinic.  If you do not have a primary care provider, please call [phone] and they will assist you.        Care EveryWhere ID     This is your Care EveryWhere ID. This could be used by other organizations to access your [place] medical records  [id number]        Your Vitals Were     Temperature Respirations Last Period Pulse Oximetry          97.7  F (36.5  C) (Oral) 16 04/26/2018 (Exact Date) 100%         Blood Pressure from Last 3 Encounters:   07/31/18 (!) 79/42   07/27/18 (!) (P) 77/43   07/24/18 (!) 89/48    Weight from Last 3 Encounters:   07/17/18 56.6 kg (124 lb 12.8 oz)   07/02/18 57.2 kg (126 lb 3.2 oz)   06/27/18 56.7 kg (125 lb)              Today, you had the following     No orders found for display       Primary Care Provider Office Phone # Fax #    Cindy Novakkaterina Laurent,  [phone]       [address] [facility] [facility] [address]        Equal Access to Services     [facility]: Hadii aad ku hadasho Somargothali, waaxda luqadaha, qaybta kaalmada adeegyada, sebastien zayas . So [facility] [phone].    ATENCIÓN: Si habla español, tiene a pink disposición servicios gratuitos de asistencia lingüística. LlUC [facility] [phone].    We comply with applicable federal civil rights laws and [place] laws. We do not discriminate on the basis of race, color, national origin, age, disability, sex, sexual orientation, or gender identity.            Thank you!     Thank you for choosing [facility] INFUSION SERVICES  for your care. Our goal is always to provide you with excellent care. Hearing back from our patients is one way we can continue to improve our services. Please take a few minutes to complete the written survey that you may receive in the mail after your visit with us. Thank you!             Your Updated Medication List - Protect others around you: Learn how to safely use,  store and throw away your medicines at www.disposemymeds.org.          This list is accurate as of 7/31/18 10:54 AM.  Always use your most recent med list.                   Brand Name Dispense Instructions for use Diagnosis    cholecalciferol 1000 UNIT tablet    vitamin D3    90 tablet    Take 1 tablet (1,000 Units) by mouth daily    Vitamin D deficiency       doxylamine 25 MG Tabs tablet    UNISOM     Take 25 mg by mouth At Bedtime        metoclopramide 5 MG tablet    REGLAN    120 tablet    Take 1 tablet (5 mg) by mouth 4 times daily as needed    Hyperemesis gravidarum       prenatal multivitamin plus iron 27-0.8 MG Tabs per tablet     100 tablet    Take 1 tablet by mouth daily        pyridOXINE 50 MG tablet    [employer] VITAMIN B-6    90 tablet    Take 1 tablet (50 mg) by mouth 3 times daily    Hyperemesis gravidarum       TYLENOL PO      Take 325 mg by mouth        * ZOFRAN ODT PO      Take 1 tablet by mouth every 6 hours as needed for nausea        * ondansetron 4 MG ODT tab    ZOFRAN ODT    30 tablet    Take 1 tablet (4 mg) by mouth every 8 hours as needed    Hyperemesis gravidarum       * Notice:  This list has 2 medication(s) that are the same as other medications prescribed for you. Read the directions carefully, and ask your doctor or other care provider to review them with you.

## 2018-07-31 NOTE — PROGRESS NOTES
Infusion Nursing Note:  Rizwana Plummer presents today for IVF.    Patient seen by provider today: No   present during visit today: Not Applicable.    Note: Rizwana took zofran before appointment, so was not given any IV today.    Intravenous Access:  Peripheral IV placed.    Treatment Conditions:  Not Applicable.      Post Infusion Assessment:  Patient tolerated infusion without incident.  Blood return noted pre and post infusion.  Site patent and intact, free from redness, edema or discomfort.  No evidence of extravasations.  Access discontinued per protocol.    Discharge Plan:   Patient declined prescription refills.  Discharge instructions reviewed with: Patient.  Patient and/or family verbalized understanding of discharge instructions and all questions answered.  Copy of AVS reviewed with patient and/or family.  Patient will return 8/3/18 for next appointment.  Patient discharged in stable condition accompanied by: self.  Departure Mode: Ambulatory.    Natalie Carr RN

## 2018-08-03 ENCOUNTER — INFUSION THERAPY VISIT (OUTPATIENT)
Dept: INFUSION THERAPY | Facility: CLINIC | Age: 38
End: 2018-08-03
Attending: FAMILY MEDICINE
Payer: COMMERCIAL

## 2018-08-03 VITALS — TEMPERATURE: 98 F | DIASTOLIC BLOOD PRESSURE: 44 MMHG | SYSTOLIC BLOOD PRESSURE: 83 MMHG | RESPIRATION RATE: 16 BRPM

## 2018-08-03 DIAGNOSIS — O21.0 HYPEREMESIS GRAVIDARUM: Primary | ICD-10-CM

## 2018-08-03 DIAGNOSIS — E86.0 DEHYDRATION: ICD-10-CM

## 2018-08-03 PROCEDURE — 96361 HYDRATE IV INFUSION ADD-ON: CPT

## 2018-08-03 PROCEDURE — 25000128 H RX IP 250 OP 636: Performed by: FAMILY MEDICINE

## 2018-08-03 PROCEDURE — 96374 THER/PROPH/DIAG INJ IV PUSH: CPT

## 2018-08-03 RX ORDER — ONDANSETRON 2 MG/ML
8 INJECTION INTRAMUSCULAR; INTRAVENOUS EVERY 6 HOURS PRN
Status: CANCELLED
Start: 2018-08-03

## 2018-08-03 RX ORDER — ONDANSETRON 2 MG/ML
8 INJECTION INTRAMUSCULAR; INTRAVENOUS EVERY 6 HOURS PRN
Status: DISCONTINUED | OUTPATIENT
Start: 2018-08-03 | End: 2018-08-03 | Stop reason: HOSPADM

## 2018-08-03 RX ADMIN — ONDANSETRON 8 MG: 2 INJECTION INTRAMUSCULAR; INTRAVENOUS at 09:30

## 2018-08-03 RX ADMIN — SODIUM CHLORIDE 2000 ML: 9 INJECTION, SOLUTION INTRAVENOUS at 09:00

## 2018-08-03 NOTE — MR AVS SNAPSHOT
After Visit Summary   8/3/2018    Rizwana Plummer    MRN: [id number]           Patient Information     Date Of Birth          1980        Visit Information        Provider Department      8/3/2018 8:30 AM RH INFUSION CHAIR 3 [facility]        Today's Diagnoses     Hyperemesis gravidarum    -  1    Dehydration           Follow-ups after your visit        Your next 10 appointments already scheduled     Aug 06, 2018  1:30 PM CDT   Level 3 with RH INFUSION CHAIR 4   [facility] ([facility])    [facility] [address]  [facility] [address]   [phone]            Aug 08, 2018  1:30 PM CDT   Level 3 with RH INFUSION CHAIR 7   [facility] ([facility])    [facility] [address]  [address]  [facility] [address]   [phone]            Aug 13, 2018  1:00 PM CDT   Level 3 with RH INFUSION CHAIR 10   [facility] ([facility])    [facility] [address]  [facility] [address]   [phone]            Aug 14, 2018 10:45 AM CDT   ESTABLISHED PRENATAL with Cindy Laurent,    [facility] ([facility])    [address]  [facility] [address]   [phone]            Aug 15, 2018  8:30 AM CDT   Level 3 with RH INFUSION CHAIR 8   [facility] ([facility])    [facility] [address]  [facility] [address]   [phone]            Aug 20, 2018  8:30 AM CDT   Level 3 with RH INFUSION CHAIR 6   [facility] ([facility])    [facility] [address]  [address]  [facility] [address]   [phone]            Aug 22, 2018  8:30 AM  CDT   Level 3 with RH INFUSION CHAIR 7   [facility] ([facility])    [facility]  [address]  [facility] [address]   [phone]            Aug 27, 2018  8:30 AM CDT   Level 3 with RH INFUSION CHAIR 2   [facility] ([facility])    [facility]  [address]  [place] [address]   [phone]            Aug 29, 2018  8:30 AM CDT   Level 3 with RH INFUSION CHAIR 8   [facility] ([facility])    [facility]  [address]  [facility] [address]   [phone]            Jan 24, 2019   Procedure with Cindy Laurent, DO   [facility] Labor and Delivery (--)    [address]  [facility] [id number] [phone]              Who to contact     If you have questions or need follow up information about today's clinic visit or your schedule please contact [facility] directly at [phone].  Normal or non-critical lab and imaging results will be communicated to you by [employer], letter or phone within 4 business days after the clinic has received the results. If you do not hear from us within 7 days, please contact the clinic through [employer] or phone. If you have a critical or abnormal lab result, we will notify you by phone as soon as possible.  Submit refill requests through [employer] or call your pharmacy and they will forward the refill request to us. Please allow 3 business days for your refill to be completed.          Additional Information About Your Visit        [employer] Information     [employer] gives you secure access to your electronic health record. If you see a primary care provider, you can also send messages to your care team and make appointments. If you have questions, please call your primary care clinic.   If you do not have a primary care provider, please call [phone] and they will assist you.        Care EveryWhere ID     This is your Care EveryWhere ID. This could be used by other organizations to access your [place] medical records  [id number]        Your Vitals Were     Temperature Respirations Last Period             98  F (36.7  C) (Tympanic) 16 04/26/2018 (Exact Date)          Blood Pressure from Last 3 Encounters:   08/03/18 (!) 83/44   07/31/18 (!) 79/42   07/27/18 (!) (P) 77/43    Weight from Last 3 Encounters:   07/17/18 56.6 kg (124 lb 12.8 oz)   07/02/18 57.2 kg (126 lb 3.2 oz)   06/27/18 56.7 kg (125 lb)              Today, you had the following     No orders found for display       Primary Care Provider Office Phone # Fax #    Cindy Bajwa Mehran,  [phone]       [address] [facility] [address]        Equal Access to Services     [facility]: Hadii aad ku hadasho Soomaali, waaxda luqadaha, qaybta kaalmada adeegyada, waxcandelaria minor haypoppy zayas . So [facility] [phone].    ATENCIÓN: Si habla español, tiene a pink disposición servicios gratuitos de asistencia lingüística. Llame al [phone].    We comply with applicable federal civil rights laws and [place] laws. We do not discriminate on the basis of race, color, national origin, age, disability, sex, sexual orientation, or gender identity.            Thank you!     Thank you for choosing [facility]  for your care. Our goal is always to provide you with excellent care. Hearing back from our patients is one way we can continue to improve our services. Please take a few minutes to complete the written survey that you may receive in the mail after your visit with us. Thank you!             Your Updated Medication List - Protect others around you: Learn how to safely use, store and throw away your medicines at www.disposemymeds.org.          This list is accurate as of  8/3/18  1:12 PM.  Always use your most recent med list.                   Brand Name Dispense Instructions for use Diagnosis    cholecalciferol 1000 UNIT tablet    vitamin D3    90 tablet    Take 1 tablet (1,000 Units) by mouth daily    Vitamin D deficiency       doxylamine 25 MG Tabs tablet    UNISOM     Take 25 mg by mouth At Bedtime        metoclopramide 5 MG tablet    REGLAN    120 tablet    Take 1 tablet (5 mg) by mouth 4 times daily as needed    Hyperemesis gravidarum       prenatal multivitamin plus iron 27-0.8 MG Tabs per tablet     100 tablet    Take 1 tablet by mouth daily        pyridOXINE 50 MG tablet    [employer]S VITAMIN B-6    90 tablet    Take 1 tablet (50 mg) by mouth 3 times daily    Hyperemesis gravidarum       TYLENOL PO      Take 325 mg by mouth        * ZOFRAN ODT PO      Take 1 tablet by mouth every 6 hours as needed for nausea        * ondansetron 4 MG ODT tab    ZOFRAN ODT    30 tablet    Take 1 tablet (4 mg) by mouth every 8 hours as needed    Hyperemesis gravidarum       * Notice:  This list has 2 medication(s) that are the same as other medications prescribed for you. Read the directions carefully, and ask your doctor or other care provider to review them with you.

## 2018-08-03 NOTE — PROGRESS NOTES
Infusion Nursing Note:  Rizwana Plummer presents today for IVF.    Patient seen by provider today: No   present during visit today: Not Applicable.    Note: Gave IV zofran today.  Nausea improved per patient but had one emesis yesterday.    Intravenous Access:  Peripheral IV placed.    Treatment Conditions:  Not Applicable.      Post Infusion Assessment:  Patient tolerated infusion without incident.  Blood return noted pre and post infusion.  Site patent and intact, free from redness, edema or discomfort.  No evidence of extravasations.  Access discontinued per protocol.    Discharge Plan:   AVS to patient via MYCHART.  Patient will return 8/6/18 for next appointment.   Patient discharged in stable condition accompanied by: self.  Departure Mode: Ambulatory.    Lluvia Mosquera RN

## 2018-08-06 ENCOUNTER — INFUSION THERAPY VISIT (OUTPATIENT)
Dept: INFUSION THERAPY | Facility: CLINIC | Age: 38
End: 2018-08-06
Attending: FAMILY MEDICINE
Payer: COMMERCIAL

## 2018-08-06 VITALS
OXYGEN SATURATION: 100 % | TEMPERATURE: 98.8 F | RESPIRATION RATE: 16 BRPM | DIASTOLIC BLOOD PRESSURE: 62 MMHG | SYSTOLIC BLOOD PRESSURE: 95 MMHG

## 2018-08-06 DIAGNOSIS — O21.0 HYPEREMESIS GRAVIDARUM: Primary | ICD-10-CM

## 2018-08-06 DIAGNOSIS — E86.0 DEHYDRATION: ICD-10-CM

## 2018-08-06 PROCEDURE — 96361 HYDRATE IV INFUSION ADD-ON: CPT

## 2018-08-06 PROCEDURE — 25000128 H RX IP 250 OP 636: Performed by: FAMILY MEDICINE

## 2018-08-06 PROCEDURE — 96360 HYDRATION IV INFUSION INIT: CPT

## 2018-08-06 RX ORDER — ONDANSETRON 2 MG/ML
8 INJECTION INTRAMUSCULAR; INTRAVENOUS EVERY 6 HOURS PRN
Status: CANCELLED
Start: 2018-08-06

## 2018-08-06 RX ADMIN — SODIUM CHLORIDE 2000 ML: 9 INJECTION, SOLUTION INTRAVENOUS at 14:01

## 2018-08-06 NOTE — MR AVS SNAPSHOT
After Visit Summary   8/6/2018    Rizwana Plummer    MRN: [id number]           Patient Information     Date Of Birth          1980        Visit Information        Provider Department      8/6/2018 1:30 PM RH INFUSION CHAIR 4 [facility]        Today's Diagnoses     Hyperemesis gravidarum    -  1    Dehydration           Follow-ups after your visit        Your next 10 appointments already scheduled     Aug 08, 2018  1:30 PM CDT   Level 3 with RH INFUSION CHAIR 7   [facility] ([facility])    [facility] [address]  [address]  [facility] [address]   [phone]            Aug 13, 2018  1:00 PM CDT   Level 3 with RH INFUSION CHAIR 10   [facility] ([facility])    [facility] [address]  [address]  [facility] [address]   [phone]            Aug 14, 2018 10:45 AM CDT   ESTABLISHED PRENATAL with Cindy Laurent,    [facility] ([facility])    [address]  [facility] [address]   [phone]            Aug 15, 2018  8:30 AM CDT   Level 3 with RH INFUSION CHAIR 8   [facility] ([facility])    [facility] [address]  [address]  [facility] [address]   [phone]            Aug 20, 2018  8:30 AM CDT   Level 3 with RH INFUSION CHAIR 6   [facility] ([facility])    [facility] [address]  [address]  [facility] [address]   [phone]            Aug 22, 2018  8:30 AM CDT   Level 3 with RH INFUSION CHAIR 7   [facility] ([facility])    [facility] [address]  [address]  [facility] [address]   [phone]            Aug 27, 2018  8:30 AM  CDT   Level 3 with RH INFUSION CHAIR 2   [facility] ([facility])    [facility]  [address]  [facility] [address] [phone]            Aug 29, 2018  8:30 AM CDT   Level 3 with RH INFUSION CHAIR 8   [facility] ([facility])    [facility]  [address]  [facility] [address] [phone]            Jan 24, 2019   Procedure with Cindy Laurent, DO   [facility] Labor and Delivery (--)    [address]  [facility] [address] [phone]            Feb 07, 2019  1:00 PM CST   SHORT with Cindy Laurent, DO   [facility] ([facility])    [address]  [facility] [address] [phone]              Who to contact     If you have questions or need follow up information about today's clinic visit or your schedule please contact [facility] directly at [phone].  Normal or non-critical lab and imaging results will be communicated to you by MyChart, letter or phone within 4 business days after the clinic has received the results. If you do not hear from us within 7 days, please contact the clinic through [employer] or phone. If you have a critical or abnormal lab result, we will notify you by phone as soon as possible.  Submit refill requests through Cover or call your pharmacy and they will forward the refill request to us. Please allow 3 business days for your refill to be completed.          Additional Information About Your Visit        Cover Information     Cover gives you secure access to your electronic health record. If you see a primary care provider, you can also send messages to your care team and make appointments. If you have questions, please call your primary care clinic.  If you do not have a primary care provider,  please call [phone] and they will assist you.        Care EveryWhere ID     This is your Care EveryWhere ID. This could be used by other organizations to access your [place] medical records  [id number]        Your Vitals Were     Temperature Respirations Last Period Pulse Oximetry          98.8  F (37.1  C) (Tympanic) 16 04/26/2018 (Exact Date) 100%         Blood Pressure from Last 3 Encounters:   08/06/18 95/62   08/03/18 (!) 83/44   07/31/18 (!) 79/42    Weight from Last 3 Encounters:   07/17/18 56.6 kg (124 lb 12.8 oz)   07/02/18 57.2 kg (126 lb 3.2 oz)   06/27/18 56.7 kg (125 lb)              Today, you had the following     No orders found for display       Primary Care Provider Office Phone # Fax #    Cindy Bajwa Mehran,  [phone]       [address] [facility] [address]        Equal Access to Services     [facility] : Hadii aad ku hadasho Soomaali, waaxda luqadaha, qaybta kaalmada adeegyada, waxay idiin hayjamesn shahzad zayas . So [facility] [phone].    ATENCIÓN: Si habla español, tiene a pink disposición servicios gratuitos de asistencia lingüística. [facility] [phone].    We comply with applicable federal civil rights laws and [place] laws. We do not discriminate on the basis of race, color, national origin, age, disability, sex, sexual orientation, or gender identity.            Thank you!     Thank you for choosing [facility]  for your care. Our goal is always to provide you with excellent care. Hearing back from our patients is one way we can continue to improve our services. Please take a few minutes to complete the written survey that you may receive in the mail after your visit with us. Thank you!             Your Updated Medication List - Protect others around you: Learn how to safely use, store and throw away your medicines at www.disposemymeds.org.          This list is accurate as of 8/6/18  3:58 PM.  Always use your  most recent med list.                   Brand Name Dispense Instructions for use Diagnosis    cholecalciferol 1000 UNIT tablet    vitamin D3    90 tablet    Take 1 tablet (1,000 Units) by mouth daily    Vitamin D deficiency       doxylamine 25 MG Tabs tablet    UNISOM     Take 25 mg by mouth At Bedtime        metoclopramide 5 MG tablet    REGLAN    120 tablet    Take 1 tablet (5 mg) by mouth 4 times daily as needed    Hyperemesis gravidarum       prenatal multivitamin plus iron 27-0.8 MG Tabs per tablet     100 tablet    Take 1 tablet by mouth daily        pyridOXINE 50 MG tablet    CVS VITAMIN B-6    90 tablet    Take 1 tablet (50 mg) by mouth 3 times daily    Hyperemesis gravidarum       TYLENOL PO      Take 325 mg by mouth        * ZOFRAN ODT PO      Take 1 tablet by mouth every 6 hours as needed for nausea        * ondansetron 4 MG ODT tab    ZOFRAN ODT    30 tablet    Take 1 tablet (4 mg) by mouth every 8 hours as needed    Hyperemesis gravidarum       * Notice:  This list has 2 medication(s) that are the same as other medications prescribed for you. Read the directions carefully, and ask your doctor or other care provider to review them with you.

## 2018-08-06 NOTE — PROGRESS NOTES
Infusion Nursing Note:  Rizwana Plummer presents today for IVF.    Patient seen by provider today: No   present during visit today: Not Applicable.    Note: Zofran not given today, patient took it at home prior to coming.      Intravenous Access:  Peripheral IV placed.    Treatment Conditions:  Not Applicable.      Post Infusion Assessment:  Patient tolerated infusion without incident.  Blood return noted pre and post infusion.  Site patent and intact, free from redness, edema or discomfort.  No evidence of extravasations.  Access discontinued per protocol.    Discharge Plan:   AVS to patient via MYCHART.  Patient will return 8/8/18 for next appointment.   Patient discharged in stable condition accompanied by: self.  Departure Mode: Ambulatory.    Lluvia Mosquera RN

## 2018-08-13 ENCOUNTER — INFUSION THERAPY VISIT (OUTPATIENT)
Dept: INFUSION THERAPY | Facility: CLINIC | Age: 38
End: 2018-08-13
Attending: FAMILY MEDICINE
Payer: COMMERCIAL

## 2018-08-13 VITALS
HEART RATE: 93 BPM | DIASTOLIC BLOOD PRESSURE: 45 MMHG | OXYGEN SATURATION: 100 % | TEMPERATURE: 99 F | RESPIRATION RATE: 16 BRPM | SYSTOLIC BLOOD PRESSURE: 91 MMHG

## 2018-08-13 DIAGNOSIS — O21.0 HYPEREMESIS GRAVIDARUM: Primary | ICD-10-CM

## 2018-08-13 DIAGNOSIS — E86.0 DEHYDRATION: ICD-10-CM

## 2018-08-13 PROCEDURE — 25000128 H RX IP 250 OP 636: Performed by: FAMILY MEDICINE

## 2018-08-13 PROCEDURE — 96360 HYDRATION IV INFUSION INIT: CPT

## 2018-08-13 PROCEDURE — 96361 HYDRATE IV INFUSION ADD-ON: CPT

## 2018-08-13 RX ORDER — ONDANSETRON 2 MG/ML
8 INJECTION INTRAMUSCULAR; INTRAVENOUS EVERY 6 HOURS PRN
Status: CANCELLED
Start: 2018-08-13

## 2018-08-13 RX ADMIN — SODIUM CHLORIDE 2000 ML: 9 INJECTION, SOLUTION INTRAVENOUS at 13:56

## 2018-08-13 ASSESSMENT — PAIN SCALES - GENERAL: PAINLEVEL: NO PAIN (0)

## 2018-08-13 NOTE — PROGRESS NOTES
Infusion Nursing Note:  Rizwana Plummer presents today for IV hydration.    Patient seen by provider today: No   present during visit today: Not Applicable.    Note: Feels like nausea has improved slightly. Had emesis yesterday.  No change in constipation.    Intravenous Access:  Peripheral IV placed.    Treatment Conditions:  Not Applicable.      Post Infusion Assessment:  Patient tolerated infusion without incident.  Site patent and intact, free from redness, edema or discomfort.  No evidence of extravasations.  Access discontinued per protocol.    Discharge Plan:   Discharge instructions reviewed with: Patient.  Patient discharged in stable condition accompanied by: self.  Departure Mode: Ambulatory.  Scheduled for fluids again on Wed.  To see OB tomorrow.    GALE NINA RN

## 2018-08-13 NOTE — MR AVS SNAPSHOT
After Visit Summary   8/13/2018    Rizwana Plummer    MRN: [id number]           Patient Information     Date Of Birth          1980        Visit Information        Provider Department      8/13/2018 1:00 PM RH INFUSION CHAIR 10 [facility]        Today's Diagnoses     Hyperemesis gravidarum    -  1    Dehydration           Follow-ups after your visit        Your next 10 appointments already scheduled     Aug 14, 2018 10:45 AM CDT   ESTABL[facility]ATAL with Cindy Laurent,    [facility]    [address]  [address]  [facility] [address]   [phone]            Aug 15, 2018  8:30 AM CDT   Level 3 with RH INFUSION CHAIR 8   [facility] ([facility])    [facility]  [address]  [facility] [address]   [phone]            Aug 20, 2018  8:30 AM CDT   Level 3 with RH INFUSION CHAIR 6   [facility] ([facility])    [facility] [address]  [facility] [address]   [phone]            Aug 22, 2018  8:30 AM CDT   Level 3 with RH INFUSION CHAIR 7   [facility] ([facility])    [facility] [place]  [address]  [facility] [address]   [phone]            Aug 27, 2018  8:30 AM CDT   Level 3 with RH INFUSION CHAIR 2   [facility] ([facility])    [facility] [place]  [address]  [facility] [address]   [phone]            Aug 29, 2018  8:30 AM CDT   Level 3 with RH INFUSION CHAIR 8   [facility] ([facility])    [facility] [place]  [address]  [facility] [address]   [phone]            Jan 24,  2019   Procedure with Cindy Laurent, DO   [facility] (--)    [address]  [facility] [id number] [phone]            Feb 07, 2019  1:00 PM CST   SHORT with Cindy [facility], DO   [facility]    [address]  [address]  [facility] [address] [phone]              Who to contact     If you have questions or need follow up information about today's clinic visit or your schedule please contact  INFUSION SERVICES directly at [phone].  Normal or non-critical lab and imaging results will be communicated to you by MyChart, letter or phone within 4 business days after the clinic has received the results. If you do not hear from us within 7 days, please contact the clinic through S[employer]ord[employer] or phone. If you have a critical or abnormal lab result, we will notify you by phone as soon as possible.  Submit refill requests through Realitycheck or call your pharmacy and they will forward the refill request to us. Please allow 3 business days for your refill to be completed.          Additional Information About Your Visit        S[employer]ordhart Information     [employer] gives you secure access to your electronic health record. If you see a primary care provider, you can also send messages to your care team and make appointments. If you have questions, please call your primary care clinic.  If you do not have a primary care provider, please call [phone] and they will assist you.        Care EveryWhere ID     This is your Care EveryWhere ID. This could be used by other organizations to access your [place] medical records  [id number]        Your Vitals Were     Pulse Temperature Respirations Last Period Pulse Oximetry       93 99  F (37.2  C) 16 04/26/2018 (Exact Date) 100%        Blood Pressure from Last 3 Encounters:   08/13/18 91/45   08/06/18 95/62   08/03/18 (!) 83/44    Weight from Last 3  Encounters:   07/17/18 56.6 kg (124 lb 12.8 oz)   07/02/18 57.2 kg (126 lb 3.2 oz)   06/27/18 56.7 kg (125 lb)              Today, you had the following     No orders found for display       Primary Care Provider Office Phone # Fax #    Cindy Laurent [phone]       [address] [facility] [address]        Equal Access to Services     [facility] : Hadii aad ku hadasho Soomaali, waaxda luqadaha, qaybta kaalmada adeegyada, waxay idiin hayaan adeeg iamaraarminda la'poppy . So [facility] [phone].    ATENCIÓN: Si habla español, tiene a pink disposición servicios gratuitos de asistencia lingüística. [facility] [phone].    We comply with applicable federal civil rights laws and [place] laws. We do not discriminate on the basis of race, color, national origin, age, disability, sex, sexual orientation, or gender identity.            Thank you!     Thank you for choosing [facility]  for your care. Our goal is always to provide you with excellent care. Hearing back from our patients is one way we can continue to improve our services. Please take a few minutes to complete the written survey that you may receive in the mail after your visit with us. Thank you!             Your Updated Medication List - Protect others around you: Learn how to safely use, store and throw away your medicines at www.disposemymeds.org.          This list is accurate as of 8/13/18  4:17 PM.  Always use your most recent med list.                   Brand Name Dispense Instructions for use Diagnosis    cholecalciferol 1000 UNIT tablet    vitamin D3    90 tablet    Take 1 tablet (1,000 Units) by mouth daily    Vitamin D deficiency       doxylamine 25 MG Tabs tablet    UNISOM     Take 25 mg by mouth At Bedtime        metoclopramide 5 MG tablet    REGLAN    120 tablet    Take 1 tablet (5 mg) by mouth 4 times daily as needed    Hyperemesis gravidarum       prenatal multivitamin plus iron  27-0.8 MG Tabs per tablet     100 tablet    Take 1 tablet by mouth daily        pyridOXINE 50 MG tablet    CVS VITAMIN B-6    90 tablet    Take 1 tablet (50 mg) by mouth 3 times daily    Hyperemesis gravidarum       TYLENOL PO      Take 325 mg by mouth        * ZOFRAN ODT PO      Take 1 tablet by mouth every 6 hours as needed for nausea        * ondansetron 4 MG ODT tab    ZOFRAN ODT    30 tablet    Take 1 tablet (4 mg) by mouth every 8 hours as needed    Hyperemesis gravidarum       * Notice:  This list has 2 medication(s) that are the same as other medications prescribed for you. Read the directions carefully, and ask your doctor or other care provider to review them with you.

## 2018-08-14 ENCOUNTER — HOSPITAL ENCOUNTER (OUTPATIENT)
Facility: CLINIC | Age: 38
LOS: 1 days | Discharge: HOME | End: 2018-08-14
Attending: OBSTETRICS & GYNECOLOGY | Admitting: OBSTETRICS & GYNECOLOGY
Payer: COMMERCIAL

## 2018-08-14 ENCOUNTER — THERAPY VISIT (OUTPATIENT)
Dept: PHYSICAL THERAPY | Facility: CLINIC | Age: 38
End: 2018-08-14
Attending: OBSTETRICS & GYNECOLOGY
Payer: COMMERCIAL

## 2018-08-14 VITALS
DIASTOLIC BLOOD PRESSURE: 57 MMHG | TEMPERATURE: 98.7 F | HEART RATE: 88 BPM | BODY MASS INDEX: 25 KG/M2 | SYSTOLIC BLOOD PRESSURE: 99 MMHG | RESPIRATION RATE: 16 BRPM | WEIGHT: 124 LBS | HEIGHT: 59 IN

## 2018-08-14 DIAGNOSIS — M54.50 BACK PAIN, LUMBOSACRAL: Primary | ICD-10-CM

## 2018-08-14 DIAGNOSIS — M53.3 SI (SACROILIAC) JOINT DYSFUNCTION: ICD-10-CM

## 2018-08-14 LAB
ALBUMIN UR-MCNC: NEGATIVE MG/DL
APPEARANCE UR: CLEAR
BACTERIA #/AREA URNS HPF: ABNORMAL /HPF
BILIRUB UR QL STRIP: NEGATIVE
COLOR UR AUTO: ABNORMAL
GLUCOSE UR STRIP-MCNC: NEGATIVE MG/DL
HGB UR QL STRIP: NEGATIVE
KETONES UR STRIP-MCNC: NEGATIVE MG/DL
LEUKOCYTE ESTERASE UR QL STRIP: NEGATIVE
MUCOUS THREADS #/AREA URNS LPF: PRESENT /LPF
NITRATE UR QL: NEGATIVE
PH UR STRIP: 8 PH (ref 5–7)
RBC #/AREA URNS AUTO: <1 /HPF (ref 0–2)
SOURCE: ABNORMAL
SP GR UR STRIP: 1 (ref 1–1.03)
SQUAMOUS #/AREA URNS AUTO: 4 /HPF (ref 0–1)
UROBILINOGEN UR STRIP-MCNC: 0 MG/DL (ref 0–2)
WBC #/AREA URNS AUTO: 2 /HPF (ref 0–5)

## 2018-08-14 PROCEDURE — G8978 MOBILITY CURRENT STATUS: HCPCS | Mod: GP | Performed by: PHYSICAL THERAPIST

## 2018-08-14 PROCEDURE — 97140 MANUAL THERAPY 1/> REGIONS: CPT | Mod: GP | Performed by: PHYSICAL THERAPIST

## 2018-08-14 PROCEDURE — G0463 HOSPITAL OUTPT CLINIC VISIT: HCPCS

## 2018-08-14 PROCEDURE — 25000132 ZZH RX MED GY IP 250 OP 250 PS 637: Performed by: OBSTETRICS & GYNECOLOGY

## 2018-08-14 PROCEDURE — G8979 MOBILITY GOAL STATUS: HCPCS | Mod: GP | Performed by: PHYSICAL THERAPIST

## 2018-08-14 PROCEDURE — 99213 OFFICE O/P EST LOW 20 MIN: CPT | Performed by: OBSTETRICS & GYNECOLOGY

## 2018-08-14 PROCEDURE — 81001 URINALYSIS AUTO W/SCOPE: CPT | Performed by: OBSTETRICS & GYNECOLOGY

## 2018-08-14 PROCEDURE — 97162 PT EVAL MOD COMPLEX 30 MIN: CPT | Mod: GP | Performed by: PHYSICAL THERAPIST

## 2018-08-14 RX ORDER — ACETAMINOPHEN 500 MG
1000 TABLET ORAL EVERY 4 HOURS PRN
Status: DISCONTINUED | OUTPATIENT
Start: 2018-08-14 | End: 2018-08-14 | Stop reason: HOSPADM

## 2018-08-14 RX ORDER — ONDANSETRON 2 MG/ML
4 INJECTION INTRAMUSCULAR; INTRAVENOUS EVERY 6 HOURS PRN
Status: DISCONTINUED | OUTPATIENT
Start: 2018-08-14 | End: 2018-08-14 | Stop reason: HOSPADM

## 2018-08-14 RX ADMIN — ACETAMINOPHEN 1000 MG: 500 TABLET, FILM COATED ORAL at 12:12

## 2018-08-14 NOTE — PLAN OF CARE
Dr Bustillo ordered pt to see Physical Therapy for her back pain. Spoke with Louise from the [facility] and the Physical Therapist will see her today. Will discharge to home accompanied by her . They will be going directly to  [facility] [address]. In addition they will be following up with Dr Laurent on Thursday.

## 2018-08-14 NOTE — IP AVS SNAPSHOT
[facility]    [address]    [facility] [address]    Phone:  [phone]    Fax:  [phone]                                       After Visit Summary   8/14/2018    Rizwana Plummer    MRN: [id number]           After Visit Summary Signature Page     I have received my discharge instructions, and my questions have been answered. I have discussed any challenges I see with this plan with the nurse or doctor.    ..........................................................................................................................................  Patient/Patient Representative Signature      ..........................................................................................................................................  Patient Representative Print Name and Relationship to Patient    ..................................................               ................................................  Date                                            Time    ..........................................................................................................................................  Reviewed by Signature/Title    ...................................................              ..............................................  Date                                                            Time

## 2018-08-14 NOTE — PLAN OF CARE
Pt admitted to [facility] # 3 with severe back pain. Reports it started last evening and became more intense @ 0900. Denies any vaginal bleeding, symptoms of a bladder infection, no pain or bleeding with urination or LOF. Did take some tylenol last evening with some relief. FHR per doptone 152, no ctx noted or palpated. UA was collected and results are pending. Dr Bustillo notified of the above. Orders received for Tylenol 1000 mg po now and an Aqua K heating pad. She will be coming to see pt.

## 2018-08-14 NOTE — IP AVS SNAPSHOT
MRN:[id number]                      After Visit Summary   8/14/2018    Rizwana Plummer    MRN: [id number]           Thank you!     Thank you for choosing [facility] for your care. Our goal is always to provide you with excellent care. Hearing back from our patients is one way we can continue to improve our services. Please take a few minutes to complete the written survey that you may receive in the mail after you visit. If you would like to speak to someone directly about your visit please contact Patient Relations at [phone]. Thank you!          Patient Information     Date Of Birth          1980        About your hospital stay     You were admitted on:  August 14, 2018 You last received care in the:  [facility] [place] [facility]    You were discharged on:  August 14, 2018       Who to Call     For medical emergencies, please call 911.  For non-urgent questions about your medical care, please call your primary care provider or clinic, [phone]          Attending Provider     Provider Ammy Castillo MD OB/Gyn       Primary Care Provider Office Phone # Fax #    Cindy Bajwa Mehran,  [phone]      Your next 10 appointments already scheduled     Aug 14, 2018  2:50 PM CDT   (Arrive by 2:35 PM)   [facility] with Vanessa Herron, PT   [facility] ([facility]  )    [address]  [address]  [facility] [address]   [phone]            Aug 15, 2018  8:30 AM CDT   Level 3 with RH INFUSION CHAIR 8   [facility] ([facility])    [facility] [place]  [address]  [facility] [address] [phone]            Aug 20, 2018  8:30 AM CDT   Level 3 with RH INFUSION CHAIR 6   [facility] ([facility])    [facility] [place]  [address]  [facility] [address] [phone]             Aug 21, 2018 10:45 AM CDT   ESTABLISHED PRENATAL with Cindy Laurent DO   [facility] ([facility])    [address]  [address]  [facility] [address]   [phone]            Aug 22, 2018  8:30 AM CDT   Level 3 with RH INFUSION CHAIR 7   [facility] ([facility])    [facility]  [address]  [facility] [address]   [phone]            Aug 27, 2018  8:30 AM CDT   Level 3 with RH INFUSION CHAIR 2   [facility] ([facility])    [facility]  [address]  [facility] [address]   [phone]            Aug 29, 2018  8:30 AM CDT   Level 3 with RH INFUSION CHAIR 8   [facility] ([facility])    [facility]  [address]  [facility] [address]   [phone]            Jan 24, 2019   Procedure with Cindy Laurent DO   [facility] (--)    [address]  [facility] [address]   [phone]            Feb 07, 2019  1:00 PM CST   SHORT with Cindy Laurent DO   [facility] ([facility])    [address]  [address]  [facility] [address]   [phone]              Additional Services     Physical Therapy Referral       *This therapy referral will be filtered to a centralized scheduling office at [facility] and the patient will receive a call to schedule an appointment at a [place] location most convenient for them. *     [facility] provides Physical Therapy evaluation and treatment and many specialty services across the [place] system.  If requesting a specialty program, please choose from the list below.    If you have not heard from the scheduling office within 2 business days, please  "call [phone] for all locations, with the exception of [place], please call [phone] and [place], please call [phone]  Treatment: Evaluation & Treatment  Special Instructions/Modalities: acute onset low back pain radiating to left thigh in 2nd trimester pregnancy, now 15w5d.  Special Programs: None    Please be aware that coverage of these services is subject to the terms and limitations of your health insurance plan.  Call member services at your health plan with any benefit or coverage questions.      **Note to Provider:  If you are referring outside of [place] for the therapy appointment, please list the name of the location in the \"special instructions\" above, print the referral and give to the patient to schedule the appointment.                  Further instructions from your care team       Data: Patient [facility] at 1102.   Reason for maternal/fetal assessment per patient is Back Pain (started last night, severe @ 0900)  . Patient is a . Prenatal record reviewed.      Obstetric History       T2      L2     SAB1   TAB0   Ectopic0   Multiple0   Live Births2       # Outcome Date GA Lbr Anthony/2nd Weight Sex Delivery Anes PTL Lv   4 Current            3 Term 17 39w0d  3.31 kg (7 lb 4.8 oz) F CS-LTranv Spinal  CATRACHO      Name: Asmkar      Apgar1:  8                Apgar5: 9   2 Term 14 41w6d  3.705 kg (8 lb 2.7 oz) M CS-Unspec EPI N CATRACHO      Name: Radha      Apgar1:  3                Apgar5: 8   1 SAB /     SAB   ND         Medical History:   Past Medical History:   Diagnosis Date     Varicella age 5   . Gestational Age 15w5d. VSS. Cervix: not examined.  Fetal movement present. Patient denies cramping, backache, vaginal discharge, pelvic pressure, UTI symptoms, GI problems, bloody show, vaginal bleeding, edema, headache, visual disturbances, epigastric or URQ pain, abdominal pain, rupture of membranes. Support persons is present.  Action: Verbal " "consent for EFM. Triage assessment completed. Doptone 152. Uterine assessment no contractions noted. Fetal assessment: Presumed adequate fetal oxygenation documented (see flow record). Patient instructed to report change in fetal movement, vaginal leaking of fluid or bleeding, abdominal pain, or any concerns related to the pregnancy to her nurse/physician.   Response: Dr. Bustillo informed of back pain and UA results. She came to the bedside to assess pt. Plan per provider is discharge to home. [facility]. She will see Dr Laurent on Thursday. Patient verbalized understanding of education and verbalized agreement with plan. Discharged ambulatory at 1145.    I    Pending Results     No orders found from 8/12/2018 to 8/15/2018.            Admission Information     Date & Time Provider Department Dept. Phone    8/14/2018 Ammy Bustillo MD [facility] Labor and Delivery [phone]      Your Vitals Were     Blood Pressure Pulse Temperature Respirations Height Weight    99/57 88 98.7  F (37.1  C) (Oral) 16 1.51 m (4' 11.45\") 56.2 kg (124 lb)    Last Period BMI (Body Mass Index)                04/26/2018 (Exact Date) 24.67 kg/m2          MyChart Information     [employer] gives you secure access to your electronic health record. If you see a primary care provider, you can also send messages to your care team and make appointments. If you have questions, please call your primary care clinic.  If you do not have a primary care provider, please call [phone] and they will assist you.        Care EveryWhere ID     This is your Care EveryWhere ID. This could be used by other organizations to access your [place] medical records  [id number]        Equal Access to Services     [facility]ISSA : Demond Burroughs, sincere monique, sebastien suero. So [facility] [phone].    ATENCIÓN: Si wilton espjared lara " pink disposición servicios gratuitos de asistencia lingüística. Mellisa delgado [phone].    We comply with applicable federal civil rights laws and [place] laws. We do not discriminate on the basis of race, color, national origin, age, disability, sex, sexual orientation, or gender identity.               Review of your medicines      CONTINUE these medicines which have NOT CHANGED        Dose / Directions    metoclopramide 5 MG tablet   Commonly known as:  REGLAN   Used for:  Hyperemesis gravidarum        Dose:  5 mg   Take 1 tablet (5 mg) by mouth 4 times daily as needed   Quantity:  120 tablet   Refills:  1       prenatal multivitamin plus iron 27-0.8 MG Tabs per tablet        Dose:  1 tablet   Take 1 tablet by mouth daily   Quantity:  100 tablet   Refills:  3       pyridOXINE 50 MG tablet   Commonly known as:  CVS VITAMIN B-6   Used for:  Hyperemesis gravidarum        Dose:  50 mg   Take 1 tablet (50 mg) by mouth 3 times daily   Quantity:  90 tablet   Refills:  1       TYLENOL PO        Dose:  325 mg   Take 325 mg by mouth   Refills:  0       * ZOFRAN ODT PO        Dose:  1 tablet   Take 1 tablet by mouth every 6 hours as needed for nausea   Refills:  0       * ondansetron 4 MG ODT tab   Commonly known as:  ZOFRAN ODT   Used for:  Hyperemesis gravidarum        Dose:  4 mg   Take 1 tablet (4 mg) by mouth every 8 hours as needed   Quantity:  30 tablet   Refills:  3       * Notice:  This list has 2 medication(s) that are the same as other medications prescribed for you. Read the directions carefully, and ask your doctor or other care provider to review them with you.             Protect others around you: Learn how to safely use, store and throw away your medicines at www.disposemymeds.org.             Medication List: This is a list of all your medications and when to take them. Check marks below indicate your daily home schedule. Keep this list as a reference.      Medications           Morning Afternoon Evening  Bedtime As Needed    metoclopramide 5 MG tablet   Commonly known as:  REGLAN   Take 1 tablet (5 mg) by mouth 4 times daily as needed                                prenatal multivitamin plus iron 27-0.8 MG Tabs per tablet   Take 1 tablet by mouth daily                                pyridOXINE 50 MG tablet   Commonly known as:  CVS VITAMIN B-6   Take 1 tablet (50 mg) by mouth 3 times daily                                TYLENOL PO   Take 325 mg by mouth                                * ZOFRAN ODT PO   Take 1 tablet by mouth every 6 hours as needed for nausea                                * ondansetron 4 MG ODT tab   Commonly known as:  ZOFRAN ODT   Take 1 tablet (4 mg) by mouth every 8 hours as needed                                * Notice:  This list has 2 medication(s) that are the same as other medications prescribed for you. Read the directions carefully, and ask your doctor or other care provider to review them with you.

## 2018-08-14 NOTE — PROGRESS NOTES
[place] [facility] Initial Evaluation -- Lumbar    Date: August 14, 2018  Rizwana Plummer is a 38 year old female with a *** condition.   Referral: ***  Employment/Workability(Lost work days):  ***  Formal Exerciser (Y/N):  ***  Leisure Mechanical Stresses: ***  Functional disability score (SARA/STarT Back):  ***  Visual Analog Score (VAS 0-10): ***  Patient goals/expectations:  ***    HISTORY:    Present symptoms: ***  Pain quality (Sharp/shooting/stabbing/aching/burning/cramping):  ***  Paresthesia (yes/no):  ***    DFO < 22 days (Y/N):  ***.   Symptoms (improving/unchanging/worsening):  ***.   Symptoms commenced as a result of: ***     Symptoms at onset(back/thigh/leg): ***  Constant symptoms(back/thigh/leg): ***  Intermittent symptoms(back/thigh/leg): ***    Symptoms are made worse with the following: {TIEN Lumbar Better/Worse:391933}   Symptoms are made better with the following: {TIEN Lumbar Better/Worse:666835}    Disturbed sleep (yes/no):  *** Sleeping postures (prone/sup/side R/L): ***    Previous episodes (0/1-5/6-10/11+): *** Year of first episode: ***    Previous history: ***  Previous treatments: ***      Specific Questions:  Cough/Sneeze/Strain (Pos/Neg): ***  Bowel/Bladder (Normal/Abnormal): ***  Gait (Normal/Abnormal): ***  Medications (Nil/NSAIDS/Analg/Steroids/Anticoag/Other):  {[facility] Medications:987685}  Medical allergies:  ***  General Health (Excellent/Good/Fair/Poor):  ***  Pertinent medical history:  {[facility] Past Medical History:467470}  Imaging (NA/Xray/MRI):  ***  Recent or major surgery (Yes/No):  ***  Night pain (Yes/No): ***  Accidents (Yes/No): ***  Unexplained weight loss (Yes/No): ***  Barriers at home: ***  Other red flags: ***    EXAMINATION    Posture:   Sitting(Good/Fair/Poor): ***  Standing(Good/Fair/Poor):***  Lordosis (Red/Acc/Normal): ***  Correction of posture(Better/Worse/NE): ***    Lateral Shift (Right/Left/Nil): ***  Relevant (Yes/No):  ***  Other Observations:  "***    Neurological:    Motor Deficit:  ***  Reflexes:  ***  Sensory Deficit:  ***  Dural Signs:  ***    Movement Loss:   Jimmy Mod Min Nil Pain   Flexion     ***   Extension     ***   Side Gliding R     ***   Side Gliding L     ***     Test Movements:   During: produces, abolishes, increases, decreases, no effect, centralizing, peripheralizing   After: better, worse, no better, no worse, no effect, centralized, peripheralized    Pre-test Symptoms Standing: ***   Symptoms During Symptoms After ROM increased ROM decreased No Effect   FIS {TIEN MDT During Testin::\" \"} {TIEN MDT After Testin::\" \"}      Rep FIS {TIEN MDT During Testin::\" \"} {TIEN MDT After Testin::\" \"}      EIS {TIEN MDT During Testin::\" \"} {TIEN MDT After Testin::\" \"}      Rep EIS {TIEN MDT During Testin::\" \"} {TIEN MDT After Testin::\" \"}      Pre-test Symptoms Lying: ***    Symptoms During Symptoms After ROM increased ROM decreased No Effect   MARIIA {TIEN MDT During Testin::\" \"} {TIEN MDT After Testin::\" \"}      Rep MARIIA {TIEN MDT During Testin::\" \"} {TIEN MDT After Testin::\" \"}      EIL {TIEN MDT During Testin::\" \"} {TIEN MDT After Testin::\" \"}      Rep EIL {TIEN MDT During Testin::\" \"} {TIEN MDT After Testin::\" \"}      If required Pre-test Symptoms: ***   Symptoms During Symptoms After ROM increased ROM decreased No Effect   SGIS - R {TIEN MDT During Testin::\" \"} {TIEN MDT After Testin::\" \"}      Rep SGIS - R {TIEN MDT During Testin::\" \"} {TIEN MDT After Testin::\" \"}      SGIS - L {TIEN MDT During Testin::\" \"} {TIEN MDT After Testin::\" \"}      Rep SGIS - L {TIEN MDT During Testin::\" \"} {TIEN MDT After Testin::\" \"}        Static Tests:  Sitting Slouched:  ***  Sitting erect:  ***  Standing Slouched ***  Standing erect:  ***  Lying prone in extension:  *** Long sitting:  ***    Other Tests: " ***    Provisional Classification:  {aileen shoret lumbar classification:794643}    Principle of Management:  Education:  ***  Equipment provided:  ***  Mechanical therapy (Y/N):  ***  Extension principle:  ***  Lateral Principle:  ***  Flexion principle:  ***  Other:  ***    ASSESSMENT/PLAN    {REHAB NOTES:243804}

## 2018-08-14 NOTE — MR AVS SNAPSHOT
After Visit Summary   8/14/2018    Rizwana Plummer    MRN: [id number]           Patient Information     Date Of Birth          1980        Visit Information        Provider Department      8/14/2018 2:50 PM Vanessa Herron, PT [facility]        Today's Diagnoses     SI (sacroiliac) joint dysfunction           Follow-ups after your visit        Your next 10 appointments already scheduled     Aug 15, 2018  8:30 AM CDT   Level 3 with RH INFUSION CHAIR 8   [facility] ([facility])    [facility] [place] [address]  [facility] [address]   [phone]            Aug 20, 2018  8:30 AM CDT   Level 3 with RH INFUSION CHAIR 6   [facility] ([facility])    [facility]  [address]  [facility] [address]   [phone]            Aug 21, 2018 10:45 AM CDT   ESTABLISHED PRENATAL with Cindy Laurent,    [facility] ([facility])    [address]  [address]  [facility] [address]   [phone]            Aug 22, 2018  8:30 AM CDT   Level 3 with RH INFUSION CHAIR 7   [facility] ([facility])    [facility]  [address]  [facility] [address]   [phone]            Aug 27, 2018  8:30 AM CDT   Level 3 with RH INFUSION CHAIR 2   [facility] ([facility])    [facility]  [address]  [facility] [address]   [phone]            Aug 29, 2018  8:30 AM CDT   Level 3 with RH INFUSION CHAIR 8   [facility] ([facility])    [facility]  [address]  [facility] [address]   [phone]            Jan 24, 2019   Procedure with Cindy Laurent,  DO   [facility] (--)    [address]  [facility] [address]   [phone]            Feb 07, 2019  1:00 PM CST   SHORT with Cindy Laurent, DO   [facility]    [address]  [address]  [facility] [address]   [phone]              Who to contact     If you have questions or need follow up information about today's clinic visit or your schedule please contact [facility] directly at [phone].  Normal or non-critical lab and imaging results will be communicated to you by [employer], letter or phone within 4 business days after the clinic has received the results. If you do not hear from us within 7 days, please contact the clinic through [employer] or phone. If you have a critical or abnormal lab result, we will notify you by phone as soon as possible.  Submit refill requests through [employer] or call your pharmacy and they will forward the refill request to us. Please allow 3 business days for your refill to be completed.          Additional Information About Your Visit        [employer] [facility] Information     [employer] gives you secure access to your electronic health record. If you see a primary care provider, you can also send messages to your care team and make appointments. If you have questions, please call your primary care clinic.  If you do not have a primary care provider, please call [phone] and they will assist you.        Care EveryWhere ID     This is your Care EveryWhere ID. This could be used by other organizations to access your [facility] medical records  [id number]        Your Vitals Were     Last Period                   04/26/2018 (Exact Date)            Blood Pressure from Last 3 Encounters:   08/14/18 99/57   08/13/18 91/45   08/06/18 95/62    Weight from Last 3 Encounters:   08/14/18 56.2 kg (124 lb)   07/17/18 56.6 kg (124 lb 12.8 oz)   07/02/18 57.2 kg (126 lb 3.2 oz)              We  Performed the Following     HC PT EVAL, MODERATE COMPLEXITY     TIEN INITIAL EVAL REPORT     MANUAL THER TECH,1+REGIONS,EA 15 MIN        Primary Care Provider Office Phone # Fax #    Cindy Laurent,  [phone]       [address]  [facility] [address]        Equal Access to Services     JORGE HINES : Hadii aad ku hadasho Soomaali, waaxda luqadaha, qaybta kaalmada adeegyada, waxay idiin hayaan adeeg khemilyarminda lastewartn . So [facility] [phone].    ATENCIÓN: Si habla español, tiene a pink disposición servicios gratuitos de asistencia lingüística. Llame al [phone].    We comply with applicable federal civil rights laws and [place] laws. We do not discriminate on the basis of race, color, national origin, age, disability, sex, sexual orientation, or gender identity.            Thank you!     Thank you for choosing [facility]  for your care. Our goal is always to provide you with excellent care. Hearing back from our patients is one way we can continue to improve our services. Please take a few minutes to complete the written survey that you may receive in the mail after your visit with us. Thank you!             Your Updated Medication List - Protect others around you: Learn how to safely use, store and throw away your medicines at www.disposemymeds.org.          This list is accurate as of 8/14/18  4:49 PM.  Always use your most recent med list.                   Brand Name Dispense Instructions for use Diagnosis    metoclopramide 5 MG tablet    REGLAN    120 tablet    Take 1 tablet (5 mg) by mouth 4 times daily as needed    Hyperemesis gravidarum       prenatal multivitamin plus iron 27-0.8 MG Tabs per tablet     100 tablet    Take 1 tablet by mouth daily        pyridOXINE 50 MG tablet    CVS VITAMIN B-6    90 tablet    Take 1 tablet (50 mg) by mouth 3 times daily    Hyperemesis gravidarum       TYLENOL PO      Take 325 mg by mouth        * ZOFRAN ODT PO      Take 1 tablet by mouth  every 6 hours as needed for nausea        * ondansetron 4 MG ODT tab    ZOFRAN ODT    30 tablet    Take 1 tablet (4 mg) by mouth every 8 hours as needed    Hyperemesis gravidarum       * Notice:  This list has 2 medication(s) that are the same as other medications prescribed for you. Read the directions carefully, and ask your doctor or other care provider to review them with you.

## 2018-08-14 NOTE — PROGRESS NOTES
[place] [facility] Initial Evaluation -- Lumbar    Date: August 14, 2018  Rizwana Plummer is a 38 year old female with a lumbar condition.   Referral: Dr. Bustillo  Employment/Workability(Lost work days):  n/a  Formal Exerciser (Y/N):  N  Leisure Mechanical Stresses: n/a  Functional disability score (SARA/STarT Back):  See flow sheet  Visual Analog Score (VAS 0-10): 10/10  Patient goals/expectations:  To reduce her pain.    HISTORY:    Present symptoms: left LB  Pain quality (Sharp/shooting/stabbing/aching/burning/cramping):  sharp  Paresthesia (yes/no):  no    DFO < 22 days (Y/N):  Y.   Symptoms (improving/unchanging/worsening):  worsening.   Symptoms commenced as a result of: no apparent reason     Symptoms at onset(back/thigh/leg): leg , onset a month ago  Constant symptoms(back/thigh/leg): back  Intermittent symptoms(back/thigh/leg): leg    Symptoms are made worse with the following: Always Standing, Always Walking and Time of day - No effect   Symptoms are made better with the following: Sometimes Sitting and Always Lying    Disturbed sleep (yes/no):  yes Sleeping postures (prone/sup/side R/L): sides    Previous episodes (0/1-5/6-10/11+): previous with pregnancy Year of first episode:     Previous history: previous history of similar symptoms but not as severe  Previous treatments: none      Specific Questions:  Cough/Sneeze/Strain (Pos/Neg):   Bowel/Bladder (Normal/Abnormal): normal  Gait (Normal/Abnormal): abnornal  Medications (Nil/NSAIDS/Analg/Steroids/Anticoag/Other):  NSAIDS  Medical allergies:  none  General Health (Excellent/Good/Fair/Poor):    Pertinent medical history:  Currently pregnant x 16 weeks  Imaging (NA/Xray/MRI):  none  Recent or major surgery (Yes/No):  no  Night pain (Yes/No): no  Accidents (Yes/No): no  Unexplained weight loss (Yes/No): no  Barriers at home: no  Other red flags: none    EXAMINATION    Posture:   Sitting(Good/Fair/Poor): fair  Standing(Good/Fair/Poor):good  Lordosis  "(Red/Acc/Normal): acc  Correction of posture(Better/Worse/NE): better    Lateral Shift (Right/Left/Nil): nil  Relevant (Yes/No):  no  Other Observations: none    Neurological:    Motor Deficit:  Not tested  Reflexes:    Sensory Deficit:  intact  Dural Signs:  Not tested    Movement Loss:   Jimmy Mod Min Nil Pain   Flexion   x  pdm   Extension   x  erp   Side Gliding R    x pdm   Side Gliding L    x pdm     Test Movements:   During: produces, abolishes, increases, decreases, no effect, centralizing, peripheralizing   After: better, worse, no better, no worse, no effect, centralized, peripheralized    Pre-test Symptoms Standing:    Symptoms During Symptoms After ROM increased ROM decreased No Effect   FIS        Rep FIS        EIS        Rep EIS        Pre-test Symptoms Lying:     Symptoms During Symptoms After ROM increased ROM decreased No Effect   MARIIA        Rep MARIIA        EIL        Rep EIL        If required Pre-test Symptoms:    Symptoms During Symptoms After ROM increased ROM decreased No Effect   SGIS - R        Rep SGIS - R        SGIS - L        Rep SGIS - L          Static Tests:  Sitting Slouched:    Sitting erect:    Standing Slouched   Standing erect:    Lying prone in extension:   Long sitting:      Other Tests: +SIJ testing;     Provisional Classification:  Inconclusive/Other - SIJ/Pregnancy Related PGP    Principle of Management:  Education: origin of sx, use of ice, dosing of exercise  Equipment provided:  none  Mechanical therapy (Y/N):  Y  Extension principle:    Lateral Principle:    Flexion principle:    Other:  Self correction for anterior L inominate, MET 5 x 5\" followed by bridging 5 reps.     ASSESSMENT/PLAN    Patient is a 38 year old female with lumbar complaints.    Patient has the following significant findings with corresponding treatment plan.                Diagnosis 1:  SIJ/pregnancy back pain  Pain -  manual therapy, self management, education, directional preference exercise and home " program  Decreased ROM/flexibility - manual therapy and therapeutic exercise  Inflammation - cold therapy  Decreased function - therapeutic activities  Instability -  Therapeutic Activity  Therapeutic Exercise    Therapy Evaluation Codes:   1) History comprised of:   Personal factors that impact the plan of care:      None.    Comorbidity factors that impact the plan of care are:      Current pregnancy.     Medications impacting care: Anti-inflammatory.  2) Examination of Body Systems comprised of:   Body structures and functions that impact the plan of care:      Sacral illiac joint.   Activity limitations that impact the plan of care are:      Standing and Walking.  3) Clinical presentation characteristics are:   Evolving/Changing.  4) Decision-Making    Moderate complexity using standardized patient assessment instrument and/or measureable assessment of functional outcome.  Cumulative Therapy Evaluation is: Moderate complexity.    Previous and current functional limitations:  (See Goal Flow Sheet for this information)    Short term and Long term goals: (See Goal Flow Sheet for this information)     Communication ability:  Patient appears to be able to clearly communicate and understand verbal and written communication and follow directions correctly.  Treatment Explanation - The following has been discussed with the patient:   RX ordered/plan of care  Anticipated outcomes  Possible risks and side effects  This patient would benefit from PT intervention to resume normal activities.   Rehab potential is good.    Frequency:  1 X week, once daily  Duration:  for 6 weeks  Discharge Plan:  Achieve all LTG.  Independent in home treatment program.  Reach maximal therapeutic benefit.    Please refer to the daily flowsheet for treatment today, total treatment time and time spent performing 1:1 timed codes.

## 2018-08-14 NOTE — DISCHARGE INSTRUCTIONS
Data: [facility] at 1102.   Reason for maternal/fetal assessment per patient is Back Pain (started last night, severe @ 0900)  . Patient is a . Prenatal record reviewed.      Obstetric History       T2      L2     SAB1   TAB0   Ectopic0   Multiple0   Live Births2       # Outcome Date GA Lbr Anthony/2nd Weight Sex Delivery Anes PTL Lv   4 Current            3 Term 17 39w0d  3.31 kg (7 lb 4.8 oz) F CS-LTranv Spinal  CATRACHO      Name: Gato      Apgar1:  8                Apgar5: 9   2 Term 14 41w6d  3.705 kg (8 lb 2.7 oz) M CS-Unspec EPI N CATRACHO      Name: Radha      Apgar1:  3                Apgar5: 8   1 SAB 12     SAB   ND         Medical History:   Past Medical History:   Diagnosis Date     Varicella age 5   . Gestational Age 15w5d. VSS. Cervix: not examined.  Fetal movement present. Patient denies cramping, backache, vaginal discharge, pelvic pressure, UTI symptoms, GI problems, bloody show, vaginal bleeding, edema, headache, visual disturbances, epigastric or URQ pain, abdominal pain, rupture of membranes. Support persons is present.  Action: Verbal consent for EFM. Triage assessment completed. [place] 152. Uterine assessment no contractions noted. Fetal assessment: Presumed adequate fetal oxygenation documented (see flow record). Patient instructed to report change in fetal movement, vaginal leaking of fluid or bleeding, abdominal pain, or any concerns related to the pregnancy to her nurse/physician.   Response: Dr. Bustillo informed of back pain and UA results. She came to the bedside to assess pt. Plan per provider is discharge to home. [facility]. She will see Dr Laurent on Thursday. Patient verbalized understanding of education and verbalized agreement with plan. Discharged ambulatory at 1145.    I

## 2018-08-14 NOTE — PROGRESS NOTES
Obstetrics Triage Note    HPI:  Rizwana Plummer is a 38 year old  female at 15w5d, pregnancy complicated by hyperemesis, here with complaints of severe lumbosacral back pain starting yesterday, today with radiation down her left thigh. Associated with HA overnight which resolved with tylenol and rest. Has never had pain like this before. Worse with ambulation. She does note improvement with a hot pack and tylenol here. She states that she has otherwise been feeling well. She denies fever, N/V, chest pain, SOB, abdominal pain, vaginal bleeding, vaginal discharge, dysuria. No trauma or new activity. +constipation. Tried miralax with no improvement.     ROS:  Negative except as mentioned in HPI.    PMH:  Past Medical History:   Diagnosis Date     Varicella age 5       PSHx:  Past Surgical History:   Procedure Laterality Date      SECTION  2014    Procedure:  SECTION;   Primary  section         SECTION N/A 2017    Procedure:  SECTION;  Surgeon: Cindy Laurent DO;  Location: [facility] OR     Infibulation       MARSUPIALIZATION BARTHOLIN CYST  2013    Procedure: MARSUPIALIZATION BARTHOLIN CYST;  Removal of Inclusion Cyst.   Fetal heart tones 180's;  Surgeon: Rohit Parks MD;  Location: [facility] OR       Medications:    No current facility-administered medications on file prior to encounter.   Current Outpatient Prescriptions on File Prior to Encounter:  Acetaminophen (TYLENOL PO) Take 325 mg by mouth   metoclopramide (REGLAN) 5 MG tablet Take 1 tablet (5 mg) by mouth 4 times daily as needed   Ondansetron (ZOFRAN ODT PO) Take 1 tablet by mouth every 6 hours as needed for nausea   Prenatal Vit-Fe Fumarate-FA (PRENATAL MULTIVITAMIN PLUS IRON) 27-0.8 MG TABS per tablet Take 1 tablet by mouth daily   pyridOXINE (CVS VITAMIN B-6) 50 MG tablet Take 1 tablet (50 mg) by mouth 3 times daily   ondansetron (ZOFRAN ODT) 4 MG ODT tab Take 1 tablet (4 mg) by mouth every 8  "hours as needed        Allergies:   No Known Allergies    Physical Exam:   Vitals:    18 1134   BP: 99/57   Pulse: 88   Resp: 16   Temp: 98.7  F (37.1  C)   TempSrc: Oral   Weight: 56.2 kg (124 lb)   Height: 1.51 m (4' 11.45\")      Gen: resting comfortably, in NAD  CV: regular rate, well perfused  Pulm: no increased work of breathing, no cough  Abd: soft, gravid, non-tender, non-distended  Back: no CVA tenderness. +tender to palpation of sacrum and paraspinous muscles in the lumbosacral region, L>R    FHT: 150  Uterine irritability    Labs/Imaging:  Results for orders placed or performed during the hospital encounter of 18 (from the past 24 hour(s))   UA with Microscopic reflex to Culture   Result Value Ref Range    Color Urine Straw     Appearance Urine Clear     Glucose Urine Negative NEG^Negative mg/dL    Bilirubin Urine Negative NEG^Negative    Ketones Urine Negative NEG^Negative mg/dL    Specific Gravity Urine 1.003 1.003 - 1.035    Blood Urine Negative NEG^Negative    pH Urine 8.0 (H) 5.0 - 7.0 pH    Protein Albumin Urine Negative NEG^Negative mg/dL    Urobilinogen mg/dL 0.0 0.0 - 2.0 mg/dL    Nitrite Urine Negative NEG^Negative    Leukocyte Esterase Urine Negative NEG^Negative    Source Unspecified Urine     WBC Urine 2 0 - 5 /HPF    RBC Urine <1 0 - 2 /HPF    Bacteria Urine Few (A) NEG^Negative /HPF    Squamous Epithelial /HPF Urine 4 (H) 0 - 1 /HPF    Mucous Urine Present (A) NEG^Negative /LPF       Assessment/Plan: Rizwana Plummer is a 38 year old female  at 15w5d, here for acute onset back pain concerning for sciatica vs paraspinous muscle spasms.  - Tylenol and heat for back pain. Can try icy-hot with massage. Recommended outpatient physical therapy today  - Dispo: f/u this week in clinic if ongoing back pain. May need to consider muscle relaxant if conservative measures above are not helpful.     Ammy Bustillo MD  OB/GYN     2018 1:06 PM    "

## 2018-08-21 ENCOUNTER — PRENATAL OFFICE VISIT (OUTPATIENT)
Dept: OBGYN | Facility: CLINIC | Age: 38
End: 2018-08-21
Payer: COMMERCIAL

## 2018-08-21 VITALS
BODY MASS INDEX: 25.48 KG/M2 | DIASTOLIC BLOOD PRESSURE: 50 MMHG | HEIGHT: 59 IN | SYSTOLIC BLOOD PRESSURE: 84 MMHG | WEIGHT: 126.4 LBS

## 2018-08-21 DIAGNOSIS — Z34.92 PRENATAL CARE IN SECOND TRIMESTER: Primary | ICD-10-CM

## 2018-08-21 PROCEDURE — 99207 ZZC PRENATAL VISIT: CPT | Performed by: FAMILY MEDICINE

## 2018-08-21 NOTE — PATIENT INSTRUCTIONS
Return 4 weeks   Schedule ultrasound @    Return to clinic:  every 4 weeks till 30 weeks, then every 2 weeks till 36 weeks, then weekly till delivery      Phone numbers [place]:  Day/ night [phone] ask for ob triage  Emergency:  Call labor and delivery:  [phone]    What should I call about??    Contraction every 5 minutes for 1 hour 1 minute long (511), bleeding, loss of fluid, headache that doesn't resolve with tylenol, and decreased fetal movement     Start kick counts @ 26-28 weeks   Keep track of movement and discover your normal baby movement pattern   guideline is listed below  Please call if you do not feel the baby move!  We will have you come in for fetal heart rate monitoring:   Perception of at least 10 FMs during 12 hours of normal maternal activity   Perception of least 10 FMs over two hours when the mother is at rest and focused on [facility] Address   [address], [place], [place] [address] [phone]    Dr. Cindy Laurent, DO    OB/GYN   [facility]

## 2018-08-21 NOTE — NURSING NOTE
"16w5d    Chief Complaint   Patient presents with     Prenatal Care     back pain but now ok       Initial BP (!) 84/50  Ht 4' 11.45\" (1.51 m)  Wt 126 lb 6.4 oz (57.3 kg)  LMP 2018 (Exact Date)  BMI 25.14 kg/m2 Estimated body mass index is 25.14 kg/(m^2) as calculated from the following:    Height as of this encounter: 4' 11.45\" (1.51 m).    Weight as of this encounter: 126 lb 6.4 oz (57.3 kg).  BP completed using cuff size: regular        The following HM Due: NONE      "

## 2018-08-21 NOTE — PROGRESS NOTES
"CC: Here for routine prenatal visit   38 year old y/o  @ 16w5d with Estimated Date of Delivery: 2019   HPI: had back pain   BP (!) 84/50  Ht 4' 11.45\" (1.51 m)  Wt 126 lb 6.4 oz (57.3 kg)  LMP 2018 (Exact Date)  BMI 25.14 kg/m2  See OB flowsheet  + fetal movement, no contractions, no bleeding, no loss of fluid   Discussed monitoring fetal movement       1) concerns: hyperemesis gravidarum:  infusions still occurring, feels better overall getting same amount as was previously, increase to 2 liters per time  On reglan and zofran and vit b 6 but not helping   2) Routine care: Declines genetic screening;  3) AMA: US with MFM @ 20w, Growth US @ 36w & Biweekly/Weekly BPP's @ 36w                        - Delivery @ 39w, repeat c/s scheduled 2018                        - Hx of oligohydramnios, will monitor beginning @ 32w  4) Return: 4 weeks  5) back pain last week: now given physical therapy    Mehran    "

## 2018-08-21 NOTE — MR AVS SNAPSHOT
After Visit Summary   8/21/2018    Rizwana Plummer    MRN: [id number]           Patient Information     Date Of Birth          1980        Visit Information        Provider Department      8/21/2018 10:45 AM Cindy Laurent, DO [facility]        Today's Diagnoses     Prenatal care in second trimester    -  1      Care Instructions    Return 4 weeks   Schedule ultrasound @    Return to clinic:  every 4 weeks till 30 weeks, then every 2 weeks till 36 weeks, then weekly till delivery      Phone numbers [place]:  Day/ night [phone] ask for ob triage  Emergency:  Call labor and delivery:  [phone]    What should I call about??    Contraction every 5 minutes for 1 hour 1 minute long (511), bleeding, loss of fluid, headache that doesn't resolve with tylenol, and decreased fetal movement     Start kick counts @ 26-28 weeks   Keep track of movement and discover your normal baby movement pattern   guideline is listed below  Please call if you do not feel the baby move!  We will have you come in for fetal heart rate monitoring:   Perception of at least 10 FMs during 12 hours of normal maternal activity   Perception of least 10 FMs over two hours when the mother is at rest and focused on counting       [facility] Address   [address], [place] [address] [phone]    Dr. Cindy Laurent, DO    OB/GYN   [facility] and [facility]                                      Follow-ups after your visit        Your next 10 appointments already scheduled     Aug 22, 2018  8:30 AM CDT   Level 3 with RH INFUSION CHAIR 7   [facility] ([facility])    [facility]  [address]  [facility] [address]   [phone]            Aug 27, 2018  8:30 AM CDT   Level 3 with RH INFUSION CHAIR 2   [facility] ([facility]  [facility])    [facility]  [address]  [place] [address]   [phone]            Aug 29, 2018  8:30 AM CDT   Level 3 with RH INFUSION CHAIR 8   [facility] ([facility])    [facility]  [address]  [place] [address]   [phone]            Jan 24, 2019   Procedure with Cindy Laurent DO   [facility] Labor and Delivery (--)    [address]  [facility] [address]   [phone]            Feb 07, 2019  1:00 PM CST   SHORT with Cindy Laurent DO   [facility] ([facility])    [address] [place]  [address]  [facility] [address] [phone]              Future tests that were ordered for you today     Open Future Orders        Priority Expected Expires Ordered    US OB > 14 Weeks Complete Single Routine 8/30/2018 8/21/2019 8/21/2018    US OB > 14 Weeks Complete Single Routine  8/21/2019 8/21/2018            Who to contact     If you have questions or need follow up information about today's clinic visit or your schedule please contact [facility] directly at [phone].  Normal or non-critical lab and imaging results will be communicated to you by [employer], letter or phone within 4 business days after the clinic has received the results. If you do not hear from us within 7 days, please contact the clinic through MyChart or phone. If you have a critical or abnormal lab result, we will notify you by phone as soon as possible.  Submit refill requests through [employer] or call your pharmacy and they will forward the refill request to us. Please allow 3 business days for your refill to be completed.          Additional Information About Your Visit        [employer] Information     [employer] gives you secure access to your electronic health record. If you see a primary care provider, you can also send messages to  "your care team and make appointments. If you have questions, please call your primary care clinic.  If you do not have a primary care provider, please call [phone] and they will assist you.        Care EveryWhere ID     This is your Care EveryWhere ID. This could be used by other organizations to access your [place] medical records  [id number]        Your Vitals Were     Height Last Period BMI (Body Mass Index)             4' 11.45\" (1.51 m) 04/26/2018 (Exact Date) 25.14 kg/m2          Blood Pressure from Last 3 Encounters:   08/21/18 (!) 84/50   08/14/18 99/57   08/13/18 91/45    Weight from Last 3 Encounters:   08/21/18 126 lb 6.4 oz (57.3 kg)   08/14/18 124 lb (56.2 kg)   07/17/18 124 lb 12.8 oz (56.6 kg)               Primary Care Provider Office Phone # Fax #    Cindy Bajwa Mehran,  [phone]       [address] [facility] [address]        Equal Access to Services     [facility]: Hadii aad ku hadasho Soomaali, waaxda luqadaha, qaybta kaalmada adeailyn, sebastien zayas . So [facility] [phone].    ATENCIÓN: Si habla español, tiene a pink disposición servicios gratuitos de asistencia lingüística. Mellisa al [phone].    We comply with applicable federal civil rights laws and [place] laws. We do not discriminate on the basis of race, color, national origin, age, disability, sex, sexual orientation, or gender identity.            Thank you!     Thank you for choosing [facility]  for your care. Our goal is always to provide you with excellent care. Hearing back from our patients is one way we can continue to improve our services. Please take a few minutes to complete the written survey that you may receive in the mail after your visit with us. Thank you!             Your Updated Medication List - Protect others around you: Learn how to safely use, store and throw away your medicines at www.disposemymeds.org.          This list is accurate " as of 8/21/18 11:16 AM.  Always use your most recent med list.                   Brand Name Dispense Instructions for use Diagnosis    metoclopramide 5 MG tablet    REGLAN    120 tablet    Take 1 tablet (5 mg) by mouth 4 times daily as needed    Hyperemesis gravidarum       prenatal multivitamin plus iron 27-0.8 MG Tabs per tablet     100 tablet    Take 1 tablet by mouth daily        pyridOXINE 50 MG tablet    CVS VITAMIN B-6    90 tablet    Take 1 tablet (50 mg) by mouth 3 times daily    Hyperemesis gravidarum       TYLENOL PO      Take 325 mg by mouth        * ZOFRAN ODT PO      Take 1 tablet by mouth every 6 hours as needed for nausea        * ondansetron 4 MG ODT tab    ZOFRAN ODT    30 tablet    Take 1 tablet (4 mg) by mouth every 8 hours as needed    Hyperemesis gravidarum       * Notice:  This list has 2 medication(s) that are the same as other medications prescribed for you. Read the directions carefully, and ask your doctor or other care provider to review them with you.

## 2018-08-24 ENCOUNTER — TELEPHONE (OUTPATIENT)
Dept: ONCOLOGY | Facility: CLINIC | Age: 38
End: 2018-08-24

## 2018-08-24 NOTE — TELEPHONE ENCOUNTER
Needed to more patient from 08/29/2018 at 8:30am because clinic does not open till 10am and moved again to 08/31/2018 at 9am. Left message on patient phone as to the new appt. And left our number if she had any questions or concerns

## 2018-08-29 ENCOUNTER — RADIANT APPOINTMENT (OUTPATIENT)
Dept: ULTRASOUND IMAGING | Facility: CLINIC | Age: 38
End: 2018-08-29
Attending: FAMILY MEDICINE
Payer: COMMERCIAL

## 2018-08-29 DIAGNOSIS — Z34.92 PRENATAL CARE IN SECOND TRIMESTER: ICD-10-CM

## 2018-08-29 PROCEDURE — 76805 OB US >/= 14 WKS SNGL FETUS: CPT | Performed by: OBSTETRICS & GYNECOLOGY

## 2018-09-06 ENCOUNTER — TELEPHONE (OUTPATIENT)
Dept: OBGYN | Facility: CLINIC | Age: 38
End: 2018-09-06

## 2018-09-06 ENCOUNTER — RADIANT APPOINTMENT (OUTPATIENT)
Dept: ULTRASOUND IMAGING | Facility: CLINIC | Age: 38
End: 2018-09-06
Attending: FAMILY MEDICINE
Payer: COMMERCIAL

## 2018-09-06 DIAGNOSIS — Z34.92 PRENATAL CARE IN SECOND TRIMESTER: ICD-10-CM

## 2018-09-06 DIAGNOSIS — O09.522 ELDERLY MULTIGRAVIDA IN SECOND TRIMESTER: Primary | ICD-10-CM

## 2018-09-06 PROCEDURE — 76816 OB US FOLLOW-UP PER FETUS: CPT | Performed by: OBSTETRICS & GYNECOLOGY

## 2018-09-06 NOTE — PROGRESS NOTES
Kelly [facility] [place] called to inquire why pt needs M US. Patient already did her 20 week fetal survey. Pt placed on RI US today per Dr. Laurent to see gender of baby.  Domi Benson CMA

## 2018-09-06 NOTE — TELEPHONE ENCOUNTER
19w0d    Pt had note in chart to get 20 week scan at [facility], however she ended up having it with our clinic (unsure why).      Pt was on U/s schedule today to check gender as this was not able to be seen at last U/s.  Per pt, she was told she can some back for this.      Pt calls worried that [facility] called to schedule U/s (order placed by MA, she didn't know pt already had 20wk scan).  She thought something was wrong.  Explained that she does not need second 20wk scan with [facility], but can still get this U/s for gender.    Routed to MD as sarah.    Lakia GERMAN R.N.  [facility]

## 2018-09-06 NOTE — TELEPHONE ENCOUNTER
Sounds ok.  Nothing noted wrong on exam, I just wanted her to do MFM due to AMA, but will talk with her about that.  I think I was out last week and things got confused.   Dr. Cindy Laurent, DO    Obstetrics and Gynecology  [facility] and [place]

## 2018-09-06 NOTE — PROGRESS NOTES
Order for MFM complete US at 20 weeks needed per Dr. Laurent office note from 8/21/18.  Domi Benson CMA

## 2018-09-25 ENCOUNTER — PRENATAL OFFICE VISIT (OUTPATIENT)
Dept: OBGYN | Facility: CLINIC | Age: 38
End: 2018-09-25
Payer: COMMERCIAL

## 2018-09-25 VITALS
BODY MASS INDEX: 26.04 KG/M2 | DIASTOLIC BLOOD PRESSURE: 60 MMHG | HEIGHT: 59 IN | WEIGHT: 129.2 LBS | SYSTOLIC BLOOD PRESSURE: 100 MMHG

## 2018-09-25 DIAGNOSIS — Z87.59 HISTORY OF OLIGOHYDRAMNIOS: ICD-10-CM

## 2018-09-25 DIAGNOSIS — Z34.92 PRENATAL CARE IN SECOND TRIMESTER: Primary | ICD-10-CM

## 2018-09-25 PROCEDURE — 99207 ZZC PRENATAL VISIT: CPT | Performed by: FAMILY MEDICINE

## 2018-09-25 NOTE — MR AVS SNAPSHOT
After Visit Summary   9/25/2018    Rizwana Plummer    MRN: [id number]           Patient Information     Date Of Birth          1980        Visit Information        Provider Department      9/25/2018 2:30 PM Cindy Laurent DO [facility]        Today's Diagnoses     Prenatal care in second trimester    -  1      Care Instructions    Return in 4 weeks  Return to clinic:  every 4 weeks till 30 weeks, then every 2 weeks till 36 weeks, then weekly till delivery      Phone numbers [place]:  Day/ night [phone] ask for ob triage  Emergency:  Call labor and delivery:  [phone]    What should I call about??    Contraction every 5 minutes for 1 hour 1 minute long (511), bleeding, loss of fluid, headache that doesn't resolve with tylenol, and decreased fetal movement     Start kick counts @ 26-28 weeks   Keep track of movement and discover your normal baby movement pattern   guideline is listed below  Please call if you do not feel the baby move!  We will have you come in for fetal heart rate monitoring:   Perception of at least 10 FMs during 12 hours of normal maternal activity   Perception of least 10 FMs over two hours when the mother is at rest and focused on [facility] Address   [address], [place], [place] [address] [phone]    Dr. Cindy Laurent, DO    OB/GYN   [facility] [place] [facility]                                      Follow-ups after your visit        Your next 10 appointments already scheduled     Jan 24, 2019   Procedure with Cindy Laurent DO   [facility] Labor and Delivery (--)    [address]  [facility] [address] [phone]            Feb 07, 2019  1:00 PM CST   SHORT with Cindy Laurent DO   [facility] ([facility])    [address]  [address]  [facility] [address] [phone]              Who to contact     If you have  "questions or need follow up information about today's clinic visit or your schedule please contact [facility] directly at [phone].  Normal or non-critical lab and imaging results will be communicated to you by [employer], letter or phone within 4 business days after the clinic has received the results. If you do not hear from us within 7 days, please contact the clinic through [employer] or phone. If you have a critical or abnormal lab result, we will notify you by phone as soon as possible.  Submit refill requests through [employer] or call your pharmacy and they will forward the refill request to us. Please allow 3 business days for your refill to be completed.          Additional Information About Your Visit        [employer] Information     [employer] gives you secure access to your electronic health record. If you see a primary care provider, you can also send messages to your care team and make appointments. If you have questions, please call your primary care clinic.  If you do not have a primary care provider, please call [phone] and they will assist you.        Care EveryWhere ID     This is your Care EveryWhere ID. This could be used by other organizations to access your [place] medical records  [id number]        Your Vitals Were     Height Last Period BMI (Body Mass Index)             4' 11.45\" (1.51 m) 04/26/2018 (Exact Date) 25.7 kg/m2          Blood Pressure from Last 3 Encounters:   09/25/18 100/60   08/21/18 (!) 84/50   08/14/18 99/57    Weight from Last 3 Encounters:   09/25/18 129 lb 3.2 oz (58.6 kg)   08/21/18 126 lb 6.4 oz (57.3 kg)   08/14/18 124 lb (56.2 kg)              Today, you had the following     No orders found for display       Primary Care Provider Office Phone # Fax #    Cindy Laurent [phone]       [address]  [place] [address]        Equal Access to Services     JORGE HINES AH: sincere Mar, " sebastien suero ah. So [facility] [phone].    ATENCIÓN: Si wilton ortez, tiene a pink disposición servicios gratuitos de asistencia lingüística. Mellisa delgado [phone].    We comply with applicable federal civil rights laws and [place] laws. We do not discriminate on the basis of race, color, national origin, age, disability, sex, sexual orientation, or gender identity.            Thank you!     Thank you for choosing [facility]  for your care. Our goal is always to provide you with excellent care. Hearing back from our patients is one way we can continue to improve our services. Please take a few minutes to complete the written survey that you may receive in the mail after your visit with us. Thank you!             Your Updated Medication List - Protect others around you: Learn how to safely use, store and throw away your medicines at www.disposemymeds.org.          This list is accurate as of 9/25/18  2:42 PM.  Always use your most recent med list.                   Brand Name Dispense Instructions for use Diagnosis    metoclopramide 5 MG tablet    REGLAN    120 tablet    Take 1 tablet (5 mg) by mouth 4 times daily as needed    Hyperemesis gravidarum       prenatal multivitamin plus iron 27-0.8 MG Tabs per tablet     100 tablet    Take 1 tablet by mouth daily        pyridOXINE 50 MG tablet    CVS VITAMIN B-6    90 tablet    Take 1 tablet (50 mg) by mouth 3 times daily    Hyperemesis gravidarum       TYLENOL PO      Take 325 mg by mouth        * ZOFRAN ODT PO      Take 1 tablet by mouth every 6 hours as needed for nausea        * ondansetron 4 MG ODT tab    ZOFRAN ODT    30 tablet    Take 1 tablet (4 mg) by mouth every 8 hours as needed    Hyperemesis gravidarum       * Notice:  This list has 2 medication(s) that are the same as other medications prescribed for you. Read the directions carefully, and ask your doctor or other care provider to review  them with you.

## 2018-09-25 NOTE — PATIENT INSTRUCTIONS
Return in 4 weeks  Return to clinic:  every 4 weeks till 30 weeks, then every 2 weeks till 36 weeks, then weekly till delivery      Phone numbers [place]:  Day/ night [phone] ask for ob triage  Emergency:  Call labor and delivery:  [phone]    What should I call about??    Contraction every 5 minutes for 1 hour 1 minute long (511), bleeding, loss of fluid, headache that doesn't resolve with tylenol, and decreased fetal movement     Start kick counts @ 26-28 weeks   Keep track of movement and discover your normal baby movement pattern   guideline is listed below  Please call if you do not feel the baby move!  We will have you come in for fetal heart rate monitoring:   Perception of at least 10 FMs during 12 hours of normal maternal activity   Perception of least 10 FMs over two hours when the mother is at rest and focused on [facility] Address   [address], [place], [place] [address] [phone]    Dr. Cindy Laurent, DO    OB/GYN   [facility] and [facility]

## 2018-09-25 NOTE — NURSING NOTE
"21w5d    Chief Complaint   Patient presents with     Prenatal Care     feeling tired for the last 2-3 days--no appetite       Initial /60  Ht 4' 11.45\" (1.51 m)  Wt 129 lb 3.2 oz (58.6 kg)  LMP 2018 (Exact Date)  BMI 25.7 kg/m2 Estimated body mass index is 25.7 kg/(m^2) as calculated from the following:    Height as of this encounter: 4' 11.45\" (1.51 m).    Weight as of this encounter: 129 lb 3.2 oz (58.6 kg).  BP completed using cuff size: regular        The following HM Due: NONE             "

## 2018-09-25 NOTE — PROGRESS NOTES
"CC: Here for routine prenatal visit   38 year old y/o  @ 21w5d with Estimated Date of Delivery: 2019   HPI: Pt states she is tired & weak, believes the baby is taking all of her vitamins & energy right now. Otherwise she is doing well & does not express any concerns today        This document serves as a record of the services and decisions personally performed and made by Cindy Laurent DO. It was created on her behalf by Danisha Wing, a trained medical scribe. The creation of this document is based the provider's statements to the medical scribe.  Scribe Danisha Wing 2:42 PM, 2018    /60  Ht 1.51 m (4' 11.45\")  Wt 58.6 kg (129 lb 3.2 oz)  LMP 2018 (Exact Date)  BMI 25.7 kg/m2  See OB flowsheet  + fetal movement, no contractions, no bleeding, no loss of fluid   Discussed monitoring fetal movement - has begun noticing movement, will continue monitoring      1) concerns: Possible uterine fibroid found during exam -- Growth US @ 24-26w & 36w to monitor fetal growth, will recheck with US after delivery to determine if a fibroid is present  2) Routine care: Declines genetic screening; GC - negative  3) AMA: US with MFM @ 20w, Growth US @ 36w & Biweekly/Weekly BPP's @ 36w                        - Delivery @ 39w, repeat c/s scheduled 2018                        - Hx of oligohydramnios, will monitor beginning @ 32w  4) Hyperemesis gravidarum: infusions still occurring, feels better overall getting same amount as was previously, increase to 2 liters per time; On reglan and zofran and vit b 6 but not helping  5) Return: 4 weeks        The information in this document, created by the medical scribe for me, accurately reflects the services I personally performed and the decisions made by me. I have reviewed and approved this document for accuracy prior to leaving the patient care area.  2:42 PM, 18    Mehran    "

## 2018-10-23 ENCOUNTER — RADIANT APPOINTMENT (OUTPATIENT)
Dept: ULTRASOUND IMAGING | Facility: CLINIC | Age: 38
End: 2018-10-23
Attending: FAMILY MEDICINE
Payer: COMMERCIAL

## 2018-10-23 DIAGNOSIS — Z87.59 HISTORY OF OLIGOHYDRAMNIOS: ICD-10-CM

## 2018-10-23 PROCEDURE — 76816 OB US FOLLOW-UP PER FETUS: CPT | Performed by: OBSTETRICS & GYNECOLOGY

## 2018-10-25 ENCOUNTER — PRENATAL OFFICE VISIT (OUTPATIENT)
Dept: OBGYN | Facility: CLINIC | Age: 38
End: 2018-10-25
Payer: COMMERCIAL

## 2018-10-25 VITALS
WEIGHT: 138.3 LBS | HEIGHT: 59 IN | BODY MASS INDEX: 27.88 KG/M2 | DIASTOLIC BLOOD PRESSURE: 54 MMHG | SYSTOLIC BLOOD PRESSURE: 96 MMHG

## 2018-10-25 DIAGNOSIS — D25.9 UTERINE LEIOMYOMA, UNSPECIFIED LOCATION: ICD-10-CM

## 2018-10-25 DIAGNOSIS — M54.42 ACUTE MIDLINE LOW BACK PAIN WITH LEFT-SIDED SCIATICA: ICD-10-CM

## 2018-10-25 DIAGNOSIS — Z34.93 PRENATAL CARE IN THIRD TRIMESTER: Primary | ICD-10-CM

## 2018-10-25 PROCEDURE — 99207 ZZC PRENATAL VISIT: CPT | Performed by: FAMILY MEDICINE

## 2018-10-25 NOTE — PROGRESS NOTES
"CC: Here for routine prenatal visit   38 year old y/o  @ 26w0d with Estimated Date of Delivery: 2019   HPI: Pt has noticed more lower back pain recently, especially with walking & occasional radiation down her left leg.          This document serves as a record of the services and decisions personally performed and made by Cindy Laurent DO. It was created on her behalf by Danisha Wing, a trained medical scribe. The creation of this document is based the provider's statements to the medical scribe.  Scribe Danisha Wing 3:39 PM, 2018    BP 96/54  Ht 1.51 m (4' 11.45\")  Wt 62.7 kg (138 lb 4.8 oz)  LMP 2018 (Exact Date)  BMI 27.51 kg/m2  See OB flowsheet  + fetal movement, no contractions, no bleeding, no loss of fluid   Discussed monitoring fetal movement - has begun noticing movement, will continue monitoring      1) concerns: Low Back Pain -- offered options: belly band, chiropractics or PT & patient chooses to proceed with PT [referral given]  2) Routine care: Girl; Declines genetic screening; GC - negative  3) AMA: US with MFM @ 20w, Growth US @ 36w & Biweekly/Weekly BPP's @ 36w   - Delivery @ 39w, repeat c/s scheduled 2018   - Hx of oligohydramnios, will monitor beginning @ 32w  4) Hyperemesis gravidarum: infusions still occurring, feels better overall getting same amount as was previously, increase to 2 liters per time; On reglan and zofran and vit B6 but not helping  5) Uterine fibroid: Fetal growth monitored with US every 4 weeks, so far normal & no growth of the fibroid   - Explained if fibroid is removed surgically, all future pregnancies will have to  be delivered by  @ 36w  6) Return: 4 weeks        The information in this document, created by the medical scribe for me, accurately reflects the services I personally performed and the decisions made by me. I have reviewed and approved this document for accuracy prior to leaving the patient care area.  3:39 " PM, 10/25/18    Mehran

## 2018-10-25 NOTE — NURSING NOTE
"26w0d    Chief Complaint   Patient presents with     Prenatal Care     feels like nerve is pinched--sharp pain--left back then goes to knee--comes and goes       Initial BP 96/54  Ht 4' 11.45\" (1.51 m)  Wt 138 lb 4.8 oz (62.7 kg)  LMP 2018 (Exact Date)  BMI 27.51 kg/m2 Estimated body mass index is 27.51 kg/(m^2) as calculated from the following:    Height as of this encounter: 4' 11.45\" (1.51 m).    Weight as of this encounter: 138 lb 4.8 oz (62.7 kg).  BP completed using cuff size: regular    Questioned patient about current smoking habits.  Pt. has never smoked.          The following HM Due: NONE           "

## 2018-10-25 NOTE — PATIENT INSTRUCTIONS
Return in 4 weeks   Return to clinic:  every 4 weeks till 30 weeks, then every 2 weeks till 36 weeks, then weekly till delivery      Phone numbers [place]:  Day/ night [phone] ask for ob triage  Emergency:  Call labor and delivery:  [phone]    What should I call about??    Contraction every 5 minutes for 1 hour 1 minute long (511), bleeding, loss of fluid, headache that doesn't resolve with tylenol, and decreased fetal movement     Start kick counts @ 26-28 weeks   Keep track of movement and discover your normal baby movement pattern   guideline is listed below  Please call if you do not feel the baby move!  We will have you come in for fetal heart rate monitoring:   Perception of at least 10 FMs during 12 hours of normal maternal activity   Perception of least 10 FMs over two hours when the mother is at rest and focused on [facility] Address   [address], [place], [place] [address] [phone]    Dr. Cindy Laurent, DO    OB/GYN   [facility] [place] [facility]                                Dr. Cindy Laurent, DO    Obstetrics and Gynecology  [facility] [place] [facility] [place]

## 2018-10-25 NOTE — MR AVS SNAPSHOT
After Visit Summary   10/25/2018    Rizwana Plummer    MRN: [id number]           Patient Information     Date Of Birth          1980        Visit Information        Provider Department      10/25/2018 3:00 PM Cindy Laurent, DO [facility]        Today's Diagnoses     Prenatal care in third trimester    -  1    Acute midline low back pain with left-sided sciatica        Uterine leiomyoma, unspecified location          Care Instructions    Return in 4 weeks   Return to clinic:  every 4 weeks till 30 weeks, then every 2 weeks till 36 weeks, then weekly till delivery      Phone numbers [place]:  Day/ night [phone] ask for ob triage  Emergency:  Call labor and delivery:  [phone]    What should I call about??    Contraction every 5 minutes for 1 hour 1 minute long (188), bleeding, loss of fluid, headache that doesn't resolve with tylenol, and decreased fetal movement     Start kick counts @ 26-28 weeks   Keep track of movement and discover your normal baby movement pattern   guideline is listed below  Please call if you do not feel the baby move!  We will have you come in for fetal heart rate monitoring:   Perception of at least 10 FMs during 12 hours of normal maternal activity   Perception of least 10 FMs over two hours when the mother is at rest and focused on [facility] Address   [address] [facility], [place] [address] [phone]    Dr. Cindy Laurent, DO    OB/GYN   [facility]                                Dr. Cindy Laurent, DO    Obstetrics and Gynecology  [facility] and [place]                 Follow-ups after your visit        Additional Services     TIEN PT, HAND, AND CHIROPRACTIC REFERRAL       Physical Therapy, Hand Therapy and Chiropractic Care are available through:  *[facility]  *Hand Therapy (Occupational Therapy or Physical Therapy)  *[facility]    Call one number to schedule at any of the above locations: [phone].    Physical therapy, Hand therapy and/or Chiropractic care has been recommended by your physician as an excellent treatment option to reduce pain and help people return to normal activities, including sports.  Therapy and/or chiropractic care services are a great complement or alternative to expensive and invasive surgery, injections, or long-term use of prescription medications. The primary goal is to identify the underlying problem and provide you the tools to manage your condition on your own.     Please be aware that coverage of these services is subject to the terms and limitations of your health insurance plan.  Call member services at your health plan with any benefit or coverage questions.      Please bring the following to your appointment:  *Your personal calendar for scheduling future appointments  *Comfortable clothing                  Your next 10 appointments already scheduled     Jan 24, 2019   Procedure with Cindy Laurent DO   [facility] Labor and Delivery (--)    [address]  [facility] [address]   [phone]            Feb 07, 2019  1:00 PM CST   SHORT with Cindy Laurent DO   [facility]    [address]  [address]  [facility] [id number] [phone]              Future tests that were ordered for you today     Open Future Orders        Priority Expected Expires Ordered    US OB < 14 Weeks Single Routine 11/25/2018 10/25/2019 10/25/2018    TIEN PT, HAND, AND CHIROPRACTIC REFERRAL Routine  10/25/2019 10/25/2018            Who to contact     If you have questions or need follow up information about today's clinic visit or your schedule please contact [facility] directly at [phone].  Normal or non-critical lab and imaging results will be communicated to you by MyChart, letter or phone  "within 4 business days after the clinic has received the results. If you do not hear from us within 7 days, please contact the clinic through [employer] or phone. If you have a critical or abnormal lab result, we will notify you by phone as soon as possible.  Submit refill requests through [employer] or call your pharmacy and they will forward the refill request to us. Please allow 3 business days for your refill to be completed.          Additional Information About Your Visit        [employer] Information     [employer] gives you secure access to your electronic health record. If you see a primary care provider, you can also send messages to your care team and make appointments. If you have questions, please call your primary care clinic.  If you do not have a primary care provider, please call [phone] and they will assist you.        Care EveryWhere ID     This is your Care EveryWhere ID. This could be used by other organizations to access your [place] medical records  [id number]        Your Vitals Were     Height Last Period BMI (Body Mass Index)             4' 11.45\" (1.51 m) 04/26/2018 (Exact Date) 27.51 kg/m2          Blood Pressure from Last 3 Encounters:   10/25/18 96/54   09/25/18 100/60   08/21/18 (!) 84/50    Weight from Last 3 Encounters:   10/25/18 138 lb 4.8 oz (62.7 kg)   09/25/18 129 lb 3.2 oz (58.6 kg)   08/21/18 126 lb 6.4 oz (57.3 kg)               Primary Care Provider Office Phone # Fax #    Cindy Bajwa DO Mehran [phone]       [address] [facility] [address]        Equal Access to Services     [facility]: Hadii eyal wiggins Socathy, waaxda luqadaha, qaybta kaalmasebastien corrales. So [facility] [phone].    ATENCIÓN: Si habla español, tiene a pink disposición servicios gratuitos de asistencia lingüística. Mellisa delgado [phone].    We comply with applicable federal civil rights laws and [place] laws. We do not discriminate on the " basis of race, color, national origin, age, disability, sex, sexual orientation, or gender identity.            Thank you!     Thank you for choosing [facility]  for your care. Our goal is always to provide you with excellent care. Hearing back from our patients is one way we can continue to improve our services. Please take a few minutes to complete the written survey that you may receive in the mail after your visit with us. Thank you!             Your Updated Medication List - Protect others around you: Learn how to safely use, store and throw away your medicines at www.disposemymeds.org.          This list is accurate as of 10/25/18  3:47 PM.  Always use your most recent med list.                   Brand Name Dispense Instructions for use Diagnosis    metoclopramide 5 MG tablet    REGLAN    120 tablet    Take 1 tablet (5 mg) by mouth 4 times daily as needed    Hyperemesis gravidarum       prenatal multivitamin plus iron 27-0.8 MG Tabs per tablet     100 tablet    Take 1 tablet by mouth daily        pyridOXINE 50 MG tablet    [employer]S VITAMIN B-6    90 tablet    Take 1 tablet (50 mg) by mouth 3 times daily    Hyperemesis gravidarum       TYLENOL PO      Take 325 mg by mouth        * ZOFRAN ODT PO      Take 1 tablet by mouth every 6 hours as needed for nausea        * ondansetron 4 MG ODT tab    ZOFRAN ODT    30 tablet    Take 1 tablet (4 mg) by mouth every 8 hours as needed    Hyperemesis gravidarum       * Notice:  This list has 2 medication(s) that are the same as other medications prescribed for you. Read the directions carefully, and ask your doctor or other care provider to review them with you.

## 2018-11-06 ENCOUNTER — HOSPITAL ENCOUNTER (EMERGENCY)
Facility: CLINIC | Age: 38
End: 2018-11-06
Payer: COMMERCIAL

## 2018-11-06 ENCOUNTER — TELEPHONE (OUTPATIENT)
Dept: OBGYN | Facility: CLINIC | Age: 38
End: 2018-11-06

## 2018-11-06 ENCOUNTER — HOSPITAL ENCOUNTER (OUTPATIENT)
Facility: CLINIC | Age: 38
Discharge: HOME | End: 2018-11-06
Attending: FAMILY MEDICINE | Admitting: FAMILY MEDICINE
Payer: COMMERCIAL

## 2018-11-06 VITALS — RESPIRATION RATE: 16 BRPM | TEMPERATURE: 97.7 F | DIASTOLIC BLOOD PRESSURE: 49 MMHG | SYSTOLIC BLOOD PRESSURE: 98 MMHG

## 2018-11-06 DIAGNOSIS — N30.00 ACUTE CYSTITIS WITHOUT HEMATURIA: Primary | ICD-10-CM

## 2018-11-06 LAB
ALBUMIN UR-MCNC: NEGATIVE MG/DL
APPEARANCE UR: CLEAR
BACTERIA #/AREA URNS HPF: ABNORMAL /HPF
BILIRUB UR QL STRIP: NEGATIVE
COLOR UR AUTO: YELLOW
FIBRONECTIN FETAL VAG QL: NEGATIVE
FLUAV+FLUBV AG SPEC QL: NEGATIVE
FLUAV+FLUBV AG SPEC QL: NEGATIVE
GLUCOSE UR STRIP-MCNC: NEGATIVE MG/DL
HGB UR QL STRIP: NEGATIVE
KETONES UR STRIP-MCNC: NEGATIVE MG/DL
LEUKOCYTE ESTERASE UR QL STRIP: NEGATIVE
MUCOUS THREADS #/AREA URNS LPF: PRESENT /LPF
NITRATE UR QL: NEGATIVE
PH UR STRIP: 7 PH (ref 5–7)
RBC #/AREA URNS AUTO: 1 /HPF (ref 0–2)
SOURCE: ABNORMAL
SP GR UR STRIP: 1.01 (ref 1–1.03)
SPECIMEN SOURCE: NORMAL
SPECIMEN SOURCE: NORMAL
SQUAMOUS #/AREA URNS AUTO: 2 /HPF (ref 0–1)
TRANS CELLS #/AREA URNS HPF: <1 /HPF (ref 0–1)
UROBILINOGEN UR STRIP-MCNC: NEGATIVE MG/DL (ref 0–2)
WBC #/AREA URNS AUTO: 2 /HPF (ref 0–5)
WET PREP SPEC: NORMAL

## 2018-11-06 PROCEDURE — 25000128 H RX IP 250 OP 636: Performed by: FAMILY MEDICINE

## 2018-11-06 PROCEDURE — 96372 THER/PROPH/DIAG INJ SC/IM: CPT

## 2018-11-06 PROCEDURE — 40000809 ZZH STATISTIC NO DOCUMENTATION TO SUPPORT CHARGE

## 2018-11-06 PROCEDURE — 87210 SMEAR WET MOUNT SALINE/INK: CPT | Performed by: FAMILY MEDICINE

## 2018-11-06 PROCEDURE — G0463 HOSPITAL OUTPT CLINIC VISIT: HCPCS

## 2018-11-06 PROCEDURE — 87804 INFLUENZA ASSAY W/OPTIC: CPT | Performed by: FAMILY MEDICINE

## 2018-11-06 PROCEDURE — 96365 THER/PROPH/DIAG IV INF INIT: CPT

## 2018-11-06 PROCEDURE — 81001 URINALYSIS AUTO W/SCOPE: CPT | Performed by: FAMILY MEDICINE

## 2018-11-06 PROCEDURE — 82731 ASSAY OF FETAL FIBRONECTIN: CPT | Performed by: FAMILY MEDICINE

## 2018-11-06 PROCEDURE — 96366 THER/PROPH/DIAG IV INF ADDON: CPT

## 2018-11-06 RX ORDER — ONDANSETRON 2 MG/ML
4 INJECTION INTRAMUSCULAR; INTRAVENOUS EVERY 6 HOURS PRN
Status: DISCONTINUED | OUTPATIENT
Start: 2018-11-06 | End: 2018-11-06 | Stop reason: HOSPADM

## 2018-11-06 RX ORDER — SODIUM CHLORIDE, SODIUM LACTATE, POTASSIUM CHLORIDE, CALCIUM CHLORIDE 600; 310; 30; 20 MG/100ML; MG/100ML; MG/100ML; MG/100ML
INJECTION, SOLUTION INTRAVENOUS CONTINUOUS
Status: DISCONTINUED | OUTPATIENT
Start: 2018-11-06 | End: 2018-11-06 | Stop reason: HOSPADM

## 2018-11-06 RX ORDER — NITROFURANTOIN 25; 75 MG/1; MG/1
100 CAPSULE ORAL 2 TIMES DAILY
Qty: 14 CAPSULE | Refills: 0 | Status: SHIPPED | OUTPATIENT
Start: 2018-11-06 | End: 2018-12-26

## 2018-11-06 RX ORDER — TERBUTALINE SULFATE 1 MG/ML
0.25 INJECTION, SOLUTION SUBCUTANEOUS ONCE
Status: COMPLETED | OUTPATIENT
Start: 2018-11-06 | End: 2018-11-06

## 2018-11-06 RX ADMIN — TERBUTALINE SULFATE 0.25 MG: 1 INJECTION SUBCUTANEOUS at 16:03

## 2018-11-06 RX ADMIN — SODIUM CHLORIDE, POTASSIUM CHLORIDE, SODIUM LACTATE AND CALCIUM CHLORIDE 1000 ML: 600; 310; 30; 20 INJECTION, SOLUTION INTRAVENOUS at 11:00

## 2018-11-06 NOTE — PROVIDER NOTIFICATION
Finished 1 liter bolus. Pt feeling cramping which she describes more as tightening. Palpate mild and do  once TOCO was adjusted. Dr Laurent informed and orders for FFN, wet prep and cervical exam.

## 2018-11-06 NOTE — DISCHARGE INSTRUCTIONS
Discharge Instruction for Undelivered Patients      You were seen for: Labor Assessment  We Consulted: Dr Laurent  You had (Test or Medicine):IV fluids, FFN, Flu swab, UA, wet prep, cervical exam      Diet:   Drink 8 to 12 glasses of liquids (milk, juice, water) every day.     Activity:  Call your doctor or nurse midwife if your baby is moving less than usual.     Call your provider if you notice:  Swelling in your face or increased swelling in your hands or legs.  Headaches that are not relieved by Tylenol (acetaminophen).  Changes in your vision (blurring: seeing spots or stars.)  Nausea (sick to your stomach) and vomiting (throwing up).   Weight gain of 5 pounds or more per week.  Heartburn that doesn't go away.  Signs of bladder infection: pain when you urinate (use the toilet), need to go more often and more urgently.  The bag of fairchild (rupture of membranes) breaks, or you notice leaking in your underwear.  Bright red blood in your underwear.  Abdominal (lower belly) or stomach pain.  For first baby: Contractions (tightening) less than 5 minutes apart for one hour or more.  Second (plus) baby: Contractions (tightening) less than 10 minutes apart and getting stronger.  *If less than 34 weeks: Contractions (tightenings) more than 6 times in one hour.  Increase or change in vaginal discharge (note the color and amount)  Other: Take prescription Macrobid as ordered     Follow-up:  As scheduled in the clinic

## 2018-11-06 NOTE — IP AVS SNAPSHOT
[facility] Labor and Delivery    [address]    [facility] [address]    Phone:  [phone]    Fax:  [phone]                                       After Visit Summary   11/6/2018    Rizwana Plummer    MRN: [id number]           After Visit Summary Signature Page     I have received my discharge instructions, and my questions have been answered. I have discussed any challenges I see with this plan with the nurse or doctor.    ..........................................................................................................................................  Patient/Patient Representative Signature      ..........................................................................................................................................  Patient Representative Print Name and Relationship to Patient    ..................................................               ................................................  Date                                   Time    ..........................................................................................................................................  Reviewed by Signature/Title    ...................................................              ..............................................  Date                                               Time          22EPIC Rev 08/18

## 2018-11-06 NOTE — H&P
[facility] Labor and Delivery OB triage note    Rizwana Plummer MRN# [id number]   Age: 38 year old YOB: 1980     Date of Admission:  2018    Primary care provider: Cindy Laurent           Chief Complaint:   Rizwana Plummer is a 38 year old female who is  @ 27w5d pregnant    Who presented with cough, sore throat and body aches.   She was treated with IV fluids and had negative influenza. UA slightly positive.  She then felt better but developed contraction on the monitor which   She reported feeling as period cramps. fFN was negative.  Cervix was closed.   The patient was given terbutaline which stopped the contractions on the monitor.   She also reports pain with urination.  Given symptoms and slightly positive UA  Will treat for UTI.                Pregnancy history:     OBSTETRIC HISTORY:    Obstetric History       T2      L2     SAB1   TAB0   Ectopic0   Multiple0   Live Births2       # Outcome Date GA Lbr Anthony/2nd Weight Sex Delivery Anes PTL Lv   4 Current            3 Term 17 39w0d  3.31 kg (7 lb 4.8 oz) F CS-LTranv Spinal  CATRACHO      Name: Asmkar      Apgar1:  8                Apgar5: 9   2 Term 14 41w6d  3.705 kg (8 lb 2.7 oz) M CS-Unspec EPI N CATRACHO      Name: Radha      Apgar1:  3                Apgar5: 8   1 SAB 12     SAB   ND          EDC: Estimated Date of Delivery: 2019    Prenatal Labs:   Lab Results   Component Value Date    ABO O 2018    RH Pos 2018    AS Neg 2018    HEPBANG Nonreactive 2018    CHPCRT Negative 2018    GCPCRT Negative 2018    TREPAB Negative 2017    RUBELLAABIGG 73 2013    HGB 10.9 (L) 2018    HIV Negative 2013       GBS Status:   Lab Results   Component Value Date    GBS  2017     Negative  No GBS DNA detected, presumed negative for GBS or number of bacteria may be   below the limit of detection of the assay.   Assay performed on incubated  broth culture of specimen using [employer] real-time   PCR.         Active Problem List  Patient Active Problem List   Diagnosis     Female genital infibulation     Epidermal inclusion cyst of infibulation scar     Influenza A     Dehydration     Hyperemesis gravidarum     Labor and delivery indication for care or intervention     SI (sacroiliac) joint dysfunction     Indication for care in labor or delivery       Medication Prior to Admission  Prescriptions Prior to Admission   Medication Sig Dispense Refill Last Dose     Acetaminophen (TYLENOL PO) Take 325 mg by mouth   Taking     metoclopramide (REGLAN) 5 MG tablet Take 1 tablet (5 mg) by mouth 4 times daily as needed 120 tablet 1 8/13/2018 at Unknown time     Ondansetron (ZOFRAN ODT PO) Take 1 tablet by mouth every 6 hours as needed for nausea   8/14/2018 at Unknown time     ondansetron (ZOFRAN ODT) 4 MG ODT tab Take 1 tablet (4 mg) by mouth every 8 hours as needed 30 tablet 3 Taking     Prenatal Vit-Fe Fumarate-FA (PRENATAL MULTIVITAMIN PLUS IRON) 27-0.8 MG TABS per tablet Take 1 tablet by mouth daily 100 tablet 3 Taking     pyridOXINE (CVS VITAMIN B-6) 50 MG tablet Take 1 tablet (50 mg) by mouth 3 times daily 90 tablet 1 Past Month at Unknown time   .        Maternal Past Medical History:     Past Medical History:   Diagnosis Date     Varicella age 5                       Family History:   This patient has no significant family history            Social History:   This patient has no significant social history         Review of Systems:   CONSTITUTIONAL: NEGATIVE for fever, chills, change in weight  INTEGUMENTARY/SKIN: NEGATIVE for worrisome rashes, moles or lesions  EYES: NEGATIVE for vision changes or irritation  ENT/MOUTH: NEGATIVE for ear, mouth and throat problems  RESP: NEGATIVE for significant cough or SOB  BREAST: NEGATIVE for masses, tenderness or discharge  CV: NEGATIVE for chest pain, palpitations or peripheral edema  GI: NEGATIVE for nausea, abdominal  pain, heartburn, or change in bowel habits  : NEGATIVE for frequency, dysuria, or hematuria  MUSCULOSKELETAL: NEGATIVE for significant arthralgias or myalgia  NEURO: NEGATIVE for weakness, dizziness or paresthesias  ENDOCRINE: NEGATIVE for temperature intolerance, skin/hair changes  HEME: NEGATIVE for bleeding problems  PSYCHIATRIC: NEGATIVE for changes in mood or affect          Physical Exam:     Vitals were reviewed  All vitals stable  Patient Vitals for the past 8 hrs:   BP Temp Temp src Resp   18 1400 - 97.7  F (36.5  C) Oral -   18 1055 98/49 98.2  F (36.8  C) Oral 16     Constitutional: Awake, alert, cooperative, no apparent distress, and appears stated age.  Eyes: Lids and lashes normal, pupils equal, round   ENT: Normocephalic, without obvious abnormality, atramatic, sinuses nontender on palpation, external ears without lesions, oral pharynx with moist mucus membranes, tonsils without erythema or exudates, gums normal and good dentition.  Neck: Supple, symmetrical, trachea midline, no adenopathy, thyroid symmetric, not enlarged and no tenderness, skin normal.  Abdomen:  normal bowel sounds, soft, non-distended, non-tender, no masses palpated, no hepatosplenomegally.  Abdomen is tender, gravid  Musculoskeletal: No redness, warmth, or swelling of the joints.    Neurologic: Awake, alert, oriented to name, place and time.  Cranial nerves II-XII are grossly intact.  Motor is 5 out of 5 bilaterally.    Neuropsychiatric: Normal affect, mood, orientation, memory and insight.  Skin: No rashes, erythema, pallor, petechia or purpura.   Cervix:   Membranes: intact   Dilation: closed   Effacement: 0%   Station:-1   Consistency: firm   Position: Mid  Presentation:uncertain  Fetal Heart Rate Tracing: Tier 1 (normal)  Tocometer: frequency q 2-3  Minutes, weak in strength, now none since administration   Of terbuatline                       Assessment:   Rizwana Plummer is a 38 year old female who is  @  27w5d pregnant    Who presented with cough, sore throat and body aches.     She was treated with IV fluids and had negative influenza. UA slightly positive.    She then felt better but developed contraction on the monitor which   She reported feeling as period cramps. fFN was negative.  Cervix was closed.   The patient was given terbutaline which stopped the contractions on the monitor.   She also reports pain with urination.  Given symptoms and slightly positive UA  Will treat for UTI.  No sign of labor at this point.             Plan:   Patient may discharge home   Rx for macrobid given   Precautions on returning given.   Such as return of contractions, pelvic pain, Rupture of membranes or decreased fetal movement.    If contractions return and become painful will manage as  labor.     Cindy Laurent, DO

## 2018-11-06 NOTE — PLAN OF CARE
Data: [facility] at 1046.   Reason for maternal/fetal assessment per patient is Fever  . Patient is a . Prenatal record reviewed.      Obstetric History       T2      L2     SAB1   TAB0   Ectopic0   Multiple0   Live Births2       # Outcome Date GA Lbr Anthony/2nd Weight Sex Delivery Anes PTL Lv   4 Current            3 Term 17 39w0d  3.31 kg (7 lb 4.8 oz) F CS-LTranv Spinal  CATRACHO      Name: Gato      Apgar1:  8                Apgar5: 9   2 Term 14 41w6d  3.705 kg (8 lb 2.7 oz) M CS-Unspec EPI N CATRACHO      Name: Radha      Apgar1:  3                Apgar5: 8   1 SAB 12     SAB   ND         Medical History:   Past Medical History:   Diagnosis Date     Varicella age 5   . Gestational Age 27w5d. VSS. Cervix: closed.  Fetal movement present. Patient denies cramping, backache, vaginal discharge, pelvic pressure, UTI symptoms, GI problems, bloody show, vaginal bleeding, edema, headache, visual disturbances, epigastric or URQ pain, abdominal pain, rupture of membranes. Support persons not present.  Action: Verbal consent for EFM. Triage assessment completed. EFM applied for fetal wellbeing. Uterine assessment with TOCO. Fetal assessment: Presumed adequate fetal oxygenation documented (see flow record). Patient instructed to report change in fetal movement, vaginal leaking of fluid or bleeding, abdominal pain, or any concerns related to the pregnancy to her nurse/physician.   Response: Dr. Laurent informed of see note. Plan per provider is discharge to home with abx. Instruction to call clinic if contractions comeback. Patient verbalized understanding of education and verbalized agreement with plan. Discharged ambulatory at 1730.

## 2018-11-06 NOTE — PLAN OF CARE
Data: [facility]: 2018 10:46 AM.  Reason for maternal/fetal assessment is fever, sore throat, body aches, nausea, pain with urinating. Patient reports not feeling well since yeaterday. Her children were in urgent care yesterday with similar symptoms. Negative for strep culture.  Patient is a .  Prenatal record reviewed. Pregnancy has been uncomplicated..  Gestational Age 27w5d. VSS. Fetal movement present. Patient denies uterine contractions, leaking of vaginal fluid/rupture of membranes, vaginal bleeding, pelvic pressure. Support person is not present.   Action: Verbal consent for EFM. Triage assessment completed. Bill of rights reviewed.  Response: Patient verbalized agreement with plan. Will contact Dr Cindy Laurent with update and further orders.

## 2018-11-06 NOTE — PROVIDER NOTIFICATION
MD at bedside. Orders to discharge to home with macrobid Rx. Pt denies any contractions since Terbutaline.

## 2018-11-06 NOTE — IP AVS SNAPSHOT
MRN:[id number]                      After Visit Summary   11/6/2018    Rizwana Plummer    MRN: [id number]           Thank you!     Thank you for choosing [facility] for your care. Our goal is always to provide you with excellent care. Hearing back from our patients is one way we can continue to improve our services. Please take a few minutes to complete the written survey that you may receive in the mail after you visit. If you would like to speak to someone directly about your visit please contact Patient Relations at [phone]. Thank you!          Patient Information     Date Of Birth          1980        About your hospital stay     You were admitted on:  November 6, 2018 You last received care in the:  [facility] Labor and Delivery    You were discharged on:  November 6, 2018       Who to Call     For medical emergencies, please call 911.  For non-urgent questions about your medical care, please call your primary care provider or clinic, [phone]          Attending Provider     Provider Specialty    Cindy Laurent DO OB/Gyn       Primary Care Provider Office Phone # Fax #    Cindy Laurent [phone]      Your next 10 appointments already scheduled     Nov 09, 2018  2:10 PM CST   (Arrive by 1:55 PM)   [facility] with Yuki Hankins PT   [facility]  )    [address]  [address]  [facility] [id number] [phone]            Nov 26, 2018  3:30 PM CST   US OB < 14 WEEKS SINGLE with RIUS1   [facility]    [address]  [address]  [facility] [id number] [phone]           How do I prepare for my exam? (Food and drink instructions) Drink four 8-ounce glasses of fluid an hour before your exam. If you need to empty your bladder before your exam, try to release only a little urine. Then, drink another glass of fluid.  How do I prepare  for my exam? (Other instructions) You may have up to two family members in the exam room. If you bring a small child, an adult must be there to care for him or her. No video or camera photography during the procedure.  What should I wear: Wear comfortable clothes.  How long does the exam take: Most ultrasounds take 30 to 60 minutes.  What should I bring: Bring a list of your medicines, including vitamins, minerals and over-the-counter drugs. It is safest to leave personal items at home.  Do I need a :  No  is needed.  What do I need to tell my doctor: Tell your doctor about any allergies you may have.  What should I do after the exam: No restrictions, You may resume normal activities.  What is this test: An ultrasound uses sound waves to make pictures of the body. Sound waves do not cause pain. The only discomfort may be the pressure of the wand against your skin or full bladder.  Who should I call with questions: If you have any questions, please call the Imaging Department where you will have your exam. Directions, parking instructions, and other information is available on our website, [email or web address].            Jan 24, 2019   Procedure with Cindy Laurent,    [facility] Labor and Delivery (--)    [address] [facility] [address]   [phone]            Feb 07, 2019  1:00 PM CST   SHORT with Cindy Laurent DO   [facility] ([facility]    [address] [place] [place]  [address]  [facility] [address]   [phone]              Further instructions from your care team       Discharge Instruction for Undelivered Patients      You were seen for: Labor Assessment  We Consulted: Dr Laurent  You had (Test or Medicine):IV fluids, FFN, Flu swab, UA, wet prep, cervical exam      Diet:   Drink 8 to 12 glasses of liquids (milk, juice, water) every day.     Activity:  Call your doctor or nurse midwife if your baby is moving less than usual.      Call your provider if you notice:  Swelling in your face or increased swelling in your hands or legs.  Headaches that are not relieved by Tylenol (acetaminophen).  Changes in your vision (blurring: seeing spots or stars.)  Nausea (sick to your stomach) and vomiting (throwing up).   Weight gain of 5 pounds or more per week.  Heartburn that doesn't go away.  Signs of bladder infection: pain when you urinate (use the toilet), need to go more often and more urgently.  The bag of fairchild (rupture of membranes) breaks, or you notice leaking in your underwear.  Bright red blood in your underwear.  Abdominal (lower belly) or stomach pain.  For first baby: Contractions (tightening) less than 5 minutes apart for one hour or more.  Second (plus) baby: Contractions (tightening) less than 10 minutes apart and getting stronger.  *If less than 34 weeks: Contractions (tightenings) more than 6 times in one hour.  Increase or change in vaginal discharge (note the color and amount)  Other: Take prescription Macrobid as ordered     Follow-up:  As scheduled in the clinic          Pending Results     No orders found from 11/4/2018 to 11/7/2018.            Admission Information     Date & Time Provider Department Dept. Phone    11/6/2018 Cindy Laurent, DO [facility] Labor and Delivery [phone]      Your Vitals Were     Blood Pressure Temperature Respirations Last Period          98/49 97.7  F (36.5  C) (Oral) 16 04/26/2018 (Exact Date)        MyChart Information     [employer] gives you secure access to your electronic health record. If you see a primary care provider, you can also send messages to your care team and make appointments. If you have questions, please call your primary care clinic.  If you do not have a primary care provider, please call [phone] and they will assist you.        Care EveryWhere ID     This is your Care EveryWhere ID. This could be used by other organizations to access your [place]  medical records  [id number]        Equal Access to Services     [facility] DONNY : Hadii eyal taylor shahnazmady Somargothali, waaxda luqadaha, qaybta karoquestella hernandez, sebastien vitaleemilyarminda morales. So [facility] [phone].    ATENCIÓN: Si habla español, tiene a pink disposición servicios gratuitos de asistencia lingüística. Llame al [phone].    We comply with applicable federal civil rights laws and [place] laws. We do not discriminate on the basis of race, color, national origin, age, disability, sex, sexual orientation, or gender identity.               Review of your medicines      UNREVIEWED medicines. Ask your doctor about these medicines        Dose / Directions    metoclopramide 5 MG tablet   Commonly known as:  REGLAN   Used for:  Hyperemesis gravidarum        Dose:  5 mg   Take 1 tablet (5 mg) by mouth 4 times daily as needed   Quantity:  120 tablet   Refills:  1       nitroFURantoin (macrocrystal-monohydrate) 100 MG capsule   Commonly known as:  MACROBID   Used for:  Acute cystitis without hematuria        Dose:  100 mg   Take 1 capsule (100 mg) by mouth 2 times daily   Quantity:  14 capsule   Refills:  0       prenatal multivitamin plus iron 27-0.8 MG Tabs per tablet        Dose:  1 tablet   Take 1 tablet by mouth daily   Quantity:  100 tablet   Refills:  3       pyridOXINE 50 MG tablet   Commonly known as:  CVS VITAMIN B-6   Used for:  Hyperemesis gravidarum        Dose:  50 mg   Take 1 tablet (50 mg) by mouth 3 times daily   Quantity:  90 tablet   Refills:  1       TYLENOL PO        Dose:  325 mg   Take 325 mg by mouth   Refills:  0       * ZOFRAN ODT PO        Dose:  1 tablet   Take 1 tablet by mouth every 6 hours as needed for nausea   Refills:  0       * ondansetron 4 MG ODT tab   Commonly known as:  ZOFRAN ODT   Used for:  Hyperemesis gravidarum        Dose:  4 mg   Take 1 tablet (4 mg) by mouth every 8 hours as needed   Quantity:  30 tablet   Refills:  3       * Notice:  This list has 2  medication(s) that are the same as other medications prescribed for you. Read the directions carefully, and ask your doctor or other care provider to review them with you.         Where to get your medicines      These medications were sent to [facility] [address] [place], [place] - [address] [facility] [address]  [address], [facility] [address]     Phone:  [phone]     nitroFURantoin (macrocrystal-monohydrate) 100 MG capsule                Protect others around you: Learn how to safely use, store and throw away your medicines at www.disposemymeds.org.             Medication List: This is a list of all your medications and when to take them. Check marks below indicate your daily home schedule. Keep this list as a reference.      Medications           Morning Afternoon Evening Bedtime As Needed    metoclopramide 5 MG tablet   Commonly known as:  REGLAN   Take 1 tablet (5 mg) by mouth 4 times daily as needed                                nitroFURantoin (macrocrystal-monohydrate) 100 MG capsule   Commonly known as:  MACROBID   Take 1 capsule (100 mg) by mouth 2 times daily                                prenatal multivitamin plus iron 27-0.8 MG Tabs per tablet   Take 1 tablet by mouth daily                                pyridOXINE 50 MG tablet   Commonly known as:  CVS VITAMIN B-6   Take 1 tablet (50 mg) by mouth 3 times daily                                TYLENOL PO   Take 325 mg by mouth                                * ZOFRAN ODT PO   Take 1 tablet by mouth every 6 hours as needed for nausea                                * ondansetron 4 MG ODT tab   Commonly known as:  ZOFRAN ODT   Take 1 tablet (4 mg) by mouth every 8 hours as needed                                * Notice:  This list has 2 medication(s) that are the same as other medications prescribed for you. Read the directions carefully, and ask your doctor or other care provider to review them with you.

## 2018-11-06 NOTE — PROVIDER NOTIFICATION
Pt continues to contract every 2-4 min. Dr Laurent updated on lab results and pt status. Orders for terbutaline.

## 2018-11-08 ENCOUNTER — HOSPITAL ENCOUNTER (OUTPATIENT)
Facility: CLINIC | Age: 38
Discharge: HOME | End: 2018-11-08
Attending: OBSTETRICS & GYNECOLOGY | Admitting: FAMILY MEDICINE
Payer: COMMERCIAL

## 2018-11-08 VITALS
TEMPERATURE: 98.8 F | BODY MASS INDEX: 27.09 KG/M2 | DIASTOLIC BLOOD PRESSURE: 57 MMHG | WEIGHT: 138 LBS | HEART RATE: 102 BPM | SYSTOLIC BLOOD PRESSURE: 107 MMHG | HEIGHT: 60 IN | RESPIRATION RATE: 16 BRPM

## 2018-11-08 PROBLEM — Z36.89 ENCOUNTER FOR TRIAGE IN PREGNANT PATIENT: Status: ACTIVE | Noted: 2018-11-08

## 2018-11-08 LAB
ALBUMIN UR-MCNC: NEGATIVE MG/DL
APPEARANCE UR: CLEAR
BILIRUB UR QL STRIP: NEGATIVE
COLOR UR AUTO: ABNORMAL
ERYTHROCYTE [DISTWIDTH] IN BLOOD BY AUTOMATED COUNT: 15.2 % (ref 10–15)
GLUCOSE UR STRIP-MCNC: NEGATIVE MG/DL
HCT VFR BLD AUTO: 30.1 % (ref 35–47)
HGB BLD-MCNC: 9.1 G/DL (ref 11.7–15.7)
HGB UR QL STRIP: NEGATIVE
KETONES UR STRIP-MCNC: 80 MG/DL
LEUKOCYTE ESTERASE UR QL STRIP: NEGATIVE
MCH RBC QN AUTO: 23.9 PG (ref 26.5–33)
MCHC RBC AUTO-ENTMCNC: 30.2 G/DL (ref 31.5–36.5)
MCV RBC AUTO: 79 FL (ref 78–100)
MUCOUS THREADS #/AREA URNS LPF: PRESENT /LPF
NITRATE UR QL: NEGATIVE
PH UR STRIP: 5 PH (ref 5–7)
PLATELET # BLD AUTO: 211 10E9/L (ref 150–450)
RBC # BLD AUTO: 3.8 10E12/L (ref 3.8–5.2)
RBC #/AREA URNS AUTO: 1 /HPF (ref 0–2)
SOURCE: ABNORMAL
SP GR UR STRIP: 1.01 (ref 1–1.03)
SQUAMOUS #/AREA URNS AUTO: <1 /HPF (ref 0–1)
UROBILINOGEN UR STRIP-MCNC: 0 MG/DL (ref 0–2)
WBC # BLD AUTO: 10.3 10E9/L (ref 4–11)
WBC #/AREA URNS AUTO: 1 /HPF (ref 0–5)

## 2018-11-08 PROCEDURE — 40000809 ZZH STATISTIC NO DOCUMENTATION TO SUPPORT CHARGE

## 2018-11-08 PROCEDURE — G0463 HOSPITAL OUTPT CLINIC VISIT: HCPCS

## 2018-11-08 PROCEDURE — 85027 COMPLETE CBC AUTOMATED: CPT | Performed by: OBSTETRICS & GYNECOLOGY

## 2018-11-08 PROCEDURE — 87081 CULTURE SCREEN ONLY: CPT | Performed by: FAMILY MEDICINE

## 2018-11-08 PROCEDURE — 87651 STREP A DNA AMP PROBE: CPT | Performed by: OBSTETRICS & GYNECOLOGY

## 2018-11-08 PROCEDURE — 81001 URINALYSIS AUTO W/SCOPE: CPT | Performed by: OBSTETRICS & GYNECOLOGY

## 2018-11-08 PROCEDURE — 36415 COLL VENOUS BLD VENIPUNCTURE: CPT | Performed by: OBSTETRICS & GYNECOLOGY

## 2018-11-08 PROCEDURE — 87880 STREP A ASSAY W/OPTIC: CPT | Performed by: FAMILY MEDICINE

## 2018-11-08 PROCEDURE — 99213 OFFICE O/P EST LOW 20 MIN: CPT | Performed by: OBSTETRICS & GYNECOLOGY

## 2018-11-08 PROCEDURE — 96360 HYDRATION IV INFUSION INIT: CPT

## 2018-11-08 PROCEDURE — 25000128 H RX IP 250 OP 636: Performed by: OBSTETRICS & GYNECOLOGY

## 2018-11-08 PROCEDURE — 25000132 ZZH RX MED GY IP 250 OP 250 PS 637: Performed by: FAMILY MEDICINE

## 2018-11-08 RX ORDER — ACETAMINOPHEN 325 MG/1
650 TABLET ORAL ONCE
Status: COMPLETED | OUTPATIENT
Start: 2018-11-08 | End: 2018-11-08

## 2018-11-08 RX ADMIN — SODIUM CHLORIDE, POTASSIUM CHLORIDE, SODIUM LACTATE AND CALCIUM CHLORIDE 1000 ML: 600; 310; 30; 20 INJECTION, SOLUTION INTRAVENOUS at 16:46

## 2018-11-08 RX ADMIN — ACETAMINOPHEN 650 MG: 325 TABLET, FILM COATED ORAL at 17:34

## 2018-11-08 NOTE — PLAN OF CARE
Data: [facility] presented [facility]: 2018  3:40 PM.  Reason for maternal/fetal assessment is uterine contractions. Patient reports experiencing tightness and heaviness since 1000 am today.  Patient is a .  Prenatal record reviewed. Pregnancy has been uncomplicated..  Gestational Age 28w0d. VSS. Fetal movement present. Patient denies leaking of vaginal fluid/rupture of membranes, vaginal bleeding, headache, visual disturbances, epigastric or URQ pain, significant edema. Support person is present.   Action: Verbal consent for EFM. Triage assessment completed. Bill of rights reviewed.  Response: Patient verbalized agreement with plan. Will contact Dr Cindy Laurent with update and further orders.

## 2018-11-08 NOTE — IP AVS SNAPSHOT
MRN:[id number]                      After Visit Summary   11/8/2018    Rizwana Plummer    MRN: [id number]           Thank you!     Thank you for choosing [facility] for your care. Our goal is always to provide you with excellent care. Hearing back from our patients is one way we can continue to improve our services. Please take a few minutes to complete the written survey that you may receive in the mail after you visit. If you would like to speak to someone directly about your visit please contact Patient Relations at [phone]. Thank you!          Patient Information     Date Of Birth          1980        About your hospital stay     You were admitted on:  November 8, 2018 You last received care in the:  [facility] Labor and Delivery    You were discharged on:  November 8, 2018       Who to Call     For medical emergencies, please call 911.  For non-urgent questions about your medical care, please call your primary care provider or clinic, [phone]          Attending Provider     Provider Specialty    Ruthy aBnda MD OB/Gyn    Cindy Laurent DO OB/Gyn       Primary Care Provider Office Phone # Fax #    Cindy Laurent [phone]      Your next 10 appointments already scheduled     Nov 26, 2018  3:30 PM CST   US OB < 14 WEEKS SINGLE with RIUS1   [facility]    [address]  [address]  [facility] [address] [phone]           How do I prepare for my exam? (Food and drink instructions) Drink four 8-ounce glasses of fluid an hour before your exam. If you need to empty your bladder before your exam, try to release only a little urine. Then, drink another glass of fluid.  How do I prepare for my exam? (Other instructions) You may have up to two family members in the exam room. If you bring a small child, an adult must be there to care for him or her. No video or camera  photography during the procedure.  What should I wear: Wear comfortable clothes.  How long does the exam take: Most ultrasounds take 30 to 60 minutes.  What should I bring: Bring a list of your medicines, including vitamins, minerals and over-the-counter drugs. It is safest to leave personal items at home.  Do I need a :  No  is needed.  What do I need to tell my doctor: Tell your doctor about any allergies you may have.  What should I do after the exam: No restrictions, You may resume normal activities.  What is this test: An ultrasound uses sound waves to make pictures of the body. Sound waves do not cause pain. The only discomfort may be the pressure of the wand against your skin or full bladder.  Who should I call with questions: If you have any questions, please call the Imaging Department where you will have your exam. Directions, parking instructions, and other information is available on our website, [email or web address].            Jan 24, 2019   Procedure with Cindy Laurent DO   [facility] Labor and Delivery (--)    [address] [facility] [address]   [phone]            Feb 07, 2019  1:00 PM CST   SHORT with Cindy Laurent DO   [facility]    [address]  [address]  [facility] [address]   [phone]              Further instructions from your care team       Discharge Instruction for Undelivered Patients      You were seen for: Labor Assessment  We Consulted: Dr. Banda  You had (Test or Medicine): Tylenol, IV fluids, Strep throat swab     Diet:   Drink 8 to 12 glasses of liquids (milk, juice, water) every day.     Activity:  Call your doctor or nurse midwife if your baby is moving less than usual.     Call your provider if you notice:  Swelling in your face or increased swelling in your hands or legs.  Headaches that are not relieved by Tylenol (acetaminophen).  Changes in your vision (blurring:  seeing spots or stars.)  Nausea (sick to your stomach) and vomiting (throwing up).   Weight gain of 5 pounds or more per week.  Heartburn that doesn't go away.  Signs of bladder infection: pain when you urinate (use the toilet), need to go more often and more urgently.  The bag of fairchild (rupture of membranes) breaks, or you notice leaking in your underwear.  Bright red blood in your underwear.  Abdominal (lower belly) or stomach pain.  For first baby: Contractions (tightening) less than 5 minutes apart for one hour or more.  Second (plus) baby: Contractions (tightening) less than 10 minutes apart and getting stronger.  *If less than 34 weeks: Contractions (tightenings) more than 6 times in one hour.  Increase or change in vaginal discharge (note the color and amount)  Other: FOLLOW UP WITH PRIMARY NEXT WEEK.     Follow-up:  Make an appointment to be seen on next week.          Pending Results     Date and Time Order Name Status Description    11/8/2018 1844 Group A Streptococcus PCR Throat Swab In process             Admission Information     Date & Time Provider Department Dept. Phone    11/8/2018 Cindy Laurent,  [facility] Labor and Delivery [phone]      Your Vitals Were     Blood Pressure Pulse Temperature Respirations Height Weight    107/57 102 98.8  F (37.1  C) (Oral) 16 1.524 m (5') 62.6 kg (138 lb)    Last Period BMI (Body Mass Index)                04/26/2018 (Exact Date) 26.95 kg/m2          [employer] Information     [employer] gives you secure access to your electronic health record. If you see a primary care provider, you can also send messages to your care team and make appointments. If you have questions, please call your primary care clinic.  If you do not have a primary care provider, please call [phone] and they will assist you.        Care EveryWhere ID     This is your Care EveryWhere ID. This could be used by other organizations to access your [facility]  records  [id number]        Equal Access to Services     [facility] : Hadii eyal taylor hadarmanimady Somargothali, waaxda luqadaha, qaybta kaalmada mary, sebastien vitaleemilyarminda morales. So [facility] [phone].    ATENCIÓN: Si habla español, tiene a pink disposición servicios gratuitos de asistencia lingüística. Llame al [phone].    We comply with applicable federal civil rights laws and [place] laws. We do not discriminate on the basis of race, color, national origin, age, disability, sex, sexual orientation, or gender identity.               Review of your medicines      UNREVIEWED medicines. Ask your doctor about these medicines        Dose / Directions    metoclopramide 5 MG tablet   Commonly known as:  REGLAN   Used for:  Hyperemesis gravidarum        Dose:  5 mg   Take 1 tablet (5 mg) by mouth 4 times daily as needed   Quantity:  120 tablet   Refills:  1       nitroFURantoin (macrocrystal-monohydrate) 100 MG capsule   Commonly known as:  MACROBID   Used for:  Acute cystitis without hematuria        Dose:  100 mg   Take 1 capsule (100 mg) by mouth 2 times daily   Quantity:  14 capsule   Refills:  0       prenatal multivitamin plus iron 27-0.8 MG Tabs per tablet        Dose:  1 tablet   Take 1 tablet by mouth daily   Quantity:  100 tablet   Refills:  3       pyridOXINE 50 MG tablet   Commonly known as:  CVS VITAMIN B-6   Used for:  Hyperemesis gravidarum        Dose:  50 mg   Take 1 tablet (50 mg) by mouth 3 times daily   Quantity:  90 tablet   Refills:  1       TYLENOL PO        Dose:  325 mg   Take 325 mg by mouth   Refills:  0       * ZOFRAN ODT PO        Dose:  1 tablet   Take 1 tablet by mouth every 6 hours as needed for nausea   Refills:  0       * ondansetron 4 MG ODT tab   Commonly known as:  ZOFRAN ODT   Used for:  Hyperemesis gravidarum        Dose:  4 mg   Take 1 tablet (4 mg) by mouth every 8 hours as needed   Quantity:  30 tablet   Refills:  3       * Notice:  This list has 2 medication(s)  that are the same as other medications prescribed for you. Read the directions carefully, and ask your doctor or other care provider to review them with you.             Protect others around you: Learn how to safely use, store and throw away your medicines at www.disposemymeds.org.             Medication List: This is a list of all your medications and when to take them. Check marks below indicate your daily home schedule. Keep this list as a reference.      Medications           Morning Afternoon Evening Bedtime As Needed    metoclopramide 5 MG tablet   Commonly known as:  REGLAN   Take 1 tablet (5 mg) by mouth 4 times daily as needed                                nitroFURantoin (macrocrystal-monohydrate) 100 MG capsule   Commonly known as:  MACROBID   Take 1 capsule (100 mg) by mouth 2 times daily                                prenatal multivitamin plus iron 27-0.8 MG Tabs per tablet   Take 1 tablet by mouth daily                                pyridOXINE 50 MG tablet   Commonly known as:  CVS VITAMIN B-6   Take 1 tablet (50 mg) by mouth 3 times daily                                TYLENOL PO   Take 325 mg by mouth   Last time this was given:  650 mg on 11/8/2018  5:34 PM                                * ZOFRAN ODT PO   Take 1 tablet by mouth every 6 hours as needed for nausea                                * ondansetron 4 MG ODT tab   Commonly known as:  ZOFRAN ODT   Take 1 tablet (4 mg) by mouth every 8 hours as needed                                * Notice:  This list has 2 medication(s) that are the same as other medications prescribed for you. Read the directions carefully, and ask your doctor or other care provider to review them with you.

## 2018-11-08 NOTE — PROVIDER NOTIFICATION
11/08/18 1617   Provider Notification   Provider Name/Title Dr. Banda   Method of Notification Electronic Page;Phone     Updated of pt arrival and Hx, contraction freq q1.5-2 min, pt was seen here 2 days ago and reports being prescribed macrobid yet took one dose yesterday otherwise have not taken the abx.  Received orders for UA/UC, 1L of iv fluids, CBC, and SVE.

## 2018-11-08 NOTE — IP AVS SNAPSHOT
[facility] Labor and Delivery    [address]    [facility] [address]    Phone:  [phone]    Fax:  [phone]                                       After Visit Summary   11/8/2018    Rizwana Plummer    MRN: [id number]           After Visit Summary Signature Page     I have received my discharge instructions, and my questions have been answered. I have discussed any challenges I see with this plan with the nurse or doctor.    ..........................................................................................................................................  Patient/Patient Representative Signature      ..........................................................................................................................................  Patient Representative Print Name and Relationship to Patient    ..................................................               ................................................  Date                                   Time    ..........................................................................................................................................  Reviewed by Signature/Title    ...................................................              ..............................................  Date                                               Time          22EPIC Rev 08/18

## 2018-11-09 ENCOUNTER — TELEPHONE (OUTPATIENT)
Dept: OBGYN | Facility: CLINIC | Age: 38
End: 2018-11-09

## 2018-11-09 ENCOUNTER — APPOINTMENT (OUTPATIENT)
Dept: MRI IMAGING | Facility: CLINIC | Age: 38
End: 2018-11-09
Attending: OBSTETRICS & GYNECOLOGY
Payer: COMMERCIAL

## 2018-11-09 ENCOUNTER — APPOINTMENT (OUTPATIENT)
Dept: ULTRASOUND IMAGING | Facility: CLINIC | Age: 38
End: 2018-11-09
Attending: OBSTETRICS & GYNECOLOGY
Payer: COMMERCIAL

## 2018-11-09 ENCOUNTER — HOSPITAL ENCOUNTER (OUTPATIENT)
Facility: CLINIC | Age: 38
Discharge: HOME | End: 2018-11-10
Attending: OBSTETRICS & GYNECOLOGY | Admitting: OBSTETRICS & GYNECOLOGY
Payer: COMMERCIAL

## 2018-11-09 PROBLEM — O21.0 HYPEREMESIS GRAVIDARUM: Status: RESOLVED | Noted: 2018-06-12 | Resolved: 2018-11-09

## 2018-11-09 PROBLEM — J10.1 INFLUENZA A: Status: RESOLVED | Noted: 2017-02-01 | Resolved: 2018-11-09

## 2018-11-09 PROBLEM — Z36.89 ENCOUNTER FOR TRIAGE IN PREGNANT PATIENT: Status: ACTIVE | Noted: 2018-11-09

## 2018-11-09 PROBLEM — E86.0 DEHYDRATION: Status: RESOLVED | Noted: 2017-02-07 | Resolved: 2018-11-09

## 2018-11-09 PROBLEM — O09.90 HIGH-RISK PREGNANCY: Status: ACTIVE | Noted: 2018-11-09

## 2018-11-09 PROBLEM — O34.219 PREVIOUS CESAREAN SECTION COMPLICATING PREGNANCY: Status: ACTIVE | Noted: 2018-11-09

## 2018-11-09 PROBLEM — Z36.89 ENCOUNTER FOR TRIAGE IN PREGNANT PATIENT: Status: RESOLVED | Noted: 2018-11-08 | Resolved: 2018-11-09

## 2018-11-09 LAB
ALBUMIN SERPL-MCNC: 2.6 G/DL (ref 3.4–5)
ALP SERPL-CCNC: 65 U/L (ref 40–150)
ALT SERPL W P-5'-P-CCNC: 11 U/L (ref 0–50)
ANION GAP SERPL CALCULATED.3IONS-SCNC: 10 MMOL/L (ref 3–14)
AST SERPL W P-5'-P-CCNC: 22 U/L (ref 0–45)
BASOPHILS # BLD AUTO: 0 10E9/L (ref 0–0.2)
BASOPHILS NFR BLD AUTO: 0.2 %
BILIRUB SERPL-MCNC: 0.5 MG/DL (ref 0.2–1.3)
BUN SERPL-MCNC: 6 MG/DL (ref 7–30)
CALCIUM SERPL-MCNC: 8 MG/DL (ref 8.5–10.1)
CHLORIDE SERPL-SCNC: 105 MMOL/L (ref 94–109)
CO2 SERPL-SCNC: 21 MMOL/L (ref 20–32)
CREAT SERPL-MCNC: 0.54 MG/DL (ref 0.52–1.04)
DEPRECATED S PYO AG THROAT QL EIA: NORMAL
DIFFERENTIAL METHOD BLD: ABNORMAL
EOSINOPHIL # BLD AUTO: 0 10E9/L (ref 0–0.7)
EOSINOPHIL NFR BLD AUTO: 0.1 %
ERYTHROCYTE [DISTWIDTH] IN BLOOD BY AUTOMATED COUNT: 15.5 % (ref 10–15)
FLUAV+FLUBV AG SPEC QL: NEGATIVE
FLUAV+FLUBV AG SPEC QL: NEGATIVE
FLUAV+FLUBV RNA SPEC QL NAA+PROBE: NORMAL
FLUAV+FLUBV RNA SPEC QL NAA+PROBE: NORMAL
GFR SERPL CREATININE-BSD FRML MDRD: >90 ML/MIN/1.7M2
GLUCOSE SERPL-MCNC: 79 MG/DL (ref 70–99)
HCT VFR BLD AUTO: 29.2 % (ref 35–47)
HGB BLD-MCNC: 8.9 G/DL (ref 11.7–15.7)
IMM GRANULOCYTES # BLD: 0.2 10E9/L (ref 0–0.4)
IMM GRANULOCYTES NFR BLD: 1.6 %
LYMPHOCYTES # BLD AUTO: 0.6 10E9/L (ref 0.8–5.3)
LYMPHOCYTES NFR BLD AUTO: 5.9 %
MCH RBC QN AUTO: 24.5 PG (ref 26.5–33)
MCHC RBC AUTO-ENTMCNC: 30.5 G/DL (ref 31.5–36.5)
MCV RBC AUTO: 80 FL (ref 78–100)
MONOCYTES # BLD AUTO: 0.5 10E9/L (ref 0–1.3)
MONOCYTES NFR BLD AUTO: 5 %
NEUTROPHILS # BLD AUTO: 8.8 10E9/L (ref 1.6–8.3)
NEUTROPHILS NFR BLD AUTO: 87.2 %
NRBC # BLD AUTO: 0.1 10*3/UL
NRBC BLD AUTO-RTO: 1 /100
PLATELET # BLD AUTO: 194 10E9/L (ref 150–450)
POTASSIUM SERPL-SCNC: 3.9 MMOL/L (ref 3.4–5.3)
PROT SERPL-MCNC: 6.4 G/DL (ref 6.8–8.8)
RBC # BLD AUTO: 3.63 10E12/L (ref 3.8–5.2)
RSV RNA SPEC NAA+PROBE: NORMAL
SODIUM SERPL-SCNC: 136 MMOL/L (ref 133–144)
SPECIMEN SOURCE: NORMAL
STREP GROUP A PCR: NORMAL
WBC # BLD AUTO: 10.1 10E9/L (ref 4–11)

## 2018-11-09 PROCEDURE — 25800025 ZZH RX 258: Performed by: OBSTETRICS & GYNECOLOGY

## 2018-11-09 PROCEDURE — 76705 ECHO EXAM OF ABDOMEN: CPT

## 2018-11-09 PROCEDURE — 25000128 H RX IP 250 OP 636: Performed by: OBSTETRICS & GYNECOLOGY

## 2018-11-09 PROCEDURE — 99213 OFFICE O/P EST LOW 20 MIN: CPT | Performed by: OBSTETRICS & GYNECOLOGY

## 2018-11-09 PROCEDURE — 36415 COLL VENOUS BLD VENIPUNCTURE: CPT | Performed by: OBSTETRICS & GYNECOLOGY

## 2018-11-09 PROCEDURE — G0463 HOSPITAL OUTPT CLINIC VISIT: HCPCS

## 2018-11-09 PROCEDURE — 85025 COMPLETE CBC W/AUTO DIFF WBC: CPT | Performed by: OBSTETRICS & GYNECOLOGY

## 2018-11-09 PROCEDURE — 96360 HYDRATION IV INFUSION INIT: CPT

## 2018-11-09 PROCEDURE — 25000132 ZZH RX MED GY IP 250 OP 250 PS 637: Performed by: OBSTETRICS & GYNECOLOGY

## 2018-11-09 PROCEDURE — 80053 COMPREHEN METABOLIC PANEL: CPT | Performed by: OBSTETRICS & GYNECOLOGY

## 2018-11-09 PROCEDURE — 87804 INFLUENZA ASSAY W/OPTIC: CPT | Performed by: OBSTETRICS & GYNECOLOGY

## 2018-11-09 PROCEDURE — 96361 HYDRATE IV INFUSION ADD-ON: CPT

## 2018-11-09 PROCEDURE — 74181 MRI ABDOMEN W/O CONTRAST: CPT

## 2018-11-09 RX ORDER — ACETAMINOPHEN 325 MG/1
975 TABLET ORAL EVERY 8 HOURS PRN
Status: DISCONTINUED | OUTPATIENT
Start: 2018-11-09 | End: 2018-11-10 | Stop reason: HOSPADM

## 2018-11-09 RX ORDER — SODIUM CHLORIDE 9 MG/ML
INJECTION, SOLUTION INTRAVENOUS CONTINUOUS
Status: DISCONTINUED | OUTPATIENT
Start: 2018-11-09 | End: 2018-11-09 | Stop reason: CLARIF

## 2018-11-09 RX ORDER — ZOLPIDEM TARTRATE 5 MG/1
5 TABLET ORAL
Status: DISCONTINUED | OUTPATIENT
Start: 2018-11-09 | End: 2018-11-10 | Stop reason: HOSPADM

## 2018-11-09 RX ORDER — ONDANSETRON 2 MG/ML
8 INJECTION INTRAMUSCULAR; INTRAVENOUS EVERY 6 HOURS PRN
Status: DISCONTINUED | OUTPATIENT
Start: 2018-11-09 | End: 2018-11-10 | Stop reason: HOSPADM

## 2018-11-09 RX ADMIN — ZOLPIDEM TARTRATE 5 MG: 5 TABLET, FILM COATED ORAL at 22:40

## 2018-11-09 RX ADMIN — ONDANSETRON 8 MG: 2 INJECTION INTRAMUSCULAR; INTRAVENOUS at 13:07

## 2018-11-09 RX ADMIN — ACETAMINOPHEN 975 MG: 325 TABLET, FILM COATED ORAL at 15:47

## 2018-11-09 RX ADMIN — SODIUM CHLORIDE 500 ML: 9 INJECTION, SOLUTION INTRAVENOUS at 13:06

## 2018-11-09 RX ADMIN — DEXTROSE AND SODIUM CHLORIDE: 5; 450 INJECTION, SOLUTION INTRAVENOUS at 20:10

## 2018-11-09 NOTE — IP AVS SNAPSHOT
[facility] Labor and Delivery    [address]    [facility] [address]    Phone:  [phone]    Fax:  [phone]                                       After Visit Summary   11/9/2018    Rizwana Plummer    MRN: [id number]           After Visit Summary Signature Page     I have received my discharge instructions, and my questions have been answered. I have discussed any challenges I see with this plan with the nurse or doctor.    ..........................................................................................................................................  Patient/Patient Representative Signature      ..........................................................................................................................................  Patient Representative Print Name and Relationship to Patient    ..................................................               ................................................  Date                                   Time    ..........................................................................................................................................  Reviewed by Signature/Title    ...................................................              ..............................................  Date                                               Time          22EPIC Rev 08/18

## 2018-11-09 NOTE — PROVIDER NOTIFICATION
11/09/18 1235   Provider Notification   Provider Name/Title Dr. Erwin   Method of Notification At Bedside   MD at bedside to evaluate and discuss POC with patient. Orders received for 500 mL normal saline bolus followed by 125 mL/hr continuous and vital signs every 30 mins. MD placing orders for influenza swab, appendix ultrasound at bedside, CBC and CMP, and zofran 8 mg IV. Continue to monitor and update as needed.

## 2018-11-09 NOTE — PLAN OF CARE
Data: Patient assessed in the [facility] for uterine contractions.  Cervical exam closed, per Dr. Banda.  Membranes intact.  Contractions 2.5-4, tapered off and pt reported not as intense or frequent.  Action:  Presumed adequate fetal oxygenation documented (see flow record). Discharge instructions reviewed.  Patient instructed to report change in fetal movement, vaginal leaking of fluid or bleeding, abdominal pain, or any concerns related to the pregnancy to her nurse/physician.    Response: Orders to discharge home per .  Patient verbalized understanding of education and verbalized agreement with plan. Discharged to home at 1907, to f/u in clinic next week.

## 2018-11-09 NOTE — PROVIDER NOTIFICATION
11/09/18 1349   Provider Notification   Provider Name/Title Dr. Banda   Method of Notification Phone   Request Evaluate - Remote   Notification Reason Lab/Diagnostic Study;Status Update   Dr. Banda covering. MD updated on temperature now 103.1 which triggered sepsis protocol. Awaiting influenza and ultrasound results. No new orders.

## 2018-11-09 NOTE — PLAN OF CARE
Data: Patient presented to [facility]: 2018 11:55 AM.  Reason for maternal/fetal assessment is back and abdominal pain/cramping, fever, n/v, shaking, chills. Patient reports after she ate breakfast this morning she was nauseous and has vomitted 3 times, she took tylenol around 0900 that helped the cramping but is still nauseous with chills, fever, and shaking.  Patient is a .  Prenatal record reviewed. Pregnancy has been uncomplicated.  Gestational Age 28w1d. VSS. Fetal movement present. Patient denies leaking of vaginal fluid/rupture of membranes, vaginal bleeding, visual disturbances, epigastric or URQ pain, significant edema. Support person is not present.   Action: Verbal consent for EFM. Triage assessment completed. Bill of rights reviewed. Room stimuli minimized.   Response: Patient verbalized agreement with plan. Will contact Dr Marlo Erwin with update and further orders.

## 2018-11-09 NOTE — TELEPHONE ENCOUNTER
Pt is 28w1d. Pts  calling. She was in L&D last night for flu like symptoms and contractions. Fluids and tylenol given and the pt felt better. Cxns went away before going home. They are calling because the contractions started again this morning and her  says they are about every 3 minutes apart. States the contractions are painful for her. No vaginal bleeding. They said the pt had a pelvic exam last night and her cervix was not changing. Pt should be evaluated. L&D notified. Text page sent to MD on call to notify him as well.      Shavon Byrd RN

## 2018-11-09 NOTE — PROVIDER NOTIFICATION
11/09/18 1510   Provider Notification   Provider Name/Title Dr. Erwin   Method of Notification In Department   Request Evaluate - Remote   Notification Reason Status Update   MD updated on fever (highest 103.1 down to 100.9 degrees F), maternal tachycardia and tachypnea, negative for influenza, appendix could not be visualized on ultrasound, FHT's still 180's. MD ordering MRI.

## 2018-11-09 NOTE — IP AVS SNAPSHOT
MRN:[id number]                      After Visit Summary   11/9/2018    Rizwana Plummer    MRN: [id number]           Thank you!     Thank you for choosing [facility] for your care. Our goal is always to provide you with excellent care. Hearing back from our patients is one way we can continue to improve our services. Please take a few minutes to complete the written survey that you may receive in the mail after you visit. If you would like to speak to someone directly about your visit please contact Patient Relations at [phone]. Thank you!          Patient Information     Date Of Birth          1980        About your hospital stay     You were admitted on:  November 9, 2018 You last received care in the:  [facility]    You were discharged on:  November 10, 2018       Who to Call     For medical emergencies, please call 911.  For non-urgent questions about your medical care, please call your primary care provider or clinic, [phone]          Attending Provider     Provider Specialty    Marlo Erwin MD OB/Gyn    Celis, Ranjit Austin MD OB/Gyn       Primary Care Provider Office Phone # Fax #    Cindy Bajwa [place]DO [phone]      Your next 10 appointments already scheduled     Nov 26, 2018  3:30 PM CST   US OB < 14 WEEKS SINGLE with RIUS1   [facility]    [address]  [address]  [facility] [address] [phone]           How do I prepare for my exam? (Food and drink instructions) Drink four 8-ounce glasses of fluid an hour before your exam. If you need to empty your bladder before your exam, try to release only a little urine. Then, drink another glass of fluid.  How do I prepare for my exam? (Other instructions) You may have up to two family members in the exam room. If you bring a small child, an adult must be there to care for him or her. No video or camera  photography during the procedure.  What should I wear: Wear comfortable clothes.  How long does the exam take: Most ultrasounds take 30 to 60 minutes.  What should I bring: Bring a list of your medicines, including vitamins, minerals and over-the-counter drugs. It is safest to leave personal items at home.  Do I need a :  No  is needed.  What do I need to tell my doctor: Tell your doctor about any allergies you may have.  What should I do after the exam: No restrictions, You may resume normal activities.  What is this test: An ultrasound uses sound waves to make pictures of the body. Sound waves do not cause pain. The only discomfort may be the pressure of the wand against your skin or full bladder.  Who should I call with questions: If you have any questions, please call the Imaging Department where you will have your exam. Directions, parking instructions, and other information is available on our website, [email or web address].            Jan 24, 2019   Procedure with Cindy Laurent DO   [facility] and Delivery (--)    [address] [facility] [address]   [phone]            Feb 07, 2019  1:00 PM CST   [facility] with Cindy Laurent DO   [facility]    [address]  [address]  [facility] [address]   [phone]              Further instructions from your care team       Discharge Instruction for Undelivered Patients      You were seen for: Abdominal/back pain, nausea/vomiting, chills, fever, shaking  We Consulted: Dr. Celis and Dr. Erwin  You had (Test or Medicine): uterine and fetal monitoring, negative influenza test, ultrasound, MRI     Diet:   Drink 8 to 12 glasses of liquids (milk, juice, water) every day.  You may eat meals and snacks.     Activity:  Take it easy, rest and hydration    Call your provider if you notice:  Swelling in your face or increased swelling in your hands or legs.  Headaches  that are not relieved by Tylenol (acetaminophen).  Changes in your vision (blurring: seeing spots or stars.)  Nausea (sick to your stomach) and vomiting (throwing up).   Weight gain of 5 pounds or more per week.  Heartburn that doesn't go away.  Signs of bladder infection: pain when you urinate (use the toilet), need to go more often and more urgently.  The bag of fairchild (rupture of membranes) breaks, or you notice leaking in your underwear.  Bright red blood in your underwear.  Abdominal (lower belly) or stomach pain.  Second (plus) baby: Contractions (tightening) less than 10 minutes apart and getting stronger.  *If less than 34 weeks: Contractions (tightenings) more than 6 times in one hour.  Increase or change in vaginal discharge (note the color and amount)    Follow-up:  Make an appointment to be seen in clinic this week          Pending Results     Date and Time Order Name Status Description    11/9/2018 1523 MR Abdomen w/o Contrast In process     11/8/2018 1856 Beta strep group A culture Preliminary             Admission Information     Date & Time Provider Department Dept. Phone    11/9/2018 Ranjit Celis MD [facility] Labor and Delivery [phone]      Your Vitals Were     Blood Pressure Temperature Respirations Last Period Pulse Oximetry       94/52 98.2  F (36.8  C) (Oral) 20 04/26/2018 (Exact Date) 99%       MyChart Information     [facility] gives you secure access to your electronic health record. If you see a primary care provider, you can also send messages to your care team and make appointments. If you have questions, please call your primary care clinic.  If you do not have a primary care provider, please call [phone] and they will assist you.        Care EveryWhere ID     This is your Care EveryWhere ID. This could be used by other organizations to access your [place] medical records  [id number]        Equal Access to Services     OSMEL TURK: Demond Burroughs,  waaxda luqadaha, qaybta kaalmada adeegamarada, sebastien dossaan ah. So [facility] [phone].    ATENCIÓN: Si wilton ortez, tiene a pink disposición servicios gratuitos de asistencia lingüística. Llame al [phone].    We comply with applicable federal civil rights laws and [place] laws. We do not discriminate on the basis of race, color, national origin, age, disability, sex, sexual orientation, or gender identity.               Review of your medicines      UNREVIEWED medicines. Ask your doctor about these medicines        Dose / Directions    metoclopramide 5 MG tablet   Commonly known as:  REGLAN   Used for:  Hyperemesis gravidarum        Dose:  5 mg   Take 1 tablet (5 mg) by mouth 4 times daily as needed   Quantity:  120 tablet   Refills:  1       nitroFURantoin (macrocrystal-monohydrate) 100 MG capsule   Commonly known as:  MACROBID   Used for:  Acute cystitis without hematuria        Dose:  100 mg   Take 1 capsule (100 mg) by mouth 2 times daily   Quantity:  14 capsule   Refills:  0       prenatal multivitamin plus iron 27-0.8 MG Tabs per tablet        Dose:  1 tablet   Take 1 tablet by mouth daily   Quantity:  100 tablet   Refills:  3       pyridOXINE 50 MG tablet   Commonly known as:  [employer]S VITAMIN B-6   Used for:  Hyperemesis gravidarum        Dose:  50 mg   Take 1 tablet (50 mg) by mouth 3 times daily   Quantity:  90 tablet   Refills:  1       TYLENOL PO        Dose:  325 mg   Take 325 mg by mouth   Refills:  0       * ZOFRAN ODT PO        Dose:  1 tablet   Take 1 tablet by mouth every 6 hours as needed for nausea   Refills:  0       * ondansetron 4 MG ODT tab   Commonly known as:  ZOFRAN ODT   Used for:  Hyperemesis gravidarum        Dose:  4 mg   Take 1 tablet (4 mg) by mouth every 8 hours as needed   Quantity:  30 tablet   Refills:  3       * Notice:  This list has 2 medication(s) that are the same as other medications prescribed for you. Read the directions carefully, and ask your  doctor or other care provider to review them with you.             Protect others around you: Learn how to safely use, store and throw away your medicines at www.disposemymeds.org.             Medication List: This is a list of all your medications and when to take them. Check marks below indicate your daily home schedule. Keep this list as a reference.      Medications           Morning Afternoon Evening Bedtime As Needed    metoclopramide 5 MG tablet   Commonly known as:  REGLAN   Take 1 tablet (5 mg) by mouth 4 times daily as needed                                nitroFURantoin (macrocrystal-monohydrate) 100 MG capsule   Commonly known as:  MACROBID   Take 1 capsule (100 mg) by mouth 2 times daily                                prenatal multivitamin plus iron 27-0.8 MG Tabs per tablet   Take 1 tablet by mouth daily                                pyridOXINE 50 MG tablet   Commonly known as:  CVS VITAMIN B-6   Take 1 tablet (50 mg) by mouth 3 times daily                                TYLENOL PO   Take 325 mg by mouth   Last time this was given:  975 mg on 11/10/2018 11:21 AM                                * ZOFRAN ODT PO   Take 1 tablet by mouth every 6 hours as needed for nausea                                * ondansetron 4 MG ODT tab   Commonly known as:  ZOFRAN ODT   Take 1 tablet (4 mg) by mouth every 8 hours as needed                                * Notice:  This list has 2 medication(s) that are the same as other medications prescribed for you. Read the directions carefully, and ask your doctor or other care provider to review them with you.

## 2018-11-09 NOTE — DISCHARGE INSTRUCTIONS
Discharge Instruction for Undelivered Patients      You were seen for: Labor Assessment  We Consulted: Dr. Banda  You had (Test or Medicine): Tylenol, IV fluids, Strep throat swab     Diet:   Drink 8 to 12 glasses of liquids (milk, juice, water) every day.     Activity:  Call your doctor or nurse midwife if your baby is moving less than usual.     Call your provider if you notice:  Swelling in your face or increased swelling in your hands or legs.  Headaches that are not relieved by Tylenol (acetaminophen).  Changes in your vision (blurring: seeing spots or stars.)  Nausea (sick to your stomach) and vomiting (throwing up).   Weight gain of 5 pounds or more per week.  Heartburn that doesn't go away.  Signs of bladder infection: pain when you urinate (use the toilet), need to go more often and more urgently.  The bag of fairchild (rupture of membranes) breaks, or you notice leaking in your underwear.  Bright red blood in your underwear.  Abdominal (lower belly) or stomach pain.  For first baby: Contractions (tightening) less than 5 minutes apart for one hour or more.  Second (plus) baby: Contractions (tightening) less than 10 minutes apart and getting stronger.  *If less than 34 weeks: Contractions (tightenings) more than 6 times in one hour.  Increase or change in vaginal discharge (note the color and amount)  Other: FOLLOW UP WITH PRIMARY NEXT WEEK.     Follow-up:  Make an appointment to be seen on next week.

## 2018-11-09 NOTE — PROVIDER NOTIFICATION
11/08/18 1569   Provider Notification   Provider Name/Title Dr. Banda   Method of Notification Electronic Page;Phone     Updated of pt status, continues to contract q2-4 min, pt reported not as intense or frequent.  Pt requesting to be swabbed for strep, Dr. Banda gave order and stated would call pt with results, ok to discharge to home.

## 2018-11-09 NOTE — PROGRESS NOTES
"Obstetrics Triage Note    HPI:  Rizwana Plummer is a 38 year old  female at 28w0d, pregnancy complicated by fibroids, here with complaints of uterine cramping.      Patient states that she was seen for contractions 2 days ago and given terbutaline, which helped. She had a UA at that time but no UC; FFN was negative. Today she c/o \"not feeling well,\" notes that her kids have been sick with what sounds like a viral illness. They were swabbed for strep and flu, both were negative.  + FM.  She has had mild nausea, has not been taking in much po. No fevers. No dysuria. No vaginal bleeding or leakage of fluid.    ROS:  Negative except as mentioned in HPI.    PMH:  Past Medical History:   Diagnosis Date     Varicella age 5       PSHx:  Past Surgical History:   Procedure Laterality Date      SECTION  2014    Procedure:  SECTION;   Primary  section         SECTION N/A 2017    Procedure:  SECTION;  Surgeon: Cindy Laurent DO;  Location: [facility] OR     Infibulation       MARSUPIALIZATION BARTHOLIN CYST  2013    Procedure: MARSUPIALIZATION BARTHOLIN CYST;  Removal of Inclusion Cyst.   Fetal heart tones 180's;  Surgeon: Rohit Parks MD;  Location: [facility] OR       Medications:    No current facility-administered medications on file prior to encounter.   Current Outpatient Prescriptions on File Prior to Encounter:  Acetaminophen (TYLENOL PO) Take 325 mg by mouth   nitroFURantoin, macrocrystal-monohydrate, (MACROBID) 100 MG capsule Take 1 capsule (100 mg) by mouth 2 times daily   Ondansetron (ZOFRAN ODT PO) Take 1 tablet by mouth every 6 hours as needed for nausea   ondansetron (ZOFRAN ODT) 4 MG ODT tab Take 1 tablet (4 mg) by mouth every 8 hours as needed   Prenatal Vit-Fe Fumarate-FA (PRENATAL MULTIVITAMIN PLUS IRON) 27-0.8 MG TABS per tablet Take 1 tablet by mouth daily   metoclopramide (REGLAN) 5 MG tablet Take 1 tablet (5 mg) by mouth 4 times daily as needed "   pyridOXINE (CVS VITAMIN B-6) 50 MG tablet Take 1 tablet (50 mg) by mouth 3 times daily        Allergies:   No Known Allergies    Physical Exam:   Vitals:    11/08/18 1555 11/08/18 1600 11/08/18 1800   BP: 107/57     Pulse: 102     Resp: 16     Temp: 100.3  F (37.9  C)  98.8  F (37.1  C)   TempSrc: Oral  Oral   Weight:  62.6 kg (138 lb)    Height:  1.524 m (5')       Gen: resting fairly comfortably, in NAD.  CV: RRR, no m/r/g  Pulm: CTAB, no increased work of breathing  Abd: soft, gravid, non-tender, non-distended  Cx: High, closed, posterior. No bleeding noted.    NST:  FHT: BL normal, normal [place], with accels, no decels  [place]: Q 2-5 minutes at first then decreasing with IV fluids to mostly irritability; she no longer feels most of the contractions.    Labs/Imaging:  Results for orders placed or performed during the hospital encounter of 11/08/18 (from the past 24 hour(s))   UA with Microscopic reflex to Culture   Result Value Ref Range    Color Urine Straw     Appearance Urine Clear     Glucose Urine Negative NEG^Negative mg/dL    Bilirubin Urine Negative NEG^Negative    Ketones Urine 80 (A) NEG^Negative mg/dL    Specific Gravity Urine 1.010 1.003 - 1.035    Blood Urine Negative NEG^Negative    pH Urine 5.0 5.0 - 7.0 pH    Protein Albumin Urine Negative NEG^Negative mg/dL    Urobilinogen mg/dL 0.0 0.0 - 2.0 mg/dL    Nitrite Urine Negative NEG^Negative    Leukocyte Esterase Urine Negative NEG^Negative    Source Midstream Urine     WBC Urine 1 0 - 5 /HPF    RBC Urine 1 0 - 2 /HPF    Squamous Epithelial /HPF Urine <1 0 - 1 /HPF    Mucous Urine Present (A) NEG^Negative /LPF   CBC with platelets   Result Value Ref Range    WBC 10.3 4.0 - 11.0 10e9/L    RBC Count 3.80 3.8 - 5.2 10e12/L    Hemoglobin 9.1 (L) 11.7 - 15.7 g/dL    Hematocrit 30.1 (L) 35.0 - 47.0 %    MCV 79 78 - 100 fl    MCH 23.9 (L) 26.5 - 33.0 pg    MCHC 30.2 (L) 31.5 - 36.5 g/dL    RDW 15.2 (H) 10.0 - 15.0 %    Platelet Count 211 150 - 450 10e9/L        Assessment/Plan: Rizwana Plummer is a 38 year old female  at 28w0d here for uterine cramping.    - I think she is likely having a viral syndrome, which was discussed. She asked for a strep swab, so that was done. Discussed her normal WBC, low hemoglobin for which she should consider iron supplements. These can be obtained OTC or from Dr. Laurent at her follow up visit, which should be within a week. Discussed need for 28 week labs, as she currently has no appointment scheduled. Her discomfort resolved with IV fluids; discussed the need for increased po intake. Discussed signs and symptoms of  labor and when to return ( leakage of fluid, bleeding, increased contractions not responding to rest and hydration.)  - Dispo: to home with follow up in the next week. Discussed tylenol for pain as needed. Discussed labor warning signs and indication to return to care.    Ruthy Banda MD   OB/GYN     2018 6:56 PM

## 2018-11-10 VITALS
RESPIRATION RATE: 20 BRPM | OXYGEN SATURATION: 99 % | SYSTOLIC BLOOD PRESSURE: 94 MMHG | DIASTOLIC BLOOD PRESSURE: 52 MMHG | TEMPERATURE: 98.2 F

## 2018-11-10 LAB
BASOPHILS # BLD AUTO: 0 10E9/L (ref 0–0.2)
BASOPHILS NFR BLD AUTO: 0.2 %
DIFFERENTIAL METHOD BLD: ABNORMAL
EOSINOPHIL # BLD AUTO: 0 10E9/L (ref 0–0.7)
EOSINOPHIL NFR BLD AUTO: 0.1 %
ERYTHROCYTE [DISTWIDTH] IN BLOOD BY AUTOMATED COUNT: 15.8 % (ref 10–15)
HCT VFR BLD AUTO: 24.2 % (ref 35–47)
HGB BLD-MCNC: 7.6 G/DL (ref 11.7–15.7)
IMM GRANULOCYTES # BLD: 0.1 10E9/L (ref 0–0.4)
IMM GRANULOCYTES NFR BLD: 0.9 %
LYMPHOCYTES # BLD AUTO: 0.7 10E9/L (ref 0.8–5.3)
LYMPHOCYTES NFR BLD AUTO: 4.7 %
MCH RBC QN AUTO: 24.8 PG (ref 26.5–33)
MCHC RBC AUTO-ENTMCNC: 31.4 G/DL (ref 31.5–36.5)
MCV RBC AUTO: 79 FL (ref 78–100)
MONOCYTES # BLD AUTO: 0.8 10E9/L (ref 0–1.3)
MONOCYTES NFR BLD AUTO: 5 %
NEUTROPHILS # BLD AUTO: 13.4 10E9/L (ref 1.6–8.3)
NEUTROPHILS NFR BLD AUTO: 89.1 %
NRBC # BLD AUTO: 0 10*3/UL
NRBC BLD AUTO-RTO: 0 /100
PLATELET # BLD AUTO: 188 10E9/L (ref 150–450)
RBC # BLD AUTO: 3.07 10E12/L (ref 3.8–5.2)
WBC # BLD AUTO: 15.1 10E9/L (ref 4–11)

## 2018-11-10 PROCEDURE — 25000132 ZZH RX MED GY IP 250 OP 250 PS 637: Performed by: OBSTETRICS & GYNECOLOGY

## 2018-11-10 PROCEDURE — 25800025 ZZH RX 258: Performed by: OBSTETRICS & GYNECOLOGY

## 2018-11-10 PROCEDURE — 96361 HYDRATE IV INFUSION ADD-ON: CPT

## 2018-11-10 PROCEDURE — 85025 COMPLETE CBC W/AUTO DIFF WBC: CPT | Performed by: OBSTETRICS & GYNECOLOGY

## 2018-11-10 PROCEDURE — 36415 COLL VENOUS BLD VENIPUNCTURE: CPT | Performed by: OBSTETRICS & GYNECOLOGY

## 2018-11-10 RX ADMIN — DEXTROSE AND SODIUM CHLORIDE: 5; 450 INJECTION, SOLUTION INTRAVENOUS at 03:51

## 2018-11-10 RX ADMIN — ACETAMINOPHEN 975 MG: 325 TABLET, FILM COATED ORAL at 03:50

## 2018-11-10 RX ADMIN — ACETAMINOPHEN 975 MG: 325 TABLET, FILM COATED ORAL at 11:21

## 2018-11-10 NOTE — PROVIDER NOTIFICATION
11/09/18 1939   Provider Notification   Provider Name/Title Dr. cabrera   Method of Notification At Bedside   MD at bedside to see patient, to go over plan of care with patient.  Received orders that MD would like patient to stay overnight for Observation, IV fluids, when results of MRI come back, if Negative patient may eat, if anything else call MD, If temp above 100.5 call MD, TID TOCO and FHT monitoring, routine VS, IV D5 1/2 NS at 125 ml/hr. Patient is in agreement will move patient to room 408.

## 2018-11-10 NOTE — PROVIDER NOTIFICATION
11/09/18 2116   Provider Notification   Provider Name/Title Dr. Erwin   Method of Notification Phone   Request Evaluate in Person   Notification Reason Lab/Diagnostic Study;Status Update   Talked with Dr. Erwin regarding results of MRI. Called MRI regarding results because they were not in computer. Relayed to Dr. Erwin that the MRI tech stated that there were preliminary results in the computer, but final results would probably be read in the am.   Preliminary results read were No evidence of appendicitis seen.   Received orders to let patient eat tonight if patient wishes. Updated MD that vida feels about the same , no change.

## 2018-11-10 NOTE — DISCHARGE INSTRUCTIONS
Discharge Instruction for Undelivered Patients      You were seen for: Abdominal/back pain, nausea/vomiting, chills, fever, shaking  We Consulted: Dr. Celis and Dr. Erwin  You had (Test or Medicine): uterine and fetal monitoring, negative influenza test, ultrasound, MRI     Diet:   Drink 8 to 12 glasses of liquids (milk, juice, water) every day.  You may eat meals and snacks.     Activity:  Take it easy, rest and hydration    Call your provider if you notice:  Swelling in your face or increased swelling in your hands or legs.  Headaches that are not relieved by Tylenol (acetaminophen).  Changes in your vision (blurring: seeing spots or stars.)  Nausea (sick to your stomach) and vomiting (throwing up).   Weight gain of 5 pounds or more per week.  Heartburn that doesn't go away.  Signs of bladder infection: pain when you urinate (use the toilet), need to go more often and more urgently.  The bag of fairchild (rupture of membranes) breaks, or you notice leaking in your underwear.  Bright red blood in your underwear.  Abdominal (lower belly) or stomach pain.  Second (plus) baby: Contractions (tightening) less than 10 minutes apart and getting stronger.  *If less than 34 weeks: Contractions (tightenings) more than 6 times in one hour.  Increase or change in vaginal discharge (note the color and amount)    Follow-up:  Make an appointment to be seen in clinic this week

## 2018-11-10 NOTE — PROVIDER NOTIFICATION
11/09/18 222   Provider Notification   Provider Name/Title Dr. Erwin   Method of Notification Phone   MD called , patient is requesting a sleeping pill for the night. Orders received for Ambien 5 mg po x 1 prn.

## 2018-11-10 NOTE — PROVIDER NOTIFICATION
11/10/18 1135   Provider Notification   Provider Name/Title Dr. Celis   Method of Notification At Bedside   MD at bedside to discuss POC with patient. Updated on no vomiting but nausea this AM, afebrile, patient overall states she is feeling better. Order received to discharge patient and have her follow up this week in clinic. Discussed rest and hydration this weekend and to call clinic if symptoms persist. Patient states she has zofran prescription.

## 2018-11-10 NOTE — PROGRESS NOTES
Data: Patient assessed in the [facility] for abdominal and back pain, nausea and vomiting, chills, shaking, fever.  Cervical exam not examined.  Membranes intact.  Contractions occasional.  Action:  Presumed adequate fetal oxygenation documented (see flow record). Discharge instructions reviewed.  Patient instructed to report change in fetal movement, vaginal leaking of fluid or bleeding, abdominal pain, or any concerns related to the pregnancy to her nurse/physician.    Response: Orders to discharge home per Ranjit Celis Md.  Patient verbalized understanding of education and verbalized agreement with plan. Discharged to home at 1150.

## 2018-11-11 LAB
BACTERIA SPEC CULT: NORMAL
Lab: NORMAL
SPECIMEN SOURCE: NORMAL

## 2018-11-15 ENCOUNTER — PRENATAL OFFICE VISIT (OUTPATIENT)
Dept: OBGYN | Facility: CLINIC | Age: 38
End: 2018-11-15
Payer: COMMERCIAL

## 2018-11-15 VITALS — SYSTOLIC BLOOD PRESSURE: 90 MMHG | WEIGHT: 138.8 LBS | BODY MASS INDEX: 27.11 KG/M2 | DIASTOLIC BLOOD PRESSURE: 58 MMHG

## 2018-11-15 DIAGNOSIS — O09.93 HIGH-RISK PREGNANCY IN THIRD TRIMESTER: Primary | ICD-10-CM

## 2018-11-15 LAB
ERYTHROCYTE [DISTWIDTH] IN BLOOD BY AUTOMATED COUNT: 15.6 % (ref 10–15)
HCT VFR BLD AUTO: 27.4 % (ref 35–47)
HGB BLD-MCNC: 8.5 G/DL (ref 11.7–15.7)
MCH RBC QN AUTO: 24 PG (ref 26.5–33)
MCHC RBC AUTO-ENTMCNC: 31 G/DL (ref 31.5–36.5)
MCV RBC AUTO: 77 FL (ref 78–100)
PLATELET # BLD AUTO: 273 10E9/L (ref 150–450)
RBC # BLD AUTO: 3.54 10E12/L (ref 3.8–5.2)
WBC # BLD AUTO: 7.9 10E9/L (ref 4–11)

## 2018-11-15 PROCEDURE — 85027 COMPLETE CBC AUTOMATED: CPT | Performed by: FAMILY MEDICINE

## 2018-11-15 PROCEDURE — 99207 ZZC PRENATAL VISIT: CPT | Performed by: FAMILY MEDICINE

## 2018-11-15 PROCEDURE — 36415 COLL VENOUS BLD VENIPUNCTURE: CPT | Performed by: FAMILY MEDICINE

## 2018-11-15 PROCEDURE — 82950 GLUCOSE TEST: CPT | Performed by: FAMILY MEDICINE

## 2018-11-15 PROCEDURE — 90715 TDAP VACCINE 7 YRS/> IM: CPT | Performed by: FAMILY MEDICINE

## 2018-11-15 PROCEDURE — 86780 TREPONEMA PALLIDUM: CPT | Performed by: FAMILY MEDICINE

## 2018-11-15 PROCEDURE — 90471 IMMUNIZATION ADMIN: CPT | Performed by: FAMILY MEDICINE

## 2018-11-15 NOTE — MR AVS SNAPSHOT
After Visit Summary   11/15/2018    Rizwana Plummer    MRN: [id number]           Patient Information     Date Of Birth          1980        Visit Information        Provider Department      11/15/2018 2:45 PM Cindy Laurent, DO [facility]        Today's Diagnoses     High-risk pregnancy in third trimester    -  1      Care Instructions    Return 2 weeks  Return to clinic:  every 4 weeks till 30 weeks, then every 2 weeks till 36 weeks, then weekly till delivery      Phone numbers [place]:  Day/ night [phone] ask for ob triage  Emergency:  Call labor and delivery:  [phone]    What should I call about??    Contraction every 5 minutes for 1 hour 1 minute long (511), bleeding, loss of fluid, headache that doesn't resolve with tylenol, and decreased fetal movement     Start kick counts @ 26-28 weeks   Keep track of movement and discover your normal baby movement pattern   guideline is listed below  Please call if you do not feel the baby move!  We will have you come in for fetal heart rate monitoring:   Perception of at least 10 FMs during 12 hours of normal maternal activity   Perception of least 10 FMs over two hours when the mother is at rest and focused on counting       [facility] Address   [address] [facility], [place] [address] [phone]    Dr. Cindy Laurent, DO    OB/GYN   [facility]                                      Follow-ups after your visit        Your next 10 appointments already scheduled     Nov 26, 2018  3:30 PM CST   US OB < 14 WEEKS SINGLE with RIUS1   [facility]    [address] [place]  [address]  [facility] [address] [phone]           How do I prepare for my exam? (Food and drink instructions) Drink four 8-ounce glasses of fluid an hour before your exam. If you need to empty your bladder before your exam, try to release only a  little urine. Then, drink another glass of fluid.  How do I prepare for my exam? (Other instructions) You may have up to two family members in the exam room. If you bring a small child, an adult must be there to care for him or her. No video or camera photography during the procedure.  What should I wear: Wear comfortable clothes.  How long does the exam take: Most ultrasounds take 30 to 60 minutes.  What should I bring: Bring a list of your medicines, including vitamins, minerals and over-the-counter drugs. It is safest to leave personal items at home.  Do I need a :  No  is needed.  What do I need to tell my doctor: Tell your doctor about any allergies you may have.  What should I do after the exam: No restrictions, You may resume normal activities.  What is this test: An ultrasound uses sound waves to make pictures of the body. Sound waves do not cause pain. The only discomfort may be the pressure of the wand against your skin or full bladder.  Who should I call with questions: If you have any questions, please call the Imaging Department where you will have your exam. Directions, parking instructions, and other information is available on our website, [email or web address].            Jan 24, 2019   Procedure with Cindy Laurent DO   [facility] (--)    [address]  [facility] [address]   [phone]            Feb 07, 2019  1:00 PM CST   SHORT with Cindy Laurent DO   [facility]    [address]  [address]  [facility] [address]   [phone]              Who to contact     If you have questions or need follow up information about today's clinic visit or your schedule please contact [facility] directly at [phone].  Normal or non-critical lab and imaging results will be communicated to you by MyChart, letter or phone within 4 business days after the clinic has received the  results. If you do not hear from us within 7 days, please contact the clinic through FSI or phone. If you have a critical or abnormal lab result, we will notify you by phone as soon as possible.  Submit refill requests through FSI or call your pharmacy and they will forward the refill request to us. Please allow 3 business days for your refill to be completed.          Additional Information About Your Visit        [employer] Information     FSI gives you secure access to your electronic health record. If you see a primary care provider, you can also send messages to your care team and make appointments. If you have questions, please call your primary care clinic.  If you do not have a primary care provider, please call [phone] and they will assist you.        Care EveryWhere ID     This is your Care EveryWhere ID. This could be used by other organizations to access your [place] medical records  [id number]        Your Vitals Were     Last Period BMI (Body Mass Index)                04/26/2018 (Exact Date) 27.11 kg/m2           Blood Pressure from Last 3 Encounters:   11/15/18 90/58   11/10/18 94/52   11/08/18 107/57    Weight from Last 3 Encounters:   11/15/18 138 lb 12.8 oz (63 kg)   11/08/18 138 lb (62.6 kg)   10/25/18 138 lb 4.8 oz (62.7 kg)              We Performed the Following     ADMIN 1st VACCINE     CBC with platelets     Glucose tolerance gest screen 1 hour     TDAP VACCINE (ADACEL)     Treponema Abs w Reflex to RPR and Titer        Primary Care Provider Office Phone # Fax #    Cindy Aydee Laurent,  [phone]       [address] [facility] [address]        Equal Access to Services     [facility]: Hadii aad brandon hadasho Somargothali, waaxda luqadaha, qaybta kaalmada mary, sebastien morales. So [facility] [phone].    ATENCIÓN: Si habla español, tiene a pink disposición servicios gratuitos de asistencia lingüística. Llame al [phone].    We  comply with applicable federal civil rights laws and [place] laws. We do not discriminate on the basis of race, color, national origin, age, disability, sex, sexual orientation, or gender identity.            Thank you!     Thank you for choosing [facility]  for your care. Our goal is always to provide you with excellent care. Hearing back from our patients is one way we can continue to improve our services. Please take a few minutes to complete the written survey that you may receive in the mail after your visit with us. Thank you!             Your Updated Medication List - Protect others around you: Learn how to safely use, store and throw away your medicines at www.disposemymeds.org.          This list is accurate as of 11/15/18  3:15 PM.  Always use your most recent med list.                   Brand Name Dispense Instructions for use Diagnosis    metoclopramide 5 MG tablet    REGLAN    120 tablet    Take 1 tablet (5 mg) by mouth 4 times daily as needed    Hyperemesis gravidarum       nitroFURantoin (macrocrystal-monohydrate) 100 MG capsule    MACROBID    14 capsule    Take 1 capsule (100 mg) by mouth 2 times daily    Acute cystitis without hematuria       ondansetron 4 MG ODT tab    ZOFRAN ODT    30 tablet    Take 1 tablet (4 mg) by mouth every 8 hours as needed    Hyperemesis gravidarum       prenatal multivitamin plus iron 27-0.8 MG Tabs per tablet     100 tablet    Take 1 tablet by mouth daily        pyridOXINE 50 MG tablet    CVS VITAMIN B-6    90 tablet    Take 1 tablet (50 mg) by mouth 3 times daily    Hyperemesis gravidarum       TYLENOL PO      Take 325 mg by mouth

## 2018-11-15 NOTE — PROGRESS NOTES
CC: Here for routine prenatal visit   38 year old y/o  @ 29w0d with Estimated Date of Delivery: 2019   HPI: Pt reported to L&D on  and  with flu-like symptoms & contractions -- discharged without labor and advised to continue monitoring. She reports improvement today -- doing well, no longer experiencing previous symptoms. Her only concern is any after-effects of high fever on her baby.        This document serves as a record of the services and decisions personally performed and made by Cindy Laurent DO. It was created on her behalf by Danisha Wing, a trained medical scribe. The creation of this document is based the provider's statements to the medical scribe.  Scribe Danisha Wing 3:10 PM, November 15, 2018    BP 90/58  Wt 63 kg (138 lb 12.8 oz)  LMP 2018 (Exact Date)  BMI 27.11 kg/m2  See OB flowsheet  + fetal movement, no contractions, no bleeding, no loss of fluid   Discussed monitoring fetal movement - kick counts explained, will begin monitoring      1) concerns: High Fever -- informed fetal HR was normal in hospital, did not find anything concerning, will continue monitoring   - typically only see birth defects from this during early first trimester  2) Routine care: Girl; Declines genetic screening; GC - negative; GTT - pending; Tdap vaccine given  3) AMA: US with MFM @ 20w, Growth US @ 36w & Biweekly/Weekly BPP's @ 36w   - Delivery @ 39w, repeat c/s scheduled 2018   - Hx of oligohydramnios, will monitor beginning @ 32w  4) Hyperemesis gravidarum: infusions still occurring, feels better overall getting same amount as was previously, increase to 2 liters per time; On reglan and zofran and vit B6 but not helping  5) Uterine fibroid: Fetal growth monitored with US every 4 weeks, so far normal & no growth of the fibroid   - Explained if fibroid is removed surgically, all future pregnancies will have to  be delivered by  @ 36w  3) Return: 2 weeks        The  information in this document, created by the medical scribe for me, accurately reflects the services I personally performed and the decisions made by me. I have reviewed and approved this document for accuracy prior to leaving the patient care area.  3:10 PM, 11/15/18    Mehran

## 2018-11-15 NOTE — NURSING NOTE
Chief Complaint   Patient presents with     Prenatal Care     Discuss L&D visit - improving   29w0d  Improving from L&D stay but states it took 10 days to recover  GCT today   Here with family    Initial BP 90/58  Wt 138 lb 12.8 oz (63 kg)  LMP 2018 (Exact Date)  BMI 27.11 kg/m2 Estimated body mass index is 27.11 kg/(m^2) as calculated from the following:    Height as of 18: 5' (1.524 m).    Weight as of this encounter: 138 lb 12.8 oz (63 kg).  BP completed using cuff size: regular    Questioned patient about current smoking habits.  Pt. has never smoked.          The following HM Due: Vaccinations: Nikko Nowak CMA

## 2018-11-15 NOTE — PATIENT INSTRUCTIONS
Return 2 weeks  Return to clinic:  every 4 weeks till 30 weeks, then every 2 weeks till 36 weeks, then weekly till delivery      Phone numbers [place]:  Day/ night [phone] ask for ob triage  Emergency:  Call labor and delivery:  [phone]    What should I call about??    Contraction every 5 minutes for 1 hour 1 minute long (511), bleeding, loss of fluid, headache that doesn't resolve with tylenol, and decreased fetal movement     Start kick counts @ 26-28 weeks   Keep track of movement and discover your normal baby movement pattern   guideline is listed below  Please call if you do not feel the baby move!  We will have you come in for fetal heart rate monitoring:   Perception of at least 10 FMs during 12 hours of normal maternal activity   Perception of least 10 FMs over two hours when the mother is at rest and focused on [facility] Address   [address], [place], [place] [address] [phone]    Dr. Cindy Laurent, DO    OB/GYN   [facility]

## 2018-11-16 LAB
GLUCOSE 1H P 50 G GLC PO SERPL-MCNC: 84 MG/DL (ref 60–129)
T PALLIDUM AB SER QL: NONREACTIVE

## 2018-11-28 ENCOUNTER — RADIANT APPOINTMENT (OUTPATIENT)
Dept: ULTRASOUND IMAGING | Facility: CLINIC | Age: 38
End: 2018-11-28
Attending: FAMILY MEDICINE
Payer: COMMERCIAL

## 2018-11-28 DIAGNOSIS — O09.90 HIGH-RISK PREGNANCY, UNSPECIFIED TRIMESTER: Primary | ICD-10-CM

## 2018-11-28 DIAGNOSIS — O09.90 HIGH-RISK PREGNANCY, UNSPECIFIED TRIMESTER: ICD-10-CM

## 2018-11-28 PROCEDURE — 76816 OB US FOLLOW-UP PER FETUS: CPT | Performed by: OBSTETRICS & GYNECOLOGY

## 2018-11-29 ENCOUNTER — PRENATAL OFFICE VISIT (OUTPATIENT)
Dept: OBGYN | Facility: CLINIC | Age: 38
End: 2018-11-29
Payer: COMMERCIAL

## 2018-11-29 VITALS
WEIGHT: 137.4 LBS | SYSTOLIC BLOOD PRESSURE: 80 MMHG | BODY MASS INDEX: 26.97 KG/M2 | HEIGHT: 60 IN | DIASTOLIC BLOOD PRESSURE: 52 MMHG

## 2018-11-29 DIAGNOSIS — D25.9 UTERINE LEIOMYOMA, UNSPECIFIED LOCATION: ICD-10-CM

## 2018-11-29 DIAGNOSIS — Z34.93 PRENATAL CARE IN THIRD TRIMESTER: Primary | ICD-10-CM

## 2018-11-29 PROCEDURE — 99207 ZZC PRENATAL VISIT: CPT | Performed by: FAMILY MEDICINE

## 2018-11-29 NOTE — PROGRESS NOTES
CC: Here for routine prenatal visit   38 year old y/o  @ 31w0d with Estimated Date of Delivery: 2019   HPI: Pt is doing well, expresses no concerns at today's visit. She underwent another Growth US yesterday to monitor fetal growth, due to presence of uterine fibroids.         This document serves as a record of the services and decisions personally performed and made by Cindy Laurent DO. It was created on her behalf by Danisha Wing, a trained medical scribe. The creation of this document is based the provider's statements to the medical scribe.  Scribe Danisha Wing 1:56 PM, 2018    BP (!) 80/52  Ht 1.524 m (5')  Wt 62.3 kg (137 lb 6.4 oz)  LMP 2018 (Exact Date)  BMI 26.83 kg/m2  See OB flowsheet  + fetal movement, no contractions, no bleeding, no loss of fluid   Discussed monitoring fetal movement - aware of kick counts, reports good movement today      1) concerns: Growth US 2018 -- measurements on track, predict EFW at 4#  2) Routine care: Girl; Declines genetic screening; GC - negative; GTT - normal; Tdap vaccine given  3) AMA: US with MFM @ 20w, Growth US @ 36w & Biweekly/Weekly BPP's @ 36w   - Delivery @ 39w, repeat c/s scheduled 2018   - Hx of oligohydramnios, will monitor beginning @ 32w  4) Hyperemesis gravidarum: infusions still occurring, feels better overall getting same amount as was previously, increase to 2 liters per time; On reglan and zofran and vit B6 but not helping  5) Uterine fibroid: Fetal growth monitored with US every 4 weeks, so far normal & no growth of the fibroid   - Explained if fibroid is removed surgically, all future pregnancies will have to  be delivered by  @ 36w  6) Return: 2 weeks        The information in this document, created by the medical scribe for me, accurately reflects the services I personally performed and the decisions made by me. I have reviewed and approved this document for accuracy prior to leaving the  patient care area.  1:56 PM, 11/29/18    Mehran

## 2018-11-29 NOTE — PATIENT INSTRUCTIONS
Return in 2 weeks  Return to clinic:  every 4 weeks till 30 weeks, then every 2 weeks till 36 weeks, then weekly till delivery      Phone numbers [place]:  Day/ night [phone] ask for ob triage  Emergency:  Call labor and delivery:  [phone]    What should I call about??    Contraction every 5 minutes for 1 hour 1 minute long (511), bleeding, loss of fluid, headache that doesn't resolve with tylenol, and decreased fetal movement     Start kick counts @ 26-28 weeks   Keep track of movement and discover your normal baby movement pattern   guideline is listed below  Please call if you do not feel the baby move!  We will have you come in for fetal heart rate monitoring:   Perception of at least 10 FMs during 12 hours of normal maternal activity   Perception of least 10 FMs over two hours when the mother is at rest and focused on [facility] Address   [address], [place], [place] [address] [phone]    Dr. Cindy Laurent, DO    OB/GYN   [facility]

## 2018-11-29 NOTE — MR AVS SNAPSHOT
After Visit Summary   11/29/2018    Rizwana Plummer    MRN: [id number]           Patient Information     Date Of Birth          1980        Visit Information        Provider Department      11/29/2018 1:45 PM Cindy Laurent DO [facility]        Today's Diagnoses     Prenatal care in third trimester    -  1    Uterine leiomyoma, unspecified location          Care Instructions    Return in 2 weeks  Return to clinic:  every 4 weeks till 30 weeks, then every 2 weeks till 36 weeks, then weekly till delivery      Phone numbers [place]:  Day/ night [phone] ask for ob triage  Emergency:  Call labor and delivery:  [phone]    What should I call about??    Contraction every 5 minutes for 1 hour 1 minute long (511), bleeding, loss of fluid, headache that doesn't resolve with tylenol, and decreased fetal movement     Start kick counts @ 26-28 weeks   Keep track of movement and discover your normal baby movement pattern   guideline is listed below  Please call if you do not feel the baby move!  We will have you come in for fetal heart rate monitoring:   Perception of at least 10 FMs during 12 hours of normal maternal activity   Perception of least 10 FMs over two hours when the mother is at rest and focused on [facility] Address   [address], [place], [place] [address] [phone]    Dr. Cindy Laurent, DO    OB/GYN   [facility]                                      Follow-ups after your visit        Your next 10 appointments already scheduled     Jan 24, 2019   Procedure with Cindy Laurent DO   [facility] Labor and Delivery (--)    [address]  [facility] [id number] [phone]            Feb 07, 2019  1:00 PM CST   SHORT with Cindy Laurent DO   [facility]    [address]  [address]  [facility] [id number] [phone]               Future tests that were ordered for you today     Open Future Orders        Priority Expected Expires Ordered    US OB >14 Weeks Limited wo Fetal Measurement Routine 12/29/2018 11/29/2019 11/29/2018            Who to contact     If you have questions or need follow up information about today's clinic visit or your schedule please contact [facility] directly at [phone].  Normal or non-critical lab and imaging results will be communicated to you by MyChart, letter or phone within 4 business days after the clinic has received the results. If you do not hear from us within 7 days, please contact the clinic through [employer] or phone. If you have a critical or abnormal lab result, we will notify you by phone as soon as possible.  Submit refill requests through [employer] or call your pharmacy and they will forward the refill request to us. Please allow 3 business days for your refill to be completed.          Additional Information About Your Visit        [employer] Information     [employer] gives you secure access to your electronic health record. If you see a primary care provider, you can also send messages to your care team and make appointments. If you have questions, please call your primary care clinic.  If you do not have a primary care provider, please call [phone] and they will assist you.        Care EveryWhere ID     This is your Care EveryWhere ID. This could be used by other organizations to access your [place] medical records  [id number]        Your Vitals Were     Height Last Period BMI (Body Mass Index)             5' (1.524 m) 04/26/2018 (Exact Date) 26.83 kg/m2          Blood Pressure from Last 3 Encounters:   11/29/18 (!) 80/52   11/15/18 90/58   11/10/18 94/52    Weight from Last 3 Encounters:   11/29/18 137 lb 6.4 oz (62.3 kg)   11/15/18 138 lb 12.8 oz (63 kg)   11/08/18 138 lb (62.6 kg)               Primary Care Provider Office Phone # Fax #    Cindy Laurent,  [phone]       [address] [facility] [address]        Equal Access to Services     JORGE HINES : Hadii aad ku hadros Burroughs, waaxda luqadaha, qaybta kaalmada mary, sebastien vanessa laYoliepoppy morales. So [facility] [phone].    ATENCIÓN: Si habla español, tiene a pink disposición servicios gratuitos de asistencia lingüística. Llame al [phone].    We comply with applicable federal civil rights laws and [place] laws. We do not discriminate on the basis of race, color, national origin, age, disability, sex, sexual orientation, or gender identity.            Thank you!     Thank you for choosing [facility]  for your care. Our goal is always to provide you with excellent care. Hearing back from our patients is one way we can continue to improve our services. Please take a few minutes to complete the written survey that you may receive in the mail after your visit with us. Thank you!             Your Updated Medication List - Protect others around you: Learn how to safely use, store and throw away your medicines at www.disposemymeds.org.          This list is accurate as of 11/29/18  1:59 PM.  Always use your most recent med list.                   Brand Name Dispense Instructions for use Diagnosis    metoclopramide 5 MG tablet    REGLAN    120 tablet    Take 1 tablet (5 mg) by mouth 4 times daily as needed    Hyperemesis gravidarum       nitroFURantoin macrocrystal-monohydrate 100 MG capsule    MACROBID    14 capsule    Take 1 capsule (100 mg) by mouth 2 times daily    Acute cystitis without hematuria       ondansetron 4 MG ODT tab    ZOFRAN ODT    30 tablet    Take 1 tablet (4 mg) by mouth every 8 hours as needed    Hyperemesis gravidarum       prenatal multivitamin w/iron 27-0.8 MG tablet     100 tablet    Take 1 tablet by mouth daily        pyridOXINE 50 MG tablet    CVS VITAMIN B-6    90 tablet    Take 1 tablet (50 mg) by mouth 3 times daily     Hyperemesis gravidarum       TYLENOL PO      Take 325 mg by mouth

## 2018-11-29 NOTE — NURSING NOTE
31w0d    Chief Complaint   Patient presents with     Prenatal Care     pelvic pressure--had US yesterday       Initial BP (!) 80/52  Ht 5' (1.524 m)  Wt 137 lb 6.4 oz (62.3 kg)  LMP 2018 (Exact Date)  BMI 26.83 kg/m2 Estimated body mass index is 26.83 kg/(m^2) as calculated from the following:    Height as of this encounter: 5' (1.524 m).    Weight as of this encounter: 137 lb 6.4 oz (62.3 kg).  BP completed using cuff size: regular    Questioned patient about current smoking habits.  Pt. has never smoked.          The following HM Due: NONE

## 2018-12-26 ENCOUNTER — PRENATAL OFFICE VISIT (OUTPATIENT)
Dept: OBGYN | Facility: CLINIC | Age: 38
End: 2018-12-26
Payer: COMMERCIAL

## 2018-12-26 VITALS — SYSTOLIC BLOOD PRESSURE: 96 MMHG | WEIGHT: 135 LBS | BODY MASS INDEX: 26.37 KG/M2 | DIASTOLIC BLOOD PRESSURE: 60 MMHG

## 2018-12-26 DIAGNOSIS — O09.93 HIGH-RISK PREGNANCY IN THIRD TRIMESTER: Primary | ICD-10-CM

## 2018-12-26 PROCEDURE — 99207 ZZC PRENATAL VISIT: CPT | Performed by: ADVANCED PRACTICE MIDWIFE

## 2018-12-26 NOTE — PROGRESS NOTES
S: Feels well and has no concerns.  Baby active.  Denies uterine cramping, vaginal bleeding or leaking of fluid.   O: Vitals: BP 96/60 (BP Location: Right arm, Cuff Size: Adult Regular)   Wt 61.2 kg (135 lb)   LMP 04/26/2018 (Exact Date)   BMI 26.37 kg/m    BMI= Body mass index is 26.37 kg/m .  Exam:  Constitutional: healthy, alert and no distress  Respiratory: respirations even and unlabored  Gastrointestinal: Abdomen soft, non-tender. Fundus measures appropriate for gestational age. Fetal heart tones hear without difficulty and within normal limits  : Deferred  Psychiatric: mentation appears normal and affect normal/bright  A:     ICD-10-CM    1. High-risk pregnancy in third trimester O09.93 US Fetal Biophys Prof w/o Non Stress Test     CANCELED: US Fetal Biophys Prof w/o Non Stress Test     P:  Patient was supposed to start BPPs at 32 weeks, but has not been seen for past 4 weeks. Scheduled BPP.   Discussed GBS screen for next visit. Discussed plans for labor.   Encouraged patient to call with any questions or concerns.  Return to clinic 1 weeks    SAHIL Agrawal, [place]

## 2018-12-26 NOTE — PATIENT INSTRUCTIONS
GROUP B STREP    Group B Strep (GBS) is a common bacteria that is sometimes found in the vagina, urinary tract or rectum.  It is not harmful typically to adults but can cause serious illness in newborns.  It occasionally is passed from mother to baby during birth.   It is important to test in pregnancy.  When a woman is found to be positive for GBS, either at the first prenatal visit or by taking a culture at 36 weeks, treatment will be offered to reduce the chance of spreading the bacteria to the baby.       Treatment consists of either oral antibiotics early in pregnancy or antibiotics given by IV during labor if testing is positive at 36 weeks.      Even without treatment the baby rarely (1-2% of the time) gets infected.  With treatment the baby almost never gets infected.       There really isn't anything you can do to keep from getting or being positive for GBS.  It isn't sexually transmitted and there are no symptoms if you are positive.       Your midwife will discuss your results with you and make recommendations for treatment.

## 2018-12-26 NOTE — NURSING NOTE
Chief Complaint   Patient presents with     Prenatal Care     34w 6d, no concerns       Initial BP 96/60 (BP Location: Right arm, Cuff Size: Adult Regular)   Wt 61.2 kg (135 lb)   LMP 2018 (Exact Date)   BMI 26.37 kg/m   Estimated body mass index is 26.37 kg/m  as calculated from the following:    Height as of 18: 1.524 m (5').    Weight as of this encounter: 61.2 kg (135 lb).  BP completed using cuff size: regular    Questioned patient about current smoking habits.  Pt. has never smoked.          The following HM Due: Vaccinations: DECLINES FLU      Isabelle Buitrago, GRAEME

## 2018-12-27 ENCOUNTER — HOSPITAL ENCOUNTER (OUTPATIENT)
Dept: ULTRASOUND IMAGING | Facility: CLINIC | Age: 38
Discharge: HOME | End: 2018-12-27
Attending: ADVANCED PRACTICE MIDWIFE | Admitting: ADVANCED PRACTICE MIDWIFE
Payer: COMMERCIAL

## 2018-12-27 DIAGNOSIS — O09.93 HIGH-RISK PREGNANCY IN THIRD TRIMESTER: ICD-10-CM

## 2018-12-27 PROCEDURE — 76819 FETAL BIOPHYS PROFIL W/O NST: CPT

## 2019-01-08 DIAGNOSIS — O09.529 SUPERVISION OF HIGH-RISK PREGNANCY OF ELDERLY MULTIGRAVIDA: Primary | ICD-10-CM

## 2019-01-08 DIAGNOSIS — D25.9 UTERINE LEIOMYOMA, UNSPECIFIED LOCATION: ICD-10-CM

## 2019-01-08 DIAGNOSIS — Z34.93 PRENATAL CARE IN THIRD TRIMESTER: ICD-10-CM

## 2019-01-11 ENCOUNTER — PRENATAL OFFICE VISIT (OUTPATIENT)
Dept: OBGYN | Facility: CLINIC | Age: 39
End: 2019-01-11
Payer: COMMERCIAL

## 2019-01-11 VITALS — WEIGHT: 141 LBS | BODY MASS INDEX: 27.54 KG/M2 | SYSTOLIC BLOOD PRESSURE: 98 MMHG | DIASTOLIC BLOOD PRESSURE: 51 MMHG

## 2019-01-11 DIAGNOSIS — Z34.93 PRENATAL CARE IN THIRD TRIMESTER: Primary | ICD-10-CM

## 2019-01-11 PROCEDURE — 99207 ZZC PRENATAL VISIT: CPT | Performed by: FAMILY MEDICINE

## 2019-01-11 PROCEDURE — 87186 SC STD MICRODIL/AGAR DIL: CPT | Performed by: FAMILY MEDICINE

## 2019-01-11 PROCEDURE — 87653 STREP B DNA AMP PROBE: CPT | Performed by: FAMILY MEDICINE

## 2019-01-11 NOTE — PROGRESS NOTES
CC: Here for routine prenatal visit   38 year old y/o  @ 37w1d with Estimated Date of Delivery: 2019   HPI: Pt is doing well, expresses no concerns at today's visit.         This document serves as a record of the services and decisions personally performed and made by Cindy Laurent DO. It was created on her behalf by Danisha Wing, a trained medical scribe. The creation of this document is based the provider's statements to the medical scribe.  Scribe Danisha Wing 1:26 PM, 2019    BP 98/51 (BP Location: Right arm, Patient Position: Sitting, Cuff Size: Adult Regular)   Wt 64 kg (141 lb)   LMP 2018 (Exact Date)   BMI 27.54 kg/m    See OB flowsheet  + fetal movement, no contractions, no bleeding, no loss of fluid   Discussed monitoring fetal movement - aware of kick counts, reports good movement today      1) concerns: No concerns today -- Cervical exam deferred today  2) Routine care: Girl; Declines genetic screening; GC - negative; GTT - normal; Tdap vaccine given; GBS - pending  3) AMA: US with MFM @ 20w, Growth US @ 36w & Biweekly/Weekly BPP's @ 36w               - Delivery @ 39w, repeat c/s scheduled 2018               - Hx of oligohydramnios, will monitor beginning @ 32w  4) Hyperemesis gravidarum: infusions still occurring, feels better overall getting same amount as was previously, increase to 2 liters per time; On reglan and zofran and vit B6 but not helping  5) Uterine fibroid: Fetal growth monitored with US every 4 weeks, so far normal & no growth of the fibroid               - Explained if fibroid is removed surgically, all future pregnancies will have to         be delivered by  @ 36w  6) Return: Weekly        The information in this document, created by the medical scribe for me, accurately reflects the services I personally performed and the decisions made by me. I have reviewed and approved this document for accuracy prior to leaving the patient care  area.  1:27 PM, 01/11/19    Mehran

## 2019-01-11 NOTE — PATIENT INSTRUCTIONS
Return weekly  Return to clinic:  every 4 weeks till 30 weeks, then every 2 weeks till 36 weeks, then weekly till delivery      Phone numbers [place]:  Day/ night [phone] ask for ob triage  Emergency:  Call labor and delivery:  [phone]    What should I call about??    Contraction every 5 minutes for 1 hour 1 minute long (511), bleeding, loss of fluid, headache that doesn't resolve with tylenol, and decreased fetal movement     Start kick counts @ 26-28 weeks   Keep track of movement and discover your normal baby movement pattern   guideline is listed below  Please call if you do not feel the baby move!  We will have you come in for fetal heart rate monitoring:   Perception of at least 10 FMs during 12 hours of normal maternal activity   Perception of least 10 FMs over two hours when the mother is at rest and focused on [facility] Address   [address], [place], [place] [address] [phone]    Dr. Cindy Laurent, DO    OB/GYN   [facility]

## 2019-01-11 NOTE — NURSING NOTE
Chief Complaint   Patient presents with     Prenatal Care     37 weeks 1 day- no concerns        Initial BP 98/51 (BP Location: Right arm, Patient Position: Sitting, Cuff Size: Adult Regular)   Wt 64 kg (141 lb)   LMP 2018 (Exact Date)   BMI 27.54 kg/m   Estimated body mass index is 27.54 kg/m  as calculated from the following:    Height as of 18: 1.524 m (5').    Weight as of this encounter: 64 kg (141 lb).  BP completed using cuff size: regular    Questioned patient about current smoking habits.  Pt. has never smoked.          The following HM Due: NONE    +FM    37w1d  Devon Frost, CMA

## 2019-01-12 LAB
GP B STREP DNA SPEC QL NAA+PROBE: POSITIVE
SPECIMEN SOURCE: ABNORMAL

## 2019-01-15 DIAGNOSIS — O09.529 SUPERVISION OF HIGH-RISK PREGNANCY OF ELDERLY MULTIGRAVIDA: ICD-10-CM

## 2019-01-15 LAB
BACTERIA SPEC CULT: ABNORMAL
SPECIMEN SOURCE: ABNORMAL

## 2019-01-17 ENCOUNTER — PRENATAL OFFICE VISIT (OUTPATIENT)
Dept: OBGYN | Facility: CLINIC | Age: 39
End: 2019-01-17
Payer: COMMERCIAL

## 2019-01-17 VITALS
SYSTOLIC BLOOD PRESSURE: 100 MMHG | DIASTOLIC BLOOD PRESSURE: 60 MMHG | BODY MASS INDEX: 27.15 KG/M2 | TEMPERATURE: 97.4 F | HEIGHT: 60 IN | WEIGHT: 138.3 LBS

## 2019-01-17 DIAGNOSIS — Z01.818 PREOP GENERAL PHYSICAL EXAM: Primary | ICD-10-CM

## 2019-01-17 PROCEDURE — 99207 ZZC PRENATAL VISIT: CPT | Performed by: FAMILY MEDICINE

## 2019-01-17 ASSESSMENT — MIFFLIN-ST. JEOR: SCORE: 1218.82

## 2019-01-17 NOTE — NURSING NOTE
Chief Complaint   Patient presents with     Prenatal Care     contractions in the evening--pelvic area feels heavy--pressure--has trouble walking--pt declines cervical check     Pre-Op Exam     c-seciton 19       Initial /60 (BP Location: Right arm, Patient Position: Sitting, Cuff Size: Adult Regular)   Temp 97.4  F (36.3  C) (Oral)   Ht 1.524 m (5')   Wt 62.7 kg (138 lb 4.8 oz)   LMP 2018 (Exact Date)   BMI 27.01 kg/m   Estimated body mass index is 27.01 kg/m  as calculated from the following:    Height as of this encounter: 1.524 m (5').    Weight as of this encounter: 62.7 kg (138 lb 4.8 oz).  BP completed using cuff size: regular    Questioned patient about current smoking habits.  Pt. has never smoked.          The following HM Due: NONE

## 2019-01-17 NOTE — NURSING NOTE
[facility] [address]  [address]  [facility] [address]  [phone]  Dept: [phone]    PRE-OP EVALUATION:  Today's date: 2019    Rizwana Plummer (: 1980) presents for pre-operative evaluation assessment as requested by Dr. Cindy Laurent.  She requires evaluation and anesthesia risk assessment prior to undergoing surgery/procedure for treatment of pregnancy--repeat  .    HPI:     HPI related to upcoming procedure: Repeat CS      See problem list for active medical problems.  Problems all longstanding and stable, except as noted/documented.  See ROS for pertinent symptoms related to these conditions.                                                                                                                                                          .    MEDICAL HISTORY:     Patient Active Problem List    Diagnosis Date Noted     High-risk pregnancy 2018     Priority: Medium     Previous  section complicating pregnancy 2018     Priority: Medium     Encounter for triage in pregnant patient 2018     Priority: Medium     SI (sacroiliac) joint dysfunction 2018     Priority: Medium     Female genital infibulation 2013     Priority: Medium     Epidermal inclusion cyst of infibulation scar 2013     Priority: Medium      Past Medical History:   Diagnosis Date     Varicella age 5     Past Surgical History:   Procedure Laterality Date      SECTION  2014    Procedure:  SECTION;   Primary  section         SECTION N/A 2017    Procedure:  SECTION;  Surgeon: Cindy Laurent DO;  Location: [facility] OR     Infibulation       MARSUPIALIZATION BARTHOLIN CYST  2013    Procedure: MARSUPIALIZATION BARTHOLIN CYST;  Removal of Inclusion Cyst.   Fetal heart tones 180's;  Surgeon: Rohit Parks MD;  Location:  OR     Current Outpatient Medications   Medication Sig Dispense  Refill     Acetaminophen (TYLENOL PO) Take 325 mg by mouth       ondansetron (ZOFRAN ODT) 4 MG ODT tab Take 1 tablet (4 mg) by mouth every 8 hours as needed 30 tablet 3     Prenatal Vit-Fe Fumarate-FA (PRENATAL MULTIVITAMIN PLUS IRON) 27-0.8 MG TABS per tablet Take 1 tablet by mouth daily 100 tablet 3     pyridOXINE ([employer] VITAMIN B-6) 50 MG tablet Take 1 tablet (50 mg) by mouth 3 times daily (Patient not taking: Reported on 12/26/2018) 90 tablet 1     OTC products: None, except as noted above    No Known Allergies   Latex Allergy: NO    Social History     Tobacco Use     Smoking status: Never Smoker     Smokeless tobacco: Never Used   Substance Use Topics     Alcohol use: No     Alcohol/week: 0.0 oz     History   Drug Use No       REVIEW OF SYSTEMS:   CONSTITUTIONAL: NEGATIVE for fever, chills, change in weight  INTEGUMENTARY/SKIN: NEGATIVE for worrisome rashes, moles or lesions  EYES: NEGATIVE for vision changes or irritation  ENT/MOUTH: NEGATIVE for ear, mouth and throat problems  RESP: NEGATIVE for significant cough or SOB  BREAST: NEGATIVE for masses, tenderness or discharge  CV: NEGATIVE for chest pain, palpitations or peripheral edema  GI: NEGATIVE for nausea, abdominal pain, heartburn, or change in bowel habits  : NEGATIVE for frequency, dysuria, or hematuria  MUSCULOSKELETAL: NEGATIVE for significant arthralgias or myalgia  NEURO: NEGATIVE for weakness, dizziness or paresthesias  ENDOCRINE: NEGATIVE for temperature intolerance, skin/hair changes  HEME: NEGATIVE for bleeding problems  PSYCHIATRIC: NEGATIVE for changes in mood or affect    EXAM:   /60 (BP Location: Right arm, Patient Position: Sitting, Cuff Size: Adult Regular)   Temp 97.4  F (36.3  C) (Oral)   Ht 1.524 m (5')   Wt 62.7 kg (138 lb 4.8 oz)   LMP 04/26/2018 (Exact Date)   BMI 27.01 kg/m    GENERAL APPEARANCE: healthy, alert and no distress  HENT: ear canals and TM's normal and nose and mouth without ulcers or lesions  RESP: lungs  clear to auscultation - no rales, rhonchi or wheezes  CV: regular rate and rhythm, normal S1 S2, no S3 or S4 and no murmur, click or rub   ABDOMEN: soft, nontender, no HSM or masses and bowel sounds normal  NEURO: Normal strength and tone, sensory exam grossly normal, mentation intact and speech normal    GENERAL APPEARANCE: healthy, alert and no distress     EYES: EOMI, PERRL     HENT: ear canals and TM's normal and nose and mouth without ulcers or lesions     NECK: no adenopathy, no asymmetry, masses, or scars and thyroid normal to palpation     RESP: lungs clear to auscultation - no rales, rhonchi or wheezes     CV: regular rates and rhythm, normal S1 S2, no S3 or S4 and no murmur, click or rub     ABDOMEN:  soft, nontender, no HSM or masses and bowel sounds normal     MS: extremities normal- no gross deformities noted, no evidence of inflammation in joints, FROM in all extremities.     SKIN: no suspicious lesions or rashes     NEURO: Normal strength and tone, sensory exam grossly normal, mentation intact and speech normal     PSYCH: mentation appears normal. and affect normal/bright     LYMPHATICS: No cervical adenopathy    DIAGNOSTICS:   No labs or EKG required for low risk surgery (cataract, skin procedure, breast biopsy, etc)    Recent Labs   Lab Test 11/15/18  1526 11/10/18  0624 11/09/18  1313  06/18/18  1359   HGB 8.5* 7.6* 8.9*   < > 12.0    188 194   < > 290   NA  --   --  136  --  133   POTASSIUM  --   --  3.9  --  4.1   CR  --   --  0.54  --  0.56    < > = values in this interval not displayed.        IMPRESSION:   Reason for surgery/procedure: hx of CS  Diagnosis/reason for consult: RCS    The proposed surgical procedure is considered LOW risk.    REVISED CARDIAC RISK INDEX  The patient has the following serious cardiovascular risks for perioperative complications such as (MI, PE, VFib and 3  AV Block):  No serious cardiac risks  INTERPRETATION: 0 risks: Class I (very low risk - 0.4%  complication rate)    The patient has the following additional risks for perioperative complications:  No identified additional risks    No diagnosis found.    RECOMMENDATIONS:     --Consult hospital rounder / IM to assist post-op medical management    --Patient is to take all scheduled medications on the day of surgery EXCEPT for modifications listed below.    APPROVAL GIVEN to proceed with proposed procedure, without further diagnostic evaluation       Signed Electronically by: Cindy Laurent DO    Copy of this evaluation report is provided to requesting physician.    [facility] Preop Guidelines    Revised Cardiac Risk Index

## 2019-01-17 NOTE — PROGRESS NOTES
CC: Here for routine prenatal visit   38 year old y/o  @ 38w0d with Estimated Date of Delivery: 2019   HPI: Pt is doing well, is noticing even more pelvic pressure this week.        This document serves as a record of the services and decisions personally performed and made by Cindy Laurent DO. It was created on her behalf by Danisha Wing, a trained medical scribe. The creation of this document is based the provider's statements to the medical scribe.  Scribe Danisha Wing 1:55 PM, 2019    /60 (BP Location: Right arm, Patient Position: Sitting, Cuff Size: Adult Regular)   Temp 97.4  F (36.3  C) (Oral)   Ht 1.524 m (5')   Wt 62.7 kg (138 lb 4.8 oz)   LMP 2018 (Exact Date)   BMI 27.01 kg/m    See OB flowsheet  + fetal movement, no contractions, no bleeding, no loss of fluid   Discussed monitoring fetal movement - aware of kick counts, reports good movement today      1) concerns: Pt cleared for surgery [] today in clinic, us verifies baby is now vertex, not transvers as   Us shows  2) Routine care: Girl; Declines genetic screening; GC - negative; GTT - normal; Tdap vaccine given; GBS - positive;  3) AMA: US with MFM @ 20w, Growth US @ 36w & Biweekly/Weekly BPP's @ 36w               - Delivery @ 39w, repeat c/s scheduled 2018               - Hx of oligohydramnios, will monitor beginning @ 32w  4) Hyperemesis gravidarum: infusions still occurring, feels better overall getting same amount as was previously, increase to 2 liters per time; On reglan and zofran and vit B6 but not helping  5) Uterine fibroid: Fetal growth monitored with US every 4 weeks, so far normal & no growth of the fibroid               - Explained if fibroid is removed surgically, all future pregnancies will have to         be delivered by  @ 36w  6) Return: Weekly        The information in this document, created by the medical scribe for me, accurately reflects the services I personally  performed and the decisions made by me. I have reviewed and approved this document for accuracy prior to leaving the patient care area.  1:55 PM, 01/17/19    Mehran

## 2019-01-17 NOTE — PATIENT INSTRUCTIONS
Return weekly   Return to clinic:  every 4 weeks till 30 weeks, then every 2 weeks till 36 weeks, then weekly till delivery      Phone numbers [place]:  Day/ night [phone] ask for ob triage  Emergency:  Call labor and delivery:  [phone]    What should I call about??    Contraction every 5 minutes for 1 hour 1 minute long (511), bleeding, loss of fluid, headache that doesn't resolve with tylenol, and decreased fetal movement     Start kick counts @ 26-28 weeks   Keep track of movement and discover your normal baby movement pattern   guideline is listed below  Please call if you do not feel the baby move!  We will have you come in for fetal heart rate monitoring:   Perception of at least 10 FMs during 12 hours of normal maternal activity   Perception of least 10 FMs over two hours when the mother is at rest and focused on [facility] Address   [address], [place] [address]  [phone]    Dr. Cindy Laurent, DO    OB/GYN   [facility]                              Before Your Surgery      Call your surgeon if there is any change in your health. This includes signs of a cold or flu (such as a sore throat, runny nose, cough, rash or fever).    Do not smoke, drink alcohol or take over the counter medicine (unless your surgeon or primary care doctor tells you to) for the 24 hours before and after surgery.    If you take prescribed drugs: Follow your doctor s orders about which medicines to take and which to stop until after surgery.    Eating and drinking prior to surgery: follow the instructions from your surgeon    Take a shower or bath the night before surgery. Use the soap your surgeon gave you to gently clean your skin. If you do not have soap from your surgeon, use your regular soap. Do not shave or scrub the surgery site.  Wear clean pajamas and have clean sheets on your bed.   Before Your Surgery    Call your surgeon if there is  any change in your health. This includes signs of a cold or flu (such as a sore throat, runny nose, cough, rash or fever).  Do not smoke, drink alcohol or take over the counter medicine (unless your surgeon or primary care doctor tells you to) for the 24 hours before and after surgery.  If you take prescribed drugs: Follow your doctor s orders about which medicines to take and which to stop until after surgery.  Eating and drinking prior to surgery: follow the instructions from your surgeon  Take a shower or bath the night before surgery. Use the soap your surgeon gave you to gently clean your skin. If you do not have soap from your surgeon, use your regular soap. Do not shave or scrub the surgery site.  Wear clean pajamas and have clean sheets on your bed.

## 2019-01-22 DIAGNOSIS — Z01.812 PRE-OPERATIVE LABORATORY EXAMINATION: ICD-10-CM

## 2019-01-22 LAB
ABO + RH BLD: NORMAL
ABO + RH BLD: NORMAL
BLD GP AB SCN SERPL QL: NORMAL
BLOOD BANK CMNT PATIENT-IMP: NORMAL
HGB BLD-MCNC: 8.8 G/DL (ref 11.7–15.7)
SPECIMEN EXP DATE BLD: NORMAL

## 2019-01-22 PROCEDURE — 36415 COLL VENOUS BLD VENIPUNCTURE: CPT | Performed by: FAMILY MEDICINE

## 2019-01-22 PROCEDURE — 86850 RBC ANTIBODY SCREEN: CPT | Performed by: FAMILY MEDICINE

## 2019-01-22 PROCEDURE — 85018 HEMOGLOBIN: CPT | Performed by: FAMILY MEDICINE

## 2019-01-22 PROCEDURE — 86900 BLOOD TYPING SEROLOGIC ABO: CPT | Performed by: FAMILY MEDICINE

## 2019-01-22 PROCEDURE — 86901 BLOOD TYPING SEROLOGIC RH(D): CPT | Performed by: FAMILY MEDICINE

## 2019-01-22 NOTE — PHARMACY-ADMISSION MEDICATION HISTORY
Pharmacy reviewed prior to admission med list from pre-admitting rn, COLBY Paige.        Prior to Admission medications    Medication Sig Last Dose Taking? Auth Provider   Acetaminophen (TYLENOL PO) Take 325 mg by mouth every 6 hours as needed   Yes Reported, Patient   ondansetron (ZOFRAN ODT) 4 MG ODT tab Take 1 tablet (4 mg) by mouth every 8 hours as needed  Yes Ruthy Banda MD   Prenatal Vit-Fe Fumarate-FA (PRENATAL MULTIVITAMIN PLUS IRON) 27-0.8 MG TABS per tablet Take 1 tablet by mouth daily Past Week at Unknown time Yes Ammy Bustillo MD

## 2019-01-23 ENCOUNTER — ANESTHESIA (OUTPATIENT)
Dept: OBGYN | Facility: CLINIC | Age: 39
End: 2019-01-23
Payer: COMMERCIAL

## 2019-01-23 ENCOUNTER — ANESTHESIA EVENT (OUTPATIENT)
Dept: OBGYN | Facility: CLINIC | Age: 39
End: 2019-01-23
Payer: COMMERCIAL

## 2019-01-23 ENCOUNTER — NURSE TRIAGE (OUTPATIENT)
Dept: NURSING | Facility: CLINIC | Age: 39
End: 2019-01-23

## 2019-01-23 ENCOUNTER — HOSPITAL ENCOUNTER (INPATIENT)
Facility: CLINIC | Age: 39
LOS: 3 days | Discharge: HOME | End: 2019-01-26
Attending: OBSTETRICS & GYNECOLOGY | Admitting: OBSTETRICS & GYNECOLOGY
Payer: COMMERCIAL

## 2019-01-23 DIAGNOSIS — Z98.891 S/P REPEAT LOW TRANSVERSE C-SECTION: Primary | ICD-10-CM

## 2019-01-23 PROCEDURE — 25000132 ZZH RX MED GY IP 250 OP 250 PS 637: Performed by: OBSTETRICS & GYNECOLOGY

## 2019-01-23 PROCEDURE — 36000056 ZZH SURGERY LEVEL 3 1ST 30 MIN: Performed by: FAMILY MEDICINE

## 2019-01-23 PROCEDURE — 36000058 ZZH SURGERY LEVEL 3 EA 15 ADDTL MIN: Performed by: FAMILY MEDICINE

## 2019-01-23 PROCEDURE — 25000128 H RX IP 250 OP 636: Performed by: ANESTHESIOLOGY

## 2019-01-23 PROCEDURE — 59510 CESAREAN DELIVERY: CPT | Performed by: OBSTETRICS & GYNECOLOGY

## 2019-01-23 PROCEDURE — 25000128 H RX IP 250 OP 636: Performed by: OBSTETRICS & GYNECOLOGY

## 2019-01-23 PROCEDURE — 25000125 ZZHC RX 250: Performed by: OBSTETRICS & GYNECOLOGY

## 2019-01-23 PROCEDURE — 25000125 ZZHC RX 250

## 2019-01-23 PROCEDURE — 12000000 ZZH R&B MED SURG/OB

## 2019-01-23 PROCEDURE — 25000128 H RX IP 250 OP 636

## 2019-01-23 PROCEDURE — 71000015 ZZH RECOVERY PHASE 1 LEVEL 2 EA ADDTL HR: Performed by: FAMILY MEDICINE

## 2019-01-23 PROCEDURE — 71000014 ZZH RECOVERY PHASE 1 LEVEL 2 FIRST HR: Performed by: FAMILY MEDICINE

## 2019-01-23 PROCEDURE — 27210794 ZZH OR GENERAL SUPPLY STERILE: Performed by: FAMILY MEDICINE

## 2019-01-23 PROCEDURE — 37000008 ZZH ANESTHESIA TECHNICAL FEE, 1ST 30 MIN: Performed by: FAMILY MEDICINE

## 2019-01-23 PROCEDURE — 37000009 ZZH ANESTHESIA TECHNICAL FEE, EACH ADDTL 15 MIN: Performed by: FAMILY MEDICINE

## 2019-01-23 RX ORDER — OXYCODONE HYDROCHLORIDE 5 MG/1
5 TABLET ORAL
Status: DISCONTINUED | OUTPATIENT
Start: 2019-01-23 | End: 2019-01-26 | Stop reason: HOSPADM

## 2019-01-23 RX ORDER — DEXTROSE, SODIUM CHLORIDE, SODIUM LACTATE, POTASSIUM CHLORIDE, AND CALCIUM CHLORIDE 5; .6; .31; .03; .02 G/100ML; G/100ML; G/100ML; G/100ML; G/100ML
INJECTION, SOLUTION INTRAVENOUS CONTINUOUS
Status: DISCONTINUED | OUTPATIENT
Start: 2019-01-23 | End: 2019-01-26 | Stop reason: HOSPADM

## 2019-01-23 RX ORDER — DIPHENHYDRAMINE HCL 25 MG
25 CAPSULE ORAL EVERY 6 HOURS PRN
Status: DISCONTINUED | OUTPATIENT
Start: 2019-01-23 | End: 2019-01-26 | Stop reason: HOSPADM

## 2019-01-23 RX ORDER — OXYTOCIN/0.9 % SODIUM CHLORIDE 30/500 ML
PLASTIC BAG, INJECTION (ML) INTRAVENOUS
Status: COMPLETED
Start: 2019-01-23 | End: 2019-01-23

## 2019-01-23 RX ORDER — FENTANYL CITRATE 50 UG/ML
INJECTION, SOLUTION INTRAMUSCULAR; INTRAVENOUS PRN
Status: DISCONTINUED | OUTPATIENT
Start: 2019-01-23 | End: 2019-01-23

## 2019-01-23 RX ORDER — LIDOCAINE 40 MG/G
CREAM TOPICAL
Status: DISCONTINUED | OUTPATIENT
Start: 2019-01-23 | End: 2019-01-26 | Stop reason: HOSPADM

## 2019-01-23 RX ORDER — OXYTOCIN/0.9 % SODIUM CHLORIDE 30/500 ML
100 PLASTIC BAG, INJECTION (ML) INTRAVENOUS CONTINUOUS
Status: DISCONTINUED | OUTPATIENT
Start: 2019-01-23 | End: 2019-01-26 | Stop reason: HOSPADM

## 2019-01-23 RX ORDER — MAGNESIUM HYDROXIDE 1200 MG/15ML
LIQUID ORAL PRN
Status: DISCONTINUED | OUTPATIENT
Start: 2019-01-23 | End: 2019-01-26 | Stop reason: HOSPADM

## 2019-01-23 RX ORDER — AMOXICILLIN 250 MG
2 CAPSULE ORAL 2 TIMES DAILY PRN
Status: DISCONTINUED | OUTPATIENT
Start: 2019-01-23 | End: 2019-01-26 | Stop reason: HOSPADM

## 2019-01-23 RX ORDER — CEFAZOLIN SODIUM 2 G/100ML
2 INJECTION, SOLUTION INTRAVENOUS
Status: COMPLETED | OUTPATIENT
Start: 2019-01-23 | End: 2019-01-23

## 2019-01-23 RX ORDER — SIMETHICONE 80 MG
80 TABLET,CHEWABLE ORAL 4 TIMES DAILY PRN
Status: DISCONTINUED | OUTPATIENT
Start: 2019-01-23 | End: 2019-01-26 | Stop reason: HOSPADM

## 2019-01-23 RX ORDER — LANOLIN 100 %
OINTMENT (GRAM) TOPICAL
Status: DISCONTINUED | OUTPATIENT
Start: 2019-01-23 | End: 2019-01-26 | Stop reason: HOSPADM

## 2019-01-23 RX ORDER — DEXAMETHASONE SODIUM PHOSPHATE 4 MG/ML
INJECTION, SOLUTION INTRA-ARTICULAR; INTRALESIONAL; INTRAMUSCULAR; INTRAVENOUS; SOFT TISSUE PRN
Status: DISCONTINUED | OUTPATIENT
Start: 2019-01-23 | End: 2019-01-23

## 2019-01-23 RX ORDER — MISOPROSTOL 200 UG/1
800 TABLET ORAL
Status: DISCONTINUED | OUTPATIENT
Start: 2019-01-23 | End: 2019-01-26 | Stop reason: HOSPADM

## 2019-01-23 RX ORDER — ACETAMINOPHEN 325 MG/1
650 TABLET ORAL EVERY 4 HOURS PRN
Status: DISCONTINUED | OUTPATIENT
Start: 2019-01-23 | End: 2019-01-26 | Stop reason: HOSPADM

## 2019-01-23 RX ORDER — AMOXICILLIN 250 MG
1 CAPSULE ORAL 2 TIMES DAILY PRN
Status: DISCONTINUED | OUTPATIENT
Start: 2019-01-23 | End: 2019-01-26 | Stop reason: HOSPADM

## 2019-01-23 RX ORDER — OXYTOCIN 10 [USP'U]/ML
10 INJECTION, SOLUTION INTRAMUSCULAR; INTRAVENOUS
Status: DISCONTINUED | OUTPATIENT
Start: 2019-01-23 | End: 2019-01-26 | Stop reason: HOSPADM

## 2019-01-23 RX ORDER — KETOROLAC TROMETHAMINE 30 MG/ML
30 INJECTION, SOLUTION INTRAMUSCULAR; INTRAVENOUS EVERY 6 HOURS
Status: COMPLETED | OUTPATIENT
Start: 2019-01-23 | End: 2019-01-24

## 2019-01-23 RX ORDER — BUPIVACAINE HYDROCHLORIDE 7.5 MG/ML
INJECTION, SOLUTION INTRASPINAL PRN
Status: DISCONTINUED | OUTPATIENT
Start: 2019-01-23 | End: 2019-01-23

## 2019-01-23 RX ORDER — IBUPROFEN 800 MG/1
800 TABLET, FILM COATED ORAL EVERY 6 HOURS PRN
Status: DISCONTINUED | OUTPATIENT
Start: 2019-01-24 | End: 2019-01-26 | Stop reason: HOSPADM

## 2019-01-23 RX ORDER — NALOXONE HYDROCHLORIDE 0.4 MG/ML
.1-.4 INJECTION, SOLUTION INTRAMUSCULAR; INTRAVENOUS; SUBCUTANEOUS
Status: DISCONTINUED | OUTPATIENT
Start: 2019-01-23 | End: 2019-01-26 | Stop reason: HOSPADM

## 2019-01-23 RX ORDER — HYDROCORTISONE 2.5 %
CREAM (GRAM) TOPICAL 3 TIMES DAILY PRN
Status: DISCONTINUED | OUTPATIENT
Start: 2019-01-23 | End: 2019-01-26 | Stop reason: HOSPADM

## 2019-01-23 RX ORDER — PROCHLORPERAZINE 25 MG
25 SUPPOSITORY, RECTAL RECTAL EVERY 12 HOURS PRN
Status: DISCONTINUED | OUTPATIENT
Start: 2019-01-23 | End: 2019-01-26 | Stop reason: HOSPADM

## 2019-01-23 RX ORDER — ACETAMINOPHEN 650 MG/1
650 SUPPOSITORY RECTAL EVERY 4 HOURS PRN
Status: DISCONTINUED | OUTPATIENT
Start: 2019-01-23 | End: 2019-01-26 | Stop reason: HOSPADM

## 2019-01-23 RX ORDER — SODIUM CHLORIDE, SODIUM LACTATE, POTASSIUM CHLORIDE, CALCIUM CHLORIDE 600; 310; 30; 20 MG/100ML; MG/100ML; MG/100ML; MG/100ML
INJECTION, SOLUTION INTRAVENOUS CONTINUOUS
Status: DISCONTINUED | OUTPATIENT
Start: 2019-01-23 | End: 2019-01-23

## 2019-01-23 RX ORDER — MORPHINE SULFATE 1 MG/ML
INJECTION, SOLUTION EPIDURAL; INTRATHECAL; INTRAVENOUS PRN
Status: DISCONTINUED | OUTPATIENT
Start: 2019-01-23 | End: 2019-01-23

## 2019-01-23 RX ORDER — DIPHENHYDRAMINE HYDROCHLORIDE 50 MG/ML
25 INJECTION INTRAMUSCULAR; INTRAVENOUS EVERY 6 HOURS PRN
Status: DISCONTINUED | OUTPATIENT
Start: 2019-01-23 | End: 2019-01-26 | Stop reason: HOSPADM

## 2019-01-23 RX ORDER — ONDANSETRON 2 MG/ML
4 INJECTION INTRAMUSCULAR; INTRAVENOUS EVERY 6 HOURS PRN
Status: DISCONTINUED | OUTPATIENT
Start: 2019-01-23 | End: 2019-01-26 | Stop reason: HOSPADM

## 2019-01-23 RX ORDER — METOCLOPRAMIDE HYDROCHLORIDE 5 MG/ML
10 INJECTION INTRAMUSCULAR; INTRAVENOUS EVERY 6 HOURS PRN
Status: DISCONTINUED | OUTPATIENT
Start: 2019-01-23 | End: 2019-01-26 | Stop reason: HOSPADM

## 2019-01-23 RX ORDER — ONDANSETRON 2 MG/ML
INJECTION INTRAMUSCULAR; INTRAVENOUS PRN
Status: DISCONTINUED | OUTPATIENT
Start: 2019-01-23 | End: 2019-01-23

## 2019-01-23 RX ORDER — CITRIC ACID/SODIUM CITRATE 334-500MG
30 SOLUTION, ORAL ORAL
Status: DISCONTINUED | OUTPATIENT
Start: 2019-01-23 | End: 2019-01-23

## 2019-01-23 RX ORDER — OXYTOCIN/0.9 % SODIUM CHLORIDE 30/500 ML
340 PLASTIC BAG, INJECTION (ML) INTRAVENOUS CONTINUOUS PRN
Status: DISCONTINUED | OUTPATIENT
Start: 2019-01-23 | End: 2019-01-26 | Stop reason: HOSPADM

## 2019-01-23 RX ORDER — CEFAZOLIN SODIUM 1 G/3ML
1 INJECTION, POWDER, FOR SOLUTION INTRAMUSCULAR; INTRAVENOUS SEE ADMIN INSTRUCTIONS
Status: DISCONTINUED | OUTPATIENT
Start: 2019-01-23 | End: 2019-01-23

## 2019-01-23 RX ORDER — BISACODYL 10 MG
10 SUPPOSITORY, RECTAL RECTAL DAILY PRN
Status: DISCONTINUED | OUTPATIENT
Start: 2019-01-25 | End: 2019-01-26 | Stop reason: HOSPADM

## 2019-01-23 RX ADMIN — OXYCODONE HYDROCHLORIDE 5 MG: 5 TABLET ORAL at 22:15

## 2019-01-23 RX ADMIN — ONDANSETRON 4 MG: 2 INJECTION INTRAMUSCULAR; INTRAVENOUS at 04:35

## 2019-01-23 RX ADMIN — Medication 100 ML/HR: at 06:04

## 2019-01-23 RX ADMIN — PHENYLEPHRINE HYDROCHLORIDE 100 MCG: 10 INJECTION, SOLUTION INTRAMUSCULAR; INTRAVENOUS; SUBCUTANEOUS at 04:52

## 2019-01-23 RX ADMIN — SODIUM CHLORIDE, SODIUM LACTATE, POTASSIUM CHLORIDE, CALCIUM CHLORIDE AND DEXTROSE MONOHYDRATE: 5; 600; 310; 30; 20 INJECTION, SOLUTION INTRAVENOUS at 10:44

## 2019-01-23 RX ADMIN — CEFAZOLIN SODIUM 2 G: 2 INJECTION, SOLUTION INTRAVENOUS at 04:38

## 2019-01-23 RX ADMIN — PHENYLEPHRINE HYDROCHLORIDE 200 MCG: 10 INJECTION, SOLUTION INTRAMUSCULAR; INTRAVENOUS; SUBCUTANEOUS at 04:35

## 2019-01-23 RX ADMIN — SENNOSIDES AND DOCUSATE SODIUM 1 TABLET: 8.6; 5 TABLET ORAL at 21:17

## 2019-01-23 RX ADMIN — FENTANYL CITRATE 15 MCG: 50 INJECTION, SOLUTION INTRAMUSCULAR; INTRAVENOUS at 04:35

## 2019-01-23 RX ADMIN — DIPHENHYDRAMINE HYDROCHLORIDE 50 MG: 50 INJECTION, SOLUTION INTRAMUSCULAR; INTRAVENOUS at 17:11

## 2019-01-23 RX ADMIN — ACETAMINOPHEN 650 MG: 325 TABLET, FILM COATED ORAL at 21:25

## 2019-01-23 RX ADMIN — PHENYLEPHRINE HYDROCHLORIDE 100 MCG: 10 INJECTION, SOLUTION INTRAMUSCULAR; INTRAVENOUS; SUBCUTANEOUS at 05:00

## 2019-01-23 RX ADMIN — FENTANYL CITRATE 25 MCG: 50 INJECTION, SOLUTION INTRAMUSCULAR; INTRAVENOUS at 05:11

## 2019-01-23 RX ADMIN — KETOROLAC TROMETHAMINE 30 MG: 30 INJECTION, SOLUTION INTRAMUSCULAR at 18:32

## 2019-01-23 RX ADMIN — SODIUM CHLORIDE, POTASSIUM CHLORIDE, SODIUM LACTATE AND CALCIUM CHLORIDE 500 ML: 600; 310; 30; 20 INJECTION, SOLUTION INTRAVENOUS at 07:00

## 2019-01-23 RX ADMIN — SODIUM CHLORIDE, POTASSIUM CHLORIDE, SODIUM LACTATE AND CALCIUM CHLORIDE: 600; 310; 30; 20 INJECTION, SOLUTION INTRAVENOUS at 04:00

## 2019-01-23 RX ADMIN — OXYTOCIN-SODIUM CHLORIDE 0.9% IV SOLN 30 UNIT/500ML 100 ML/HR: 30-0.9/5 SOLUTION at 06:04

## 2019-01-23 RX ADMIN — KETOROLAC TROMETHAMINE 30 MG: 30 INJECTION, SOLUTION INTRAMUSCULAR at 12:43

## 2019-01-23 RX ADMIN — BUPIVACAINE HYDROCHLORIDE IN DEXTROSE 12 MG: 7.5 INJECTION, SOLUTION SUBARACHNOID at 04:35

## 2019-01-23 RX ADMIN — PHENYLEPHRINE HYDROCHLORIDE 200 MCG: 10 INJECTION, SOLUTION INTRAMUSCULAR; INTRAVENOUS; SUBCUTANEOUS at 04:45

## 2019-01-23 RX ADMIN — DEXAMETHASONE SODIUM PHOSPHATE 4 MG: 4 INJECTION, SOLUTION INTRA-ARTICULAR; INTRALESIONAL; INTRAMUSCULAR; INTRAVENOUS; SOFT TISSUE at 04:35

## 2019-01-23 RX ADMIN — AZITHROMYCIN MONOHYDRATE 500 MG: 500 INJECTION, POWDER, LYOPHILIZED, FOR SOLUTION INTRAVENOUS at 04:15

## 2019-01-23 RX ADMIN — SODIUM CHLORIDE, POTASSIUM CHLORIDE, SODIUM LACTATE AND CALCIUM CHLORIDE 1000 ML: 600; 310; 30; 20 INJECTION, SOLUTION INTRAVENOUS at 04:10

## 2019-01-23 RX ADMIN — Medication 0.15 MG: at 04:35

## 2019-01-23 RX ADMIN — AZITHROMYCIN MONOHYDRATE 500 MG: 500 INJECTION, POWDER, LYOPHILIZED, FOR SOLUTION INTRAVENOUS at 04:28

## 2019-01-23 RX ADMIN — KETOROLAC TROMETHAMINE 30 MG: 30 INJECTION, SOLUTION INTRAMUSCULAR at 06:33

## 2019-01-23 NOTE — PLAN OF CARE
Report given to ROMY Mora. Discussed low BPs with 500ml LR bolus running. Pt baseline per prenatal record 80-100s/50-60s. Pt asymptomatic. Bleeding scant to light with 2/U firm without massage.

## 2019-01-23 NOTE — PLAN OF CARE
Patient stable since transfer to postpartum this morning. Tolerating oral fluids and has ate small amount of oatmeal given by family, no nausea or vomiting, BS faint  not passing flatus pr, advised to keep to fluids until later this afternoon. Small drainage on incision dressing. Patient denies pain. VS stable, BP readings in the lower range but appear unremarkable for this patient as per previous readings. Bro catheter patent and draining. IV fluids continue . Stable at this  time.

## 2019-01-23 NOTE — PROVIDER NOTIFICATION
19 0500   Provider Notification   Provider Name/Title NICU   Method of Notification At Bedside   Request Attend Delivery   NICU in OR post-delivery for meconium stained fluid. 1minute Apgar 9.

## 2019-01-23 NOTE — PROVIDER NOTIFICATION
01/23/19 1250   Provider Notification   Provider Name/Title DR Celis   Method of Notification Phone   Notification Reason Medication Request   Comments   Comments tylenol 975mg as per unit protocol post c/section

## 2019-01-23 NOTE — PROVIDER NOTIFICATION
01/23/19 0700   Provider Notification   Provider Name/Title Dr. Dawson   Method of Notification In Department   Request Evaluate-Remote   Informed of BP's.  Patient asymptomatic. Order received for 500cc bolus LR

## 2019-01-23 NOTE — PROVIDER NOTIFICATION
19 0449   Provider Notification   Provider Name/Title NICU   Method of Notification Electronic Page   Request Attend Delivery   NICU paged to attend  delivery.

## 2019-01-23 NOTE — PLAN OF CARE
Data: [facility] presented to [facility]: 2019  3:30 AM.  Reason for maternal/fetal assessment is uterine contractions, leaking vaginal fluid. Patient reports contractions began at 2300 and water broke at 0139.  Patient is a .  Prenatal record reviewed. Pregnancy has been uncomplicated..  Gestational Age 38w6d. VSS. Fetal movement present. Patient denies pelvic pressure, vomiting, headache, visual disturbances, epigastric or URQ pain, significant edema. Support person is not present.   Action: Verbal consent for EFM. Triage assessment completed. Bill of rights reviewed.  Response: Patient verbalized agreement with plan. Will proceed with  preparations

## 2019-01-23 NOTE — ANESTHESIA CARE TRANSFER NOTE
Patient: Rizwana Plummer    Procedure(s):  Repeat  section    Diagnosis: Previous  Diagnosis Additional Information: Previous C/S in labor  Dr. Banda    Anesthesia Type:   Spinal     Note:  Airway :Room Air  Patient transferred to:Labor and Delivery  Comments: Pt to Labor and delivery MAC #1, VSS, report to RN      Vitals: (Last set prior to Anesthesia Care Transfer)    CRNA VITALS  2019 0502 - 2019 0538      2019             Pulse:  68    SpO2:  100 %                Electronically Signed By: SAHIL Corrigan CRNA  2019  5:38 AM

## 2019-01-23 NOTE — TELEPHONE ENCOUNTER
"Reason for Call/Nurse Assessment:  39 y/o female who is 38w6d and also  calls to report her water broke, it has been leaking for the past hour, she is also reporting contractions that are 5 minutes apart. She is followed by Dr. Laurent out of the [facility], she has a planned  for tomorrow at [facility].     RN Action/Disposiiton:  Advised her per protocols to go now to L&D to be evaluated, have another drive her. RN will call ahead to let them know she is coming.     RN placed call to [facility] station [phone] and spoke with nurse Kenia to let them know that she is on her way.      Desiree Bell RN - [place] Nurse Advisor  2019   2:44    Reason for Disposition    Leakage of fluid from vagina    Additional Information    Negative: [1] SEVERE abdominal pain (e.g., excruciating) AND [2] constant AND [3] present > 1 hour    Negative: Severe bleeding (e.g., continuous red blood from vagina, or large blood clots)    Negative: Umbilical cord hanging out of the vagina (shiny, white, curled appearance, \"like telephone cord\")    Negative: Uncontrollable urge to push (i.e., feels like baby is coming out now)    Negative: Can see baby    Negative: Sounds like a life-threatening emergency to the triager    Negative: Vaginal bleeding    Protocols used: PREGNANCY - RUPTURE OF MEMBRANES-ADULT-AH      "

## 2019-01-23 NOTE — OP NOTE
PREOPERATIVE DIAGNOSES:      1.  Prior  x 2,  desiring repeat.    2.  Intrauterine pregnancy at 38w6d gestation.    3.  Rupture of membranes with meconium stained fluid      POSTOPERATIVE DIAGNOSES:    1.  Same, plus  2. Liveborn female infant    PROCEDURE:  Repeat  delivery.        SURGEON:  Ruthy Banda MD          FINDINGS:      1.  Liveborn  Infant in the LOT presentation.        PROCEDURE NOTE:  The patient was consented for repeat  delivery and was taken to the operating room where spinal anesthetic was administered.  She was prepped and draped in the dorsal supine position with a left lateral tilt.  After assuring adequate anesthesia and after a time out was performed, Pfannenstiel incision was made at the site of previous incision.  Dissection was carried with electrocautery to the fascia which was also opened with both sharp dissection and electrocautery.  The myofascial plane was developed in a cephalad and caudad directions.  The recti muscles were gently split in the midline.  The peritoneal cavity entered bluntly without difficulty.  No intra-abdominal adhesions were noted.  An Balaji retractor was placed. The lower uterine segment was very thin.  A low transverse hysterotomy was made and membranes ruptured bluntly.  Fluid was clear.  The uterine incision was extended digitally.  The fetal head was gently elevated and flexed and gently delivered through the incision.  The remainder of the infant followed without any difficulty.  Infant was immediately vigorous and did cry spontaneously.  The mouth and nares were suctioned.  After a 60 second delay the cord was doubly clamped and cut and the baby was taken to the warmer.  The placenta delivered spontaneously, was inspected and found to be intact with a 3-vessel cord.  The uterus was cleared of all clots and debris.  The uterine incision was inspected and no extensions were noted. The bladder was taken down sharply to give  additional room in the lower uterine segment. The uterus was closed with a running locking stitch of 1 Vicryl, and no imbricating layer was placed due to thin tissue and close approximation to the bladder.  Both adnexae were visualized and tubes and ovaries appeared normal bilaterally. The uterine incision was inspected and found to be hemostatic. The Balaji retractor was removed at this point.  Sponge and instrument counts were reported to me as correct.  Subfascial space was inspected and bleeding vessels were cauterized.  The fascia was reapproximated with a running looped 0 PDS suture. The skin was reapproximated with a running subcuticular stitch of 3-0 Monocryl.  Steri-Strips were placed across the incision.        She tolerated the procedure well and there were no complications noted. Quantitative blood loss for the procedure is pending.  Maternal blood type is Rh positive and therefore RhoGAM was not given.        The patient was returned to the PACU in good condition.  Urine output was adequate and clear throughout the procedure.            WAQAR GUZMAN MD

## 2019-01-23 NOTE — PLAN OF CARE
Data: Rizwana Plummer transferred to [id number] via cart at 0945. Baby transferred via parent's arms.  Action: Receiving unit notified of transfer: Yes. Patient and family notified of room change. Report given to Henna Cordova Rn at 0950. Belongings sent to receiving unit. Accompanied by Registered Nurse. Oriented patient to surroundings. Call light within reach. ID bands double-checked with receiving RN.  Response: Patient tolerated transfer and is stable.

## 2019-01-23 NOTE — H&P
No significant change in general health status based on examination of the patient, review of Nursing Admission Database and prenatal record.    EFW: 7lb     Patient is scheduled for repeat  tomorrow with Dr. Laurent. H&P on file and reviewed. She came in with contractions and c/o rupture of membranes at 130, meconium noted. Last op note reviewed. Risks, benefits, and alternatives reviewed again with patient and consent signed.    Will proceed with RCS at this time.    Rutyh Banda MD

## 2019-01-23 NOTE — ANESTHESIA PREPROCEDURE EVALUATION
Anesthesia Pre-Procedure Evaluation    Patient: Rizwana Plummer   MRN: [id number] : 1980          Preoperative Diagnosis: Previous    Procedure(s):  Repeat  section    Past Medical History:   Diagnosis Date     Anemia      Gastroesophageal reflux disease      Varicella age 5     Past Surgical History:   Procedure Laterality Date      SECTION  2014    Procedure:  SECTION;   Primary  section         SECTION N/A 2017    Procedure:  SECTION;  Surgeon: Cindy Laurent DO;  Location: [facility] OR     Infibulation       MARSUPIALIZATION BARTHOLIN CYST  2013    Procedure: MARSUPIALIZATION BARTHOLIN CYST;  Removal of Inclusion Cyst.   Fetal heart tones 180's;  Surgeon: Rohit Parks MD;  Location: [facility] OR     Anesthesia Evaluation     . Pt has had prior anesthetic. Type: General and Regional    No history of anesthetic complications          ROS/MED HX    ENT/Pulmonary:  - neg pulmonary ROS     Neurologic:  - neg neurologic ROS     Cardiovascular:  - neg cardiovascular ROS       METS/Exercise Tolerance:     Hematologic:  - neg hematologic  ROS       Musculoskeletal:  - neg musculoskeletal ROS       GI/Hepatic:     (+) GERD Symptomatic,       Renal/Genitourinary:  - ROS Renal section negative       Endo:  - neg endo ROS       Psychiatric:  - neg psychiatric ROS       Infectious Disease:  - neg infectious disease ROS       Malignancy:      - no malignancy   Other: Comment: Scheduled for C/S but ruptured for emergent C/S now   (+) Possibly pregnant                         Physical Exam  Normal systems: cardiovascular, pulmonary and dental    Airway   Mallampati: II  TM distance: >3 FB  Neck ROM: full    Dental     Cardiovascular   Rhythm and rate: regular and normal      Pulmonary    breath sounds clear to auscultation    Other findings: Lab Test        01/22/19     11/15/18     11/10/18     11/09/18                       1417          1526          0624           1313          WBC           --          7.9          15.1*        10.1          HGB          8.8*         8.5*         7.6*         8.9*          MCV           --          77*          79           80            PLT           --          273          188          194            Lab Test        11/09/18 06/18/18 06/09/18                       1313          1359          0740          NA           136          133          136           POTASSIUM    3.9          4.1          3.7           CHLORIDE     105          103          103           CO2          21           23           25            BUN          6*           4*           6*            CR           0.54         0.56         0.64          ANIONGAP     10           7            8             ALEJANDRO          8.0*         8.9          8.9           GLC          79           78           92                  Lab Results   Component Value Date    WBC 7.9 11/15/2018    HGB 8.8 (L) 01/22/2019    HCT 27.4 (L) 11/15/2018     11/15/2018     11/09/2018    POTASSIUM 3.9 11/09/2018    CHLORIDE 105 11/09/2018    CO2 21 11/09/2018    BUN 6 (L) 11/09/2018    CR 0.54 11/09/2018    GLC 79 11/09/2018    ALEJANDRO 8.0 (L) 11/09/2018    MAG 1.9 05/27/2013    ALBUMIN 2.6 (L) 11/09/2018    PROTTOTAL 6.4 (L) 11/09/2018    ALT 11 11/09/2018    AST 22 11/09/2018    ALKPHOS 65 11/09/2018    BILITOTAL 0.5 11/09/2018    LIPASE 57 10/21/2013    TSH 1.80 05/03/2018    T4 1.01 01/23/2014    HCG Positive (A) 05/20/2018       Preop Vitals  BP Readings from Last 3 Encounters:   01/23/19 117/55   01/17/19 100/60   01/11/19 98/51    Pulse Readings from Last 3 Encounters:   11/08/18 102   08/14/18 88   08/13/18 93      Resp Readings from Last 3 Encounters:   11/10/18 20   11/08/18 16   11/06/18 16    SpO2 Readings from Last 3 Encounters:   11/09/18 99%   08/13/18 100%   08/06/18 100%      Temp Readings from Last 1 Encounters:   01/23/19 97.5  F (36.4  C) (Oral)    Ht Readings  from Last 1 Encounters:   01/17/19 1.524 m (5')      Wt Readings from Last 1 Encounters:   01/17/19 62.7 kg (138 lb 4.8 oz)    Estimated body mass index is 27.01 kg/m  as calculated from the following:    Height as of 1/17/19: 1.524 m (5').    Weight as of 1/17/19: 62.7 kg (138 lb 4.8 oz).       Anesthesia Plan      History & Physical Review  History and physical reviewed and following examination; no interval change.    ASA Status:  2 emergent.    NPO Status:  Waived due to emergency    Plan for Spinal   PONV prophylaxis:  Ondansetron (or other 5HT-3) and Dexamethasone or Solumedrol       Postoperative Care  Postoperative pain management:  Oral pain medications, IV analgesics and Neuraxial analgesia.      Consents  Anesthetic plan, risks, benefits and alternatives discussed with:  Patient.  Use of blood products discussed: No .   .                 Pavel Stevenson MD                    .

## 2019-01-23 NOTE — ANESTHESIA POSTPROCEDURE EVALUATION
Patient: Rizwana Plummer    Procedure(s):  Repeat  section    Diagnosis:Previous  Diagnosis Additional Information: Previous C/S in labor  Dr. Banda    Anesthesia Type:  Spinal    Note:  Anesthesia Post Evaluation    Patient location during evaluation: Bedside and OB PACU  Patient participation: Able to participate in evaluation but full recovery from regional anesthesia has not yet ocurrred but is anticipated to occur within 48 hours  Level of consciousness: awake  Pain management: adequate  Airway patency: patent  Cardiovascular status: acceptable  Respiratory status: acceptable  Hydration status: acceptable  PONV: none             Last vitals:  Vitals:    19 0555 19 0557 19 0600   BP:  (!) 84/43 (!) 86/50   Resp:  16 16   Temp: 97.2  F (36.2  C)     SpO2:  98% 99%         Electronically Signed By: Pavel Stevenson MD  2019  6:03 AM

## 2019-01-24 LAB — HGB BLD-MCNC: 7.4 G/DL (ref 11.7–15.7)

## 2019-01-24 PROCEDURE — 25000132 ZZH RX MED GY IP 250 OP 250 PS 637: Performed by: OBSTETRICS & GYNECOLOGY

## 2019-01-24 PROCEDURE — 12000000 ZZH R&B MED SURG/OB

## 2019-01-24 PROCEDURE — 36415 COLL VENOUS BLD VENIPUNCTURE: CPT | Performed by: OBSTETRICS & GYNECOLOGY

## 2019-01-24 PROCEDURE — 25000128 H RX IP 250 OP 636: Performed by: OBSTETRICS & GYNECOLOGY

## 2019-01-24 PROCEDURE — 85018 HEMOGLOBIN: CPT | Performed by: OBSTETRICS & GYNECOLOGY

## 2019-01-24 RX ORDER — ZOLPIDEM TARTRATE 5 MG/1
5 TABLET ORAL AT BEDTIME
Status: COMPLETED | OUTPATIENT
Start: 2019-01-24 | End: 2019-01-24

## 2019-01-24 RX ADMIN — ACETAMINOPHEN 650 MG: 325 TABLET, FILM COATED ORAL at 06:38

## 2019-01-24 RX ADMIN — SIMETHICONE CHEW TAB 80 MG 80 MG: 80 TABLET ORAL at 09:34

## 2019-01-24 RX ADMIN — SIMETHICONE CHEW TAB 80 MG 80 MG: 80 TABLET ORAL at 21:13

## 2019-01-24 RX ADMIN — OXYCODONE HYDROCHLORIDE 5 MG: 5 TABLET ORAL at 11:04

## 2019-01-24 RX ADMIN — SENNOSIDES AND DOCUSATE SODIUM 1 TABLET: 8.6; 5 TABLET ORAL at 21:13

## 2019-01-24 RX ADMIN — ACETAMINOPHEN 650 MG: 325 TABLET, FILM COATED ORAL at 14:36

## 2019-01-24 RX ADMIN — OXYCODONE HYDROCHLORIDE 5 MG: 5 TABLET ORAL at 06:38

## 2019-01-24 RX ADMIN — IBUPROFEN 800 MG: 800 TABLET ORAL at 09:33

## 2019-01-24 RX ADMIN — IBUPROFEN 800 MG: 800 TABLET ORAL at 14:37

## 2019-01-24 RX ADMIN — SIMETHICONE CHEW TAB 80 MG 80 MG: 80 TABLET ORAL at 12:53

## 2019-01-24 RX ADMIN — SIMETHICONE CHEW TAB 80 MG 80 MG: 80 TABLET ORAL at 03:44

## 2019-01-24 RX ADMIN — KETOROLAC TROMETHAMINE 30 MG: 30 INJECTION, SOLUTION INTRAMUSCULAR at 00:21

## 2019-01-24 RX ADMIN — DIPHENHYDRAMINE HYDROCHLORIDE 25 MG: 50 INJECTION, SOLUTION INTRAMUSCULAR; INTRAVENOUS at 04:49

## 2019-01-24 RX ADMIN — ZOLPIDEM TARTRATE 5 MG: 5 TABLET, COATED ORAL at 21:12

## 2019-01-24 RX ADMIN — ACETAMINOPHEN 650 MG: 325 TABLET, FILM COATED ORAL at 21:16

## 2019-01-24 RX ADMIN — ACETAMINOPHEN 650 MG: 325 TABLET, FILM COATED ORAL at 10:27

## 2019-01-24 RX ADMIN — OXYCODONE HYDROCHLORIDE 5 MG: 5 TABLET ORAL at 17:10

## 2019-01-24 RX ADMIN — SENNOSIDES AND DOCUSATE SODIUM 2 TABLET: 8.6; 5 TABLET ORAL at 09:33

## 2019-01-24 NOTE — PROVIDER NOTIFICATION
01/23/19 2053   Provider Notification   Provider Name/Title Dr Celis  (Dr Celis)   Method of Notification Phone   Request Evaluate-Remote   Notification Reason Medication Request   Asked the above Dr to put oxycodone PO for pt and to check on existing tylenol order.

## 2019-01-24 NOTE — LACTATION NOTE
This note was copied from a baby's chart.  LC visit.  She reports that she has been formula feeding because she doesn't think her baby is getting enough milk from nursing.  LC assisted with hand expression.  Excellent results noted with milk dripping down her breast.  LC discouraged formula use and discussed ways of knowing her baby is getting enough such as infant output.  She reports that her baby has been latching well, RN was assessing infant during visit.  She was encouraged to call for assistance with latch if needed.

## 2019-01-24 NOTE — PLAN OF CARE
Pt vss today.  Abdomen soft, distended with active bowel sounds.  Air noted in abdomen on percussion this am, pt states she had small soft to slightly loose bm this am. Pt  strongly encouraged to ambulate this am, has not been out of bed much prior to 1300. Pt ambulated with nurse in hallway at 1300, gas meds given. Pt denies nausea. FF at UU, flow wnl.  Voiding. hgb 7/4 this am, Dr. Coleman informed by charge nurse, Grace, on rounds this am. Saline lock patent, left in place. Continue to observe gi status, push ambulation.

## 2019-01-24 NOTE — PLAN OF CARE
Patient vital signs stable and meeting expected outcomes.  Breast and formula feeding infant independently.  Bro catheter draining adequate output; removed at 0440.  Pain well controlled with toradol.  Incision WDL.  Pt ambulated to the bathroom.  Educated pt on hat in the toilet and voiding within 4 hours.  Benadryl given x1 for itching.  Simethicone given for gas pain.  Able to perform all cares for self.  Bonding well with infant.  Will continue to monitor.

## 2019-01-24 NOTE — PROGRESS NOTES
Public Health Nurse (PHN) met with patient, discussed resources within [facility].  Provided [facility] Public Health services resource card, home visiting card, community resource guide and car seat information card given and discussed. Informed of changes to [facility] program Offered referral for home visiting, family declined. Patient declined any questions or concerns.

## 2019-01-24 NOTE — PROGRESS NOTES
[facility] Obstetrics Post-Op / Progress Note         Assessment and Plan:    Assessment:   Post-operative day #1  Low transverse repeat  section    L&D complications: 1.  Prior  x 2,  desiring repeat.    2.  Intrauterine pregnancy at 38w6d gestation.    3.  Rupture of membranes with meconium stained fluid          Doing well.  Clean wound without signs of infection.  Normal healing wound.  No immediate surgical complications identified.  No excessive bleeding  Pain well-controlled.      Plan:   Ambulation encouraged  Breast feeding strategies discussed  Monitor wound for signs of infection  Pain control measures as needed  Reportable signs and symptoms dicussed with the patient           Interval History:   Doing well.  Pain is well-controlled.  No fevers.  No history of wound drainage, warmth or significant erythema.  Good appetite.  Denies chest pain, shortness of breath, nausea or vomiting.  Ambulatory.  Breastfeeding well.          Significant Problems:      Past Medical History:   Diagnosis Date     Anemia      Gastroesophageal reflux disease      Varicella age 5             Review of Systems:    The patient denies any chest pain, shortness of breath, excessive pain, fever, chills, purulent drainage from the wound, nausea or vomiting.          Medications:     All medications related to the patient's surgery have been reviewed  Current Facility-Administered Medications   Medication     acetaminophen (TYLENOL) tablet 650 mg    Or     acetaminophen (TYLENOL) Suppository 650 mg     [START ON 2019] bisacodyl (DULCOLAX) Suppository 10 mg     dextrose 5% in lactated ringers infusion     diphenhydrAMINE (BENADRYL) capsule 25 mg    Or     diphenhydrAMINE (BENADRYL) injection 25 mg     hydrocortisone 2.5 % cream     ibuprofen (ADVIL/MOTRIN) tablet 800 mg     lactated ringers BOLUS 1,000 mL     lanolin ointment     lidocaine (LMX4) cream     lidocaine 1 % 1 mL     magnesium hydroxide (MILK  OF MAGNESIA) suspension 30 mL     metoclopramide (REGLAN) injection 10 mg     misoprostol (CYTOTEC) tablet 800 mcg     naloxone (NARCAN) injection 0.1-0.4 mg     No MMR Needed -  Assessment: Patient does not need MMR vaccine     NO Rho (D) immune globulin (RhoGam) needed - mother Rh POSITIVE     No Tdap Needed - Assessment: Patient does not need Tdap vaccine     ondansetron (ZOFRAN) injection 4 mg     oxyCODONE (ROXICODONE) tablet 5 mg     oxytocin (PITOCIN) 30 units in 500 mL 0.9% NaCl infusion     oxytocin (PITOCIN) 30 units in 500 mL 0.9% NaCl infusion     oxytocin (PITOCIN) injection 10 Units     prochlorperazine (COMPAZINE) injection 10 mg    Or     prochlorperazine (COMPAZINE) Suppository 25 mg     senna-docusate (SENOKOT-S/PERICOLACE) 8.6-50 MG per tablet 1 tablet    Or     senna-docusate (SENOKOT-S/PERICOLACE) 8.6-50 MG per tablet 2 tablet     simethicone (MYLICON) chewable tablet 80 mg     sodium chloride (PF) 0.9% PF flush 3 mL     sodium chloride (PF) 0.9% PF flush 3 mL     sodium chloride 0.9% (bottle) irrigation     tranexamic acid (CYKLOKAPRON) infusion 1 g             Physical Exam:   Vitals were reviewed  All vitals stable  Temp: 98.3  F (36.8  C) Temp src: Oral BP: 99/57 Pulse: 78 Heart Rate: 78 Resp: 20 SpO2: 100 %      Wound clean and dry with minimal or no drainage.  Surrounding skin with minimal erythema.          Data:     All laboratory data related to this surgery reviewed  Hemoglobin   Date Value Ref Range Status   01/24/2019 7.4 (L) 11.7 - 15.7 g/dL Final   01/22/2019 8.8 (L) 11.7 - 15.7 g/dL Final     Comment:     Reviewed: OK with previous   11/15/2018 8.5 (L) 11.7 - 15.7 g/dL Final   11/10/2018 7.6 (L) 11.7 - 15.7 g/dL Final   11/09/2018 8.9 (L) 11.7 - 15.7 g/dL Final     No imaging studies have been ordered    Moose Coleman MD

## 2019-01-24 NOTE — PLAN OF CARE
VSS, pt is drinking well, on regular diet, tolerated well; was up with my assistance and sat in chair, ambulated with assistance and independently in room, yuan is draining clear and yellow urine, adequate output, SL locked; incision is wnl, breast feeding and supplementing her infant with formula; pleasant, cooperative, had many visitors in the afternoon, informed pt about 12-24 hr expectations, answered questions, changed all linens.

## 2019-01-25 PROCEDURE — 12000000 ZZH R&B MED SURG/OB

## 2019-01-25 PROCEDURE — 25000132 ZZH RX MED GY IP 250 OP 250 PS 637: Performed by: OBSTETRICS & GYNECOLOGY

## 2019-01-25 PROCEDURE — 40000083 ZZH STATISTIC IP LACTATION SERVICES 1-15 MIN

## 2019-01-25 RX ORDER — FERROUS GLUCONATE 324(38)MG
324 TABLET ORAL 2 TIMES DAILY WITH MEALS
Status: DISCONTINUED | OUTPATIENT
Start: 2019-01-25 | End: 2019-01-26 | Stop reason: HOSPADM

## 2019-01-25 RX ADMIN — FERROUS GLUCONATE 324 MG: 324 TABLET ORAL at 19:39

## 2019-01-25 RX ADMIN — ACETAMINOPHEN 650 MG: 325 TABLET, FILM COATED ORAL at 16:24

## 2019-01-25 RX ADMIN — OXYCODONE HYDROCHLORIDE 5 MG: 5 TABLET ORAL at 08:35

## 2019-01-25 RX ADMIN — FERROUS GLUCONATE 324 MG: 324 TABLET ORAL at 12:41

## 2019-01-25 RX ADMIN — OXYCODONE HYDROCHLORIDE 5 MG: 5 TABLET ORAL at 12:44

## 2019-01-25 RX ADMIN — ACETAMINOPHEN 650 MG: 325 TABLET, FILM COATED ORAL at 08:35

## 2019-01-25 RX ADMIN — SENNOSIDES AND DOCUSATE SODIUM 1 TABLET: 8.6; 5 TABLET ORAL at 08:35

## 2019-01-25 RX ADMIN — IBUPROFEN 800 MG: 800 TABLET ORAL at 12:41

## 2019-01-25 RX ADMIN — SIMETHICONE CHEW TAB 80 MG 80 MG: 80 TABLET ORAL at 19:04

## 2019-01-25 RX ADMIN — OXYCODONE HYDROCHLORIDE 5 MG: 5 TABLET ORAL at 19:39

## 2019-01-25 RX ADMIN — IBUPROFEN 800 MG: 800 TABLET ORAL at 00:16

## 2019-01-25 RX ADMIN — OXYCODONE HYDROCHLORIDE 5 MG: 5 TABLET ORAL at 00:16

## 2019-01-25 RX ADMIN — OXYCODONE HYDROCHLORIDE 5 MG: 5 TABLET ORAL at 03:30

## 2019-01-25 RX ADMIN — IBUPROFEN 800 MG: 800 TABLET ORAL at 19:39

## 2019-01-25 RX ADMIN — OXYCODONE HYDROCHLORIDE 5 MG: 5 TABLET ORAL at 16:24

## 2019-01-25 RX ADMIN — ACETAMINOPHEN 650 MG: 325 TABLET, FILM COATED ORAL at 03:30

## 2019-01-25 RX ADMIN — SENNOSIDES AND DOCUSATE SODIUM 2 TABLET: 8.6; 5 TABLET ORAL at 19:39

## 2019-01-25 NOTE — PLAN OF CARE
VSS; patient is voiding without difficulty and denies any clots. Managing pain with tylenol, motrin, oxycodone and mylicon. She was encouraged to get out of bed and ambulate in the halls but she has not done so. Baby was placed in the NBN per mothers request and she has taken an ambien to sleep. Instructed patient to turn her phone off, turn lights and television off also. From what I can tell, patient is not symptomatic of a lower hemoglobin. She denies light headedness when out of bed. Breast feeding independently but also formula feeding baby.

## 2019-01-25 NOTE — PLAN OF CARE
Pt stable and meeting expected goals. VSS. Up ad olamide and independent with cares. Pain managed with ibuprofen, tylenol and oxycodone. Pt  once overnight, which went fairly well but bottle fed after breastfeeding. Bonding with infant. Continue to monitor.

## 2019-01-25 NOTE — PROGRESS NOTES
[facility] Obstetrics Post-Op / Progress Note         Assessment and Plan:    Assessment:   Post-operative day #2  Low transverse repeat  section  L&D complications: Previous      No immediate surgical complications identified.  Hg 7.4      Plan:   Ambulation encouraged  Start iron supplementation           Interval History:   Doing well.  Pain is well-controlled.  No fevers.  No history of wound drainage, warmth or significant erythema.  Good appetite.  Denies chest pain, shortness of breath, nausea or vomiting.  Ambulatory.            Significant Problems:    None          Review of Systems:    The patient denies any chest pain, shortness of breath, excessive pain, fever, chills, purulent drainage from the wound, nausea or vomiting.          Medications:   All medications related to the patient's surgery have been reviewed          Physical Exam:   All vitals stable            Data:   All laboratory data related to this surgery reviewed  All imaging studies related to this surgery reviewed    Marlo Erwin MD

## 2019-01-25 NOTE — PLAN OF CARE
Patient up ad olamide in room, voiding without difficulty. Pain controlled with oral pain medications. Incision open to air with steri-strips in place.

## 2019-01-26 VITALS
TEMPERATURE: 97.9 F | DIASTOLIC BLOOD PRESSURE: 58 MMHG | HEART RATE: 79 BPM | OXYGEN SATURATION: 100 % | SYSTOLIC BLOOD PRESSURE: 106 MMHG | RESPIRATION RATE: 18 BRPM

## 2019-01-26 PROCEDURE — 25000132 ZZH RX MED GY IP 250 OP 250 PS 637: Performed by: OBSTETRICS & GYNECOLOGY

## 2019-01-26 RX ORDER — AMOXICILLIN 250 MG
1 CAPSULE ORAL DAILY
Qty: 30 TABLET | Refills: 0 | Status: SHIPPED | OUTPATIENT
Start: 2019-01-26 | End: 2019-02-25

## 2019-01-26 RX ORDER — BREAST PUMP
1 EACH MISCELLANEOUS PRN
Qty: 1 EACH | Refills: 0 | Status: SHIPPED | OUTPATIENT
Start: 2019-01-26 | End: 2019-02-25

## 2019-01-26 RX ORDER — OXYCODONE HYDROCHLORIDE 5 MG/1
5 TABLET ORAL EVERY 6 HOURS PRN
Qty: 20 TABLET | Refills: 0 | Status: SHIPPED | OUTPATIENT
Start: 2019-01-26 | End: 2019-01-29

## 2019-01-26 RX ORDER — IBUPROFEN 600 MG/1
600 TABLET, FILM COATED ORAL EVERY 6 HOURS PRN
Qty: 60 TABLET | Refills: 1 | Status: SHIPPED | OUTPATIENT
Start: 2019-01-26 | End: 2019-02-25

## 2019-01-26 RX ORDER — FERROUS GLUCONATE 324(38)MG
324 TABLET ORAL
Qty: 90 TABLET | Refills: 0 | Status: SHIPPED | OUTPATIENT
Start: 2019-01-26 | End: 2019-02-25

## 2019-01-26 RX ADMIN — OXYCODONE HYDROCHLORIDE 5 MG: 5 TABLET ORAL at 03:42

## 2019-01-26 RX ADMIN — FERROUS GLUCONATE 324 MG: 324 TABLET ORAL at 08:07

## 2019-01-26 RX ADMIN — ACETAMINOPHEN 650 MG: 325 TABLET, FILM COATED ORAL at 07:06

## 2019-01-26 RX ADMIN — IBUPROFEN 800 MG: 800 TABLET ORAL at 08:07

## 2019-01-26 RX ADMIN — IBUPROFEN 800 MG: 800 TABLET ORAL at 01:52

## 2019-01-26 NOTE — PLAN OF CARE
Pt is stable. Voiding adequately. Pain controlled with oral pain medications. Pt performing self care and is able to care for infant. Positive attachment behaviors observed with infant.

## 2019-01-26 NOTE — DISCHARGE INSTRUCTIONS
Postop  Birth Instructions    Follow up with clinic in 6 weeks  Lactation [phone]    Activity       Do not lift more than 10 pounds for 6 weeks after surgery.  Ask family and friends for help when you need it.    No driving until you have stopped taking your pain medications (usually two weeks after surgery).    No heavy exercise or activity for 6 weeks.  Don't do anything that will put a strain on your surgery site.    Don't strain when using the toilet.  Your care team may prescribe a stool softener if you have problems with your bowel movements.     To care for your incision:       Keep the incision clean and dry.    Do not soak your incision in water. No swimming or hot tubs until it has fully healed. You may soak in the bathtub if the water level is below your incision.    Do not use peroxide, gel, cream, lotion, or ointment on your incision.    Adjust your clothes to avoid pressure on your surgery site (check the elastic in your underwear for example).     You may see a small amount of clear or pink drainage and this is normal.  Check with your health care provider:       If the drainage increases or has an odor.    If the incision reddens, you have swelling, or develop a rash.    If you have increased pain and the medicine we prescribed doesn't help.    If you have a fever above 100.4 F (38 C) with or without chills when placing thermometer under your tongue.   The area around your incision (surgery wound), will feel numb.  This is normal. The numbness should go away in less than a year.     Keep your hands clean:  Always wash your hands before touching your incision (surgery wound). This helps reduce your risk of infection. If your hands aren't dirty, you may use an alcohol hand-rub to clean your hands. Keep your nails clean and short.    Call your healthcare provider if you have any of these symptoms:       You soak a sanitary pad with blood within 1 hour, or you see blood clots larger than a  golf ball.    Bleeding that lasts more than 6 weeks.    Vaginal discharge that smells bad.    Severe pain, cramping or tenderness in your lower belly area.    A need to urinate more frequently (use the toilet more often), more urgently (use the toilet very quickly), or it burns when you urinate.    Nausea and vomiting.    Redness, swelling or pain around a vein in your leg.    Problems breastfeeding or a red or painful area on your breast.    Chest pain and cough or are gasping for air.    Problems with coping with sadness, anxiety or depression. If you have concerns about hurting yourself or the baby, call your provider immediately.      You have questions or concerns after you return home.

## 2019-01-26 NOTE — LACTATION NOTE
Lactation to see patient.  Mom reports she plans to breast and formula feed.  No questions or concerns at this time.  Encouraged to call as needed.

## 2019-01-26 NOTE — PROGRESS NOTES
Data: Vital signs within normal limits. Postpartum checks within normal limits - see flow record. Patient eating and drinking normally. Patient able to empty bladder independently and is up ambulating. No apparent signs of infection.  Patient performing self cares and is able to care for infant.  Action: Patient medicated during the shift for pain with ibuprofen. See MAR. Patient reassessed within 1 hour after each medication and pain was improved - patient stated she was comfortable. Patient education complete and patient verbalized understanding. See flow record.  Response: Positive attachment behaviors observed with infant. Support persons  present.   Plan: Anticipate discharge today at 1053. Patient to follow up with clinic in 6 weeks. AVS read to patient and all questions and concerns addressed. Patient educated on signs and symptoms of infection, when to call the doctor, and medications.

## 2019-01-26 NOTE — DISCHARGE SUMMARY
[facility] Obstetrics Post-Op / Progress Note         Assessment and Plan:    Assessment:   Post-operative day #3  Low transverse repeat  section  L&D complications: Repeat  section      Doing well.  Clean wound without signs of infection.  No excessive bleeding  Pain well-controlled.  ABLA due to pre existing anemia and expected intra op losses.  Asymptomatic      Plan:   Discharge later today  Scripts for tylenol, ibuprofen, oxycodone, Fe colace and a breast pump will be provided.           Interval History:   Doing well.  Pain is well-controlled.  No fevers.  No history of wound drainage, warmth or significant erythema.  Good appetite.  Denies chest pain, shortness of breath, nausea or vomiting.  Ambulatory.  Breastfeeding well.          Significant Problems:      Patient Active Problem List   Diagnosis     Female genital infibulation     Epidermal inclusion cyst of infibulation scar     SI (sacroiliac) joint dysfunction     High-risk pregnancy     Previous  section complicating pregnancy     Encounter for triage in pregnant patient     S/P repeat low transverse              Review of Systems:    The patient denies any chest pain, shortness of breath, excessive pain, fever, chills, purulent drainage from the wound, nausea or vomiting.          Medications:   All medications related to the patient's surgery have been reviewed          Physical Exam:     All vitals stable  Patient Vitals for the past 12 hrs:   BP Temp Temp src Pulse Resp   19 0151 107/61 97.5  F (36.4  C) Oral 71 16     Wound clean and dry with minimal or no drainage.  Surrounding skin with minimal erythema.  Ext NT          Data:     Hemoglobin   Date Value Ref Range Status   2019 7.4 (L) 11.7 - 15.7 g/dL Final   2019 8.8 (L) 11.7 - 15.7 g/dL Final     Comment:     Reviewed: OK with previous   11/15/2018 8.5 (L) 11.7 - 15.7 g/dL Final   11/10/2018 7.6 (L) 11.7 - 15.7 g/dL Final   2018  8.9 (L) 11.7 - 15.7 g/dL Final     -    Ari Chou MD  1/26/2019 8:05 AM

## 2019-01-26 NOTE — PLAN OF CARE
Patient up ad olamide in room, voiding without difficulty. Pain controlled with use of oral pain medications. Incision open to air with steri-strips in place.

## 2019-01-26 NOTE — PLAN OF CARE
Pt meeting expected outcomes. Vitals stable. Fundus firm and midline. Incision with steri-strips. C/D/I. Independent with self and baby cares. Taking ibuprofen and oxycodone for pain. Breastfeeding well. Plan for discharge home today.

## 2019-02-04 ENCOUNTER — TELEPHONE (OUTPATIENT)
Dept: OBGYN | Facility: CLINIC | Age: 39
End: 2019-02-04

## 2019-02-05 ENCOUNTER — HOSPITAL ENCOUNTER (EMERGENCY)
Facility: CLINIC | Age: 39
Discharge: HOME | End: 2019-02-06
Attending: EMERGENCY MEDICINE | Admitting: EMERGENCY MEDICINE
Payer: COMMERCIAL

## 2019-02-05 ENCOUNTER — APPOINTMENT (OUTPATIENT)
Dept: CT IMAGING | Facility: CLINIC | Age: 39
End: 2019-02-05
Attending: EMERGENCY MEDICINE
Payer: COMMERCIAL

## 2019-02-05 VITALS
TEMPERATURE: 98 F | SYSTOLIC BLOOD PRESSURE: 106 MMHG | HEART RATE: 76 BPM | DIASTOLIC BLOOD PRESSURE: 61 MMHG | OXYGEN SATURATION: 98 % | RESPIRATION RATE: 20 BRPM

## 2019-02-05 DIAGNOSIS — R19.7 VOMITING AND DIARRHEA: ICD-10-CM

## 2019-02-05 DIAGNOSIS — R11.10 VOMITING AND DIARRHEA: ICD-10-CM

## 2019-02-05 DIAGNOSIS — N30.00 ACUTE CYSTITIS WITHOUT HEMATURIA: ICD-10-CM

## 2019-02-05 LAB
ALBUMIN SERPL-MCNC: 2.9 G/DL (ref 3.4–5)
ALBUMIN UR-MCNC: NEGATIVE MG/DL
ALP SERPL-CCNC: 103 U/L (ref 40–150)
ALT SERPL W P-5'-P-CCNC: 15 U/L (ref 0–50)
ANION GAP SERPL CALCULATED.3IONS-SCNC: 7 MMOL/L (ref 3–14)
ANISOCYTOSIS BLD QL SMEAR: ABNORMAL
APPEARANCE UR: ABNORMAL
AST SERPL W P-5'-P-CCNC: 18 U/L (ref 0–45)
BACTERIA #/AREA URNS HPF: ABNORMAL /HPF
BASOPHILS # BLD AUTO: 0 10E9/L (ref 0–0.2)
BASOPHILS NFR BLD AUTO: 0.5 %
BILIRUB SERPL-MCNC: 0.4 MG/DL (ref 0.2–1.3)
BILIRUB UR QL STRIP: NEGATIVE
BUN SERPL-MCNC: 16 MG/DL (ref 7–30)
CALCIUM SERPL-MCNC: 8.6 MG/DL (ref 8.5–10.1)
CHLORIDE SERPL-SCNC: 106 MMOL/L (ref 94–109)
CO2 SERPL-SCNC: 25 MMOL/L (ref 20–32)
COLOR UR AUTO: YELLOW
CREAT SERPL-MCNC: 0.6 MG/DL (ref 0.52–1.04)
DIFFERENTIAL METHOD BLD: ABNORMAL
ELLIPTOCYTES BLD QL SMEAR: SLIGHT
EOSINOPHIL # BLD AUTO: 0.3 10E9/L (ref 0–0.7)
EOSINOPHIL NFR BLD AUTO: 3.4 %
ERYTHROCYTE [DISTWIDTH] IN BLOOD BY AUTOMATED COUNT: 18.1 % (ref 10–15)
GFR SERPL CREATININE-BSD FRML MDRD: >90 ML/MIN/{1.73_M2}
GLUCOSE SERPL-MCNC: 89 MG/DL (ref 70–99)
GLUCOSE UR STRIP-MCNC: NEGATIVE MG/DL
HCT VFR BLD AUTO: 39.6 % (ref 35–47)
HGB BLD-MCNC: 11.4 G/DL (ref 11.7–15.7)
HGB UR QL STRIP: NEGATIVE
IMM GRANULOCYTES # BLD: 0.1 10E9/L (ref 0–0.4)
IMM GRANULOCYTES NFR BLD: 1 %
KETONES UR STRIP-MCNC: NEGATIVE MG/DL
LEUKOCYTE ESTERASE UR QL STRIP: ABNORMAL
LIPASE SERPL-CCNC: 82 U/L (ref 73–393)
LYMPHOCYTES # BLD AUTO: 1.6 10E9/L (ref 0.8–5.3)
LYMPHOCYTES NFR BLD AUTO: 20 %
MCH RBC QN AUTO: 21.3 PG (ref 26.5–33)
MCHC RBC AUTO-ENTMCNC: 28.8 G/DL (ref 31.5–36.5)
MCV RBC AUTO: 74 FL (ref 78–100)
MICROCYTES BLD QL SMEAR: PRESENT
MONOCYTES # BLD AUTO: 0.4 10E9/L (ref 0–1.3)
MONOCYTES NFR BLD AUTO: 5.3 %
MUCOUS THREADS #/AREA URNS LPF: PRESENT /LPF
NEUTROPHILS # BLD AUTO: 5.5 10E9/L (ref 1.6–8.3)
NEUTROPHILS NFR BLD AUTO: 69.8 %
NITRATE UR QL: NEGATIVE
NRBC # BLD AUTO: 0 10*3/UL
NRBC BLD AUTO-RTO: 0 /100
PH UR STRIP: 5 PH (ref 5–7)
PLATELET # BLD AUTO: 422 10E9/L (ref 150–450)
PLATELET # BLD EST: ABNORMAL 10*3/UL
POTASSIUM SERPL-SCNC: 4 MMOL/L (ref 3.4–5.3)
PROT SERPL-MCNC: 7.5 G/DL (ref 6.8–8.8)
RBC # BLD AUTO: 5.35 10E12/L (ref 3.8–5.2)
RBC #/AREA URNS AUTO: 5 /HPF (ref 0–2)
SODIUM SERPL-SCNC: 138 MMOL/L (ref 133–144)
SOURCE: ABNORMAL
SP GR UR STRIP: 1.03 (ref 1–1.03)
SQUAMOUS #/AREA URNS AUTO: <1 /HPF (ref 0–1)
UROBILINOGEN UR STRIP-MCNC: 0 MG/DL (ref 0–2)
WBC # BLD AUTO: 7.9 10E9/L (ref 4–11)
WBC #/AREA URNS AUTO: >182 /HPF (ref 0–5)

## 2019-02-05 PROCEDURE — 74177 CT ABD & PELVIS W/CONTRAST: CPT

## 2019-02-05 PROCEDURE — 81001 URINALYSIS AUTO W/SCOPE: CPT | Performed by: EMERGENCY MEDICINE

## 2019-02-05 PROCEDURE — 85025 COMPLETE CBC W/AUTO DIFF WBC: CPT | Performed by: EMERGENCY MEDICINE

## 2019-02-05 PROCEDURE — 25000132 ZZH RX MED GY IP 250 OP 250 PS 637: Performed by: EMERGENCY MEDICINE

## 2019-02-05 PROCEDURE — 25000128 H RX IP 250 OP 636: Performed by: EMERGENCY MEDICINE

## 2019-02-05 PROCEDURE — 87088 URINE BACTERIA CULTURE: CPT | Performed by: EMERGENCY MEDICINE

## 2019-02-05 PROCEDURE — 80053 COMPREHEN METABOLIC PANEL: CPT | Performed by: EMERGENCY MEDICINE

## 2019-02-05 PROCEDURE — 99285 EMERGENCY DEPT VISIT HI MDM: CPT | Mod: 25

## 2019-02-05 PROCEDURE — 83690 ASSAY OF LIPASE: CPT | Performed by: EMERGENCY MEDICINE

## 2019-02-05 PROCEDURE — 87086 URINE CULTURE/COLONY COUNT: CPT | Performed by: EMERGENCY MEDICINE

## 2019-02-05 PROCEDURE — 25000125 ZZHC RX 250: Performed by: EMERGENCY MEDICINE

## 2019-02-05 PROCEDURE — 96360 HYDRATION IV INFUSION INIT: CPT

## 2019-02-05 PROCEDURE — 96361 HYDRATE IV INFUSION ADD-ON: CPT

## 2019-02-05 RX ORDER — CEPHALEXIN 500 MG/1
500 CAPSULE ORAL 2 TIMES DAILY
Qty: 14 CAPSULE | Refills: 0 | Status: SHIPPED | OUTPATIENT
Start: 2019-02-05 | End: 2019-02-12

## 2019-02-05 RX ORDER — IOPAMIDOL 755 MG/ML
500 INJECTION, SOLUTION INTRAVASCULAR ONCE
Status: COMPLETED | OUTPATIENT
Start: 2019-02-05 | End: 2019-02-05

## 2019-02-05 RX ORDER — ONDANSETRON 4 MG/1
4 TABLET, ORALLY DISINTEGRATING ORAL EVERY 8 HOURS PRN
Qty: 10 TABLET | Refills: 0 | Status: SHIPPED | OUTPATIENT
Start: 2019-02-05 | End: 2019-03-07

## 2019-02-05 RX ORDER — ACETAMINOPHEN 325 MG/1
975 TABLET ORAL ONCE
Status: COMPLETED | OUTPATIENT
Start: 2019-02-05 | End: 2019-02-05

## 2019-02-05 RX ORDER — SODIUM CHLORIDE 9 MG/ML
1000 INJECTION, SOLUTION INTRAVENOUS CONTINUOUS
Status: DISCONTINUED | OUTPATIENT
Start: 2019-02-05 | End: 2019-02-06 | Stop reason: HOSPADM

## 2019-02-05 RX ORDER — MAGNESIUM HYDROXIDE/ALUMINUM HYDROXICE/SIMETHICONE 120; 1200; 1200 MG/30ML; MG/30ML; MG/30ML
20 SUSPENSION ORAL EVERY 4 HOURS PRN
Qty: 150 ML | Refills: 0 | Status: SHIPPED | OUTPATIENT
Start: 2019-02-05 | End: 2019-03-07

## 2019-02-05 RX ADMIN — ACETAMINOPHEN 975 MG: 325 TABLET, FILM COATED ORAL at 23:40

## 2019-02-05 RX ADMIN — IOPAMIDOL 70 ML: 755 INJECTION, SOLUTION INTRAVENOUS at 23:15

## 2019-02-05 RX ADMIN — LIDOCAINE HYDROCHLORIDE 30 ML: 20 SOLUTION ORAL; TOPICAL at 19:27

## 2019-02-05 RX ADMIN — SODIUM CHLORIDE 81 ML: 9 INJECTION, SOLUTION INTRAVENOUS at 23:15

## 2019-02-05 RX ADMIN — SODIUM CHLORIDE 1000 ML: 9 INJECTION, SOLUTION INTRAVENOUS at 19:27

## 2019-02-05 NOTE — ED AVS SNAPSHOT
[facility] Emergency Department  [address]  [facility] [address]  Phone:  [phone]  Fax:  [phone]                                    Rizwana Plummer   MRN: [id number]    Department:  [facility] Emergency Department   Date of Visit:  2/5/2019           After Visit Summary Signature Page    I have received my discharge instructions, and my questions have been answered. I have discussed any challenges I see with this plan with the nurse or doctor.    ..........................................................................................................................................  Patient/Patient Representative Signature      ..........................................................................................................................................  Patient Representative Print Name and Relationship to Patient    ..................................................               ................................................  Date                                   Time    ..........................................................................................................................................  Reviewed by Signature/Title    ...................................................              ..............................................  Date                                               Time          22EPIC Rev 08/18

## 2019-02-06 NOTE — ED PROVIDER NOTES
History     Chief Complaint:    Abdominal Pain, Vomiting, and Diarrhea     HPI   Rizwana Plummer is a 38 year old female who presents with abdominal pain, vomiting, and diarrhea. The patient reports that for the past 2 days she has had intermittent upper abdominal pain with vomiting and diarrhea. 10 minutes after she has her abdominal cramping, the patient will feel like she has to have a bowel movement and vomit. She is also endorsing having chills and feels like she could have a had a fever. When the patient eats she has vomiting afterwards. Today, the patients started to notice dysuria which she did not have prior. The patient denies blood in vomit or stool, frequency or hematuria, vaginal pain, discharge, odor, or bleeding, or eaten anything recently that made her sick. Nothing seems to improve or worsen the symptoms and the patient has not recently taken antibiotics. She states that today she took Zofran around 1700 which improved the nausea and she took Tylenol for the pain. The patient has recently took prenatal vitamins due to being instructed for low iron levels. Lastly, the patient details that her  4 days ago was ill with similar symptoms and her daughter currently has a sore throat with an ear infection. Of note, she had her 3rd  on 19, with no complications of which she stopped bleeding 2 days ago and has had no malodorous discharge.     Allergies:  No known drug allergies.       Medications:    Fergon  Breast Pump  Prenatal Vitamins  Senokot-S;Pericolace     Past Medical History:    Anemia  GERD  Varicella  SI joint dysfunction  Female genital infibulation  epidural inclusion cyst of infibulation scar     Past Surgical History:    C Section (, , 2019)  Marsupialization bartholin Cyst    Family History:    Mother: Cerebrovascular Disease, Diabetes, Hypertension  Father: Hypertension    Social History:  The patient was accompanied to the ED by sister.  Smoking Status: Never  Smoker  Smokeless Tobacco: Never Used  Alcohol Use: Negative  Drug Use: Negative  PCP: Cindy Laurent   Marital Status:        Review of Systems   All other systems reviewed and are negative.    Physical Exam     Patient Vitals for the past 24 hrs:   BP Temp Temp src Pulse Resp SpO2   02/05/19 2345 -- -- -- -- -- 98 %   02/05/19 2330 106/61 -- -- 76 -- 98 %   02/05/19 2145 -- -- -- -- -- 98 %   02/05/19 2130 -- -- -- -- -- 96 %   02/05/19 2115 -- -- -- 77 -- 96 %   02/05/19 2100 -- -- -- 73 -- 98 %   02/05/19 2030 91/46 -- -- 74 -- 98 %   02/05/19 2015 97/61 -- -- 72 -- 100 %   02/05/19 2000 101/61 -- -- 72 -- 99 %   02/05/19 1945 97/55 -- -- 72 -- 99 %   02/05/19 1930 97/61 -- -- 78 -- 100 %   02/05/19 1757 99/64 98  F (36.7  C) Oral 98 20 97 %      Physical Exam  General: Resting on the bed.  Head: No obvious trauma to head.  Ears, Nose, Throat:  External ears normal.  Nose normal.  No pharyngeal erythema, swelling or exudate.  Midline uvula.    Eyes:  Conjunctivae clear.    CV: Regular rate and rhythm.  No murmurs.      Respiratory: Effort normal and breath sounds normal.  No wheezing or crackles.   Gastrointestinal: Soft.  No distension. There is mild epigastric tenderness.  There is no rigidity, no rebound and no guarding.   Musculoskeletal: No cva tenderness  Skin: Skin is warm and dry.  No rash noted.     Emergency Department Course     Imaging:  Radiology findings were communicated with the patient who voiced understanding of the findings.    CT Abdomen Pelvis w Contrast   Preliminary Result   IMPRESSION:   1. No bowel obstruction or inflammation.   2. Postpartum uterus. The endometrium measures up to 1.3 cm.   3. Very small amount of ascites.        Reading per radiology     Laboratory:  Laboratory findings were communicated with the patient who voiced understanding of the findings.    CBC: WBC 7.9, HGB 11.4 (L),   CMP: Albumin 2.9 (L) o/w WNL (Creatinine 0.60)  UA: Leukocyte Esterase  large (A), WBC >192 (H), RBC 5 (H), Bacteria few (A), Mucous present (A), o/w wnl.   Lipase: 82    Interventions:  1927 NS 1000 ml IV  192 GI Cocktail 30 mL PO  2315 NS 81 mL IV  231 ISOVUE 370 70 mL IV  2340 Tylenol 975 mg PO    Emergency Department Course:     Nursing notes and vitals reviewed.    0 I performed an exam of the patient as documented above.      IV was inserted and blood was drawn for laboratory testing, results above.      The patient provided a urine sample here in the emergency department. This was sent for laboratory testing, findings above.      The patient was sent for a CT while in the emergency department, results above.      2331 I spoke with radiology regarding patient's plan of care.      I personally reviewed the imaging and lab results with the patient and answered all related questions prior to discharge.    Impression & Plan      Medical Decision Makin-year-old female status post recent  section presents with epigastric discomfort.  Vital signs are unremarkable.  Patient notes that she is always mildly hypotensive.  Broad differential was pursued including but not limited to pancreatitis, hepatitis, cholecystitis, endometritis, UTI, pyelonephritis, gastroenteritis, dehydration, electrolyte metabolic or renal dysfunction, etc.  Patient is well-appearing nontoxic.  CBC has mild anemia but no leukocytosis.  CMP shows no acute electrolyte metabolic or renal dysfunction.  LFTs and lipase are reassuring not concerning for obstructive biliary process, hepatitis, or pancreatitis.  No hypertension or abnl LFTs or RUQ tenderness to suggest preeclampsia.  Urinalysis is consistent with a UTI which large leukoesterase greater than 182 white cells.  There is only 5 RBCs therefore low suspicion for infected stone or nephrolithiasis.  This does not appear to be a contaminanted sample and patient is endorsing some burning with urination.  No flank pain, no fevers, no  suggestion of pyelonephritis.  Urine culture was added on later.  The patient has persistent epigastric pain.  With her recent  section opted to obtain a CT scan to rule out other etiologies as well.  No evidence of bowel obstruction or inflammation.  CT does show a postpartum appearing uterus and patient denies any uterine discharge that is malodorous to suggest endometritis.  Additionally there is no discharge to suggest vaginitis, PID, cervicitis etc.  There is small amount of ascites noted suspect probably secondary to the recent  section.  No evidence of pancreatitis or other abnormalities noted.  Gallbladder is otherwise mildly distended but no stones.  Patient has no right upper quadrant tenderness to suggest cholecystitis or cholangitis.  Patient tolerating p.o.  She is otherwise well-appearing.  Her  recently had vomiting and diarrhea therefore suspect most likely viral gastroenteritis.  Patient given supportive cares and treatment for UTI.  Close follow-up with her primary care doctor as advised.  Strict return precautions were discussed.    Diagnosis:    ICD-10-CM    1. Acute cystitis without hematuria N30.00    2. Vomiting and diarrhea R11.10     R19.7         Disposition:   The patient is discharged to home.     Discharge Medications:       Review of your medicines      UNREVIEWED medicines. Ask your doctor about these medicines      Dose / Directions   Doxylamine-Pyridoxine 10-10 MG Tbec  Ask about: Should I take this medication?      Dose:  1 capsule  Take 1 capsule by mouth 3 times daily for 10 days  Quantity:  30 tablet  Refills:  0     ferrous gluconate 324 (38 Fe) MG tablet  Commonly known as:  FERGON  Used for:  S/P repeat low transverse       Dose:  324 mg  Take 1 tablet (324 mg) by mouth daily (with breakfast)  Quantity:  90 tablet  Refills:  0     ibuprofen 600 MG tablet  Commonly known as:  ADVIL/MOTRIN  Used for:  S/P repeat low transverse       Dose:   600 mg  Take 1 tablet (600 mg) by mouth every 6 hours as needed for moderate pain  Quantity:  60 tablet  Refills:  1     * ondansetron 4 MG ODT tab  Commonly known as:  ZOFRAN ODT  Ask about: Should I take this medication?      Dose:  4 mg  Take 1 tablet (4 mg) by mouth every 8 hours as needed for nausea  Quantity:  10 tablet  Refills:  0     oxyCODONE 5 MG tablet  Commonly known as:  ROXICODONE  Used for:  S/P repeat low transverse   Ask about: Should I take this medication?      Dose:  5 mg  Take 1 tablet (5 mg) by mouth every 6 hours as needed for pain  Quantity:  20 tablet  Refills:  0     prenatal multivitamin w/iron 27-0.8 MG tablet      Dose:  1 tablet  Take 1 tablet by mouth daily  Quantity:  100 tablet  Refills:  3     senna-docusate 8.6-50 MG tablet  Commonly known as:  SENOKOT-S/PERICOLACE  Used for:  S/P repeat low transverse       Dose:  1 tablet  Take 1 tablet by mouth daily  Quantity:  30 tablet  Refills:  0     TYLENOL PO      Dose:  325 mg  Take 325 mg by mouth every 6 hours as needed  Refills:  0         * This list has 1 medication(s) that are the same as other medications prescribed for you. Read the directions carefully, and ask your doctor or other care provider to review them with you.            START taking      Dose / Directions   cephALEXin 500 MG capsule  Commonly known as:  KEFLEX      Dose:  500 mg  Take 1 capsule (500 mg) by mouth 2 times daily for 7 days  Quantity:  14 capsule  Refills:  0     * ondansetron 4 MG ODT tab  Commonly known as:  ZOFRAN ODT      Dose:  4 mg  Take 1 tablet (4 mg) by mouth every 8 hours as needed for nausea  Quantity:  10 tablet  Refills:  0         * This list has 1 medication(s) that are the same as other medications prescribed for you. Read the directions carefully, and ask your doctor or other care provider to review them with you.            CONTINUE these medicines which have NOT CHANGED      Dose / Directions   breast pump Misc  Used  for:  S/P repeat low transverse       Dose:  1 each  1 each as needed  Quantity:  1 each  Refills:  0           Where to get your medicines      Some of these will need a paper prescription and others can be bought over the counter. Ask your nurse if you have questions.    Bring a paper prescription for each of these medications    cephALEXin 500 MG capsule    ondansetron 4 MG ODT tab         Scribe Disclosure:  I, Orla Severson, am serving as a scribe at 6:46 PM on 2019 to document services personally performed by Christal Rangel MD based on my observations and the provider's statements to me.     [facility] EMERGENCY DEPARTMENT       Christal Rangel MD  19 0204

## 2019-02-06 NOTE — DISCHARGE INSTRUCTIONS
Return to the ED if you are unable to tolerate fluids, intractable nausea or vomiting, severe abdominal pain, fevers >101 or other acute changes.  Please follow up with your PCP in 2-3 days.      Discharge Instructions  Urinary Tract Infection  You or your child have been diagnosed with a urinary tract infection, or UTI. The urinary tract includes the kidneys (which make urine/pee), ureters (the tubes that carry urine/pee from the kidneys to the bladder), the bladder (which stores urine/pee), and urethra (the tube that carries urine/pee out of the bladder). Urinary tract infections occur when bacteria travel up the urethra into the bladder (bladder infection) and, in some cases, from there into the kidneys (kidney infection).  Generally, every Emergency Department visit should have a follow-up clinic visit with either a primary or a specialty clinic/provider. Please follow-up as instructed by your emergency provider today.  Return to the Emergency Department if:  You or your child have severe back pain.  You or your child are vomiting (throwing up) so that you cannot take your medicine.  You or your child have a new fever (had not previously had a fever) over 101 F.  You or your child have confusion or are very weak, or feel very ill.  Your child seems much more ill, will not wake up, will not respond right, or is crying for a long time and will not calm down.  You or your child are showing signs of dehydration. These signs may include decreased urination (pee), dry mouth/gums/tongue, or decreased activity.    Follow-up with your provider:   Children under 24 months need to be seen by their regular provider within one week after a diagnosis of a UTI. It may be necessary to do some more tests to look at the child?s kidney or bladder.  You should begin to feel better within 24 - 48 hours of starting your antibiotic; follow-up with your regular clinic/doctor/provider if this is not the case.    Treatment:   You will be  treated with an antibiotic to kill the bacteria. We have to make an educated guess, based on what we know about common bacteria and antibiotics, as to which antibiotic will work for your infection. We will be correct most times but there will be some cases where the antibiotic chosen is not correct (see urine cultures below).  Take a pain medication such as acetaminophen (Tylenol ) or ibuprofen (Advil , Motrin , Nuprin ).  Phenazopyridine (Pyridium , Uristat ) is a prescription medication that numbs the bladder to reduce the burning pain of some UTIs.  The same medication is available in a non-prescription version (Azo-Standard , Urodol ). This medication will change the color of the urine and tears (usually blue or orange). If you wear contacts, do not wear them while taking this medication as they may be stained by the medication.    Urine Cultures:  If indicated, a urine culture may have been performed today. This test generally takes 24-48 hours to complete so the results are not known at this time. The results can confirm that an infection is present but also determine which antibiotic is effective for the specific bacteria that is causing the infection. If your urine culture shows that the antibiotic you were given today will not work to treat your infection, we will attempt to contact you to make arrangements to change the antibiotic. If the culture confirms that the antibiotic is effective for your infection, you will not be contacted. We often recommend follow-up with your regular physician/provider on the culture results regardless of this process.    Antibiotic Warning:   If you have been placed on antibiotics - watch for signs of allergic reaction.  These include rash, lip swelling, difficulty breathing, wheezing, and dizziness.  If you develop any of these symptoms, stop the antibiotic immediately and go to an emergency room or urgent care for evaluation.    Probiotics: If you have been given an  "antibiotic, you may want to also take a probiotic pill or eat yogurt with live cultures. Probiotics have \"good bacteria\" to help your intestines stay healthy. Studies have shown that probiotics help prevent diarrhea and other intestine problems (including C. diff infection) when you take antibiotics. You can buy these without a prescription in the pharmacy section of the store.   If you were given a prescription for medicine here today, be sure to read all of the information (including the package insert) that comes with your prescription.  This will include important information about the medicine, its side effects, and any warnings that you need to know about.  The pharmacist who fills the prescription can provide more information and answer questions you may have about the medicine.  If you have questions or concerns that the pharmacist cannot address, please call or return to the Emergency Department.   Remember that you can always come back to the Emergency Department if you are not able to see your regular provider in the amount of time listed above, if you get any new symptoms, or if there is anything that worries you.    Discharge Instructions  Abdominal Pain    Abdominal pain (belly pain) can be caused by many things. Your evaluation today does not show the exact cause for your pain. Your provider today has decided that it is unlikely your pain is due to a life threatening problem, or a problem requiring surgery or hospital admission. Sometimes those problems cannot be found right away, so it is very important that you follow up as directed.  Sometimes only the changes which occur over time allow the cause of your pain to be found.    Generally, every Emergency Department visit should have a follow-up clinic visit with either a primary or a specialty clinic/provider. Please follow-up as instructed by your emergency provider today. With abdominal pain, we often recommend very close follow-up, such as the " following day.    ADULTS:  Return to the Emergency Department right away if:    You get an oral temperature above 102oF or as directed by your provider.  You have blood in your stools. This may be bright red or appear as black, tarry stools.    You keep vomiting (throwing up) or cannot drink liquids.  You see blood when you vomit.   You cannot have a bowel movement or you cannot pass gas.  Your stomach gets bloated or bigger.  Your skin or the whites of your eyes look yellow.  You faint.  You have bloody, frequent or painful urination (peeing).  You have new symptoms or anything that worries you.    CHILDREN:  Return to the Emergency Department right away if your child has any of the above-listed symptoms or the following:    Pushes your hand away or screams/cries when his/her belly is touched.  You notice your child is very fussy or weak.  Your child is very tired and is too tired to eat or drink.  Your child is dehydrated.  Signs of dehydration can be:  Significant change in the amount of wet diapers/urine.  Your infant or child starts to have dry mouth and lips, or no saliva (spit) or tears.    PREGNANT WOMEN:  Return to the Emergency Department right away if you have any of the above-listed symptoms or the following:    You have bleeding, leaking fluid or passing tissue from the vagina.  You have worse pain or cramping, or pain in your shoulder or back.  You have vomiting that will not stop.  You have a temperature of 100oF or more.  Your baby is not moving as much as usual.  You faint.  You get a bad headache with or without eye problems and abdominal pain.  You have a seizure.  You have unusual discharge from your vagina and abdominal pain.    Abdominal pain is pretty common during pregnancy.  Your pain may or may not be related to your pregnancy. You should follow-up closely with your OB provider so they can evaluate you and your baby.  Until you follow-up with your regular provider, do the following:  "    Avoid sex and do not put anything in your vagina.  Drink clear fluids.  Only take medications approved by your provider.    MORE INFORMATION:    Appendicitis:  A possible cause of abdominal pain in any person who still has their appendix is acute appendicitis. Appendicitis is often hard to diagnose.  Testing does not always rule out early appendicitis or other causes of abdominal pain. Close follow-up with your provider and re-evaluations may be needed to figure out the reason for your abdominal pain.    Follow-up:  It is very important that you make an appointment with your clinic and go to the appointment.  If you do not follow-up with your primary provider, it may result in missing an important development which could result in permanent injury or disability and/or lasting pain.  If there is any problem keeping your appointment, call your provider or return to the Emergency Department.    Medications:  Take your medications as directed by your provider today.  Before using over-the-counter medications, ask your provider and make sure to take the medications as directed.  If you have any questions about medications, ask your provider.    Diet:  Resume your normal diet as much as possible, but do not eat fried, fatty or spicy foods while you have pain.  Do not drink alcohol or have caffeine.  Do not smoke tobacco.    Probiotics: If you have been given an antibiotic, you may want to also take a probiotic pill or eat yogurt with live cultures. Probiotics have \"good bacteria\" to help your intestines stay healthy. Studies have shown that probiotics help prevent diarrhea (loose stools) and other intestine problems (including C. diff infection) when you take antibiotics. You can buy these without a prescription in the pharmacy section of the store.     If you were given a prescription for medicine here today, be sure to read all of the information (including the package insert) that comes with your prescription.  " This will include important information about the medicine, its side effects, and any warnings that you need to know about.  The pharmacist who fills the prescription can provide more information and answer questions you may have about the medicine.  If you have questions or concerns that the pharmacist cannot address, please call or return to the Emergency Department.       Remember that you can always come back to the Emergency Department if you are not able to see your regular provider in the amount of time listed above, if you get any new symptoms, or if there is anything that worries you.

## 2019-02-07 NOTE — RESULT ENCOUNTER NOTE
Await final culture report per [place] ED Lab Result protocol.  RN confirmed Patient was prescribed antibiotic from ED visit.

## 2019-02-08 LAB
BACTERIA SPEC CULT: ABNORMAL
BACTERIA SPEC CULT: ABNORMAL
Lab: ABNORMAL
SPECIMEN SOURCE: ABNORMAL

## 2019-03-20 ENCOUNTER — PRENATAL OFFICE VISIT (OUTPATIENT)
Dept: OBGYN | Facility: CLINIC | Age: 39
End: 2019-03-20
Payer: COMMERCIAL

## 2019-03-20 VITALS
SYSTOLIC BLOOD PRESSURE: 90 MMHG | BODY MASS INDEX: 25.54 KG/M2 | WEIGHT: 130.1 LBS | DIASTOLIC BLOOD PRESSURE: 60 MMHG | HEIGHT: 60 IN

## 2019-03-20 DIAGNOSIS — Z30.011 ENCOUNTER FOR INITIAL PRESCRIPTION OF CONTRACEPTIVE PILLS: Primary | ICD-10-CM

## 2019-03-20 PROCEDURE — 99207 ZZC POST PARTUM EXAM: CPT | Performed by: FAMILY MEDICINE

## 2019-03-20 PROCEDURE — G0145 SCR C/V CYTO,THINLAYER,RESCR: HCPCS | Performed by: FAMILY MEDICINE

## 2019-03-20 PROCEDURE — G0476 HPV COMBO ASSAY CA SCREEN: HCPCS | Performed by: FAMILY MEDICINE

## 2019-03-20 RX ORDER — ACETAMINOPHEN AND CODEINE PHOSPHATE 120; 12 MG/5ML; MG/5ML
0.35 SOLUTION ORAL DAILY
Qty: 90 TABLET | Refills: 3 | Status: SHIPPED | OUTPATIENT
Start: 2019-03-20 | End: 2020-09-21

## 2019-03-20 ASSESSMENT — PATIENT HEALTH QUESTIONNAIRE - PHQ9: SUM OF ALL RESPONSES TO PHQ QUESTIONS 1-9: 0

## 2019-03-20 ASSESSMENT — MIFFLIN-ST. JEOR: SCORE: 1181.63

## 2019-03-20 NOTE — PATIENT INSTRUCTIONS
Start Oral Contraceptive     Return yearly     Dr. Cindy Laurent, DO    Obstetrics and Gynecology  [facility] [place] [facility] [place]

## 2019-03-20 NOTE — PROGRESS NOTES
SUBJECTIVE: Rizwana is here for a 6-week postpartum checkup.    Delivery date was 1/23/19. She had a repeat c/s of a viable girl, weight 8 pounds 5 oz., with no complications.  Since delivery, she has been breast feeding.  She has no signs of infection, bleeding or other complications.  She is not pregnant.  We discussed contraceptions and she has chosen none.  She  has not had intercourse since delivery and complains of No discomfort. Patient screened for postpartum depression and complaints are NEGATIVE. Screening has also been completed for intimate partner violence.        - Pt will be travelling to [place] soon for 4 months, is going with her children for a vacation. Her  will not be going with her, so she is not worried about becoming pregnant during that time.         This document serves as a record of the services and decisions personally performed and made by Cindy Laurent DO. It was created on her behalf by Danisha Wing, a trained medical scribe. The creation of this document is based the provider's statements to the medical scribe.  Scribe Danisha Wing 3:31 PM, March 20, 2019    EXAM:  Today's Depression Rating was 0  PHQ-9 SCORE 3/20/2019   PHQ-9 Total Score -   PHQ-9 Total Score 0     GENERAL healthy, alert and no distress  EYES EOMI, intact visual fields, PERRL and funduscopic deferred  HENT: Normocephalic. TM's grossly normal, oropharynx without significant findings.  NECK: no adenopathy, no asymmetry, masses, or scars and thyroid normal to palpation  RESP: Clear to auscultation  BREASTS: NEGATIVE  CV: NEGATIVE  LYMPH: NEGATIVE  GI: aorta normal and bowel sounds normal  : no hernia detected  GYN PELVIC: NEGATIVE, normal external genitalia, normal groin lymphatics, normal urethral meatus, normal vaginal mucosa, normal cervix and normal adnexa, no masses or tenderness  MS: NEGATIVE, Extremities normal. No deformaties, edema or skin discoloration.  SKIN: no ulcers, lesions or rash  NEURO:  alert/oriented to person, location and time, CN 2-12 intact, brisk, symmetric reflexes at knees and biceps b/l, strength 5/5 throughout and symmetric, sensation to light touch grossly intact throughout  PSYCH: NEGATIVE    Discussed risks and benefits of contraception options in detail including oral contraceptive pills, injection, IUD, ring and sterilization. Patient elects progesterone-only pill.  No contraindications noted.           ASSESSMENT:   Normal postpartum exam after repeat c/s.    PLAN:  1. Hemoglobin pending; Pap smear - pathology pending  2. PPBC: Progesterone-only pill  3. Return to clinic as needed or at time of next expected pap, pelvic, or breast exam.      Rx:  - norethindrone 0.35 mg 1 tab po daily        The information in this document, created by the medical scribe for me, accurately reflects the services I personally performed and the decisions made by me. I have reviewed and approved this document for accuracy prior to leaving the patient care area.  3:32 PM, 03/20/19    Dr. Cindy Laurent, DO    OB/GYN   [facility]

## 2019-03-20 NOTE — NURSING NOTE
Chief Complaint   Patient presents with     Post Partum Exam      19--pap due       Initial BP 90/60 (BP Location: Right arm, Patient Position: Sitting, Cuff Size: Adult Regular)   Ht 1.524 m (5')   Wt 59 kg (130 lb 1.6 oz)   LMP 2018 (Exact Date)   BMI 25.41 kg/m   Estimated body mass index is 25.41 kg/m  as calculated from the following:    Height as of this encounter: 1.524 m (5').    Weight as of this encounter: 59 kg (130 lb 1.6 oz).  BP completed using cuff size: regular    Questioned patient about current smoking habits.  Pt. has never smoked.          The following HM Due: Pap due

## 2019-03-26 LAB
COPATH REPORT: NORMAL
PAP: NORMAL

## 2019-03-27 LAB
FINAL DIAGNOSIS: NORMAL
HPV HR 12 DNA CVX QL NAA+PROBE: NEGATIVE
HPV16 DNA SPEC QL NAA+PROBE: NEGATIVE
HPV18 DNA SPEC QL NAA+PROBE: NEGATIVE
SPECIMEN DESCRIPTION: NORMAL
SPECIMEN SOURCE CVX/VAG CYTO: NORMAL

## 2019-05-25 NOTE — PROVIDER NOTIFICATION
02/01/17 0955   Provider Notification   Provider Name/Title Dr. Celis   Method of Notification Phone;Electronic Page   Request Evaluate - Remote   Notification Reason Lab/Diagnostic Study   Rapid throat strep culture negative.  FHR tachy and has had a reactive strip.  IV infusing.  Dr. Celis orders for patient to be seen in the ED.  Discharge instructions reviewed patient denies questions.  Into w/c and discharged to the ED for further evaluation related to her illness.  Contractions remain every 10-12 min and infant remained Tachy at 175 with a reactive strip  ED accepted patient at 1020 via bedside report.   No

## 2019-06-19 PROBLEM — M53.3 SI (SACROILIAC) JOINT DYSFUNCTION: Status: RESOLVED | Noted: 2018-08-14 | Resolved: 2019-06-19

## 2020-03-02 ENCOUNTER — HEALTH MAINTENANCE LETTER (OUTPATIENT)
Age: 40
End: 2020-03-02

## 2020-04-21 NOTE — H&P
[facility] Labor and Delivery History and Physical    Rizwana Plummer MRN# [id number]   Age: 38 year old YOB: 1980     Date of Admission:  2018    Primary care provider: Cindy Laurent           Chief Complaint:   Rizwana Plummer is a 38 year old female who is 28w1d pregnant and being admitted for evaluation of abdominal pain       -nausea, vomiting and 'not feeling well'         Onset of symptoms two days ago; progressively worse this AM       No vaginal bleeding, regular contractions, no respiratory symptoms, cough         Has not had flu vaccine         Pregnancy history:     OBSTETRIC HISTORY:    Obstetric History       T2      L2     SAB1   TAB0   Ectopic0   Multiple0   Live Births2       # Outcome Date GA Lbr Anthony/2nd Weight Sex Delivery Anes PTL Lv   4 Current            3 Term 17 39w0d  3.31 kg (7 lb 4.8 oz) F CS-LTranv Spinal  CATRACHO      Name: Gato      Apgar1:  8                Apgar5: 9   2 Term 14 41w6d  3.705 kg (8 lb 2.7 oz) M CS-Unspec EPI N CATRACHO      Name: Radha      Apgar1:  3                Apgar5: 8   1 SAB 12     SAB   ND          EDC: Estimated Date of Delivery: 19    Prenatal Labs:   Lab Results   Component Value Date    ABO O 2018    RH Pos 2018    AS Neg 2018    HEPBANG Nonreactive 2018    CHPCRT Negative 2018    GCPCRT Negative 2018    TREPAB Negative 2017    RUBELLAABIGG 73 2013    HGB 8.9 (L) 2018    HIV Negative 2013       GBS Status:   Lab Results   Component Value Date    GBS  2017     Negative  No GBS DNA detected, presumed negative for GBS or number of bacteria may be   below the limit of detection of the assay.   Assay performed on incubated broth culture of specimen using [facility] [employer] real-time   PCR.         Active Problem List  Patient Active Problem List   Diagnosis     Female genital infibulation     Epidermal inclusion cyst of infibulation scar     SI  (sacroiliac) joint dysfunction     High-risk pregnancy     Previous  section complicating pregnancy     Encounter for triage in pregnant patient       Medication Prior to Admission  Prescriptions Prior to Admission   Medication Sig Dispense Refill Last Dose     ondansetron (ZOFRAN ODT) 4 MG ODT tab Take 1 tablet (4 mg) by mouth every 8 hours as needed 30 tablet 3 2018 at Unknown time     Acetaminophen (TYLENOL PO) Take 325 mg by mouth   2018 at Unknown time     metoclopramide (REGLAN) 5 MG tablet Take 1 tablet (5 mg) by mouth 4 times daily as needed 120 tablet 1 2018 at Unknown time     nitroFURantoin, macrocrystal-monohydrate, (MACROBID) 100 MG capsule Take 1 capsule (100 mg) by mouth 2 times daily 14 capsule 0 2018 at Unknown time     Ondansetron (ZOFRAN ODT PO) Take 1 tablet by mouth every 6 hours as needed for nausea   2018 at Unknown time     Prenatal Vit-Fe Fumarate-FA (PRENATAL MULTIVITAMIN PLUS IRON) 27-0.8 MG TABS per tablet Take 1 tablet by mouth daily 100 tablet 3 2018 at Unknown time     pyridOXINE ([employer] VITAMIN B-6) 50 MG tablet Take 1 tablet (50 mg) by mouth 3 times daily 90 tablet 1 Unknown at Unknown time   .        Maternal Past Medical History:     Past Medical History:   Diagnosis Date     Varicella age 5                       Family History:   I have reviewed this patient's family history            Social History:   I have reviewed this patient's social history         Review of Systems:   CONSTITUTIONAL: NEGATIVE for fever, chills, change in weight  INTEGUMENTARY/SKIN: NEGATIVE for worrisome rashes, moles or lesions  EYES: NEGATIVE for vision changes or irritation  ENT/MOUTH: NEGATIVE for ear, mouth and throat problems  RESP: NEGATIVE for significant cough or SOB  BREAST: NEGATIVE for masses, tenderness or discharge  CV: NEGATIVE for chest pain, palpitations or peripheral edema  GI: NEGATIVE for nausea, abdominal pain, heartburn, or change in bowel  habits  : NEGATIVE for frequency, dysuria, or hematuria  MUSCULOSKELETAL: NEGATIVE for significant arthralgias or myalgia  NEURO: NEGATIVE for weakness, dizziness or paresthesias  ENDOCRINE: NEGATIVE for temperature intolerance, skin/hair changes  HEME: NEGATIVE for bleeding problems  PSYCHIATRIC: NEGATIVE for changes in mood or affect          Physical Exam:   Vitals were reviewed  Constitutional:   awake, alert, cooperative, no apparent distress, and appears stated age     Neck:   Supple, symmetrical, trachea midline, no adenopathy, thyroid symmetric, not enlarged and no tenderness, skin normal     Hematologic / Lymphatic:   no cervical lymphadenopathy     Back:   Symmetric, no curvature, spinous processes are non-tender on palpation, paraspinous muscles are non-tender on palpation, no costal vertebral tenderness     Lungs:   No increased work of breathing, good air exchange, clear to auscultation bilaterally, no crackles or wheezing     Cardiovascular:   Normal apical impulse, regular rate and rhythm, normal S1 and S2, no S3 or S4, and no murmur noted         Musculoskeletal:   There is no redness, warmth, or swelling of the joints.  Full range of motion noted.  Motor strength is 5 out of 5 all extremities bilaterally.  Tone is normal.      Cervix:   Membranes: intact       Fetal Heart Rate Tracing: appropriate for gestational age  Tocometer: external monitor                       Assessment:   Rizwana Plummer is a 28w1d pregnant female admitted with acute febrile illness      -negative Group A strep, negative influenza A or B,   Suspect viral syndrome  In view of fever, and GI symptoms attempting to exclude appendicitis          Plan:   Hydration/antiemetics  See labs and imaging    Marlo Erwin MD   Consent (Spinal Accessory)/Introductory Paragraph: The rationale for Mohs was explained to the patient and consent was obtained. The risks, benefits and alternatives to therapy were discussed in detail. Specifically, the risks of damage to the spinal accessory nerve, infection, scarring, bleeding, prolonged wound healing, incomplete removal, allergy to anesthesia, and recurrence were addressed. Prior to the procedure, the treatment site was clearly identified and confirmed by the patient. All components of Universal Protocol/PAUSE Rule completed.

## 2020-07-25 NOTE — IP AVS SNAPSHOT
MRN:[id number]                      After Visit Summary   2017    Rizwana Plummer    MRN: [id number]           Thank you!     Thank you for choosing [facility] for your care. Our goal is always to provide you with excellent care. Hearing back from our patients is one way we can continue to improve our services. Please take a few minutes to complete the written survey that you may receive in the mail after you visit. If you would like to speak to someone directly about your visit please contact Patient Relations at [phone]. Thank you!          Patient Information     Date Of Birth          1980        About your hospital stay     You were admitted on:  2017 You last received care in the:  [facility]    You were discharged on:  2017       Who to Call     For medical emergencies, please call 911.  For non-urgent questions about your medical care, please call your primary care provider or clinic, [phone]  For questions related to your surgery, please call your surgery clinic        Attending Provider     Provider Specialty    Cindy Laurent DO OB/Gyn       Primary Care Provider Office Phone # Fax #    Cindy Laurent [phone]       [facility] [address]  [facility] [address]        After Care Instructions     Activity       Review discharge instructions            Diet       Resume previous diet            Discharge Instructions - Postpartum visit       Schedule postpartum visit with your provider and return to clinic in 6 weeks.                  Your next 10 appointments already scheduled     2017  1:45 PM CST   ESTABLISHED PRENATAL with Cindy Laurent DO   [facility] ([facility]    [address]  [facility] [id number] [phone]              Further instructions from your care team       Postop  Birth  Instructions    Activity       Do not lift more than 10 pounds for 6 weeks after surgery.  Ask family and friends for help when you need it.    No driving until you have stopped taking your pain medications (usually two weeks after surgery).    No heavy exercise or activity for 6 weeks.  Don't do anything that will put a strain on your surgery site.    Don't strain when using the toilet.  Your care team may prescribe a stool softener if you have problems with your bowel movements.     To care for your incision:       Keep the incision clean and dry.    Do not soak your incision in water. No swimming or hot tubs until it has fully healed. You may soak in the bathtub if the water level is below your incision.    Do not use peroxide, gel, cream, lotion, or ointment on your incision.    Adjust your clothes to avoid pressure on your surgery site (check the elastic in your underwear for example).     You may see a small amount of clear or pink drainage and this is normal.  Check with your health care provider:       If the drainage increases or has an odor.    If the incision reddens, you have swelling, or develop a rash.    If you have increased pain and the medicine we prescribed doesn't help.    If you have a fever above 100.4 F (38 C) with or without chills when placing thermometer under your tongue.   The area around your incision (surgery wound), will feel numb.  This is normal. The numbness should go away in less than a year.     Keep your hands clean:  Always wash your hands before touching your incision (surgery wound). This helps reduce your risk of infection. If your hands aren't dirty, you may use an alcohol hand-rub to clean your hands. Keep your nails clean and short.    Call your healthcare provider if you have any of these symptoms:       You soak a sanitary pad with blood within 1 hour, or you see blood clots larger than a golf ball.    Bleeding that lasts more than 6 weeks.    Vaginal discharge that  "smells bad.    Severe pain, cramping or tenderness in your lower belly area.    A need to urinate more frequently (use the toilet more often), more urgently (use the toilet very quickly), or it burns when you urinate.    Nausea and vomiting.    Redness, swelling or pain around a vein in your leg.    Problems breastfeeding or a red or painful area on your breast.    Chest pain and cough or are gasping for air.    Problems with coping with sadness, anxiety or depression. If you have concerns about hurting yourself or the baby, call your provider immediately.      You have questions or concerns after you return home.     You can take up to 3 tabs of ibuprofen (600mg) every 6 hours as needed for pain.  You can additionally take 1-2 tabs of tylenol (325-650mg regular strength 500-1000mg extra strength), every 6 hours for additional pain control as needed.  It is best to alternate your medications so you are taking something every 3 hours if you are having continued pain.    The oxycodone is to be used as needed for breakthrough pain during the day or night.     If you have vaginal pain you can take sitz baths 1-2x/day. Fill the tub with 2-3 inches of warm water and soak the perineal and vaginal area for 10 minutes. Ice or warm packs can also be applied for comfort.                Pending Results     No orders found from 2/16/2017 to 2/19/2017.            Statement of Approval     Ordered          02/21/17 0928  I have reviewed and agree with all the recommendations and orders detailed in this document.  EFFECTIVE NOW     Approved and electronically signed by:  Ammy Bustillo MD             Admission Information     Date & Time Provider Department Dept. Phone    2/18/2017 Cindy Laurent DO [facility] [phone]      Your Vitals Were     Blood Pressure Pulse Temperature Respirations Height Weight    105/41 83 97.8  F (36.6  C) (Oral) 16 1.499 m (4' 11\") 65.8 kg (145 lb)    Last Period Pulse " Oximetry BMI (Body Mass Index)             2016 98% 29.29 kg/m2         [employer] Information     [employer] gives you secure access to your electronic health record. If you see a primary care provider, you can also send messages to your care team and make appointments. If you have questions, please call your primary care clinic.  If you do not have a primary care provider, please call [phone] and they will assist you.        Care EveryWhere ID     This is your Care EveryWhere ID. This could be used by other organizations to access your [facility] medical records  [id number]           Review of your medicines      START taking        Dose / Directions    oxyCODONE 5 MG IR tablet   Commonly known as:  ROXICODONE   Used for:  Status post  delivery        Dose:  5 mg   Take 1 tablet (5 mg) by mouth every 6 hours as needed for moderate to severe pain   Quantity:  20 tablet   Refills:  0       senna-docusate 8.6-50 MG per tablet   Commonly known as:  SENOKOT-S;PERICOLACE   Used for:  Status post  delivery        Dose:  1-2 tablet   Take 1-2 tablets by mouth 2 times daily   Quantity:  100 tablet   Refills:  1         CONTINUE these medicines which have NOT CHANGED        Dose / Directions    Abdominal Binder/Elastic 3XL Misc   Used for:  Back pain, unspecified back location, unspecified back pain laterality, unspecified chronicity        Dose:  1 Device   1 Device daily   Quantity:  1 each   Refills:  3       acetaminophen 325 MG tablet   Commonly known as:  TYLENOL   Used for:  Influenza A        Dose:  650 mg   Take 2 tablets (650 mg) by mouth every 4 hours as needed for mild pain   Quantity:  100 tablet   Refills:  0       diphenhydrAMINE 25 MG tablet   Commonly known as:  BENADRYL   Used for:  Other insomnia        Dose:  25-50 mg   Take 1-2 tablets (25-50 mg) by mouth every 6 hours as needed for sleep   Quantity:  60 tablet   Refills:  1       ferrous sulfate 325 (65 FE) MG tablet   Commonly  known as:  IRON   Used for:  Pregnancy related fatigue in third trimester, Anemia, unspecified type        Dose:  325 mg   Take 1 tablet (325 mg) by mouth 2 times daily   Quantity:  60 tablet   Refills:  2       ondansetron 8 MG tablet   Commonly known as:  ZOFRAN   Used for:  Nausea        Dose:  8 mg   Take 1 tablet (8 mg) by mouth every 8 hours as needed for nausea   Quantity:  20 tablet   Refills:  1       PRENATAL 1 PO        Dose:  1 tablet   Take 1 tablet by mouth   Refills:  0            Where to get your medicines      These medications were sent to [place], [place] - [address] [facility]  [address] [facility] [address]     Phone:  [phone]     senna-docusate 8.6-50 MG per tablet         Some of these will need a paper prescription and others can be bought over the counter. Ask your nurse if you have questions.     Bring a paper prescription for each of these medications     oxyCODONE 5 MG IR tablet                Protect others around you: Learn how to safely use, store and throw away your medicines at www.disposemymeds.org.             Medication List: This is a list of all your medications and when to take them. Check marks below indicate your daily home schedule. Keep this list as a reference.      Medications           Morning Afternoon Evening Bedtime As Needed    Abdominal Binder/Elastic 3XL Misc   1 Device daily                                acetaminophen 325 MG tablet   Commonly known as:  TYLENOL   Take 2 tablets (650 mg) by mouth every 4 hours as needed for mild pain   Last time this was given:  975 mg on 2/20/2017  8:18 PM                                diphenhydrAMINE 25 MG tablet   Commonly known as:  BENADRYL   Take 1-2 tablets (25-50 mg) by mouth every 6 hours as needed for sleep                                ferrous sulfate 325 (65 FE) MG tablet   Commonly known as:  IRON   Take 1 tablet (325 mg) by mouth 2 times daily   Last time this was  given:  325 mg on 2/21/2017  8:33 AM                                ondansetron 8 MG tablet   Commonly known as:  ZOFRAN   Take 1 tablet (8 mg) by mouth every 8 hours as needed for nausea                                oxyCODONE 5 MG IR tablet   Commonly known as:  ROXICODONE   Take 1 tablet (5 mg) by mouth every 6 hours as needed for moderate to severe pain   Last time this was given:  10 mg on 2/20/2017 11:33 PM                                PRENATAL 1 PO   Take 1 tablet by mouth                                senna-docusate 8.6-50 MG per tablet   Commonly known as:  SENOKOT-S;PERICOLACE   Take 1-2 tablets by mouth 2 times daily   Last time this was given:  1 tablet on 2/21/2017  8:33 AM                                   inability to perform BADLs

## 2020-09-02 ENCOUNTER — HOSPITAL ENCOUNTER (EMERGENCY)
Facility: CLINIC | Age: 40
Discharge: HOME | End: 2020-09-03
Attending: PHYSICIAN ASSISTANT | Admitting: PHYSICIAN ASSISTANT
Payer: COMMERCIAL

## 2020-09-02 DIAGNOSIS — R10.9 RIGHT SIDED ABDOMINAL PAIN: ICD-10-CM

## 2020-09-02 DIAGNOSIS — Q51.9 UTERINE ANOMALY: ICD-10-CM

## 2020-09-02 LAB
ALBUMIN UR-MCNC: 10 MG/DL
APPEARANCE UR: CLEAR
BACTERIA #/AREA URNS HPF: ABNORMAL /HPF
BASOPHILS # BLD AUTO: 0 10E9/L (ref 0–0.2)
BASOPHILS NFR BLD AUTO: 0.3 %
BILIRUB UR QL STRIP: NEGATIVE
COLOR UR AUTO: YELLOW
DIFFERENTIAL METHOD BLD: ABNORMAL
EOSINOPHIL # BLD AUTO: 0.2 10E9/L (ref 0–0.7)
EOSINOPHIL NFR BLD AUTO: 2.6 %
ERYTHROCYTE [DISTWIDTH] IN BLOOD BY AUTOMATED COUNT: 15.2 % (ref 10–15)
GLUCOSE UR STRIP-MCNC: NEGATIVE MG/DL
HCT VFR BLD AUTO: 30.7 % (ref 35–47)
HGB BLD-MCNC: 8.9 G/DL (ref 11.7–15.7)
HGB UR QL STRIP: NEGATIVE
IMM GRANULOCYTES # BLD: 0 10E9/L (ref 0–0.4)
IMM GRANULOCYTES NFR BLD: 0.2 %
KETONES UR STRIP-MCNC: NEGATIVE MG/DL
LEUKOCYTE ESTERASE UR QL STRIP: NEGATIVE
LYMPHOCYTES # BLD AUTO: 1.7 10E9/L (ref 0.8–5.3)
LYMPHOCYTES NFR BLD AUTO: 26.8 %
MCH RBC QN AUTO: 21.5 PG (ref 26.5–33)
MCHC RBC AUTO-ENTMCNC: 29 G/DL (ref 31.5–36.5)
MCV RBC AUTO: 74 FL (ref 78–100)
MONOCYTES # BLD AUTO: 0.6 10E9/L (ref 0–1.3)
MONOCYTES NFR BLD AUTO: 8.8 %
MUCOUS THREADS #/AREA URNS LPF: PRESENT /LPF
NEUTROPHILS # BLD AUTO: 3.8 10E9/L (ref 1.6–8.3)
NEUTROPHILS NFR BLD AUTO: 61.3 %
NITRATE UR QL: NEGATIVE
NRBC # BLD AUTO: 0 10*3/UL
NRBC BLD AUTO-RTO: 0 /100
PH UR STRIP: 5.5 PH (ref 5–7)
PLATELET # BLD AUTO: 332 10E9/L (ref 150–450)
RBC # BLD AUTO: 4.13 10E12/L (ref 3.8–5.2)
RBC #/AREA URNS AUTO: <1 /HPF (ref 0–2)
SOURCE: ABNORMAL
SP GR UR STRIP: 1.03 (ref 1–1.03)
SQUAMOUS #/AREA URNS AUTO: 2 /HPF (ref 0–1)
UROBILINOGEN UR STRIP-MCNC: 2 MG/DL (ref 0–2)
WBC # BLD AUTO: 6.2 10E9/L (ref 4–11)
WBC #/AREA URNS AUTO: 1 /HPF (ref 0–5)

## 2020-09-02 PROCEDURE — 84702 CHORIONIC GONADOTROPIN TEST: CPT | Performed by: PHYSICIAN ASSISTANT

## 2020-09-02 PROCEDURE — 36415 COLL VENOUS BLD VENIPUNCTURE: CPT | Performed by: PHYSICIAN ASSISTANT

## 2020-09-02 PROCEDURE — 99285 EMERGENCY DEPT VISIT HI MDM: CPT | Mod: 25

## 2020-09-02 PROCEDURE — 81001 URINALYSIS AUTO W/SCOPE: CPT | Performed by: EMERGENCY MEDICINE

## 2020-09-02 PROCEDURE — 80048 BASIC METABOLIC PNL TOTAL CA: CPT | Performed by: PHYSICIAN ASSISTANT

## 2020-09-02 PROCEDURE — 85025 COMPLETE CBC W/AUTO DIFF WBC: CPT | Performed by: PHYSICIAN ASSISTANT

## 2020-09-02 ASSESSMENT — ENCOUNTER SYMPTOMS
DIARRHEA: 0
VOMITING: 0
FEVER: 0
BACK PAIN: 1
ABDOMINAL PAIN: 1
NAUSEA: 1

## 2020-09-02 NOTE — ED AVS SNAPSHOT
[facility] Emergency Department  [address]  [facility] [address]  Phone:  [phone]  Fax:  [phone]                                    Rizwana Plummer   MRN: [id number]    Department:  [facility] Emergency Department   Date of Visit:  9/2/2020           After Visit Summary Signature Page    I have received my discharge instructions, and my questions have been answered. I have discussed any challenges I see with this plan with the nurse or doctor.    ..........................................................................................................................................  Patient/Patient Representative Signature      ..........................................................................................................................................  Patient Representative Print Name and Relationship to Patient    ..................................................               ................................................  Date                                   Time    ..........................................................................................................................................  Reviewed by Signature/Title    ...................................................              ..............................................  Date                                               Time          22EPIC Rev 08/18

## 2020-09-03 ENCOUNTER — TELEPHONE (OUTPATIENT)
Dept: OBGYN | Facility: CLINIC | Age: 40
End: 2020-09-03

## 2020-09-03 ENCOUNTER — APPOINTMENT (OUTPATIENT)
Dept: ULTRASOUND IMAGING | Facility: CLINIC | Age: 40
End: 2020-09-03
Attending: PHYSICIAN ASSISTANT
Payer: COMMERCIAL

## 2020-09-03 VITALS
TEMPERATURE: 98.1 F | RESPIRATION RATE: 18 BRPM | DIASTOLIC BLOOD PRESSURE: 58 MMHG | OXYGEN SATURATION: 100 % | HEART RATE: 87 BPM | SYSTOLIC BLOOD PRESSURE: 104 MMHG

## 2020-09-03 DIAGNOSIS — O09.529 SUPERVISION OF HIGH-RISK PREGNANCY OF ELDERLY MULTIGRAVIDA: Primary | ICD-10-CM

## 2020-09-03 LAB
ANION GAP SERPL CALCULATED.3IONS-SCNC: 6 MMOL/L (ref 3–14)
B-HCG SERPL-ACNC: ABNORMAL IU/L (ref 0–5)
BUN SERPL-MCNC: 8 MG/DL (ref 7–30)
CALCIUM SERPL-MCNC: 8 MG/DL (ref 8.5–10.1)
CHLORIDE SERPL-SCNC: 107 MMOL/L (ref 94–109)
CO2 SERPL-SCNC: 24 MMOL/L (ref 20–32)
CREAT SERPL-MCNC: 0.54 MG/DL (ref 0.52–1.04)
GFR SERPL CREATININE-BSD FRML MDRD: >90 ML/MIN/{1.73_M2}
GLUCOSE SERPL-MCNC: 92 MG/DL (ref 70–99)
POTASSIUM SERPL-SCNC: 3.5 MMOL/L (ref 3.4–5.3)
SODIUM SERPL-SCNC: 137 MMOL/L (ref 133–144)

## 2020-09-03 PROCEDURE — 76817 TRANSVAGINAL US OBSTETRIC: CPT

## 2020-09-03 NOTE — DISCHARGE INSTRUCTIONS
Your ultrasound demonstrates a live intrauterine pregnancy.  In addition to this it shows what appears to be a uterine fibroid, however there is a possibility that this could be what is known as a heterotopic pregnancy which is a secondary ectopic pregnancy outside of the uterus.  It is important that you follow-up closely with your OB/GYN for repeat imaging.  Should return to the emergency department immediately if you develop increased pain, dizziness, or other new or worsening symptoms.    Discharge Instructions  Abdominal Pain    Abdominal pain (belly pain) can be caused by many things. Your evaluation today does not show the exact cause for your pain. Your provider today has decided that it is unlikely your pain is due to a life threatening problem, or a problem requiring surgery or hospital admission. Sometimes those problems cannot be found right away, so it is very important that you follow up as directed.  Sometimes only the changes which occur over time allow the cause of your pain to be found.    Generally, every Emergency Department visit should have a follow-up clinic visit with either a primary or a specialty clinic/provider. Please follow-up as instructed by your emergency provider today. With abdominal pain, we often recommend very close follow-up, such as the following day.    ADULTS:  Return to the Emergency Department right away if:    You get an oral temperature above 102oF or as directed by your provider.  You have blood in your stools. This may be bright red or appear as black, tarry stools.    You keep vomiting (throwing up) or cannot drink liquids.  You see blood when you vomit.   You cannot have a bowel movement or you cannot pass gas.  Your stomach gets bloated or bigger.  Your skin or the whites of your eyes look yellow.  You faint.  You have bloody, frequent or painful urination (peeing).  You have new symptoms or anything that worries you.    CHILDREN:  Return to the Emergency Department  right away if your child has any of the above-listed symptoms or the following:    Pushes your hand away or screams/cries when his/her belly is touched.  You notice your child is very fussy or weak.  Your child is very tired and is too tired to eat or drink.  Your child is dehydrated.  Signs of dehydration can be:  Significant change in the amount of wet diapers/urine.  Your infant or child starts to have dry mouth and lips, or no saliva (spit) or tears.    PREGNANT WOMEN:  Return to the Emergency Department right away if you have any of the above-listed symptoms or the following:    You have bleeding, leaking fluid or passing tissue from the vagina.  You have worse pain or cramping, or pain in your shoulder or back.  You have vomiting that will not stop.  You have a temperature of 100oF or more.  Your baby is not moving as much as usual.  You faint.  You get a bad headache with or without eye problems and abdominal pain.  You have a seizure.  You have unusual discharge from your vagina and abdominal pain.    Abdominal pain is pretty common during pregnancy.  Your pain may or may not be related to your pregnancy. You should follow-up closely with your OB provider so they can evaluate you and your baby.  Until you follow-up with your regular provider, do the following:     Avoid sex and do not put anything in your vagina.  Drink clear fluids.  Only take medications approved by your provider.    MORE INFORMATION:    Appendicitis:  A possible cause of abdominal pain in any person who still has their appendix is acute appendicitis. Appendicitis is often hard to diagnose.  Testing does not always rule out early appendicitis or other causes of abdominal pain. Close follow-up with your provider and re-evaluations may be needed to figure out the reason for your abdominal pain.    Follow-up:  It is very important that you make an appointment with your clinic and go to the appointment.  If you do not follow-up with your  "primary provider, it may result in missing an important development which could result in permanent injury or disability and/or lasting pain.  If there is any problem keeping your appointment, call your provider or return to the Emergency Department.    Medications:  Take your medications as directed by your provider today.  Before using over-the-counter medications, ask your provider and make sure to take the medications as directed.  If you have any questions about medications, ask your provider.    Diet:  Resume your normal diet as much as possible, but do not eat fried, fatty or spicy foods while you have pain.  Do not drink alcohol or have caffeine.  Do not smoke tobacco.    Probiotics: If you have been given an antibiotic, you may want to also take a probiotic pill or eat yogurt with live cultures. Probiotics have \"good bacteria\" to help your intestines stay healthy. Studies have shown that probiotics help prevent diarrhea (loose stools) and other intestine problems (including C. diff infection) when you take antibiotics. You can buy these without a prescription in the pharmacy section of the store.     If you were given a prescription for medicine here today, be sure to read all of the information (including the package insert) that comes with your prescription.  This will include important information about the medicine, its side effects, and any warnings that you need to know about.  The pharmacist who fills the prescription can provide more information and answer questions you may have about the medicine.  If you have questions or concerns that the pharmacist cannot address, please call or return to the Emergency Department.       Remember that you can always come back to the Emergency Department if you are not able to see your regular provider in the amount of time listed above, if you get any new symptoms, or if there is anything that worries you.    "

## 2020-09-03 NOTE — TELEPHONE ENCOUNTER
Plan repeat US in 1 week, set up for new OB RN visit, educate on return to care warning signs for AB vs heterotopic (ectopic) pregnancy.     Ammy Bustillo MD

## 2020-09-03 NOTE — TELEPHONE ENCOUNTER
Patient calling to follow up after ER visit yesterday.  Had back and right sided pain.  lmp ~ 7/27/20  G4,P3  6w  See ultrasound report.  Currently not having pain. No bleeding.  Would like to know how to follow up   Was told to call about abnormal results.  Was told they were worried about these results.  Please advise.  Johanna Novak RN

## 2020-09-03 NOTE — ED PROVIDER NOTES
History     Chief Complaint:  Abdominal Pain       HPI  Rizwana Plummer is a 40 year old year old female who is five weeks pregnant who presents for evaluation of abdominal pain. The patient states she has had abdominal pain for 2 or 3 days now on her right side that radiates to her back. She also notes nausea. She did have nausea with her first pregnancy, and she has not had an ultrasound with this pregnancy yet. She did take nausea medication at home, so feels fine for now. The patient denies fever, diarrhea, vomiting, vaginal bleeding or discharge, or medical problems.  No dysuria or hematuria.  She is not on assisted reproductive therapy.  No prior hx of ectopic pregnancy.      Allergies:  No Known Drug Allergies      Medications:   Medications reviewed. No pertinent medications.      Medical History:   Anemia  GERD  Varicella  Female genital infibulation  Epidermal inclusion cyst of infibulation scar      Surgical History   Infibulation  Marsupialization bartholin cyst      Family History:   Cerebrovascular disease  Diabetes  Hypertension      Social History:  Smoking Status: Negative   Smokeless Tobacco: Negative   Alcohol Use: Negative   Drug Use: Negative   Primary Physician: Cindy Laurent         Review of Systems   Constitutional: Negative for fever.   Gastrointestinal: Positive for abdominal pain and nausea. Negative for diarrhea and vomiting.   Genitourinary: Negative for vaginal bleeding and vaginal discharge.   Musculoskeletal: Positive for back pain.   All other systems reviewed and are negative.    Physical Exam     Patient Vitals for the past 24 hrs:   BP Temp Temp src Pulse Resp SpO2   09/02/20 2117 104/58 98.1  F (36.7  C) Oral 86 18 100 %      Physical Exam  General: Alert and cooperative with exam. Resting comfortably on gurney  Head:  Scalp is NC/AT  Eyes:  No scleral icterus, PERRL  ENT:  The external nose and ears are normal.   Neck:  Normal range of motion without rigidity.  CV:  Regular  rate and rhythm    No pathologic murmur, rubs, or gallops.  Resp:  Breath sounds are clear bilaterally.  No crackles, wheezes, rhonchi, stridor.    Non-labored, no retractions or accessory muscle use  GI:  Abdomen is soft, no distension, no tenderness, no masses. No peritoneal signs.   :  No suprapubic or flank tenderness  MS:  No lower extremity edema or asymmetric calf swelling. Normal ROM in all joints without effusions.  Skin:  Warm and dry, No rash or lesions noted. 2+ peripheral pulses in all extremities  Neuro: Oriented. No gross motor deficits. GCS 15  Psych: Awake. Alert. Normal affect. Appropriate interactions.    Emergency Department Course     Imaging:  Radiology results were communicated with the patient who voiced understanding of the findings.    US OB <14 Weeks W Transvaginal  1.  Single living intrauterine gestation at 5 weeks 6 days, EDC 04/30/2020.  2.  2.9 cm questionable fibroid in the right lower uterine segment. However, on some of the images it is difficult to determine if this is uterine versus adnexal structure. In order to exclude adnexal mass/heterotopic pregnancy short-term ultrasound   follow-up or MRI correlation recommended.    Reading per radiology     Laboratory:  Laboratory findings were communicated with the patient who voiced understanding of the findings.    UA with micro: Protein albumin 10 (A), Bacteria few (A), Squamous epithelial 2 (H), Mucous present (A) o/w negative    HCG Quantitative Blood: 96284 (H)    CBC: WBC 6.2, HGB 8.9 (L),   BMP: Calcium 8.0 (L) (Creatinine 0.54) o/w WNL     Emergency Department Course:    2119 A urine sample was obtained for laboratory testing as documented above.    2208 Nursing notes and vitals reviewed.    2220 I performed an exam of the patient as documented above.     2330 IV was inserted and blood was drawn for laboratory testing, results above.    0001 The patient was sent for US while in the emergency department, results above.      0100 Findings and plan explained to the Patient. Patient discharged home with instructions regarding supportive care, medications, and reasons to return. The importance of close follow-up was reviewed. The patient was prescribed as below.    Impression & Plan     Medical Decision Makin-year-old female 5 weeks pregnant by gestational dates presents with right lower abdominal pain.  Broad differential considered.  Work-up here demonstrates normal white blood cell count, stable anemia, normal platelets, electrolytes, kidney function.  Urinalysis not suggestive of infection and no hematuria to suggest kidney stone.  Ultrasound demonstrates live intrauterine pregnancy, as well as associated abnormality favored to be uterine fibroid, however adnexal mass/heterotopic pregnancy is not completely excluded.  She is well-appearing and comfortable and has a benign nontender examination.  She is not on assisted reproductive therapy and I think the likelihood of heterotopic pregnancy is low.  No abdominal tenderness on exam, normal white blood cell count, and I doubt appendicitis at this time.  She is Rh+ and no vaginal bleeding.  No symptoms of pelvic inflammatory disease.  Plan will be close follow-up with OB/GYN for repeat ultrasound versus MRI to better characterize ultrasound abnormality.  Return precautions for worsening pain, fever, bleeding, etc.    Diagnosis:     ICD-10-CM    1. Right sided abdominal pain  R10.9         Disposition:  Discharged to home.    Discharge Medications:  New Prescriptions    No medications on file       Scribe Disclosure:  I, Kiana Malcolm, am serving as a scribe at 10:12 PM on 2020 to document services personally performed by Neymar Small PA-C based on my observations and the provider's statements to me.      Neymar Small PA-C  [phone]

## 2020-09-03 NOTE — TELEPHONE ENCOUNTER
Spoke with patient and plan reviewed.  Ultrasound scheduled.  Pt to call back for any increased pain, lateralizing pain, bleeding, or shoulder discomfort.  Patient agrees with plan.  Johanna Novak RN

## 2020-09-11 DIAGNOSIS — O09.529 SUPERVISION OF HIGH-RISK PREGNANCY OF ELDERLY MULTIGRAVIDA: ICD-10-CM

## 2020-09-15 ENCOUNTER — TELEPHONE (OUTPATIENT)
Dept: OBGYN | Facility: CLINIC | Age: 40
End: 2020-09-15

## 2020-09-15 DIAGNOSIS — O21.0 HYPEREMESIS GRAVIDARUM: ICD-10-CM

## 2020-09-15 RX ORDER — ONDANSETRON 2 MG/ML
4 INJECTION INTRAMUSCULAR; INTRAVENOUS ONCE
Status: CANCELLED | OUTPATIENT
Start: 2020-09-21

## 2020-09-15 RX ORDER — DEXTROSE, SODIUM CHLORIDE, SODIUM LACTATE, POTASSIUM CHLORIDE, AND CALCIUM CHLORIDE 5; .6; .31; .03; .02 G/100ML; G/100ML; G/100ML; G/100ML; G/100ML
1000 INJECTION, SOLUTION INTRAVENOUS ONCE
Status: CANCELLED | OUTPATIENT
Start: 2020-09-15

## 2020-09-15 NOTE — TELEPHONE ENCOUNTER
Pt calling. She is approx 7 weeks pregnant. LMP 7/27/20.     Pt states that she is unable to keep anything down. Is vomiting whenever she ingests anything.     Pt has been using Vit. B and unisom, but it is not helping.     Pt states that she went through this same thing with her previous pregnancies.     Pt had to do IV hydration previously.     Pt is requesting to do IV hydration again. Zofran didn't help in the past.    Please advise.    Maryam THOMPSON RN

## 2020-09-15 NOTE — TELEPHONE ENCOUNTER
I approve.  Can you help her get set up for IV fluids at the infusion center?      Thanks!  Sonali Gaines MD, MPH  [facility]

## 2020-09-16 DIAGNOSIS — O21.0 HYPEREMESIS GRAVIDARUM: ICD-10-CM

## 2020-09-16 DIAGNOSIS — O21.0 HYPEREMESIS GRAVIDARUM: Primary | ICD-10-CM

## 2020-09-16 PROCEDURE — U0003 INFECTIOUS AGENT DETECTION BY NUCLEIC ACID (DNA OR RNA); SEVERE ACUTE RESPIRATORY SYNDROME CORONAVIRUS 2 (SARS-COV-2) (CORONAVIRUS DISEASE [COVID-19]), AMPLIFIED PROBE TECHNIQUE, MAKING USE OF HIGH THROUGHPUT TECHNOLOGIES AS DESCRIBED BY CMS-2020-01-R: HCPCS | Performed by: OBSTETRICS & GYNECOLOGY

## 2020-09-17 LAB
SARS-COV-2 RNA SPEC QL NAA+PROBE: NOT DETECTED
SPECIMEN SOURCE: NORMAL

## 2020-09-21 ENCOUNTER — INFUSION THERAPY VISIT (OUTPATIENT)
Dept: INFUSION THERAPY | Facility: CLINIC | Age: 40
End: 2020-09-21
Attending: OBSTETRICS & GYNECOLOGY
Payer: COMMERCIAL

## 2020-09-21 VITALS
DIASTOLIC BLOOD PRESSURE: 52 MMHG | TEMPERATURE: 98.1 F | SYSTOLIC BLOOD PRESSURE: 82 MMHG | RESPIRATION RATE: 16 BRPM | HEART RATE: 90 BPM | OXYGEN SATURATION: 100 %

## 2020-09-21 DIAGNOSIS — O21.0 HYPEREMESIS GRAVIDARUM: Primary | ICD-10-CM

## 2020-09-21 PROCEDURE — 25000128 H RX IP 250 OP 636: Performed by: OBSTETRICS & GYNECOLOGY

## 2020-09-21 PROCEDURE — 96374 THER/PROPH/DIAG INJ IV PUSH: CPT

## 2020-09-21 PROCEDURE — 25800030 ZZH RX IP 258 OP 636: Performed by: OBSTETRICS & GYNECOLOGY

## 2020-09-21 RX ORDER — ONDANSETRON 2 MG/ML
4 INJECTION INTRAMUSCULAR; INTRAVENOUS ONCE
Status: COMPLETED | OUTPATIENT
Start: 2020-09-21 | End: 2020-09-21

## 2020-09-21 RX ORDER — ONDANSETRON 2 MG/ML
4 INJECTION INTRAMUSCULAR; INTRAVENOUS ONCE
Status: CANCELLED | OUTPATIENT
Start: 2020-10-05

## 2020-09-21 RX ORDER — DEXTROSE, SODIUM CHLORIDE, SODIUM LACTATE, POTASSIUM CHLORIDE, AND CALCIUM CHLORIDE 5; .6; .31; .03; .02 G/100ML; G/100ML; G/100ML; G/100ML; G/100ML
1000 INJECTION, SOLUTION INTRAVENOUS ONCE
Status: COMPLETED | OUTPATIENT
Start: 2020-09-21 | End: 2020-09-21

## 2020-09-21 RX ORDER — DEXTROSE, SODIUM CHLORIDE, SODIUM LACTATE, POTASSIUM CHLORIDE, AND CALCIUM CHLORIDE 5; .6; .31; .03; .02 G/100ML; G/100ML; G/100ML; G/100ML; G/100ML
1000 INJECTION, SOLUTION INTRAVENOUS ONCE
Status: CANCELLED | OUTPATIENT
Start: 2020-10-05

## 2020-09-21 RX ADMIN — ONDANSETRON 4 MG: 2 INJECTION INTRAMUSCULAR; INTRAVENOUS at 13:14

## 2020-09-21 RX ADMIN — SODIUM CHLORIDE, SODIUM LACTATE, POTASSIUM CHLORIDE, CALCIUM CHLORIDE AND DEXTROSE MONOHYDRATE 1000 ML: 5; 600; 310; 30; 20 INJECTION, SOLUTION INTRAVENOUS at 14:20

## 2020-09-21 RX ADMIN — SODIUM CHLORIDE, POTASSIUM CHLORIDE, SODIUM LACTATE AND CALCIUM CHLORIDE 1000 ML: 600; 310; 30; 20 INJECTION, SOLUTION INTRAVENOUS at 13:17

## 2020-09-21 NOTE — PROGRESS NOTES
Infusion Nursing Note:  Rizwana Plummer presents today for IVF and zofran.    Patient seen by provider today: No   present during visit today: Not Applicable.    Note: Patient states she isn't eating anything because if she does she vomits.  Taking only minimal fluids.    Intravenous Access:  Peripheral IV placed.    Treatment Conditions:  Not Applicable.      Post Infusion Assessment:  Patient tolerated infusion without incident.  Blood return noted pre and post infusion.  Site patent and intact, free from redness, edema or discomfort.  No evidence of extravasations.  Access discontinued per protocol.       Discharge Plan:   Patient discharged in stable condition accompanied by: self.  Departure Mode: Ambulatory.    Lluvia Mosquera RN

## 2020-09-22 ENCOUNTER — PRENATAL OFFICE VISIT (OUTPATIENT)
Dept: NURSING | Facility: CLINIC | Age: 40
End: 2020-09-22
Payer: COMMERCIAL

## 2020-09-22 ENCOUNTER — TELEPHONE (OUTPATIENT)
Dept: OBGYN | Facility: CLINIC | Age: 40
End: 2020-09-22

## 2020-09-22 DIAGNOSIS — O09.529 SUPERVISION OF HIGH-RISK PREGNANCY OF ELDERLY MULTIGRAVIDA: Primary | ICD-10-CM

## 2020-09-22 PROCEDURE — 99207 ZZC NO CHARGE NURSE ONLY: CPT

## 2020-09-22 SDOH — HEALTH STABILITY: MENTAL HEALTH: HOW OFTEN DO YOU HAVE A DRINK CONTAINING ALCOHOL?: NEVER

## 2020-09-22 SDOH — ECONOMIC STABILITY: FOOD INSECURITY: WITHIN THE PAST 12 MONTHS, YOU WORRIED THAT YOUR FOOD WOULD RUN OUT BEFORE YOU GOT MONEY TO BUY MORE.: NEVER TRUE

## 2020-09-22 SDOH — ECONOMIC STABILITY: INCOME INSECURITY: HOW HARD IS IT FOR YOU TO PAY FOR THE VERY BASICS LIKE FOOD, HOUSING, MEDICAL CARE, AND HEATING?: NOT HARD AT ALL

## 2020-09-22 SDOH — ECONOMIC STABILITY: TRANSPORTATION INSECURITY
IN THE PAST 12 MONTHS, HAS THE LACK OF TRANSPORTATION KEPT YOU FROM MEDICAL APPOINTMENTS OR FROM GETTING MEDICATIONS?: NO

## 2020-09-22 SDOH — ECONOMIC STABILITY: TRANSPORTATION INSECURITY
IN THE PAST 12 MONTHS, HAS LACK OF TRANSPORTATION KEPT YOU FROM MEETINGS, WORK, OR FROM GETTING THINGS NEEDED FOR DAILY LIVING?: NO

## 2020-09-22 SDOH — ECONOMIC STABILITY: FOOD INSECURITY: WITHIN THE PAST 12 MONTHS, THE FOOD YOU BOUGHT JUST DIDN'T LAST AND YOU DIDN'T HAVE MONEY TO GET MORE.: NEVER TRUE
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
## 2020-09-22 NOTE — TELEPHONE ENCOUNTER
During NPN nurse visit.  Patient states is not eating or drinking due to nausea and vomiting.  encouraged pt to keep trying bland foods and fluids.  Is currently receiving IV fluids 3x a week.  Patient was asking if she can have a vitamin bag added to orders.    Please advise.  Johanna Novak RN

## 2020-09-23 NOTE — TELEPHONE ENCOUNTER
Spoke with pharmacydon't want to give 3 l of fluids 3 x a week.  If they do a banana bag 3 x a week.  1 liter  D5LR  1 liter LR  1 liter NS ( banana bag)   If you want to add this bag, what other bag do you want her to have.    Spoke with Dr Chou (on call)  Ok to eliminate the 1 liter of LR.  Therapy plan updated.      Johanna Novak RN

## 2020-10-05 ENCOUNTER — INFUSION THERAPY VISIT (OUTPATIENT)
Dept: INFUSION THERAPY | Facility: CLINIC | Age: 40
End: 2020-10-05
Attending: OBSTETRICS & GYNECOLOGY
Payer: COMMERCIAL

## 2020-10-05 VITALS
SYSTOLIC BLOOD PRESSURE: 100 MMHG | TEMPERATURE: 98 F | RESPIRATION RATE: 16 BRPM | OXYGEN SATURATION: 100 % | HEART RATE: 85 BPM | DIASTOLIC BLOOD PRESSURE: 65 MMHG

## 2020-10-05 DIAGNOSIS — O21.0 HYPEREMESIS GRAVIDARUM: Primary | ICD-10-CM

## 2020-10-05 PROCEDURE — 258N000003 HC RX IP 258 OP 636: Performed by: OBSTETRICS & GYNECOLOGY

## 2020-10-05 PROCEDURE — 96365 THER/PROPH/DIAG IV INF INIT: CPT

## 2020-10-05 PROCEDURE — 250N000011 HC RX IP 250 OP 636: Performed by: OBSTETRICS & GYNECOLOGY

## 2020-10-05 PROCEDURE — 96375 TX/PRO/DX INJ NEW DRUG ADDON: CPT

## 2020-10-05 PROCEDURE — 96367 TX/PROPH/DG ADDL SEQ IV INF: CPT

## 2020-10-05 PROCEDURE — 250N000009 HC RX 250: Performed by: OBSTETRICS & GYNECOLOGY

## 2020-10-05 RX ORDER — ONDANSETRON 2 MG/ML
4 INJECTION INTRAMUSCULAR; INTRAVENOUS ONCE
Status: COMPLETED | OUTPATIENT
Start: 2020-10-05 | End: 2020-10-05

## 2020-10-05 RX ORDER — ONDANSETRON 2 MG/ML
4 INJECTION INTRAMUSCULAR; INTRAVENOUS ONCE
Status: CANCELLED | OUTPATIENT
Start: 2020-10-09 | End: 2020-10-05

## 2020-10-05 RX ORDER — ONDANSETRON 4 MG/1
TABLET, FILM COATED ORAL EVERY 8 HOURS PRN
COMMUNITY
End: 2020-10-29

## 2020-10-05 RX ORDER — DEXTROSE, SODIUM CHLORIDE, SODIUM LACTATE, POTASSIUM CHLORIDE, AND CALCIUM CHLORIDE 5; .6; .31; .03; .02 G/100ML; G/100ML; G/100ML; G/100ML; G/100ML
1000 INJECTION, SOLUTION INTRAVENOUS ONCE
Status: COMPLETED | OUTPATIENT
Start: 2020-10-05 | End: 2020-10-05

## 2020-10-05 RX ORDER — DEXTROSE, SODIUM CHLORIDE, SODIUM LACTATE, POTASSIUM CHLORIDE, AND CALCIUM CHLORIDE 5; .6; .31; .03; .02 G/100ML; G/100ML; G/100ML; G/100ML; G/100ML
1000 INJECTION, SOLUTION INTRAVENOUS ONCE
Status: CANCELLED | OUTPATIENT
Start: 2020-10-05 | End: 2020-10-05

## 2020-10-05 RX ADMIN — FOLIC ACID: 5 INJECTION, SOLUTION INTRAMUSCULAR; INTRAVENOUS; SUBCUTANEOUS at 15:20

## 2020-10-05 RX ADMIN — SODIUM CHLORIDE, SODIUM LACTATE, POTASSIUM CHLORIDE, CALCIUM CHLORIDE AND DEXTROSE MONOHYDRATE 1000 ML: 5; 600; 310; 30; 20 INJECTION, SOLUTION INTRAVENOUS at 14:11

## 2020-10-05 RX ADMIN — ONDANSETRON 4 MG: 2 INJECTION INTRAMUSCULAR; INTRAVENOUS at 14:16

## 2020-10-05 NOTE — PROGRESS NOTES
Infusion Nursing Note:  Rizwana Plummer presents today for IVF + Zofran.    Patient seen by provider today: No   present during visit today: Not Applicable.    Note:  Patient reports feeling fatigued and weak.  Patient continues with episodes of nausea and vomiting at home.  Patient denies fevers, chills or signs of infection.    Intravenous Access:  Peripheral IV placed.  PIV site C/D/I.  PIV flushes well + excellent blood return.    Treatment Conditions:  Not Applicable.      Post Infusion Assessment:  Patient tolerated infusion without incident.  Blood return noted pre and post infusion.  Site patent and intact, free from redness, edema or discomfort.  No evidence of extravasations.  Access discontinued per protocol.       Discharge Plan:   Discharge instructions reviewed with: Patient.  Patient and/or family verbalized understanding of discharge instructions and all questions answered.  Patient discharged in stable condition accompanied by: self.  Departure Mode: Ambulatory.  10/7, 10/9, 10/12, 10/14 + 10/16/20: IVF + Zofran.    Cindy Huerta, RN, BSN, OCN on 10/5/2020 at 2:34 PM

## 2020-10-08 ENCOUNTER — OFFICE VISIT (OUTPATIENT)
Dept: URGENT CARE | Facility: URGENT CARE | Age: 40
End: 2020-10-08
Payer: COMMERCIAL

## 2020-10-08 VITALS
SYSTOLIC BLOOD PRESSURE: 92 MMHG | DIASTOLIC BLOOD PRESSURE: 62 MMHG | BODY MASS INDEX: 26.83 KG/M2 | WEIGHT: 137.4 LBS | TEMPERATURE: 97.9 F | RESPIRATION RATE: 20 BRPM | HEART RATE: 97 BPM | OXYGEN SATURATION: 100 %

## 2020-10-08 DIAGNOSIS — R22.1 MASS OF LEFT SIDE OF NECK: Primary | ICD-10-CM

## 2020-10-08 DIAGNOSIS — R22.1 LOCALIZED SWELLING, MASS AND LUMP, NECK: ICD-10-CM

## 2020-10-08 DIAGNOSIS — Z3A.10 10 WEEKS GESTATION OF PREGNANCY: ICD-10-CM

## 2020-10-08 PROCEDURE — 99203 OFFICE O/P NEW LOW 30 MIN: CPT | Performed by: PHYSICIAN ASSISTANT

## 2020-10-08 NOTE — PROGRESS NOTES
S: 40-year-old female who is 10 weeks pregnant presents for left neck swelling that has been present for 1 month but now is increasing in size.  She denies any fever, ear pain, sore throat.  It does cause some pain to turn her head.  When she pushes on it she states it is not real painful.  She is concerned as it just keeps getting larger and larger.  No cough.    No Known Allergies    Past Medical History:   Diagnosis Date     Anemia      Gastroesophageal reflux disease      Varicella age 5     FMHX-neg diabetes       Acetaminophen (TYLENOL PO), Take 325 mg by mouth every 6 hours as needed        doxylamine (UNISOM) 25 MG TABS tablet, Take 25 mg by mouth At Bedtime       ondansetron (ZOFRAN) 4 MG tablet, Take by mouth every 8 hours as needed for nausea       Pyridoxine HCl (VITAMIN B6) 200 MG TABS,        Prenatal Vit-Fe Fumarate-FA (PRENATAL MULTIVITAMIN PLUS IRON) 27-0.8 MG TABS per tablet, Take 1 tablet by mouth daily    No current facility-administered medications on file prior to visit.       Social History     Tobacco Use     Smoking status: Never Smoker     Smokeless tobacco: Never Used   Substance Use Topics     Alcohol use: Never     Alcohol/week: 0.0 standard drinks     Frequency: Never       ROS:  CONSTITUTIONAL: Negative for fatigue or fever.  EYES: Negative for eye problems.  ENT: As above.  RESP: Negative for shortness of breath   CV: Negative for chest pains.  GI: Negative for vomiting.  MUSCULOSKELETAL:  Negative for significant muscle or joint pains.  NEUROLOGIC: Negative for headaches.  SKIN: Negative for rash.  PSYCH: Normal mentation for age.    OBJECTIVE:  BP 92/62   Pulse 97   Temp 97.9  F (36.6  C) (Oral)   Resp 20   Wt 62.3 kg (137 lb 6.4 oz)   SpO2 100%   BMI 26.83 kg/m    GENERAL APPEARANCE: Healthy, alert and no distress.  EYES:Conjunctiva/sclera clear.  EARS: No cerumen.   Ear canals w/o erythema.  TM's intact w/o erythema.    NOSE/MOUTH: Nose without ulcers, erythema or  lesions.  SINUSES: No maxillary sinus tenderness.  THROAT: Mild erythema along the posterior arch.  No tonsillar enlargements.  No exudate.    NECK: Supple, full range of motion with minimal discomfort of the left side of the neck.  She has a firm large fullness that seems to start at the back of the sternocleidomastoid and go almost to the thyroid.  No palpable fluctuance.  Mildly tender.  Does not appear to be pulsating.  RESP: Lungs clear to auscultation - no rales, rhonchi or wheezes  CV: Regular rate and rhythm, normal S1 S2, no murmur noted.  NEURO: Awake, alert    SKIN: No rashes        ASSESSMENT:     ICD-10-CM    1. Mass of left side of neck  R22.1    2. Localized swelling, mass and lump, neck  R22.1    3. 10 weeks gestation of pregnancy  Z3A.10            PLAN: Differential includes abscess, lymphadenopathy, vascular mass, infection. Firm swelling in the left side of her neck.  Does not feel consistent with lymphadenopathy.  Seems to cover an extensive area.  I feel she should go to the ER for further evaluation.  They can determine whether or not ultrasound or other advanced imaging is indicated.  I have discussed clinical findings with patient.  All questions are answered, patient indicates understanding of these issues and is in agreement with plan.   Patient care instructions are discussed/given at the end of visit.     Manju Sims PA-C

## 2020-10-09 ENCOUNTER — INFUSION THERAPY VISIT (OUTPATIENT)
Dept: INFUSION THERAPY | Facility: CLINIC | Age: 40
End: 2020-10-09
Attending: OBSTETRICS & GYNECOLOGY
Payer: COMMERCIAL

## 2020-10-09 VITALS
OXYGEN SATURATION: 100 % | HEART RATE: 82 BPM | SYSTOLIC BLOOD PRESSURE: 80 MMHG | TEMPERATURE: 97.5 F | DIASTOLIC BLOOD PRESSURE: 51 MMHG

## 2020-10-09 DIAGNOSIS — O21.0 HYPEREMESIS GRAVIDARUM: Primary | ICD-10-CM

## 2020-10-09 PROCEDURE — 96365 THER/PROPH/DIAG IV INF INIT: CPT

## 2020-10-09 PROCEDURE — 250N000011 HC RX IP 250 OP 636: Performed by: OBSTETRICS & GYNECOLOGY

## 2020-10-09 PROCEDURE — 96375 TX/PRO/DX INJ NEW DRUG ADDON: CPT

## 2020-10-09 RX ORDER — ONDANSETRON 2 MG/ML
4 INJECTION INTRAMUSCULAR; INTRAVENOUS ONCE
Status: CANCELLED | OUTPATIENT
Start: 2020-10-12 | End: 2020-10-09

## 2020-10-09 RX ORDER — ONDANSETRON 2 MG/ML
4 INJECTION INTRAMUSCULAR; INTRAVENOUS ONCE
Status: COMPLETED | OUTPATIENT
Start: 2020-10-09 | End: 2020-10-09

## 2020-10-09 RX ORDER — DEXTROSE, SODIUM CHLORIDE, SODIUM LACTATE, POTASSIUM CHLORIDE, AND CALCIUM CHLORIDE 5; .6; .31; .03; .02 G/100ML; G/100ML; G/100ML; G/100ML; G/100ML
1000 INJECTION, SOLUTION INTRAVENOUS ONCE
Status: COMPLETED | OUTPATIENT
Start: 2020-10-09 | End: 2020-10-09

## 2020-10-09 RX ORDER — DEXTROSE, SODIUM CHLORIDE, SODIUM LACTATE, POTASSIUM CHLORIDE, AND CALCIUM CHLORIDE 5; .6; .31; .03; .02 G/100ML; G/100ML; G/100ML; G/100ML; G/100ML
1000 INJECTION, SOLUTION INTRAVENOUS ONCE
Status: CANCELLED | OUTPATIENT
Start: 2020-10-09 | End: 2020-10-09

## 2020-10-09 RX ADMIN — ONDANSETRON 4 MG: 2 INJECTION INTRAMUSCULAR; INTRAVENOUS at 14:28

## 2020-10-09 RX ADMIN — SODIUM CHLORIDE, SODIUM LACTATE, POTASSIUM CHLORIDE, CALCIUM CHLORIDE AND DEXTROSE MONOHYDRATE 1000 ML: 5; 600; 310; 30; 20 INJECTION, SOLUTION INTRAVENOUS at 14:22

## 2020-10-09 NOTE — PROGRESS NOTES
Infusion Nursing Note:  Rizwana Plummer presents today for IVF/Zofran.    Patient seen by provider today: No   present during visit today: Not Applicable.    Note: N/A.    Intravenous Access:  Peripheral IV placed.    Treatment Conditions:  Not Applicable.      Post Infusion Assessment:  Patient tolerated infusion without incident.  Blood return noted pre and post infusion.  Site patent and intact, free from redness, edema or discomfort.  No evidence of extravasations.  Access discontinued per protocol.       Discharge Plan:   Discharge instructions reviewed with: Patient.  Patient and/or family verbalized understanding of discharge instructions and all questions answered.  AVS to patient via nfon.  Patient will return 10/12/20 for IVF for next appointment.   Patient discharged in stable condition accompanied by: self.  Departure Mode: Ambulatory.    Maris Mendez RN

## 2020-10-12 ENCOUNTER — INFUSION THERAPY VISIT (OUTPATIENT)
Dept: INFUSION THERAPY | Facility: CLINIC | Age: 40
End: 2020-10-12
Attending: OBSTETRICS & GYNECOLOGY
Payer: COMMERCIAL

## 2020-10-12 VITALS
DIASTOLIC BLOOD PRESSURE: 61 MMHG | SYSTOLIC BLOOD PRESSURE: 90 MMHG | OXYGEN SATURATION: 100 % | RESPIRATION RATE: 16 BRPM | HEART RATE: 76 BPM | TEMPERATURE: 98.8 F

## 2020-10-12 DIAGNOSIS — O21.0 HYPEREMESIS GRAVIDARUM: Primary | ICD-10-CM

## 2020-10-12 PROCEDURE — 250N000011 HC RX IP 250 OP 636: Performed by: OBSTETRICS & GYNECOLOGY

## 2020-10-12 PROCEDURE — 258N000003 HC RX IP 258 OP 636: Performed by: OBSTETRICS & GYNECOLOGY

## 2020-10-12 PROCEDURE — 250N000009 HC RX 250: Performed by: OBSTETRICS & GYNECOLOGY

## 2020-10-12 PROCEDURE — 96365 THER/PROPH/DIAG IV INF INIT: CPT

## 2020-10-12 PROCEDURE — 96367 TX/PROPH/DG ADDL SEQ IV INF: CPT

## 2020-10-12 PROCEDURE — 96375 TX/PRO/DX INJ NEW DRUG ADDON: CPT

## 2020-10-12 RX ORDER — ONDANSETRON 2 MG/ML
4 INJECTION INTRAMUSCULAR; INTRAVENOUS ONCE
Status: CANCELLED | OUTPATIENT
Start: 2020-10-12 | End: 2020-10-12

## 2020-10-12 RX ORDER — DEXTROSE, SODIUM CHLORIDE, SODIUM LACTATE, POTASSIUM CHLORIDE, AND CALCIUM CHLORIDE 5; .6; .31; .03; .02 G/100ML; G/100ML; G/100ML; G/100ML; G/100ML
1000 INJECTION, SOLUTION INTRAVENOUS ONCE
Status: CANCELLED | OUTPATIENT
Start: 2020-10-12 | End: 2020-10-12

## 2020-10-12 RX ORDER — CEPHALEXIN 500 MG/1
500 CAPSULE ORAL 3 TIMES DAILY
COMMUNITY
Start: 2020-10-09 | End: 2020-10-19

## 2020-10-12 RX ORDER — DEXTROSE, SODIUM CHLORIDE, SODIUM LACTATE, POTASSIUM CHLORIDE, AND CALCIUM CHLORIDE 5; .6; .31; .03; .02 G/100ML; G/100ML; G/100ML; G/100ML; G/100ML
1000 INJECTION, SOLUTION INTRAVENOUS ONCE
Status: COMPLETED | OUTPATIENT
Start: 2020-10-12 | End: 2020-10-12

## 2020-10-12 RX ORDER — ONDANSETRON 2 MG/ML
4 INJECTION INTRAMUSCULAR; INTRAVENOUS ONCE
Status: COMPLETED | OUTPATIENT
Start: 2020-10-12 | End: 2020-10-12

## 2020-10-12 RX ADMIN — ONDANSETRON 4 MG: 2 INJECTION INTRAMUSCULAR; INTRAVENOUS at 14:05

## 2020-10-12 RX ADMIN — FOLIC ACID: 5 INJECTION, SOLUTION INTRAMUSCULAR; INTRAVENOUS; SUBCUTANEOUS at 15:17

## 2020-10-12 RX ADMIN — SODIUM CHLORIDE, SODIUM LACTATE, POTASSIUM CHLORIDE, CALCIUM CHLORIDE AND DEXTROSE MONOHYDRATE 1000 ML: 5; 600; 310; 30; 20 INJECTION, SOLUTION INTRAVENOUS at 14:10

## 2020-10-12 NOTE — PROGRESS NOTES
Infusion Nursing Note:  Rizwana Plummer presents today for IVF and Zofran.    Patient seen by provider today: No   present during visit today: Not Applicable.    Note: Continues with nausea and vomiting Last oral zofran dose was 2 days ago. Currently on antibiotics for an infection in her neck patient reported.    Intravenous Access:  Peripheral IV placed.    Treatment Conditions:  Not Applicable.  .      Post Infusion Assessment:  Patient tolerated infusion without incident.  Blood return noted pre and post infusion.  Site patent and intact, free from redness, edema or discomfort.  No evidence of extravasations.  Access discontinued per protocol.       Discharge Plan:   Copy of AVS reviewed with patient and/or family.  Patient will return 10/14 for next appointment.  Patient discharged in stable condition accompanied by: self.  Departure Mode: Ambulatory.    Lluvia Jones RN

## 2020-10-14 ENCOUNTER — INFUSION THERAPY VISIT (OUTPATIENT)
Dept: INFUSION THERAPY | Facility: CLINIC | Age: 40
End: 2020-10-14
Attending: OBSTETRICS & GYNECOLOGY
Payer: COMMERCIAL

## 2020-10-14 VITALS
SYSTOLIC BLOOD PRESSURE: 86 MMHG | WEIGHT: 138.7 LBS | HEART RATE: 82 BPM | TEMPERATURE: 97.9 F | BODY MASS INDEX: 27.09 KG/M2 | DIASTOLIC BLOOD PRESSURE: 56 MMHG | OXYGEN SATURATION: 100 % | RESPIRATION RATE: 14 BRPM

## 2020-10-14 DIAGNOSIS — O21.0 HYPEREMESIS GRAVIDARUM: Primary | ICD-10-CM

## 2020-10-14 PROCEDURE — 96361 HYDRATE IV INFUSION ADD-ON: CPT | Performed by: INTERNAL MEDICINE

## 2020-10-14 PROCEDURE — 99207 PR NO CHARGE LOS: CPT

## 2020-10-14 PROCEDURE — 96375 TX/PRO/DX INJ NEW DRUG ADDON: CPT | Performed by: INTERNAL MEDICINE

## 2020-10-14 PROCEDURE — 96365 THER/PROPH/DIAG IV INF INIT: CPT | Performed by: INTERNAL MEDICINE

## 2020-10-14 RX ORDER — ONDANSETRON 2 MG/ML
4 INJECTION INTRAMUSCULAR; INTRAVENOUS ONCE
Status: COMPLETED | OUTPATIENT
Start: 2020-10-14 | End: 2020-10-14

## 2020-10-14 RX ORDER — DEXTROSE, SODIUM CHLORIDE, SODIUM LACTATE, POTASSIUM CHLORIDE, AND CALCIUM CHLORIDE 5; .6; .31; .03; .02 G/100ML; G/100ML; G/100ML; G/100ML; G/100ML
1000 INJECTION, SOLUTION INTRAVENOUS ONCE
Status: CANCELLED | OUTPATIENT
Start: 2020-10-14 | End: 2020-10-14

## 2020-10-14 RX ORDER — ONDANSETRON 2 MG/ML
4 INJECTION INTRAMUSCULAR; INTRAVENOUS ONCE
Status: CANCELLED | OUTPATIENT
Start: 2020-10-14 | End: 2020-10-14

## 2020-10-14 RX ORDER — DEXTROSE, SODIUM CHLORIDE, SODIUM LACTATE, POTASSIUM CHLORIDE, AND CALCIUM CHLORIDE 5; .6; .31; .03; .02 G/100ML; G/100ML; G/100ML; G/100ML; G/100ML
1000 INJECTION, SOLUTION INTRAVENOUS ONCE
Status: DISCONTINUED | OUTPATIENT
Start: 2020-10-14 | End: 2020-10-14 | Stop reason: HOSPADM

## 2020-10-14 RX ADMIN — ONDANSETRON 4 MG: 2 INJECTION INTRAMUSCULAR; INTRAVENOUS at 11:55

## 2020-10-14 ASSESSMENT — PAIN SCALES - GENERAL: PAINLEVEL: NO PAIN (0)

## 2020-10-14 NOTE — PROGRESS NOTES
Infusion Nursing Note:  Rizwana Plummer presents today for IV fluids, Zofran and Vitamins.    Patient seen by provider today: No   present during visit today: Not Applicable.    Note: Patient states she had nausea for the first 3 months, of her prior 3 pregnancies also.  Hopes nausea will resolve soon.    Intravenous Access:  Peripheral IV placed.    Treatment Conditions:  Not Applicable.      Post Infusion Assessment:  Patient tolerated infusion without incident.  Blood return noted pre and post infusion.  Site patent and intact, free from redness, edema or discomfort.  No evidence of extravasations.  Access discontinued per protocol.       Discharge Plan:   Patient will return 10/16/2020 for next appointment.   Patient discharged in stable condition accompanied by: self.  Departure Mode: Ambulatory.    Cathy Batista RN

## 2020-10-16 ENCOUNTER — INFUSION THERAPY VISIT (OUTPATIENT)
Dept: INFUSION THERAPY | Facility: CLINIC | Age: 40
End: 2020-10-16
Attending: OBSTETRICS & GYNECOLOGY
Payer: COMMERCIAL

## 2020-10-16 VITALS
DIASTOLIC BLOOD PRESSURE: 51 MMHG | SYSTOLIC BLOOD PRESSURE: 87 MMHG | RESPIRATION RATE: 16 BRPM | HEART RATE: 84 BPM | TEMPERATURE: 98.6 F | OXYGEN SATURATION: 100 %

## 2020-10-16 DIAGNOSIS — O21.0 HYPEREMESIS GRAVIDARUM: Primary | ICD-10-CM

## 2020-10-16 PROCEDURE — 96366 THER/PROPH/DIAG IV INF ADDON: CPT | Performed by: INTERNAL MEDICINE

## 2020-10-16 PROCEDURE — 96375 TX/PRO/DX INJ NEW DRUG ADDON: CPT | Performed by: INTERNAL MEDICINE

## 2020-10-16 PROCEDURE — 99207 PR NO CHARGE LOS: CPT

## 2020-10-16 PROCEDURE — 96365 THER/PROPH/DIAG IV INF INIT: CPT | Performed by: INTERNAL MEDICINE

## 2020-10-16 RX ORDER — ONDANSETRON 2 MG/ML
4 INJECTION INTRAMUSCULAR; INTRAVENOUS ONCE
Status: CANCELLED | OUTPATIENT
Start: 2020-10-16 | End: 2020-10-16

## 2020-10-16 RX ORDER — ONDANSETRON 2 MG/ML
4 INJECTION INTRAMUSCULAR; INTRAVENOUS ONCE
Status: COMPLETED | OUTPATIENT
Start: 2020-10-16 | End: 2020-10-16

## 2020-10-16 RX ORDER — DEXTROSE, SODIUM CHLORIDE, SODIUM LACTATE, POTASSIUM CHLORIDE, AND CALCIUM CHLORIDE 5; .6; .31; .03; .02 G/100ML; G/100ML; G/100ML; G/100ML; G/100ML
1000 INJECTION, SOLUTION INTRAVENOUS ONCE
Status: CANCELLED | OUTPATIENT
Start: 2020-10-16 | End: 2020-10-16

## 2020-10-16 RX ADMIN — ONDANSETRON 4 MG: 2 INJECTION INTRAMUSCULAR; INTRAVENOUS at 12:08

## 2020-10-16 ASSESSMENT — PAIN SCALES - GENERAL: PAINLEVEL: NO PAIN (0)

## 2020-10-16 NOTE — PROGRESS NOTES
Infusion Nursing Note:  Rizwana Plummer presents today for IVF/ MVI/ zofran.    Patient seen by provider today: No   present during visit today: Not Applicable.    Intravenous Access:  Peripheral IV placed.    Treatment Conditions:  See systems review flow sheet.      Post Infusion Assessment:  Patient tolerated infusion without incident.  Blood return noted pre and post infusion.  Site patent and intact, free from redness, edema or discomfort.  No evidence of extravasations.  Access discontinued per protocol.       Discharge Plan:   Patient discharged in stable condition accompanied by: self.  Departure Mode: Ambulatory.  Rizwana states she will make another appointment if needed.    Sherlyn Gil RN

## 2020-10-29 ENCOUNTER — TRANSCRIBE ORDERS (OUTPATIENT)
Dept: MATERNAL FETAL MEDICINE | Facility: CLINIC | Age: 40
End: 2020-10-29

## 2020-10-29 ENCOUNTER — PRENATAL OFFICE VISIT (OUTPATIENT)
Dept: OBGYN | Facility: CLINIC | Age: 40
End: 2020-10-29
Payer: COMMERCIAL

## 2020-10-29 VITALS
HEIGHT: 60 IN | SYSTOLIC BLOOD PRESSURE: 98 MMHG | DIASTOLIC BLOOD PRESSURE: 58 MMHG | WEIGHT: 135.3 LBS | BODY MASS INDEX: 26.56 KG/M2

## 2020-10-29 DIAGNOSIS — N30.00 ACUTE CYSTITIS WITHOUT HEMATURIA: ICD-10-CM

## 2020-10-29 DIAGNOSIS — R11.0 NAUSEA: Primary | ICD-10-CM

## 2020-10-29 DIAGNOSIS — O09.521 MULTIGRAVIDA OF ADVANCED MATERNAL AGE IN FIRST TRIMESTER: ICD-10-CM

## 2020-10-29 DIAGNOSIS — Z34.91 PRENATAL CARE IN FIRST TRIMESTER: ICD-10-CM

## 2020-10-29 DIAGNOSIS — O26.90 PREGNANCY RELATED CONDITION, ANTEPARTUM: Primary | ICD-10-CM

## 2020-10-29 LAB
ABO + RH BLD: NORMAL
ABO + RH BLD: NORMAL
ALBUMIN UR-MCNC: 30 MG/DL
APPEARANCE UR: ABNORMAL
BACTERIA #/AREA URNS HPF: ABNORMAL /HPF
BILIRUB UR QL STRIP: ABNORMAL
BLD GP AB SCN SERPL QL: NORMAL
BLOOD BANK CMNT PATIENT-IMP: NORMAL
COLOR UR AUTO: YELLOW
GLUCOSE UR STRIP-MCNC: NEGATIVE MG/DL
HGB UR QL STRIP: NEGATIVE
KETONES UR STRIP-MCNC: 15 MG/DL
LEUKOCYTE ESTERASE UR QL STRIP: NEGATIVE
MUCOUS THREADS #/AREA URNS LPF: PRESENT /LPF
NITRATE UR QL: NEGATIVE
NON-SQ EPI CELLS #/AREA URNS LPF: ABNORMAL /LPF
PH UR STRIP: 5.5 PH (ref 5–7)
RBC #/AREA URNS AUTO: ABNORMAL /HPF
SOURCE: ABNORMAL
SP GR UR STRIP: >1.03 (ref 1–1.03)
SPECIMEN EXP DATE BLD: NORMAL
UROBILINOGEN UR STRIP-ACNC: 1 EU/DL (ref 0.2–1)
WBC #/AREA URNS AUTO: ABNORMAL /HPF

## 2020-10-29 PROCEDURE — 86780 TREPONEMA PALLIDUM: CPT | Mod: 90 | Performed by: FAMILY MEDICINE

## 2020-10-29 PROCEDURE — 86762 RUBELLA ANTIBODY: CPT | Performed by: FAMILY MEDICINE

## 2020-10-29 PROCEDURE — 86900 BLOOD TYPING SEROLOGIC ABO: CPT | Performed by: FAMILY MEDICINE

## 2020-10-29 PROCEDURE — 99207 PR PRENATAL VISIT: CPT | Performed by: FAMILY MEDICINE

## 2020-10-29 PROCEDURE — 86850 RBC ANTIBODY SCREEN: CPT | Performed by: FAMILY MEDICINE

## 2020-10-29 PROCEDURE — 87340 HEPATITIS B SURFACE AG IA: CPT | Performed by: FAMILY MEDICINE

## 2020-10-29 PROCEDURE — 99000 SPECIMEN HANDLING OFFICE-LAB: CPT | Performed by: FAMILY MEDICINE

## 2020-10-29 PROCEDURE — 81001 URINALYSIS AUTO W/SCOPE: CPT | Performed by: FAMILY MEDICINE

## 2020-10-29 PROCEDURE — 87389 HIV-1 AG W/HIV-1&-2 AB AG IA: CPT | Performed by: FAMILY MEDICINE

## 2020-10-29 PROCEDURE — 36415 COLL VENOUS BLD VENIPUNCTURE: CPT | Performed by: FAMILY MEDICINE

## 2020-10-29 PROCEDURE — 86901 BLOOD TYPING SEROLOGIC RH(D): CPT | Performed by: FAMILY MEDICINE

## 2020-10-29 PROCEDURE — 87086 URINE CULTURE/COLONY COUNT: CPT | Performed by: FAMILY MEDICINE

## 2020-10-29 RX ORDER — ONDANSETRON 8 MG/1
8 TABLET, FILM COATED ORAL EVERY 8 HOURS PRN
Qty: 30 TABLET | Refills: 11 | Status: SHIPPED | OUTPATIENT
Start: 2020-10-29 | End: 2021-04-22

## 2020-10-29 RX ORDER — NITROFURANTOIN 25; 75 MG/1; MG/1
100 CAPSULE ORAL 2 TIMES DAILY
Qty: 14 CAPSULE | Refills: 0 | Status: SHIPPED | OUTPATIENT
Start: 2020-10-29 | End: 2020-11-05

## 2020-10-29 ASSESSMENT — MIFFLIN-ST. JEOR: SCORE: 1195.22

## 2020-10-29 NOTE — NURSING NOTE
13w3d    Chief Complaint   Patient presents with     Prenatal Care     patient has been feeling tired       Initial BP 98/58   Ht 1.524 m (5')   Wt 61.4 kg (135 lb 4.8 oz)   BMI 26.42 kg/m   Estimated body mass index is 26.42 kg/m  as calculated from the following:    Height as of this encounter: 1.524 m (5').    Weight as of this encounter: 61.4 kg (135 lb 4.8 oz).  BP completed using cuff size: regular    Questioned patient about current smoking habits.  Pt. has never smoked.          The following HM Due: NONE

## 2020-10-29 NOTE — PATIENT INSTRUCTIONS
"Protein to help with fatigue   icecream :  Brand halo     Return for FIRST PRENATAL VISIT NEXT WEEK, I HAVE OPENINGS Friday     Return to clinic:  every 4 weeks till 28 weeks, then every 2 weeks till 36 weeks, then weekly till delivery      Phone numbers Bobbi:  Day/ night [phone] ask for ob triage  Emergency:  Call labor and delivery:  [phone]    What should I call about??    Contraction every 5 minutes for 1 hour 1 minute long (511), bleeding, loss of fluid, headache that doesn't resolve with tylenol, and decreased fetal movement     Start kick counts @ 26-28 weeks   There is an marlena for this!  It is called \"count the kicks\"  Keep track of movement and discover your normal baby movement pattern   guideline is listed below  Please call if you do not feel the baby move!  We will have you come in for fetal heart rate monitoring:   Perception of at least 10 FMs during 12 hours of normal maternal activity   Perception of least 10 FMs over two hours when the mother is at rest and focused on [facility] Address   [address], [place] [address] [phone]    Dr. Cindy Laurent, DO    OB/GYN   [facility]                                                        Dr. Cindy Laurent, DO    Obstetrics and Gynecology  [facility] [place] [facility] [place]         "

## 2020-10-29 NOTE — PROGRESS NOTES
CC: Here for routine prenatal visit   40 year old y/o  @ 13w3d with Estimated Date of Delivery: May 3, 2021     BP 98/58   Ht 1.524 m (5')   Wt 61.4 kg (135 lb 4.8 oz)   BMI 26.42 kg/m    See OB flowsheet  + fetal movement, no contractions, no bleeding, no loss of fluid  Discussed monitoring fetal movement     1) concerns: return for first prenatal visit next time   Covid-19 concerns: none  Nausea is better, still fatigue  2) Routine: labs pending ordered today  3) Risk factors:   A: hx of CS x 3, RCS  B: Hyperemesis Gravidarum: had Infusion ordered, did it, but doesn't have someone to stay with kids at home  rx 8 mg zofran   Fatigue  C: AMA:  Level II  Us, weekly bpp after 36 weeks     Patient Active Problem List   Diagnosis     Female genital infibulation     Epidermal inclusion cyst of infibulation scar     High-risk pregnancy     Previous  section complicating pregnancy     Encounter for triage in pregnant patient     S/P repeat low transverse      Hyperemesis gravidarum       4) Return: for first prenatal visit need to schedule     [facility]

## 2020-10-30 LAB
BACTERIA SPEC CULT: NORMAL
HBV SURFACE AG SERPL QL IA: NONREACTIVE
HIV 1+2 AB+HIV1 P24 AG SERPL QL IA: NONREACTIVE
Lab: NORMAL
RUBV IGG SERPL IA-ACNC: 18 IU/ML
SPECIMEN SOURCE: NORMAL
T PALLIDUM AB SER QL: NONREACTIVE

## 2020-12-04 ENCOUNTER — OFFICE VISIT (OUTPATIENT)
Dept: MATERNAL FETAL MEDICINE | Facility: CLINIC | Age: 40
End: 2020-12-04
Attending: FAMILY MEDICINE
Payer: COMMERCIAL

## 2020-12-04 ENCOUNTER — HOSPITAL ENCOUNTER (OUTPATIENT)
Dept: ULTRASOUND IMAGING | Facility: CLINIC | Age: 40
End: 2020-12-04
Attending: FAMILY MEDICINE
Payer: COMMERCIAL

## 2020-12-04 DIAGNOSIS — O43.119 ANTEPARTUM PLACENTA CIRCUMVALLATA: Primary | ICD-10-CM

## 2020-12-04 DIAGNOSIS — O26.90 PREGNANCY RELATED CONDITION, ANTEPARTUM: ICD-10-CM

## 2020-12-04 PROCEDURE — 76811 OB US DETAILED SNGL FETUS: CPT | Mod: 26 | Performed by: OBSTETRICS & GYNECOLOGY

## 2020-12-04 PROCEDURE — 76811 OB US DETAILED SNGL FETUS: CPT

## 2020-12-04 NOTE — PROGRESS NOTES
The patient was seen for an ultrasound in the M[facility]etal Medicine Center at the [facility] today.  For a detailed report of the ultrasound examination, please see the ultrasound report which can be found under the imaging tab.    Bernadine San MD  , OB/GYN  Maternal-Fetal Medicine  [phone] (Pager)

## 2020-12-10 ENCOUNTER — PREP FOR PROCEDURE (OUTPATIENT)
Dept: OBGYN | Facility: CLINIC | Age: 40
End: 2020-12-10

## 2020-12-10 ENCOUNTER — TELEPHONE (OUTPATIENT)
Dept: OBGYN | Facility: CLINIC | Age: 40
End: 2020-12-10

## 2020-12-10 ENCOUNTER — PRENATAL OFFICE VISIT (OUTPATIENT)
Dept: OBGYN | Facility: CLINIC | Age: 40
End: 2020-12-10
Payer: COMMERCIAL

## 2020-12-10 VITALS
WEIGHT: 136.9 LBS | BODY MASS INDEX: 26.88 KG/M2 | SYSTOLIC BLOOD PRESSURE: 90 MMHG | DIASTOLIC BLOOD PRESSURE: 52 MMHG | HEIGHT: 60 IN

## 2020-12-10 DIAGNOSIS — M54.50 LOW BACK PAIN, UNSPECIFIED BACK PAIN LATERALITY, UNSPECIFIED CHRONICITY, UNSPECIFIED WHETHER SCIATICA PRESENT: Primary | ICD-10-CM

## 2020-12-10 DIAGNOSIS — Z98.891 HISTORY OF CESAREAN SECTION: Primary | ICD-10-CM

## 2020-12-10 DIAGNOSIS — Z98.891 HISTORY OF CESAREAN SECTION: ICD-10-CM

## 2020-12-10 DIAGNOSIS — N30.00 ACUTE CYSTITIS WITHOUT HEMATURIA: ICD-10-CM

## 2020-12-10 DIAGNOSIS — O09.529 SUPERVISION OF HIGH-RISK PREGNANCY OF ELDERLY MULTIGRAVIDA: ICD-10-CM

## 2020-12-10 LAB
ALBUMIN UR-MCNC: NEGATIVE MG/DL
APPEARANCE UR: CLEAR
BACTERIA #/AREA URNS HPF: ABNORMAL /HPF
BILIRUB UR QL STRIP: NEGATIVE
COLOR UR AUTO: YELLOW
GLUCOSE UR STRIP-MCNC: NEGATIVE MG/DL
HGB UR QL STRIP: NEGATIVE
KETONES UR STRIP-MCNC: >=80 MG/DL
LEUKOCYTE ESTERASE UR QL STRIP: ABNORMAL
MUCOUS THREADS #/AREA URNS LPF: PRESENT /LPF
NITRATE UR QL: NEGATIVE
NON-SQ EPI CELLS #/AREA URNS LPF: ABNORMAL /LPF
PH UR STRIP: 6.5 PH (ref 5–7)
RBC #/AREA URNS AUTO: ABNORMAL /HPF
SOURCE: ABNORMAL
SP GR UR STRIP: 1.02 (ref 1–1.03)
UROBILINOGEN UR STRIP-ACNC: 1 EU/DL (ref 0.2–1)
WBC #/AREA URNS AUTO: ABNORMAL /HPF

## 2020-12-10 PROCEDURE — 81001 URINALYSIS AUTO W/SCOPE: CPT | Performed by: FAMILY MEDICINE

## 2020-12-10 PROCEDURE — 99207 PR PRENATAL VISIT: CPT | Performed by: FAMILY MEDICINE

## 2020-12-10 RX ORDER — NITROFURANTOIN 25; 75 MG/1; MG/1
100 CAPSULE ORAL 2 TIMES DAILY
Qty: 14 CAPSULE | Refills: 0 | Status: SHIPPED | OUTPATIENT
Start: 2020-12-10 | End: 2020-12-17

## 2020-12-10 ASSESSMENT — MIFFLIN-ST. JEOR: SCORE: 1202.47

## 2020-12-10 NOTE — NURSING NOTE
19w3d    Chief Complaint   Patient presents with     Prenatal Care     back pain and tightening in abdomin--started last night--slight contractions       Initial BP 90/52   Ht 1.524 m (5')   Wt 62.1 kg (136 lb 14.4 oz)   BMI 26.74 kg/m   Estimated body mass index is 26.74 kg/m  as calculated from the following:    Height as of this encounter: 1.524 m (5').    Weight as of this encounter: 62.1 kg (136 lb 14.4 oz).  BP completed using cuff size: regular    Questioned patient about current smoking habits.  Pt. has never smoked.          The following HM Due: NONE

## 2020-12-10 NOTE — PROGRESS NOTES
CC: Here for routine prenatal visit   40 year old y/o  @ 19w3d with Estimated Date of Delivery: May 3, 2021      BP 90/52   Ht 1.524 m (5')   Wt 62.1 kg (136 lb 14.4 oz)   BMI 26.74 kg/m    See OB flowsheet  + fetal movement, no contractions, no bleeding, no loss of fluid   Discussed monitoring fetal movement      1) concerns: pelvic cramping, UA pending   Covid-19 concerns: none  2) Routine: O+ RI, XY   3) Risk factors:   A: hx of CS x 3: RCS: scheduled    B: Hyperemesis Gravidarum: resolved   C: AMA:  Level II  Us, weekly bpp after 36 weeks  D: Circumvallate placenta:  Growth us per [facility] due to inc risk IUGR  E: Post partum birth control: discussed IUD, mirena paragard kyleena, eileen              Oral Contraceptive, nexplanon. She prefers regular period.  F: Female genital infibulation     4) Return: 4 weeks   Call with urine result     Mehran

## 2020-12-10 NOTE — TELEPHONE ENCOUNTER
Procedure name(s) - multi select repeat  section   Reason for procedure history of  section   Surgeon: Mehran   Is this a multi surgeon case? No   Laterality N/A   Request for additional equipment Other (see comments)   Comment: None   Anesthesia Spinal   Initiate Pre-op orders for above procedure: Yes, as ordered in Epic   Comment: Additional orders noted there also   Location of Case: [facility]   Surgical Assistant Kelly FUNG   Urgency of Surgery: Routine   Surgeon Procedure Time (incision to closure) in minutes (per procedure as applicable) 60   Note:  Surgical Case Time Needed (in minutes)   Date of Surgery (Requested): 2021   Patient Class (for admit prior to surgery, specify number of days in comments): Surgery admit   Length of Stay: 3 days   H&P To Be Completed By: Surgeon   Post-Op Appointment 1.5 week   Vendor Needed? No

## 2020-12-10 NOTE — TELEPHONE ENCOUNTER
Pt calling with back pain and tightening in her abdomen.  Notes burning with urination sometimes.  Reviewed chart.  Pt is overdue for appt with us, has not been seen since 10/29.  Scheduled for appt with Dr Laurent today.      Forgot to review tylenol, ice/heat and hydrating with pt.  Called and LM with this information and to CB if she has questions.    Danisha Harper RN

## 2020-12-10 NOTE — PATIENT INSTRUCTIONS
"Return 4 weeks   Return to clinic:  every 4 weeks till 28 weeks, then every 2 weeks till 36 weeks, then weekly till delivery      Phone numbers [place]:  Day/ night [phone] ask for ob triage  Emergency:  Call labor and delivery:  [phone]    What should I call about??    Contraction every 5 minutes for 1 hour 1 minute long (511), bleeding, loss of fluid, headache that doesn't resolve with tylenol, and decreased fetal movement     Start kick counts @ 26-28 weeks   There is an marlena for this!  It is called \"count the kicks\"  Keep track of movement and discover your normal baby movement pattern   guideline is listed below  Please call if you do not feel the baby move!  We will have you come in for fetal heart rate monitoring:   Perception of at least 10 FMs during 12 hours of normal maternal activity   Perception of least 10 FMs over two hours when the mother is at rest and focused on [facility] Address   [address], [place], [place] [address] [phone]    Dr. Cindy Laurent, DO    OB/GYN   [facility]                                                      "

## 2020-12-11 NOTE — TELEPHONE ENCOUNTER
Type of surgery: repeat  sectioin  Location of surgery: [facility]  Date and time of surgery: 21 @ 7:30 am  Surgeon: Dr. Laurent  Pre-Op Appt Date: @ prenatal appointment  Post-Op Appt Date: 21   Packet sent out: Yes  Pre-cert/Authorization completed:  No  Date: 20

## 2020-12-18 NOTE — TELEPHONE ENCOUNTER
Left message on answering machine for patient to call back.  Breech and Growth restriction is seen on ultrasound   Referral to MFM given.   Unable to get ahold of patient  She did not show up today for appointment   This afternoon.   Recommend delivery at least by 39 weeks if not sooner if we are able to get MFM consult.       Dr. Cindy Laurent, DO    Obstetrics and Gynecology  [facility] [place] [facility] [place]         
Pt had appt this afternoon at 2:45pm. Pt had an US appt this morning and was not able to come back for appt so she rescheduled for next week.    Dr. Laurent would like pt to come back today for an appt. due to concerns with the US today. If pt cannot come back today she needs a scan at [facility] tomorrow. Order has not been placed yet.    Domi Benson CMA    
Pt scheduled for BPP US with umbilical artery dopplers at [facility] on 2/17/17 at 4:40pm per Dr. Laurent.    Called pt via 1EQ  #[id number]. No answer. LM on answering machine. Pt advised through  that Dr. Laurent wants her to have an US at [facility] tomorrow and her appt is at 4:40pm with check-in at 4:25pm. Advised to call clinic with any question and clinic telephone number given.    Order placed for US. Result will be paged to Dr. Banda who is on-call tomorrow.  Domi Benson CMA    
See 02/17 telephone encounter  
No

## 2020-12-20 ENCOUNTER — HEALTH MAINTENANCE LETTER (OUTPATIENT)
Age: 40
End: 2020-12-20

## 2021-01-15 DIAGNOSIS — Z11.59 ENCOUNTER FOR SCREENING FOR OTHER VIRAL DISEASES: ICD-10-CM

## 2021-01-31 ENCOUNTER — HOSPITAL ENCOUNTER (OUTPATIENT)
Facility: CLINIC | Age: 41
LOS: 1 days | Discharge: HOME | End: 2021-01-31
Attending: FAMILY MEDICINE | Admitting: FAMILY MEDICINE
Payer: COMMERCIAL

## 2021-01-31 ENCOUNTER — TELEPHONE (OUTPATIENT)
Dept: OBGYN | Facility: CLINIC | Age: 41
End: 2021-01-31

## 2021-01-31 VITALS — RESPIRATION RATE: 18 BRPM | TEMPERATURE: 99.3 F | SYSTOLIC BLOOD PRESSURE: 104 MMHG | DIASTOLIC BLOOD PRESSURE: 55 MMHG

## 2021-01-31 DIAGNOSIS — Z36.89 ENCOUNTER FOR TRIAGE IN PREGNANT PATIENT: Primary | ICD-10-CM

## 2021-01-31 DIAGNOSIS — M54.50 ACUTE MIDLINE LOW BACK PAIN, UNSPECIFIED WHETHER SCIATICA PRESENT: ICD-10-CM

## 2021-01-31 DIAGNOSIS — Z98.891 HISTORY OF CESAREAN SECTION: ICD-10-CM

## 2021-01-31 DIAGNOSIS — N30.00 ACUTE CYSTITIS WITHOUT HEMATURIA: Primary | ICD-10-CM

## 2021-01-31 LAB
ALBUMIN UR-MCNC: NEGATIVE MG/DL
APPEARANCE UR: CLEAR
BACTERIA #/AREA URNS HPF: ABNORMAL /HPF
BILIRUB UR QL STRIP: NEGATIVE
COLOR UR AUTO: ABNORMAL
GLUCOSE UR STRIP-MCNC: NEGATIVE MG/DL
HGB UR QL STRIP: NEGATIVE
KETONES UR STRIP-MCNC: 10 MG/DL
LEUKOCYTE ESTERASE UR QL STRIP: ABNORMAL
MUCOUS THREADS #/AREA URNS LPF: PRESENT /LPF
NITRATE UR QL: NEGATIVE
PH UR STRIP: 6.5 PH (ref 5–7)
RBC #/AREA URNS AUTO: <1 /HPF (ref 0–2)
SOURCE: ABNORMAL
SP GR UR STRIP: 1.01 (ref 1–1.03)
SQUAMOUS #/AREA URNS AUTO: 4 /HPF (ref 0–1)
UROBILINOGEN UR STRIP-MCNC: NORMAL MG/DL (ref 0–2)
WBC #/AREA URNS AUTO: 3 /HPF (ref 0–5)

## 2021-01-31 PROCEDURE — 99213 OFFICE O/P EST LOW 20 MIN: CPT | Performed by: FAMILY MEDICINE

## 2021-01-31 PROCEDURE — 87086 URINE CULTURE/COLONY COUNT: CPT | Performed by: FAMILY MEDICINE

## 2021-01-31 PROCEDURE — G0463 HOSPITAL OUTPT CLINIC VISIT: HCPCS

## 2021-01-31 PROCEDURE — 81001 URINALYSIS AUTO W/SCOPE: CPT | Performed by: FAMILY MEDICINE

## 2021-01-31 PROCEDURE — 250N000013 HC RX MED GY IP 250 OP 250 PS 637: Performed by: FAMILY MEDICINE

## 2021-01-31 RX ORDER — ONDANSETRON 2 MG/ML
4 INJECTION INTRAMUSCULAR; INTRAVENOUS EVERY 6 HOURS PRN
Status: DISCONTINUED | OUTPATIENT
Start: 2021-01-31 | End: 2021-01-31 | Stop reason: HOSPADM

## 2021-01-31 RX ORDER — ACETAMINOPHEN 325 MG/1
975 TABLET ORAL ONCE
Status: COMPLETED | OUTPATIENT
Start: 2021-01-31 | End: 2021-01-31

## 2021-01-31 RX ORDER — NITROFURANTOIN 25; 75 MG/1; MG/1
100 CAPSULE ORAL 2 TIMES DAILY
Qty: 14 CAPSULE | Refills: 0 | Status: SHIPPED | OUTPATIENT
Start: 2021-01-31 | End: 2021-02-07

## 2021-01-31 RX ORDER — HYDROXYZINE HYDROCHLORIDE 10 MG/1
10 TABLET, FILM COATED ORAL
Qty: 3 TABLET | Refills: 0 | Status: SHIPPED | OUTPATIENT
Start: 2021-01-31

## 2021-01-31 RX ADMIN — ACETAMINOPHEN 975 MG: 325 TABLET, FILM COATED ORAL at 17:03

## 2021-01-31 NOTE — TELEPHONE ENCOUNTER
rx for atarax for muscle spasm   rx macrobid for UTI    Dr. Cindy Laurent, DO    Obstetrics and Gynecology  [facility] and [place]

## 2021-01-31 NOTE — PROGRESS NOTES
Data: Patient presented to [facility]: 2021  3:23 PM.  Reason for maternal/fetal assessment is back ache. Patient reports lower backache since this am.  Patient is a .  Prenatal record reviewed. Pregnancy has been uncomplicated..  Gestational Age 26w6d. VSS. Fetal movement present. Patient denies uterine contractions, leaking of vaginal fluid/rupture of membranes, vaginal bleeding, abdominal pain, pelvic pressure. Support person is present.   Tylenol and heat given- feeling better  Action: Verbal consent for EFM. Triage assessment completed. Bill of rights reviewed.  Response: Patient verbalized agreement with plan. Will contact Dr Cindy Laurent with update and further orders.

## 2021-01-31 NOTE — DISCHARGE INSTRUCTIONS
Discharge Instruction for Undelivered Patients      You were seen for: Fetal Assessment  We Consulted: Dr. Laurent  You had (Test or Medicine): fetal monitoring, urine analysis     Diet:   Drink 8 to 12 glasses of liquids (milk, juice, water) every day.  You may eat meals and snacks.     Activity:  Call your doctor or nurse midwife if your baby is moving less than usual.     Call your provider if you notice:  Swelling in your face or increased swelling in your hands or legs.  Headaches that are not relieved by Tylenol (acetaminophen).  Changes in your vision (blurring: seeing spots or stars.)  Nausea (sick to your stomach) and vomiting (throwing up).   Weight gain of 5 pounds or more per week.  Heartburn that doesn't go away.  Signs of bladder infection: pain when you urinate (use the toilet), need to go more often and more urgently.  The bag of fairchild (rupture of membranes) breaks, or you notice leaking in your underwear.  Bright red blood in your underwear.  Abdominal (lower belly) or stomach pain.  *If less than 34 weeks: Contractions (tightenings) more than 6 times in one hour.  Increase or change in vaginal discharge (note the color and amount)      Follow-up:  As scheduled in the clinic      Macrobid (antibiotic) taken twice a day.  Atarax taken for pain.

## 2021-01-31 NOTE — H&P
[facility] Labor and Delivery Triage Note    Doreen Plummer MRN# [id number]   Age: 40 year old YOB: 1980     Date of Admission:  2021    Primary care provider: Cindy Laurent           Chief Complaint:   Doreen Plummer is a 40 year old female who is  @ 26w6d pregnant and being seen for back pain.          Pregnancy history:     OBSTETRIC HISTORY:    OB History    Para Term  AB Living   5 3 3 0 1 3   SAB TAB Ectopic Multiple Live Births   1 0 0 0 3      # Outcome Date GA Lbr Anthony/2nd Weight Sex Delivery Anes PTL Lv   5 Current            4 Term 19 38w6d  3.77 kg (8 lb 5 oz) F CS-LTranv Spinal N CATRACHO      Name: LEONARDOFEMALE-DOREEN      Apgar1: 9  Apgar5: 9   3 Term 17 39w0d  3.31 kg (7 lb 4.8 oz) F CS-LTranv Spinal  CATRACHO      Name: Gato      Apgar1: 8  Apgar5: 9   2 Term 14 41w6d  3.705 kg (8 lb 2.7 oz) M CS-Unspec EPI N CATRACHO      Birth Comments: Breast Feeding and bottle feeding      Name: Radha      Apgar1: 3  Apgar5: 8   1 SAB 12     SAB   ND       EDC: Estimated Date of Delivery: May 3, 2021    Prenatal Labs:   Lab Results   Component Value Date    ABO O 10/29/2020    RH Pos 10/29/2020    AS Neg 10/29/2020    HEPBANG Nonreactive 10/29/2020    CHPCRT Negative 2018    GCPCRT Negative 2018    TREPAB Negative 2017    RUBELLAABIGG 73 2013    HGB 8.9 (L) 2020    HIV Negative 2013       GBS Status:   Lab Results   Component Value Date    GBS Positive (A) 2019       Active Problem List  Patient Active Problem List   Diagnosis     Female genital infibulation     Epidermal inclusion cyst of infibulation scar     High-risk pregnancy     Previous  section complicating pregnancy     Encounter for triage in pregnant patient     S/P repeat low transverse      Hyperemesis gravidarum     History of  section       Medication Prior to Admission  Medications Prior to Admission   Medication Sig  Dispense Refill Last Dose     Acetaminophen (TYLENOL PO) Take 325 mg by mouth every 6 hours as needed         doxylamine (UNISOM) 25 MG TABS tablet Take 25 mg by mouth At Bedtime        ondansetron (ZOFRAN) 8 MG tablet Take 1 tablet (8 mg) by mouth every 8 hours as needed for nausea (Patient not taking: Reported on 12/10/2020) 30 tablet 11      Prenatal Vit-Fe Fumarate-FA (PRENATAL MULTIVITAMIN PLUS IRON) 27-0.8 MG TABS per tablet Take 1 tablet by mouth daily 100 tablet 3      Pyridoxine HCl (VITAMIN B6) 200 MG TABS       .        Maternal Past Medical History:     Past Medical History:   Diagnosis Date     Anemia      Gastroesophageal reflux disease      Varicella age 5                       Family History:   This patient has no significant family history            Social History:   This patient has no significant social history         Review of Systems:   CONSTITUTIONAL: NEGATIVE for fever, chills, change in weight  INTEGUMENTARY/SKIN: NEGATIVE for worrisome rashes, moles or lesions  EYES: NEGATIVE for vision changes or irritation  ENT/MOUTH: NEGATIVE for ear, mouth and throat problems  RESP: NEGATIVE for significant cough or SOB  BREAST: NEGATIVE for masses, tenderness or discharge  CV: NEGATIVE for chest pain, palpitations or peripheral edema  GI: NEGATIVE for nausea, abdominal pain, heartburn, or change in bowel habits  : NEGATIVE for frequency, dysuria, or hematuria  MUSCULOSKELETAL: NEGATIVE for significant arthralgias or myalgia  NEURO: NEGATIVE for weakness, dizziness or paresthesias  ENDOCRINE: NEGATIVE for temperature intolerance, skin/hair changes  HEME: NEGATIVE for bleeding problems  PSYCHIATRIC: NEGATIVE for changes in mood or affect          Physical Exam:     Vitals were reviewed  All vitals stable  Patient Vitals for the past 8 hrs:   BP Temp Temp src Resp   01/31/21 1535 104/55 99.3  F (37.4  C) Oral 18     Constitutional: Awake, alert, cooperative, no apparent distress, and appears stated  age.  Eyes: Lids and lashes normal, pupils equal, round and reactive to light, extra ocular muscles intact, sclera clear, conjunctiva normal.  ENT: Normocephalic, without obvious abnormality, atramatic, sinuses nontender on palpation, external ears without lesions, oral pharynx with moist mucus membranes, tonsils without erythema or exudates, gums normal and good dentition.  Neck: Supple, symmetrical, trachea midline, no adenopathy, thyroid symmetric, not enlarged and no tenderness, skin normal.  Hematologic / Lymphatic: No cervical lymphadenopathy and no supraclavicular lymphadenopathy.  Back: Symmetric, no curvature, spinous processes are non-tender on palpation, paraspinous muscles are non-tender on palpation, no costal vertebral tenderness.  Lungs: No increased work of breathing, good air exchange, clear to auscultation bilaterally, no crackles or wheezing.  Cardiovascular: Regular rate and rhythm, normal S1 and S2, no S3 or S4, and no murmur noted.  Chest / Breast: Breasts symmetrical, skin without lesion(s), no nipple retraction or dimpling, no nipple discharge, no masses palpated, no axillary or supraclavicular adenopathy.  Abdomen: No scars, normal bowel sounds, soft, non-distended, non-tender, no masses palpated, no hepatosplenomegally.  Genitourinary: No urethral discharge, normal external genitalia, no hernia.  Musculoskeletal: No redness, warmth, or swelling of the joints.  Full range of motion noted.  Motor strength is 5 out of 5 all extremities bilaterally.  Tone is normal.  Neurologic: Awake, alert, oriented to name, place and time.  Cranial nerves II-XII are grossly intact.  Motor is 5 out of 5 bilaterally.  Cerebellar finger to nose, heel to shin intact.  Sensory is intact.  Babinski down going, Romberg negative, and gait is normal.  Neuropsychiatric: Normal affect, mood, orientation, memory and insight.  Skin: No rashes, erythema, pallor, petechia or purpura.   Cervix:  Deferred       Presentation:unknown  Fetal Heart Rate Tracing: Tier 1 (normal)  Tocometer: no contractions                       Assessment:   Rizwana Plummer is a 40 year old female who is  @ 26w6d pregnant and being seen for back pain.          Plan:   Observation  Continue hot pack on back   UA positive, rx macrobird   Patient requests tylenol.   Has had to do physical therapy before, recommend as an outpatient  rx atarax to take next 3 night prn.    Ok to discharge home.     Cindy Laurent, DO

## 2021-01-31 NOTE — PLAN OF CARE
Rizwana here with c/o lower back ache that started this morning.  It feels like a constant ache that has gotten a little worse this afternoon.  Denies any abdominal pain, ctx's, LOF, or bleeding.  Baby active and appropriate for gestational age.  Heat pack given and she is requesting Tylenol.  Will send UA when she needs to void.  And update MD

## 2021-02-01 LAB
BACTERIA SPEC CULT: NO GROWTH
Lab: NORMAL
SPECIMEN SOURCE: NORMAL

## 2021-02-12 ENCOUNTER — HOSPITAL ENCOUNTER (OUTPATIENT)
Dept: ULTRASOUND IMAGING | Facility: CLINIC | Age: 41
End: 2021-02-12
Attending: OBSTETRICS & GYNECOLOGY
Payer: COMMERCIAL

## 2021-02-12 ENCOUNTER — OFFICE VISIT (OUTPATIENT)
Dept: MATERNAL FETAL MEDICINE | Facility: CLINIC | Age: 41
End: 2021-02-12
Attending: OBSTETRICS & GYNECOLOGY
Payer: COMMERCIAL

## 2021-02-12 DIAGNOSIS — O43.119 ANTEPARTUM PLACENTA CIRCUMVALLATA: ICD-10-CM

## 2021-02-12 DIAGNOSIS — O09.523 MULTIGRAVIDA OF ADVANCED MATERNAL AGE IN THIRD TRIMESTER: ICD-10-CM

## 2021-02-12 DIAGNOSIS — O40.9XX0 POLYHYDRAMNIOS AFFECTING PREGNANCY: Primary | ICD-10-CM

## 2021-02-12 PROCEDURE — 76816 OB US FOLLOW-UP PER FETUS: CPT | Mod: 26 | Performed by: OBSTETRICS & GYNECOLOGY

## 2021-02-12 PROCEDURE — 76816 OB US FOLLOW-UP PER FETUS: CPT

## 2021-02-12 NOTE — PROGRESS NOTES
"Please see \"Imaging\" tab under Chart Review for full details.    Tiffanie Guillory MD  Maternal Fetal Medicine    "

## 2021-02-19 ENCOUNTER — TELEPHONE (OUTPATIENT)
Dept: OBGYN | Facility: CLINIC | Age: 41
End: 2021-02-19

## 2021-02-19 ENCOUNTER — PRENATAL OFFICE VISIT (OUTPATIENT)
Dept: OBGYN | Facility: CLINIC | Age: 41
End: 2021-02-19
Payer: COMMERCIAL

## 2021-02-19 VITALS — SYSTOLIC BLOOD PRESSURE: 92 MMHG | BODY MASS INDEX: 27.93 KG/M2 | DIASTOLIC BLOOD PRESSURE: 56 MMHG | WEIGHT: 143 LBS

## 2021-02-19 DIAGNOSIS — D50.9 IRON DEFICIENCY ANEMIA, UNSPECIFIED IRON DEFICIENCY ANEMIA TYPE: Primary | ICD-10-CM

## 2021-02-19 DIAGNOSIS — D50.9 IRON DEFICIENCY ANEMIA, UNSPECIFIED IRON DEFICIENCY ANEMIA TYPE: ICD-10-CM

## 2021-02-19 DIAGNOSIS — Z23 NEED FOR TDAP VACCINATION: Primary | ICD-10-CM

## 2021-02-19 DIAGNOSIS — R10.2 PELVIC CRAMPING: ICD-10-CM

## 2021-02-19 DIAGNOSIS — Z34.93 PRENATAL CARE IN THIRD TRIMESTER: ICD-10-CM

## 2021-02-19 LAB
ALBUMIN UR-MCNC: NEGATIVE MG/DL
APPEARANCE UR: CLEAR
BACTERIA #/AREA URNS HPF: ABNORMAL /HPF
BILIRUB UR QL STRIP: ABNORMAL
COLOR UR AUTO: YELLOW
ERYTHROCYTE [DISTWIDTH] IN BLOOD BY AUTOMATED COUNT: 18.1 % (ref 10–15)
GLUCOSE UR STRIP-MCNC: NEGATIVE MG/DL
HCT VFR BLD AUTO: 25.6 % (ref 35–47)
HGB BLD-MCNC: 7.5 G/DL (ref 11.7–15.7)
HGB UR QL STRIP: NEGATIVE
KETONES UR STRIP-MCNC: 40 MG/DL
LEUKOCYTE ESTERASE UR QL STRIP: ABNORMAL
MCH RBC QN AUTO: 19.3 PG (ref 26.5–33)
MCHC RBC AUTO-ENTMCNC: 29.3 G/DL (ref 31.5–36.5)
MCV RBC AUTO: 66 FL (ref 78–100)
MUCOUS THREADS #/AREA URNS LPF: PRESENT /LPF
NITRATE UR QL: NEGATIVE
NON-SQ EPI CELLS #/AREA URNS LPF: ABNORMAL /LPF
PH UR STRIP: 5.5 PH (ref 5–7)
PLATELET # BLD AUTO: 279 10E9/L (ref 150–450)
RBC # BLD AUTO: 3.88 10E12/L (ref 3.8–5.2)
RBC #/AREA URNS AUTO: ABNORMAL /HPF
SOURCE: ABNORMAL
SP GR UR STRIP: 1.02 (ref 1–1.03)
UROBILINOGEN UR STRIP-ACNC: 1 EU/DL (ref 0.2–1)
WBC # BLD AUTO: 7.7 10E9/L (ref 4–11)
WBC #/AREA URNS AUTO: ABNORMAL /HPF

## 2021-02-19 PROCEDURE — 99000 SPECIMEN HANDLING OFFICE-LAB: CPT | Performed by: FAMILY MEDICINE

## 2021-02-19 PROCEDURE — 85027 COMPLETE CBC AUTOMATED: CPT | Performed by: FAMILY MEDICINE

## 2021-02-19 PROCEDURE — 86780 TREPONEMA PALLIDUM: CPT | Mod: 90 | Performed by: FAMILY MEDICINE

## 2021-02-19 PROCEDURE — 99207 PR PRENATAL VISIT: CPT | Performed by: FAMILY MEDICINE

## 2021-02-19 PROCEDURE — 36415 COLL VENOUS BLD VENIPUNCTURE: CPT | Performed by: FAMILY MEDICINE

## 2021-02-19 PROCEDURE — 81001 URINALYSIS AUTO W/SCOPE: CPT | Performed by: FAMILY MEDICINE

## 2021-02-19 PROCEDURE — 82950 GLUCOSE TEST: CPT | Performed by: FAMILY MEDICINE

## 2021-02-19 RX ORDER — NITROFURANTOIN 25; 75 MG/1; MG/1
100 CAPSULE ORAL 2 TIMES DAILY
Qty: 14 CAPSULE | Refills: 0 | Status: SHIPPED | OUTPATIENT
Start: 2021-02-19 | End: 2021-02-26

## 2021-02-19 RX ORDER — HEPARIN SODIUM,PORCINE 10 UNIT/ML
5 VIAL (ML) INTRAVENOUS
Status: CANCELLED | OUTPATIENT
Start: 2021-02-19

## 2021-02-19 RX ORDER — FERROUS SULFATE 325(65) MG
325 TABLET ORAL
Qty: 90 TABLET | Refills: 3 | Status: SHIPPED | OUTPATIENT
Start: 2021-02-19 | End: 2021-04-22

## 2021-02-19 RX ORDER — HEPARIN SODIUM (PORCINE) LOCK FLUSH IV SOLN 100 UNIT/ML 100 UNIT/ML
5 SOLUTION INTRAVENOUS
Status: CANCELLED | OUTPATIENT
Start: 2021-02-19

## 2021-02-19 NOTE — TELEPHONE ENCOUNTER
Pt advised of md's message re: urine and CBC.   She would like to do Iron infusions.   She is aware that this cannot be scheduled until next week.   Pt will start iron TID today and will also start her antibiotic for the UTI.    Dr. Laurent, please let us know what you would like to order for iron infusions.    Maryam THOMPSON RN

## 2021-02-19 NOTE — PROGRESS NOTES
CC: Here for routine prenatal visit   40 year old y/o  @ 29w4d with Estimated Date of Delivery: May 3, 2021     There were no vitals taken for this visit.  See OB flowsheet  + fetal movement, last two night two contractions at night noted, no bleeding, no loss of fluid   Discussed monitoring fetal movement     1) concerns: last two night two contractions at night noted, check UA   Has some fatigue and worried about diabetes   Covid-19 concerns: none  2) Routine: Blood type O+ RI tdap [no] flu [no] GS [no]    3) Risk factors:   A: hx of CS x 3: RCS: scheduled    B: Hyperemesis Gravidarum: resolved   C: AMA:  Level II  Us, weekly bpp after 36 weeks  D: Circumvallate placenta:  Growth us per MFM due to inc risk IUGR  E: Post partum birth control: discussed IUD, mirena paragard kyleena, eileen              Oral Contraceptive, nexplanon. She prefers regular period.  F: Female genital infibulation  G: mild polyhydramnios:  MFM rec 1 hour gtt as planned.  4) Return: 2 weeks     [facility]

## 2021-02-19 NOTE — NURSING NOTE
Chief Complaint   Patient presents with     Prenatal Care     29 weeks and 4 days       Initial BP 92/56 (BP Location: Right arm, Cuff Size: Adult Regular)   Wt 64.9 kg (143 lb)   BMI 27.93 kg/m   Estimated body mass index is 27.93 kg/m  as calculated from the following:    Height as of 12/10/20: 1.524 m (5').    Weight as of this encounter: 64.9 kg (143 lb).  BP completed using cuff size: regular    Questioned patient about current smoking habits.  Pt. has never smoked.          Declined Tdap at this visit

## 2021-02-19 NOTE — TELEPHONE ENCOUNTER
Thanks!   Orders placed for iron infusion, can you help her schedule it?   Dr. Cindy Laurent, DO    Obstetrics and Gynecology  [facility] and [place]

## 2021-02-20 LAB
GLUCOSE 1H P 50 G GLC PO SERPL-MCNC: 90 MG/DL (ref 60–129)
T PALLIDUM AB SER QL: NONREACTIVE

## 2021-02-23 ENCOUNTER — INFUSION THERAPY VISIT (OUTPATIENT)
Dept: INFUSION THERAPY | Facility: CLINIC | Age: 41
End: 2021-02-23
Attending: FAMILY MEDICINE
Payer: COMMERCIAL

## 2021-02-23 VITALS
SYSTOLIC BLOOD PRESSURE: 92 MMHG | RESPIRATION RATE: 16 BRPM | DIASTOLIC BLOOD PRESSURE: 60 MMHG | HEART RATE: 86 BPM | TEMPERATURE: 98.8 F | OXYGEN SATURATION: 99 %

## 2021-02-23 DIAGNOSIS — D50.9 IRON DEFICIENCY ANEMIA, UNSPECIFIED IRON DEFICIENCY ANEMIA TYPE: Primary | ICD-10-CM

## 2021-02-23 PROCEDURE — 250N000011 HC RX IP 250 OP 636: Performed by: FAMILY MEDICINE

## 2021-02-23 PROCEDURE — 96366 THER/PROPH/DIAG IV INF ADDON: CPT

## 2021-02-23 PROCEDURE — 258N000003 HC RX IP 258 OP 636: Performed by: FAMILY MEDICINE

## 2021-02-23 PROCEDURE — 96365 THER/PROPH/DIAG IV INF INIT: CPT

## 2021-02-23 RX ORDER — HEPARIN SODIUM,PORCINE 10 UNIT/ML
5 VIAL (ML) INTRAVENOUS
Status: CANCELLED | OUTPATIENT
Start: 2021-02-25

## 2021-02-23 RX ORDER — HEPARIN SODIUM (PORCINE) LOCK FLUSH IV SOLN 100 UNIT/ML 100 UNIT/ML
5 SOLUTION INTRAVENOUS
Status: CANCELLED | OUTPATIENT
Start: 2021-02-25

## 2021-02-23 RX ADMIN — IRON SUCROSE 300 MG: 20 INJECTION, SOLUTION INTRAVENOUS at 14:05

## 2021-02-23 NOTE — PROGRESS NOTES
Infusion Nursing Note:  Rizwana Plummer presents today for venofer.    Patient seen by provider today: No   present during visit today: Not Applicable.    Note: discussed need for, s/s reaction to venofer.  .    Intravenous Access:  Peripheral IV placed.    Treatment Conditions:  Not Applicable.      Post Infusion Assessment:  Patient tolerated infusion without incident.  Site patent and intact, free from redness, edema or discomfort.  No evidence of extravasations.  Access discontinued per protocol.       Discharge Plan:   Discharge instructions reviewed with: Patient.  AVS to patient via [email or web address].  Patient will return prn for next appointment.   Patient discharged in stable condition accompanied by: self.  Departure Mode: Ambulatory.    Nelly Harvey RN

## 2021-02-26 ENCOUNTER — INFUSION THERAPY VISIT (OUTPATIENT)
Dept: INFUSION THERAPY | Facility: CLINIC | Age: 41
End: 2021-02-26
Attending: FAMILY MEDICINE
Payer: COMMERCIAL

## 2021-02-26 VITALS
SYSTOLIC BLOOD PRESSURE: 100 MMHG | TEMPERATURE: 98.5 F | OXYGEN SATURATION: 100 % | HEART RATE: 95 BPM | DIASTOLIC BLOOD PRESSURE: 65 MMHG

## 2021-02-26 DIAGNOSIS — D50.9 IRON DEFICIENCY ANEMIA, UNSPECIFIED IRON DEFICIENCY ANEMIA TYPE: Primary | ICD-10-CM

## 2021-02-26 PROCEDURE — 96365 THER/PROPH/DIAG IV INF INIT: CPT

## 2021-02-26 PROCEDURE — 258N000003 HC RX IP 258 OP 636: Performed by: FAMILY MEDICINE

## 2021-02-26 PROCEDURE — 96366 THER/PROPH/DIAG IV INF ADDON: CPT

## 2021-02-26 PROCEDURE — 250N000011 HC RX IP 250 OP 636: Performed by: FAMILY MEDICINE

## 2021-02-26 RX ORDER — HEPARIN SODIUM (PORCINE) LOCK FLUSH IV SOLN 100 UNIT/ML 100 UNIT/ML
5 SOLUTION INTRAVENOUS
Status: CANCELLED | OUTPATIENT
Start: 2021-02-27

## 2021-02-26 RX ORDER — HEPARIN SODIUM,PORCINE 10 UNIT/ML
5 VIAL (ML) INTRAVENOUS
Status: CANCELLED | OUTPATIENT
Start: 2021-02-27

## 2021-02-26 RX ADMIN — IRON SUCROSE 300 MG: 20 INJECTION, SOLUTION INTRAVENOUS at 14:22

## 2021-02-26 NOTE — PROGRESS NOTES
Infusion Nursing Note:  Rizwana Plummer presents today for Venofer #2 of 3.    Patient seen by provider today: No   present during visit today: Not Applicable.    Note: N/A    Patient did meet criteria for an asymptomatic covid-19 PCR test in infusion today. Patient declined the covid-19 test.    Intravenous Access:  Peripheral IV placed.    Treatment Conditions:  CBC noted from 2/19/21.      Post Infusion Assessment:  Patient tolerated infusion without incident.  Blood return noted pre and post infusion.  Site patent and intact, free from redness, edema or discomfort.  No evidence of extravasations.  Access discontinued per protocol.       Discharge Plan:   Discharge instructions reviewed with: Patient.  Patient and/or family verbalized understanding of discharge instructions and all questions answered.  AVS to patient via [employer].  Patient will schedule last remaining Venofer infusion at her earliest convenience for next appointment.   Patient discharged in stable condition accompanied by: self.  Departure Mode: Ambulatory.    Maris Mendez RN

## 2021-03-02 ENCOUNTER — INFUSION THERAPY VISIT (OUTPATIENT)
Dept: INFUSION THERAPY | Facility: CLINIC | Age: 41
End: 2021-03-02
Attending: FAMILY MEDICINE
Payer: COMMERCIAL

## 2021-03-02 VITALS
DIASTOLIC BLOOD PRESSURE: 61 MMHG | SYSTOLIC BLOOD PRESSURE: 94 MMHG | RESPIRATION RATE: 16 BRPM | OXYGEN SATURATION: 99 % | HEART RATE: 93 BPM | TEMPERATURE: 97.9 F

## 2021-03-02 DIAGNOSIS — D50.9 IRON DEFICIENCY ANEMIA, UNSPECIFIED IRON DEFICIENCY ANEMIA TYPE: Primary | ICD-10-CM

## 2021-03-02 PROCEDURE — 258N000003 HC RX IP 258 OP 636: Performed by: FAMILY MEDICINE

## 2021-03-02 PROCEDURE — 96374 THER/PROPH/DIAG INJ IV PUSH: CPT

## 2021-03-02 PROCEDURE — 250N000011 HC RX IP 250 OP 636: Performed by: FAMILY MEDICINE

## 2021-03-02 RX ORDER — HEPARIN SODIUM (PORCINE) LOCK FLUSH IV SOLN 100 UNIT/ML 100 UNIT/ML
5 SOLUTION INTRAVENOUS
Status: CANCELLED | OUTPATIENT
Start: 2021-03-02

## 2021-03-02 RX ORDER — HEPARIN SODIUM,PORCINE 10 UNIT/ML
5 VIAL (ML) INTRAVENOUS
Status: CANCELLED | OUTPATIENT
Start: 2021-03-02

## 2021-03-02 RX ADMIN — IRON SUCROSE 300 MG: 20 INJECTION, SOLUTION INTRAVENOUS at 13:55

## 2021-03-02 NOTE — PROGRESS NOTES
Infusion Nursing Note:  Rizwana Plummer presents today for venofer.    Patient seen by provider today: No   present during visit today: Not Applicable.    Note: N/A.      Intravenous Access:  Peripheral IV placed.    Treatment Conditions:  Not Applicable.      Post Infusion Assessment:  Patient tolerated infusion without incident.  Blood return noted pre and post infusion.  Site patent and intact, free from redness, edema or discomfort.  No evidence of extravasations.  Access discontinued per protocol.       Discharge Plan:   Discharge instructions reviewed with: Patient.  Patient and/or family verbalized understanding of discharge instructions and all questions answered.  AVS to patient via [employer].  Patient will return *prn for next appointment.   Patient discharged in stable condition accompanied by: self.  Departure Mode: Ambulatory.    Nelly Harvey RN

## 2021-03-12 ENCOUNTER — OFFICE VISIT (OUTPATIENT)
Dept: MATERNAL FETAL MEDICINE | Facility: CLINIC | Age: 41
End: 2021-03-12
Attending: OBSTETRICS & GYNECOLOGY
Payer: COMMERCIAL

## 2021-03-12 ENCOUNTER — HOSPITAL ENCOUNTER (OUTPATIENT)
Dept: ULTRASOUND IMAGING | Facility: CLINIC | Age: 41
End: 2021-03-12
Attending: OBSTETRICS & GYNECOLOGY
Payer: COMMERCIAL

## 2021-03-12 DIAGNOSIS — O09.523 MULTIGRAVIDA OF ADVANCED MATERNAL AGE IN THIRD TRIMESTER: ICD-10-CM

## 2021-03-12 DIAGNOSIS — O40.9XX0 POLYHYDRAMNIOS AFFECTING PREGNANCY: ICD-10-CM

## 2021-03-12 DIAGNOSIS — O40.9XX0 POLYHYDRAMNIOS AFFECTING PREGNANCY: Primary | ICD-10-CM

## 2021-03-12 PROCEDURE — 76816 OB US FOLLOW-UP PER FETUS: CPT | Mod: 26 | Performed by: OBSTETRICS & GYNECOLOGY

## 2021-03-12 PROCEDURE — 76816 OB US FOLLOW-UP PER FETUS: CPT

## 2021-03-12 NOTE — PROGRESS NOTES
Please see the imaging tab for details of the ultrasound performed today.    Chelo Diaz MD  Specialist in Maternal-Fetal Medicine

## 2021-03-16 ENCOUNTER — PRENATAL OFFICE VISIT (OUTPATIENT)
Dept: OBGYN | Facility: CLINIC | Age: 41
End: 2021-03-16
Payer: COMMERCIAL

## 2021-03-16 VITALS — DIASTOLIC BLOOD PRESSURE: 60 MMHG | WEIGHT: 144 LBS | BODY MASS INDEX: 28.12 KG/M2 | SYSTOLIC BLOOD PRESSURE: 90 MMHG

## 2021-03-16 DIAGNOSIS — D50.9 IRON DEFICIENCY ANEMIA, UNSPECIFIED IRON DEFICIENCY ANEMIA TYPE: Primary | ICD-10-CM

## 2021-03-16 PROCEDURE — 85027 COMPLETE CBC AUTOMATED: CPT | Performed by: OBSTETRICS & GYNECOLOGY

## 2021-03-16 PROCEDURE — 36415 COLL VENOUS BLD VENIPUNCTURE: CPT | Performed by: OBSTETRICS & GYNECOLOGY

## 2021-03-16 PROCEDURE — 99207 PR PRENATAL VISIT: CPT | Performed by: OBSTETRICS & GYNECOLOGY

## 2021-03-16 RX ORDER — FERROUS GLUCONATE 324(38)MG
324 TABLET ORAL
Qty: 30 TABLET | Refills: 3 | Status: SHIPPED | OUTPATIENT
Start: 2021-03-16 | End: 2021-04-22

## 2021-03-16 NOTE — PROGRESS NOTES
40 year old  at 33w1d     A: hx of CS x 3: RCS: scheduled    B: Hyperemesis Gravidarum: resolved   C: AMA:  Level II  Us, weekly bpp after 36 weeks  D: Circumvallate placenta:  Growth us per MF due to inc risk IUGR  E: Post partum birth control: discussed IUD, mirena paragard kyleena, eileen              Oral Contraceptive, nexplanon. She prefers regular period.  F: Female genital infibulation  G: mild polyhydramnios:  Planning follow-up US with MFM, has one scheduled on 3/30  H: iron deficiency anemia: s/p infusion center x3, plan recheck today and po iron     RTC 2 wks     Sonali Gaines MD, MPH  [facility] OB/[facility]yn

## 2021-03-17 LAB
HCT VFR BLD AUTO: 32.7 % (ref 35–47)
HGB BLD-MCNC: 9.8 G/DL (ref 11.7–15.7)
MCH RBC QN AUTO: 24.3 PG (ref 26.5–33)
MCHC RBC AUTO-ENTMCNC: 30 G/DL (ref 31.5–36.5)
MCV RBC AUTO: 81 FL (ref 78–100)
PLATELET # BLD AUTO: 238 10E9/L (ref 150–450)
RBC # BLD AUTO: 4.04 10E12/L (ref 3.8–5.2)
WBC # BLD AUTO: 5.2 10E9/L (ref 4–11)

## 2021-03-30 ENCOUNTER — HOSPITAL ENCOUNTER (OUTPATIENT)
Dept: ULTRASOUND IMAGING | Facility: CLINIC | Age: 41
End: 2021-03-30
Attending: OBSTETRICS & GYNECOLOGY
Payer: COMMERCIAL

## 2021-03-30 ENCOUNTER — OFFICE VISIT (OUTPATIENT)
Dept: MATERNAL FETAL MEDICINE | Facility: CLINIC | Age: 41
End: 2021-03-30
Attending: OBSTETRICS & GYNECOLOGY
Payer: COMMERCIAL

## 2021-03-30 DIAGNOSIS — O40.9XX0 POLYHYDRAMNIOS AFFECTING PREGNANCY: Primary | ICD-10-CM

## 2021-03-30 DIAGNOSIS — O40.9XX0 POLYHYDRAMNIOS AFFECTING PREGNANCY: ICD-10-CM

## 2021-03-30 DIAGNOSIS — O09.523 MULTIGRAVIDA OF ADVANCED MATERNAL AGE IN THIRD TRIMESTER: ICD-10-CM

## 2021-03-30 PROCEDURE — 76815 OB US LIMITED FETUS(S): CPT | Mod: 26 | Performed by: OBSTETRICS & GYNECOLOGY

## 2021-03-30 PROCEDURE — 76815 OB US LIMITED FETUS(S): CPT

## 2021-03-30 NOTE — PROGRESS NOTES
"Please see \"Imaging\" tab under \"Chart Review\" for details of today's US at the [facility].    Norberto Collins MD  Maternal-Fetal Medicine    "

## 2021-04-01 ENCOUNTER — PRENATAL OFFICE VISIT (OUTPATIENT)
Dept: OBGYN | Facility: CLINIC | Age: 41
End: 2021-04-01
Payer: COMMERCIAL

## 2021-04-01 VITALS — WEIGHT: 147 LBS | BODY MASS INDEX: 28.71 KG/M2 | SYSTOLIC BLOOD PRESSURE: 98 MMHG | DIASTOLIC BLOOD PRESSURE: 62 MMHG

## 2021-04-01 DIAGNOSIS — N90.813 FEMALE GENITAL INFIBULATION: Primary | ICD-10-CM

## 2021-04-01 DIAGNOSIS — D64.9 ANEMIA, UNSPECIFIED TYPE: ICD-10-CM

## 2021-04-01 DIAGNOSIS — O09.93 HIGH-RISK PREGNANCY IN THIRD TRIMESTER: ICD-10-CM

## 2021-04-01 DIAGNOSIS — O34.219 HISTORY OF CESAREAN SECTION COMPLICATING PREGNANCY: ICD-10-CM

## 2021-04-01 PROCEDURE — 99207 PR PRENATAL VISIT: CPT | Performed by: FAMILY MEDICINE

## 2021-04-01 RX ORDER — HEPARIN SODIUM (PORCINE) LOCK FLUSH IV SOLN 100 UNIT/ML 100 UNIT/ML
5 SOLUTION INTRAVENOUS
Status: CANCELLED | OUTPATIENT
Start: 2021-04-01

## 2021-04-01 RX ORDER — HEPARIN SODIUM,PORCINE 10 UNIT/ML
5 VIAL (ML) INTRAVENOUS
Status: CANCELLED | OUTPATIENT
Start: 2021-04-01

## 2021-04-01 NOTE — PATIENT INSTRUCTIONS
"Return weekly   Return to clinic:  every 4 weeks till 28 weeks, then every 2 weeks till 36 weeks, then weekly till delivery      Phone numbers [place]:  Day/ night [phone] ask for ob triage  Emergency:  Call labor and delivery:  [phone]    What should I call about??    Contraction every 5 minutes for 1 hour 1 minute long (511), bleeding, loss of fluid, headache that doesn't resolve with tylenol, and decreased fetal movement     Start kick counts @ 26-28 weeks   There is an marlena for this!  It is called \"count the kicks\"  Keep track of movement and discover your normal baby movement pattern   guideline is listed below  Please call if you do not feel the baby move!  We will have you come in for fetal heart rate monitoring:   Perception of at least 10 FMs during 12 hours of normal maternal activity   Perception of least 10 FMs over two hours when the mother is at rest and focused on [facility] Address   [address], [place], [place] [address] [phone]    Dr. Cindy Laurent, DO    OB/GYN   [facility]                                                      "

## 2021-04-01 NOTE — NURSING NOTE
Chief Complaint   Patient presents with     Prenatal Care       Initial BP 98/62   Wt 66.7 kg (147 lb)   Breastfeeding No   BMI 28.71 kg/m   Estimated body mass index is 28.71 kg/m  as calculated from the following:    Height as of 12/10/20: 1.524 m (5').    Weight as of this encounter: 66.7 kg (147 lb).  BP completed using cuff size: regular    Questioned patient about current smoking habits.  Pt. has never smoked.          35w3d  + FM daily  + margaret vasquez contractions  - edema  - headache or heartburn  + constipation - stopped FE supplement  Christal Sanchez LPN

## 2021-04-01 NOTE — PROGRESS NOTES
CC: Here for routine prenatal visit   40 year old y/o  @ 35w3d with Estimated Date of Delivery: May 3, 2021     BP 98/62   Wt 66.7 kg (147 lb)   Breastfeeding No   BMI 28.71 kg/m       See OB flowsheet  + fetal movement, no contractions, no bleeding, no loss of fluid   Discussed monitoring fetal movement     1) concerns: feels depression after baby's born 3-5 months.   Has traveled to family back home and felt better. But cannot travel this time, wonders about post partum   Depression medication.   Covid-19 concerns: none  2) Routine: Blood type O+ RI tdap [no] flu [no] GS [no]    3) Risk factors:   A: hx of CS x 3: RCS: scheduled 2021   B: Hyperemesis Gravidarum: resolved   C: AMA:  Level II  Us, weekly bpp after 36 weeks  D: Circumvallate placenta:  Growth us per MFM due to inc risk IUGR  E: Post partum birth control: discussed IUD, mirena paragard kyleena, eileen              Oral Contraceptive, nexplanon. She prefers regular period.  F: Female genital infibulation  G: mild polyhydramnios-resolved:  MFM rec 1 hour gtt 28 weeks, nl   H: PPBC: POP    I: post partum depression:  Would like to start medication post partum, zoloft at hospital and home    J: Anemia: rx slo-fe called in, woulld also like iron infusion again, went from 7.5 to 9.8, ordered again  4) Return: weekly    Mehran

## 2021-04-04 ENCOUNTER — HOSPITAL ENCOUNTER (EMERGENCY)
Facility: CLINIC | Age: 41
Discharge: HOME | End: 2021-04-04
Attending: PHYSICIAN ASSISTANT | Admitting: PHYSICIAN ASSISTANT
Payer: COMMERCIAL

## 2021-04-04 VITALS
SYSTOLIC BLOOD PRESSURE: 102 MMHG | OXYGEN SATURATION: 99 % | RESPIRATION RATE: 16 BRPM | DIASTOLIC BLOOD PRESSURE: 58 MMHG | HEART RATE: 87 BPM | TEMPERATURE: 98.1 F

## 2021-04-04 DIAGNOSIS — R11.10 VOMITING AND DIARRHEA: ICD-10-CM

## 2021-04-04 DIAGNOSIS — R19.7 VOMITING AND DIARRHEA: ICD-10-CM

## 2021-04-04 DIAGNOSIS — Z3A.36 36 WEEKS GESTATION OF PREGNANCY: ICD-10-CM

## 2021-04-04 LAB
ALBUMIN SERPL-MCNC: 2.7 G/DL (ref 3.4–5)
ALP SERPL-CCNC: 140 U/L (ref 40–150)
ALT SERPL W P-5'-P-CCNC: 13 U/L (ref 0–50)
ANION GAP SERPL CALCULATED.3IONS-SCNC: 9 MMOL/L (ref 3–14)
AST SERPL W P-5'-P-CCNC: 20 U/L (ref 0–45)
BILIRUB SERPL-MCNC: 0.4 MG/DL (ref 0.2–1.3)
BUN SERPL-MCNC: 6 MG/DL (ref 7–30)
CALCIUM SERPL-MCNC: 8.4 MG/DL (ref 8.5–10.1)
CHLORIDE SERPL-SCNC: 108 MMOL/L (ref 94–109)
CO2 SERPL-SCNC: 19 MMOL/L (ref 20–32)
CREAT SERPL-MCNC: 0.5 MG/DL (ref 0.52–1.04)
GFR SERPL CREATININE-BSD FRML MDRD: >90 ML/MIN/{1.73_M2}
GLUCOSE BLDC GLUCOMTR-MCNC: 77 MG/DL (ref 70–99)
GLUCOSE SERPL-MCNC: 69 MG/DL (ref 70–99)
LIPASE SERPL-CCNC: 90 U/L (ref 73–393)
POTASSIUM SERPL-SCNC: 3.6 MMOL/L (ref 3.4–5.3)
PROT SERPL-MCNC: 6.7 G/DL (ref 6.8–8.8)
SODIUM SERPL-SCNC: 136 MMOL/L (ref 133–144)

## 2021-04-04 PROCEDURE — 96361 HYDRATE IV INFUSION ADD-ON: CPT

## 2021-04-04 PROCEDURE — 999N001017 HC STATISTIC GLUCOSE BY METER IP

## 2021-04-04 PROCEDURE — 99284 EMERGENCY DEPT VISIT MOD MDM: CPT | Mod: 25

## 2021-04-04 PROCEDURE — 96374 THER/PROPH/DIAG INJ IV PUSH: CPT

## 2021-04-04 PROCEDURE — 258N000003 HC RX IP 258 OP 636: Performed by: PHYSICIAN ASSISTANT

## 2021-04-04 PROCEDURE — 250N000011 HC RX IP 250 OP 636: Performed by: PHYSICIAN ASSISTANT

## 2021-04-04 PROCEDURE — 83690 ASSAY OF LIPASE: CPT | Performed by: PHYSICIAN ASSISTANT

## 2021-04-04 PROCEDURE — 80053 COMPREHEN METABOLIC PANEL: CPT | Performed by: PHYSICIAN ASSISTANT

## 2021-04-04 RX ORDER — ONDANSETRON 4 MG/1
4 TABLET, ORALLY DISINTEGRATING ORAL EVERY 6 HOURS PRN
Qty: 8 TABLET | Refills: 0 | Status: SHIPPED | OUTPATIENT
Start: 2021-04-04

## 2021-04-04 RX ORDER — METOCLOPRAMIDE HYDROCHLORIDE 5 MG/ML
5 INJECTION INTRAMUSCULAR; INTRAVENOUS ONCE
Status: COMPLETED | OUTPATIENT
Start: 2021-04-04 | End: 2021-04-04

## 2021-04-04 RX ADMIN — METOCLOPRAMIDE HYDROCHLORIDE 5 MG: 5 INJECTION INTRAMUSCULAR; INTRAVENOUS at 15:33

## 2021-04-04 RX ADMIN — SODIUM CHLORIDE 1000 ML: 9 INJECTION, SOLUTION INTRAVENOUS at 15:33

## 2021-04-04 ASSESSMENT — ENCOUNTER SYMPTOMS
SHORTNESS OF BREATH: 0
DYSURIA: 0
VOMITING: 1
FEVER: 0
ABDOMINAL PAIN: 0
NAUSEA: 1
BLOOD IN STOOL: 0
DIARRHEA: 1

## 2021-04-04 NOTE — ED NOTES
OB paged. Informed of patient's complaints and conditions, reports that since baby is moving normally per patient there is no need for monitoring at this time.

## 2021-04-04 NOTE — ED PROVIDER NOTES
History   Chief Complaint:  Nausea & Vomiting       The history is provided by the patient. No  was used.      Rizwana Plummer is a  36 weeks pregnant 40 year old female with a history of anemia who presents with 2 days of epigastric pain with associated nausea and vomiting and diarrhea. The patient reports that her kids had similar symptoms, and recovered ~2 days prior to the onset of her symptoms. She has had occasional diarrhea, but denies the presence of blood in her stool or vomit. Of note, the patient reports having occasional contractions that started concurrently with her vomiting. These occur 3-4 times per day and only last for a few seconds. She adds that the baby has been moving normally and denies any  vaginal bleeding or discharge. Patient sees Dr. Laurent for her OB care and has been followed with biweekly ultrasounds. Her pregnancy has been otherwise unremarkable thus far and is scheduled for a  section in about 2 weeks. She denies any chest pain, abdominal pain other than mild cramping with vomiting shortness of breath, fever, or dysuria.  She reports fetal movement is normal.    Review of Systems   Constitutional: Negative for fever.   Respiratory: Negative for shortness of breath.    Cardiovascular: Negative for chest pain.   Gastrointestinal: Positive for diarrhea, nausea and vomiting (denies hematemesis). Negative for abdominal pain (Occasional contractions new within last 2 days) and blood in stool.   Genitourinary: Negative for dysuria.   All other systems reviewed and are negative.      Allergies:  No Known Drug Allergies    Medications:  Doxylamine  Ferrous gluconate  Hydroxyzine  Prenatal vitamins    Past Medical History:    Iron deficiency anemia  GERD    Past Surgical History:     section x3  Infibulation  Excision of bartholin cyst    Family History:    Father - hypertension  Mother - hypertension, diabetes, stroke, preeclampsia    Social  History:  The patient was not accompanied to the ED.  PCP: Cindy Laurent  Marital status:     Physical Exam     Patient Vitals for the past 24 hrs:   BP Temp Temp src Pulse Resp SpO2   04/04/21 1630 102/58 -- -- 87 -- 99 %   04/04/21 1600 105/62 -- -- 85 -- 98 %   04/04/21 1530 104/61 -- -- 88 -- 99 %   04/04/21 1435 101/65 98.1  F (36.7  C) Temporal 87 16 97 %       Physical Exam  General: Awake, alert, pleasant, non-toxic.  Head:  Scalp is NC/AT  Eyes:  Conjunctiva normal, PERRL  ENT:  The external nose and ears are normal.   Neck:  Normal range of motion without rigidity.  CV:  Regular rate and rhythm    No pathologic murmur, rubs, or gallops.  Resp:  Breath sounds are clear bilaterally.  No crackles, wheezes, rhonchi, stridor.    Non-labored, no retractions or accessory muscle use  Abdomen: Abdomen is soft, gravid uterus above umbilicus, no tenderness, no masses. No CVA tenderness.  MS:  No lower extremity edema or asymmetric calf swelling. Normal ROM in all joints without effusions.    No midline cervical, thoracic, or lumbar tenderness  Skin:  Warm and dry, No rash or lesions noted. 2+ peripheral pulses in all extremities  Neuro: Alert and oriented x3.  No gross motor deficits.  No facial asymmetry.  Psych: Awake. Alert. Normal affect. Appropriate interactions.    Emergency Department Course   Laboratory:  CMP: Carbon dioxide 19 (L), Glucose 69 (L), Urea nitrogen 6 (L), Creatinine 0.50 (L), Calcium 8.4 (L), Albumin 2.7 (L), Protein total 6.7 (L), o/w WNL  Glucose by meter (collected 1700): 77    Lipase: 90    Emergency Department Course:    Reviewed:  I reviewed the patient's nursing notes, vitals, past medical history and care everywhere.     Assessments:  1514 I performed an exam of the patient in room 39 as documented above.  1515 OB contacted and informed of patient.   1519 Discussed patient with Dr. Messer.  1524 OB again contacted and spoke with charge nurse to request fetal monitoring of  patient. They do not feel that any monitoring is indicated today, but will page MD to further discuss.  1711 Patient rechecked and updated. She feels better and is drinking juice without difficulty.    Consults:    1529 I spoke with Dr. Hernandez of the OBGYN service regarding patient's presentation, findings, and plan of care. He concurs that the patient's symptoms would not indicate fetal monitoring.    Interventions:  1533 NS 1L IV Bolus  1533 Reglan 5 mg IV    Disposition:  The patient was discharged to home.     Impression & Plan     Medical Decision Making:  Rizwana Plummer is a 40 year old female who presents with acute vomiting, diarrhea.  She is 36 weeks pregnant with known intrauterine single pregnancy.  She has children at home sick with same symptoms.  She denies any abdominal pain and has no tenderness to palpation on exam and I have low suspicion at this time for intra-abdominal catastrophe such as cholecystitis, appendicitis, bowel obstruction, abscess, diverticulitis, ovarian torsion.  She has no fever, dysuria, or other evidence of serious bacterial infection.  She does endorse some contractions over the last several days approximately 3-4 per 24-hours  And states these last only a few seconds.  I spoke with her on-call OB/GYN regarding this and given that she is not having any vaginal bleeding, pelvic pain, notes normal fetal movement, and in light of the low frequency of contractions, they feel these are [employer] contractions and they do not feel that NST or transfer to labor and delivery service is indicated at this time.  I do feel the patient's symptoms are most consistent with acute viral gastrointestinal process given the vomiting and diarrhea as well as the presence of similar symptoms among other family members.  She feels improved following fluids and symptomatic treatment here.  She requests nausea prescription for home and has used Zofran before and is comfortable the risks associated  with this.  She is tolerating p.o.  She will call her OB/GYN tomorrow to schedule follow-up.  She will return sooner if she develops abdominal or pelvic pain, vaginal bleeding, fever, intractable vomiting, or if increasing duration or frequency of contractions.    Diagnosis:    ICD-10-CM    1. Vomiting and diarrhea  R11.10     R19.7    2. 36 weeks gestation of pregnancy  Z3A.36        Discharge Medications:  New Prescriptions    ONDANSETRON (ZOFRAN ODT) 4 MG ODT TAB    Take 1 tablet (4 mg) by mouth every 6 hours as needed for nausea or vomiting       Scribe Disclosure:  IArgelia, am serving as a scribe at 3:14 PM on 4/4/2021 to document services personally performed by Neymar Small PA-C based on my observations and the provider's statements to me.     This note was completed in part using Dragon voice recognition software. Although reviewed after completion, some word and grammatical errors may occur.     Argelia Iraheta  4/4/2021   [facility] EMERGENCY DEPARTMENT         Neymar Small PA-C  04/04/21 1207

## 2021-04-04 NOTE — PLAN OF CARE
Dr Romero notified of pt arrival in ED. C/o nausea vomiting. Denies leaking of fluid, vaginal bleeding. Feels normal fetal movement. States she feels 4 contractions a day.  No obstretical complaints. No need to do any monitoring.

## 2021-04-04 NOTE — DISCHARGE INSTRUCTIONS
You should call your OB/GYN's office tomorrow to arrange for follow-up.  You should return to the emergency department immediately if you develop vaginal bleeding, pelvic or lower abdominal pain, fever, bloody vomit or stools, increasing contractions, or other new or worsening symptoms.    Discharge Instructions  Vomiting and Diarrhea in Children    Your child was seen today for an illness with vomiting (throwing up) and/or diarrhea (loose stools). At this time, your provider feels that there are no signs that your child s symptoms are due to a serious or life-threatening condition, and your child does not appear severely dehydrated. However, sometimes there is a more serious illness that does not show up right away, and you need to watch your child at home and return as directed. Also, we will ask you to do all you can to keep your child from getting dehydrated, and to watch for signs of dehydration.    Generally, every Emergency Department visit should have a follow-up clinic visit with either a primary or a specialty clinic/provider. Please follow-up as instructed by your emergency provider today.    Return to the Emergency Department if:  Your child seems to get sicker, will not wake up, will not respond normally, or is crying for a long time and will not calm down.  Your child seems to have very bad abdominal (belly) pain, has blood in the stool (which may look red, maroon, or black like tar), or vomits bloody or black material.  Your child is showing signs of dehydration.  Signs of dehydration can be:  Your child has a significant decrease in urination (pee).  Your infant or child starts to have dry mouth and lips, or no saliva or tears.  Your child is very pale, seems very tired, or has sunken eyes.  Your child passes out or faints.  Your child has any new symptoms.   You notice anything else that worries you.    Oral Rehydration Therapy (ORT)  Your doctor has recommend that you continue oral rehydration  therapy at home, which is the best treatment for mild to moderate cases of dehydration--safer and better than IV fluids.     What Fluids to Use?   Commercial rehydration solution is best (Pedialyte or Rehydrate are common brands). You can also make your own oral rehydration solution at home with this recipe:  1 level teaspoon of salt.  8 level teaspoons of sugar.  5 measuring cups of clean drinking water.   If your child is older than 1 year and won t drink rehydration solution due to taste, you may use diluted sports drinks (e.g., half Gatorade, half water) or diluted apple juice (e.g., half juice, half water)     What Fluids to Avoid?  Large amounts of plain water   Infants should never be given plain water  High sugar drinks (full strength juice, sodas), this can worsen diarrhea  Diet or sugar free drinks     ORT: How-To  Give small amounts of liquids regularly, usually starting with 1 teaspoon every 5 minutes  Slowly add to the amount given each time, giving the solution less often as he or she tolerates more.  For example, give 1 tablespoon every 15 minutes.  Goals for ongoing rehydration are, by age:    Age Fluids to Start Ongoing Hydration   Age 0-6 Months 5ml (1 tsp) every 5 minutes If no vomiting, may increase to 15 mL (1Tbsp) every 15 minutes.  Gradually increase the amount given.  Goal is to give about 1.5-3 cups (12-24 oz) over the first 4 hour period.  Then give about 1 oz per hour until your child is drinking well on their own.   Age 6 Months - 3 Years Give 10 mL (2 tsp) every 5 minutes If no vomiting, you may increase to 30 mL (2Tbsp) every 15 minutes.  Goal is to give about 2-4 cups (16-32 oz) over the first 4 hours.  Then give about 1-2 oz per hour until your child is drinking well on their own.   Age 3 - 8 Years 15 mL (1 Tbsp) every 5 minutes If not vomiting you may increase to 45 mL (3 Tbsp) every 15 minutes.  Goal is to give about 4-8 cups (48-64oz) over the first 4 hours.  Then give about 3 oz  per hour until your child is drinking well on their own.   Age > 8 Years 15-30mL (1-2Tbsp) every 5 minutes If no vomiting, you may increase to 3 oz (about   cup) every 15 minutes.  Goal is to give about 6-12 cups over the first 4 hours.  Then give about 3-4 oz per hour until your child is drinking well on their own.     Volume References:  1 tsp = 5mL  1 tbsp = 15 mL  1 oz = 30 mL = 2 Tbsp  8 oz = 1 cup    If your child vomits, stop giving the fluid for about 30 minutes, then start again with 1 teaspoon, or at least with a little less than last time.   For younger children, the caregiver may need to use a medication syringe to give the fluid.  Older children may do well if you pour the recommended amount in a small cup and refill the cup every 15 minutes.  Set a timer.   If your child wants to take smaller amounts at a time, it is ok to give smaller amounts every 5-10 minutes to total the amounts listed above.  This may be more effective at the beginning of treatment.  After 4 hours, see if the child will drink on their own based on thirst.  Monitor fluid intake.  Infants can return to breastfeeding or taking formula anytime they are willing.  After older children are drinking one of the above options well, you can transition to liquids of their choice and gradually resume their usual diet.  There is no need to restrict milk or dairy products unless your child has prior dairy intolerance.    Adding Solid Foods  Once your child is taking oral rehydration solution well, you can add mild solids (or formula for babies) in small amounts (crackers, toast, noodles).   Avoid spicy, greasy, or fried foods until the vomiting and diarrhea have stopped for a day or two.   If your child vomits, stop the solids (or formula) for an hour or so. If your baby is breast fed, you may keep breastfeeding frequently.   If your child has diarrhea, milk may give them gas and loose bowels for a few days, and food may make them have more  diarrhea at first, but they will get better faster!    What if my child vomits?  If your child vomits, take a 30 min break.  Use nausea/vomiting medications if prescribed then resume oral rehydration treatment.    What if my child still has diarrhea?  Children with ongoing diarrhea will need to take in extra fluids to replace fluids lost in the stool until rehydrated and taking fluids and age appropriate foods on their own.  Give extra rehydration until diarrhea resolves.     Fever:  Treat fever with Tylenol (acetaminophen).  Fever increases the body s need for liquids.    If your doctor today has told you to follow-up with your regular doctor, it is very important that you make an appointment with your clinic and go to that appointment.  If you do not follow-up with your primary doctor, it may result in missing an important development which could result in permanent injury or disability and/or lasting pain.  If there is any problem keeping your appointment, call your doctor or return to the Emergency Department.    If you were given a prescription for medicine here today, be sure to read all of the information (including the package insert) that comes with your prescription.  This will include important information about the medicine, its side effects, and any warnings that you need to know about.  The pharmacist who fills the prescription can provide more information and answer questions you may have about the medicine.  If you have questions or concerns that the pharmacist cannot address, please call or return to the Emergency Department.       Remember that you can always come back to the Emergency Department if you are not able to see your regular provider in the amount of time listed above, if you get any new symptoms, or if there is anything that worries you.

## 2021-04-08 ENCOUNTER — PRENATAL OFFICE VISIT (OUTPATIENT)
Dept: OBGYN | Facility: CLINIC | Age: 41
End: 2021-04-08
Payer: COMMERCIAL

## 2021-04-08 ENCOUNTER — INFUSION THERAPY VISIT (OUTPATIENT)
Dept: INFUSION THERAPY | Facility: CLINIC | Age: 41
End: 2021-04-08
Attending: FAMILY MEDICINE
Payer: COMMERCIAL

## 2021-04-08 VITALS
HEART RATE: 86 BPM | RESPIRATION RATE: 16 BRPM | SYSTOLIC BLOOD PRESSURE: 98 MMHG | OXYGEN SATURATION: 99 % | TEMPERATURE: 98.2 F | DIASTOLIC BLOOD PRESSURE: 52 MMHG

## 2021-04-08 VITALS
BODY MASS INDEX: 28.61 KG/M2 | HEIGHT: 60 IN | SYSTOLIC BLOOD PRESSURE: 100 MMHG | DIASTOLIC BLOOD PRESSURE: 56 MMHG | WEIGHT: 145.7 LBS

## 2021-04-08 DIAGNOSIS — D50.9 IRON DEFICIENCY ANEMIA, UNSPECIFIED IRON DEFICIENCY ANEMIA TYPE: Primary | ICD-10-CM

## 2021-04-08 DIAGNOSIS — Z87.59 HISTORY OF POSTPARTUM DEPRESSION: ICD-10-CM

## 2021-04-08 DIAGNOSIS — O09.93 HIGH-RISK PREGNANCY IN THIRD TRIMESTER: Primary | ICD-10-CM

## 2021-04-08 DIAGNOSIS — O21.0 HYPEREMESIS GRAVIDARUM: ICD-10-CM

## 2021-04-08 DIAGNOSIS — F43.22 ADJUSTMENT DISORDER WITH ANXIOUS MOOD: ICD-10-CM

## 2021-04-08 DIAGNOSIS — Z86.59 HISTORY OF POSTPARTUM DEPRESSION: ICD-10-CM

## 2021-04-08 PROCEDURE — 258N000003 HC RX IP 258 OP 636: Performed by: FAMILY MEDICINE

## 2021-04-08 PROCEDURE — 87653 STREP B DNA AMP PROBE: CPT | Performed by: FAMILY MEDICINE

## 2021-04-08 PROCEDURE — 250N000011 HC RX IP 250 OP 636: Performed by: FAMILY MEDICINE

## 2021-04-08 PROCEDURE — 99207 PR PRENATAL VISIT: CPT | Performed by: FAMILY MEDICINE

## 2021-04-08 PROCEDURE — 96365 THER/PROPH/DIAG IV INF INIT: CPT

## 2021-04-08 RX ORDER — HEPARIN SODIUM,PORCINE 10 UNIT/ML
5 VIAL (ML) INTRAVENOUS
Status: CANCELLED | OUTPATIENT
Start: 2021-04-08

## 2021-04-08 RX ORDER — HEPARIN SODIUM (PORCINE) LOCK FLUSH IV SOLN 100 UNIT/ML 100 UNIT/ML
5 SOLUTION INTRAVENOUS
Status: CANCELLED | OUTPATIENT
Start: 2021-04-08

## 2021-04-08 RX ADMIN — IRON SUCROSE 300 MG: 20 INJECTION, SOLUTION INTRAVENOUS at 14:30

## 2021-04-08 ASSESSMENT — ANXIETY QUESTIONNAIRES
6. BECOMING EASILY ANNOYED OR IRRITABLE: SEVERAL DAYS
1. FEELING NERVOUS, ANXIOUS, OR ON EDGE: SEVERAL DAYS
5. BEING SO RESTLESS THAT IT IS HARD TO SIT STILL: NOT AT ALL
GAD7 TOTAL SCORE: 7
7. FEELING AFRAID AS IF SOMETHING AWFUL MIGHT HAPPEN: MORE THAN HALF THE DAYS
3. WORRYING TOO MUCH ABOUT DIFFERENT THINGS: SEVERAL DAYS
2. NOT BEING ABLE TO STOP OR CONTROL WORRYING: SEVERAL DAYS
IF YOU CHECKED OFF ANY PROBLEMS ON THIS QUESTIONNAIRE, HOW DIFFICULT HAVE THESE PROBLEMS MADE IT FOR YOU TO DO YOUR WORK, TAKE CARE OF THINGS AT HOME, OR GET ALONG WITH OTHER PEOPLE: SOMEWHAT DIFFICULT

## 2021-04-08 ASSESSMENT — MIFFLIN-ST. JEOR: SCORE: 1242.39

## 2021-04-08 ASSESSMENT — PATIENT HEALTH QUESTIONNAIRE - PHQ9
SUM OF ALL RESPONSES TO PHQ QUESTIONS 1-9: 8
5. POOR APPETITE OR OVEREATING: SEVERAL DAYS

## 2021-04-08 NOTE — PROGRESS NOTES
Infusion Nursing Note:  Rizwana Plummer presents today for venofer.    Patient seen by provider today: No   present during visit today: Not Applicable.    Note: N/A.      Intravenous Access:  Peripheral IV placed.    Treatment Conditions:  Not Applicable.      Post Infusion Assessment:  Patient tolerated infusion without incident.  Site patent and intact, free from redness, edema or discomfort.  No evidence of extravasations.  Access discontinued per protocol.       Discharge Plan:   Discharge instructions reviewed with: Patient.  AVS to patient via [facility].  Patient will return 4/13 for next appointment.   Patient discharged in stable condition accompanied by: self.  Departure Mode: Ambulatory.    Nelly Harvey RN

## 2021-04-08 NOTE — PATIENT INSTRUCTIONS
"Return weekly   Return to clinic:  every 4 weeks till 28 weeks, then every 2 weeks till 36 weeks, then weekly till delivery      Phone numbers [place]:  Day/ night [phone] ask for ob triage  Emergency:  Call labor and delivery:  [phone]    What should I call about??    Contraction every 5 minutes for 1 hour 1 minute long (511), bleeding, loss of fluid, headache that doesn't resolve with tylenol, and decreased fetal movement     Start kick counts @ 26-28 weeks   There is an marlena for this!  It is called \"count the kicks\"  Keep track of movement and discover your normal baby movement pattern   guideline is listed below  Please call if you do not feel the baby move!  We will have you come in for fetal heart rate monitoring:   Perception of at least 10 FMs during 12 hours of normal maternal activity   Perception of least 10 FMs over two hours when the mother is at rest and focused on [facility] Address   [address], [place], [place] [address] [phone]    Dr. Cindy Laurent, DO    OB/GYN   [facility]                                                      "

## 2021-04-08 NOTE — PROGRESS NOTES
36w3d    Chief Complaint   Patient presents with     Prenatal Care     GBS due       Initial /56   Ht 1.524 m (5')   Wt 66.1 kg (145 lb 11.2 oz)   BMI 28.46 kg/m   Estimated body mass index is 28.46 kg/m  as calculated from the following:    Height as of this encounter: 1.524 m (5').    Weight as of this encounter: 66.1 kg (145 lb 11.2 oz).  BP completed using cuff size: regular    Questioned patient about current smoking habits.  Pt. has never smoked.          The following HM Due: NONE

## 2021-04-08 NOTE — PROGRESS NOTES
CC: Here for routine prenatal visit   40 year old y/o  @ 36w3d with Estimated Date of Delivery: May 3, 2021     /56   Ht 1.524 m (5')   Wt 66.1 kg (145 lb 11.2 oz)   BMI 28.46 kg/m    See OB flowsheet  + fetal movement, no contractions, no bleeding, no loss of fluid   Discussed monitoring fetal movement   Decline cervical exam       1) concerns: none   Covid-19 concerns: no  2) Routine: Blood type O+ RI tdap [no] flu [no] GS [no]    3) Risk factors:   A: hx of CS x 3: RCS: scheduled 2021   B: Hyperemesis Gravidarum: resolved   C: AMA:  Level II  Us, weekly bpp after 36 weeks  D: Circumvallate placenta:  Growth us per MFM due to inc risk IUGR  E: Post partum birth control: discussed IUD, mirena paragard kyleena, eileen              Oral Contraceptive, nexplanon. She prefers regular period.  F: Female genital infibulation  G: mild polyhydramnios-resolved:  MFM rec 1 hour gtt 28 weeks, nl   H: PPBC: POP               I: history of post partum depression:  would like to have on hand to start if needed . rx   50 mg zoloft     PQH9: 8 HAILEE-7: 7    Might start now, will let me know.              J: Anemia: rx slo-fe called in, woulld also like iron infusion again, went from 7.5 to 9.8, ordered again    4) Return: weekly    Mehran

## 2021-04-09 LAB
GP B STREP DNA SPEC QL NAA+PROBE: POSITIVE
SPECIMEN SOURCE: ABNORMAL

## 2021-04-09 ASSESSMENT — ANXIETY QUESTIONNAIRES: GAD7 TOTAL SCORE: 7

## 2021-04-13 ENCOUNTER — HOSPITAL ENCOUNTER (OUTPATIENT)
Dept: ULTRASOUND IMAGING | Facility: CLINIC | Age: 41
End: 2021-04-13
Attending: OBSTETRICS & GYNECOLOGY
Payer: COMMERCIAL

## 2021-04-13 ENCOUNTER — INFUSION THERAPY VISIT (OUTPATIENT)
Dept: INFUSION THERAPY | Facility: CLINIC | Age: 41
End: 2021-04-13
Attending: FAMILY MEDICINE
Payer: COMMERCIAL

## 2021-04-13 ENCOUNTER — OFFICE VISIT (OUTPATIENT)
Dept: MATERNAL FETAL MEDICINE | Facility: CLINIC | Age: 41
End: 2021-04-13
Attending: OBSTETRICS & GYNECOLOGY
Payer: COMMERCIAL

## 2021-04-13 VITALS
HEART RATE: 84 BPM | SYSTOLIC BLOOD PRESSURE: 96 MMHG | OXYGEN SATURATION: 100 % | TEMPERATURE: 98.3 F | RESPIRATION RATE: 16 BRPM | DIASTOLIC BLOOD PRESSURE: 67 MMHG

## 2021-04-13 DIAGNOSIS — O09.523 MULTIGRAVIDA OF ADVANCED MATERNAL AGE IN THIRD TRIMESTER: Primary | ICD-10-CM

## 2021-04-13 DIAGNOSIS — O21.0 HYPEREMESIS GRAVIDARUM: ICD-10-CM

## 2021-04-13 DIAGNOSIS — O09.523 MULTIGRAVIDA OF ADVANCED MATERNAL AGE IN THIRD TRIMESTER: ICD-10-CM

## 2021-04-13 DIAGNOSIS — O40.9XX0 POLYHYDRAMNIOS AFFECTING PREGNANCY: ICD-10-CM

## 2021-04-13 DIAGNOSIS — D50.9 IRON DEFICIENCY ANEMIA, UNSPECIFIED IRON DEFICIENCY ANEMIA TYPE: Primary | ICD-10-CM

## 2021-04-13 PROCEDURE — 258N000003 HC RX IP 258 OP 636: Performed by: FAMILY MEDICINE

## 2021-04-13 PROCEDURE — 76819 FETAL BIOPHYS PROFIL W/O NST: CPT

## 2021-04-13 PROCEDURE — 96365 THER/PROPH/DIAG IV INF INIT: CPT

## 2021-04-13 PROCEDURE — 76819 FETAL BIOPHYS PROFIL W/O NST: CPT | Mod: 26 | Performed by: OBSTETRICS & GYNECOLOGY

## 2021-04-13 PROCEDURE — 250N000011 HC RX IP 250 OP 636: Performed by: FAMILY MEDICINE

## 2021-04-13 PROCEDURE — 76816 OB US FOLLOW-UP PER FETUS: CPT | Mod: 26 | Performed by: OBSTETRICS & GYNECOLOGY

## 2021-04-13 PROCEDURE — 76816 OB US FOLLOW-UP PER FETUS: CPT

## 2021-04-13 RX ORDER — HEPARIN SODIUM (PORCINE) LOCK FLUSH IV SOLN 100 UNIT/ML 100 UNIT/ML
5 SOLUTION INTRAVENOUS
Status: CANCELLED | OUTPATIENT
Start: 2021-04-13

## 2021-04-13 RX ORDER — HEPARIN SODIUM,PORCINE 10 UNIT/ML
5 VIAL (ML) INTRAVENOUS
Status: CANCELLED | OUTPATIENT
Start: 2021-04-13

## 2021-04-13 RX ADMIN — SODIUM CHLORIDE 250 ML: 9 INJECTION, SOLUTION INTRAVENOUS at 12:40

## 2021-04-13 RX ADMIN — IRON SUCROSE 300 MG: 20 INJECTION, SOLUTION INTRAVENOUS at 12:51

## 2021-04-13 NOTE — PROGRESS NOTES
Please see full imaging report from [employer] program under imaging tab.    Emmanuel Dias MD  Maternal Fetal Medicine

## 2021-04-13 NOTE — PROGRESS NOTES
Infusion Nursing Note:  Rizwana Plummer presents today for Venofer # 2 of 3.    Patient seen by provider today: No   present during visit today: Not Applicable.    Note:  Patient reports is feeling fine today.  Patient denies fevers, chills or signs of infection.  Patient reports is feeling tired and weak at times due to her pregnancy. Patient discussed is scheduled for  on 21.    Intravenous Access:  Peripheral IV placed.  PIV site C/D/I.  PIV flushes well + excellent blood return.    Treatment Conditions:  Not Applicable.      Post Infusion Assessment:  Patient tolerated infusion without incident.  Blood return noted pre and post infusion.  Site patent and intact, free from redness, edema or discomfort.  No evidence of extravasations.  Access discontinued per protocol.       Discharge Plan:   Discharge instructions reviewed with: Patient.  Patient and/or family verbalized understanding of discharge instructions and all questions answered.  Patient discharged in stable condition accompanied by: self.  Departure Mode: Ambulatory.  21: Venofer # 3 of 3.    Cindy Huerta, RN, BSN, OCN on 2021 at 2:48 PM

## 2021-04-16 ENCOUNTER — INFUSION THERAPY VISIT (OUTPATIENT)
Dept: INFUSION THERAPY | Facility: CLINIC | Age: 41
End: 2021-04-16
Attending: FAMILY MEDICINE
Payer: COMMERCIAL

## 2021-04-16 VITALS
TEMPERATURE: 97.1 F | SYSTOLIC BLOOD PRESSURE: 98 MMHG | HEART RATE: 83 BPM | DIASTOLIC BLOOD PRESSURE: 56 MMHG | RESPIRATION RATE: 16 BRPM | OXYGEN SATURATION: 100 %

## 2021-04-16 DIAGNOSIS — D50.9 IRON DEFICIENCY ANEMIA, UNSPECIFIED IRON DEFICIENCY ANEMIA TYPE: Primary | ICD-10-CM

## 2021-04-16 DIAGNOSIS — O21.0 HYPEREMESIS GRAVIDARUM: ICD-10-CM

## 2021-04-16 PROCEDURE — 96365 THER/PROPH/DIAG IV INF INIT: CPT

## 2021-04-16 PROCEDURE — 96366 THER/PROPH/DIAG IV INF ADDON: CPT

## 2021-04-16 PROCEDURE — 258N000003 HC RX IP 258 OP 636: Performed by: FAMILY MEDICINE

## 2021-04-16 PROCEDURE — 250N000011 HC RX IP 250 OP 636: Performed by: FAMILY MEDICINE

## 2021-04-16 RX ORDER — HEPARIN SODIUM (PORCINE) LOCK FLUSH IV SOLN 100 UNIT/ML 100 UNIT/ML
5 SOLUTION INTRAVENOUS
Status: CANCELLED | OUTPATIENT
Start: 2021-04-16

## 2021-04-16 RX ORDER — HEPARIN SODIUM,PORCINE 10 UNIT/ML
5 VIAL (ML) INTRAVENOUS
Status: CANCELLED | OUTPATIENT
Start: 2021-04-16

## 2021-04-16 RX ADMIN — IRON SUCROSE 300 MG: 20 INJECTION, SOLUTION INTRAVENOUS at 12:20

## 2021-04-16 NOTE — PROGRESS NOTES
Infusion Nursing Note:  Rizwana Plummer presents today for Venofer.    Patient seen by provider today: No   present during visit today: Not Applicable.    Note: N/A.  Patient did meet criteria for an asymptomatic covid-19 PCR test in infusion today. Patient /declined the covid-19 test.    Intravenous Access:  Peripheral IV placed.    Treatment Conditions:  Not Applicable.      Post Infusion Assessment:  Patient tolerated infusion without incident.  Blood return noted pre infusion. IV infiltrated 58 minutes into infusion. Iron stopped, cool compress applied along with elevation. Quarter size infiltrate. Patient denies pain. Patient given instructions on care of infiltrate and what to watch for. Patient verbalized understanding. Upon discharge left hand had no swelling or discoloration. Second RN Henna PEDRAZA Viewed old IV site and agrees it looks WNL. Patient denies pain at this time.  New IV started in left hand and iron restarted. Blood return noted pre and post infusion.  Site patent and intact, free from redness, edema or discomfort.  No evidence of extravasations.  Access discontinued per protocol.       Discharge Plan:   Discharge instructions reviewed with: Patient.  Patient and/or family verbalized understanding of discharge instructions and all questions answered.  AVS to patient via [facility].  Patient will return as instructed by MD for next appointment.   Patient discharged in stable condition accompanied by: self.  Departure Mode: Ambulatory.    Sophie Kaur RN

## 2021-04-20 ENCOUNTER — OFFICE VISIT (OUTPATIENT)
Dept: MATERNAL FETAL MEDICINE | Facility: CLINIC | Age: 41
End: 2021-04-20
Attending: OBSTETRICS & GYNECOLOGY
Payer: COMMERCIAL

## 2021-04-20 ENCOUNTER — HOSPITAL ENCOUNTER (OUTPATIENT)
Dept: ULTRASOUND IMAGING | Facility: CLINIC | Age: 41
End: 2021-04-20
Attending: OBSTETRICS & GYNECOLOGY
Payer: COMMERCIAL

## 2021-04-20 DIAGNOSIS — O09.523 MULTIGRAVIDA OF ADVANCED MATERNAL AGE IN THIRD TRIMESTER: Primary | ICD-10-CM

## 2021-04-20 DIAGNOSIS — O09.523 MULTIGRAVIDA OF ADVANCED MATERNAL AGE IN THIRD TRIMESTER: ICD-10-CM

## 2021-04-20 PROCEDURE — 76819 FETAL BIOPHYS PROFIL W/O NST: CPT

## 2021-04-20 PROCEDURE — 76819 FETAL BIOPHYS PROFIL W/O NST: CPT | Mod: 26 | Performed by: OBSTETRICS & GYNECOLOGY

## 2021-04-22 ASSESSMENT — MIFFLIN-ST. JEOR: SCORE: 1243.75

## 2021-04-23 ENCOUNTER — HOSPITAL ENCOUNTER (OUTPATIENT)
Dept: LAB | Facility: CLINIC | Age: 41
Discharge: HOME | End: 2021-04-23
Attending: FAMILY MEDICINE | Admitting: FAMILY MEDICINE
Payer: COMMERCIAL

## 2021-04-23 DIAGNOSIS — Z11.59 ENCOUNTER FOR SCREENING FOR OTHER VIRAL DISEASES: ICD-10-CM

## 2021-04-23 DIAGNOSIS — Z01.812 PRE-OPERATIVE LABORATORY EXAMINATION: ICD-10-CM

## 2021-04-23 LAB
ABO + RH BLD: NORMAL
ABO + RH BLD: NORMAL
BLD GP AB SCN SERPL QL: NORMAL
BLOOD BANK CMNT PATIENT-IMP: NORMAL
HGB BLD-MCNC: 11.7 G/DL (ref 11.7–15.7)
SARS-COV-2 RNA RESP QL NAA+PROBE: NORMAL
SPECIMEN EXP DATE BLD: NORMAL
SPECIMEN SOURCE: NORMAL
T PALLIDUM AB SER QL: NONREACTIVE

## 2021-04-23 PROCEDURE — 86850 RBC ANTIBODY SCREEN: CPT | Performed by: FAMILY MEDICINE

## 2021-04-23 PROCEDURE — 85018 HEMOGLOBIN: CPT | Performed by: FAMILY MEDICINE

## 2021-04-23 PROCEDURE — 36415 COLL VENOUS BLD VENIPUNCTURE: CPT | Performed by: FAMILY MEDICINE

## 2021-04-23 PROCEDURE — 86900 BLOOD TYPING SEROLOGIC ABO: CPT | Performed by: FAMILY MEDICINE

## 2021-04-23 PROCEDURE — 86901 BLOOD TYPING SEROLOGIC RH(D): CPT | Performed by: FAMILY MEDICINE

## 2021-04-23 PROCEDURE — U0005 INFEC AGEN DETEC AMPLI PROBE: HCPCS | Performed by: FAMILY MEDICINE

## 2021-04-23 PROCEDURE — 86780 TREPONEMA PALLIDUM: CPT | Performed by: FAMILY MEDICINE

## 2021-04-23 PROCEDURE — U0003 INFECTIOUS AGENT DETECTION BY NUCLEIC ACID (DNA OR RNA); SEVERE ACUTE RESPIRATORY SYNDROME CORONAVIRUS 2 (SARS-COV-2) (CORONAVIRUS DISEASE [COVID-19]), AMPLIFIED PROBE TECHNIQUE, MAKING USE OF HIGH THROUGHPUT TECHNOLOGIES AS DESCRIBED BY CMS-2020-01-R: HCPCS | Performed by: FAMILY MEDICINE

## 2021-04-23 NOTE — PHARMACY-ADMISSION MEDICATION HISTORY
Medication reconciliation completed by pre-admitting.     Prior to Admission medications    Medication Sig Last Dose Taking? Auth Provider   Acetaminophen (TYLENOL PO) Take 325 mg by mouth every 6 hours as needed   Yes Reported, Patient   doxylamine (UNISOM) 25 MG TABS tablet Take 25 mg by mouth At Bedtime  Yes Reported, Patient   ferrous sulfate (SLO-FE) 142 (45 Fe) MG CR tablet Take 1 tablet (142 mg) by mouth daily  Patient not taking: Reported on 4/13/2021   Cindy Laurent DO   hydrOXYzine (ATARAX) 10 MG tablet Take 1 tablet (10 mg) by mouth nightly as needed for other (back pain)  Patient not taking: Reported on 2/19/2021   Cindy Laurent DO   ondansetron (ZOFRAN ODT) 4 MG ODT tab Take 1 tablet (4 mg) by mouth every 6 hours as needed for nausea or vomiting  Patient not taking: Reported on 4/13/2021   Neymar Small PA-C   Prenatal Vit-Fe Fumarate-FA (PRENATAL MULTIVITAMIN PLUS IRON) 27-0.8 MG TABS per tablet Take 1 tablet by mouth daily   Ammy Bustillo MD   sertraline (ZOLOFT) 50 MG tablet Take 1 tablet (50 mg) by mouth daily  Patient not taking: Reported on 4/13/2021   Cindy Laurent DO

## 2021-04-24 LAB
LABORATORY COMMENT REPORT: NORMAL
SARS-COV-2 RNA RESP QL NAA+PROBE: NEGATIVE
SPECIMEN SOURCE: NORMAL

## 2021-04-26 ENCOUNTER — ANESTHESIA (OUTPATIENT)
Dept: OBGYN | Facility: CLINIC | Age: 41
End: 2021-04-26
Payer: COMMERCIAL

## 2021-04-26 ENCOUNTER — ANESTHESIA EVENT (OUTPATIENT)
Dept: OBGYN | Facility: CLINIC | Age: 41
End: 2021-04-26
Payer: COMMERCIAL

## 2021-04-26 ENCOUNTER — HOSPITAL ENCOUNTER (INPATIENT)
Facility: CLINIC | Age: 41
LOS: 3 days | Discharge: HOME | End: 2021-04-29
Attending: FAMILY MEDICINE | Admitting: FAMILY MEDICINE
Payer: COMMERCIAL

## 2021-04-26 DIAGNOSIS — Z98.891 HISTORY OF CESAREAN SECTION: ICD-10-CM

## 2021-04-26 LAB
CREAT SERPL-MCNC: 0.51 MG/DL (ref 0.52–1.04)
GFR SERPL CREATININE-BSD FRML MDRD: >90 ML/MIN/{1.73_M2}

## 2021-04-26 PROCEDURE — C1765 ADHESION BARRIER: HCPCS | Performed by: FAMILY MEDICINE

## 2021-04-26 PROCEDURE — 36415 COLL VENOUS BLD VENIPUNCTURE: CPT | Performed by: FAMILY MEDICINE

## 2021-04-26 PROCEDURE — 250N000013 HC RX MED GY IP 250 OP 250 PS 637: Performed by: FAMILY MEDICINE

## 2021-04-26 PROCEDURE — 250N000011 HC RX IP 250 OP 636: Performed by: FAMILY MEDICINE

## 2021-04-26 PROCEDURE — 250N000011 HC RX IP 250 OP 636: Performed by: NURSE ANESTHETIST, CERTIFIED REGISTERED

## 2021-04-26 PROCEDURE — 272N000001 HC OR GENERAL SUPPLY STERILE: Performed by: FAMILY MEDICINE

## 2021-04-26 PROCEDURE — 360N000076 HC SURGERY LEVEL 3, PER MIN: Performed by: FAMILY MEDICINE

## 2021-04-26 PROCEDURE — 82565 ASSAY OF CREATININE: CPT | Performed by: FAMILY MEDICINE

## 2021-04-26 PROCEDURE — 999N000080 HC STATISTIC IP LACTATION SERVICES 16-30 MIN

## 2021-04-26 PROCEDURE — 250N000009 HC RX 250: Performed by: NURSE ANESTHETIST, CERTIFIED REGISTERED

## 2021-04-26 PROCEDURE — 258N000003 HC RX IP 258 OP 636: Performed by: FAMILY MEDICINE

## 2021-04-26 PROCEDURE — 120N000001 HC R&B MED SURG/OB

## 2021-04-26 PROCEDURE — 250N000011 HC RX IP 250 OP 636: Performed by: ANESTHESIOLOGY

## 2021-04-26 PROCEDURE — 250N000009 HC RX 250

## 2021-04-26 PROCEDURE — 710N000009 HC RECOVERY PHASE 1, LEVEL 1, PER MIN: Performed by: FAMILY MEDICINE

## 2021-04-26 PROCEDURE — 370N000017 HC ANESTHESIA TECHNICAL FEE, PER MIN: Performed by: FAMILY MEDICINE

## 2021-04-26 PROCEDURE — 59510 CESAREAN DELIVERY: CPT | Performed by: FAMILY MEDICINE

## 2021-04-26 PROCEDURE — 250N000009 HC RX 250: Performed by: FAMILY MEDICINE

## 2021-04-26 RX ORDER — FENTANYL CITRATE 50 UG/ML
INJECTION, SOLUTION INTRAMUSCULAR; INTRAVENOUS PRN
Status: DISCONTINUED | OUTPATIENT
Start: 2021-04-26 | End: 2021-04-26

## 2021-04-26 RX ORDER — LABETALOL HYDROCHLORIDE 5 MG/ML
10 INJECTION, SOLUTION INTRAVENOUS
Status: DISCONTINUED | OUTPATIENT
Start: 2021-04-26 | End: 2021-04-26 | Stop reason: HOSPADM

## 2021-04-26 RX ORDER — OXYTOCIN/0.9 % SODIUM CHLORIDE 30/500 ML
PLASTIC BAG, INJECTION (ML) INTRAVENOUS
Status: COMPLETED
Start: 2021-04-26 | End: 2021-04-26

## 2021-04-26 RX ORDER — ONDANSETRON 2 MG/ML
4 INJECTION INTRAMUSCULAR; INTRAVENOUS EVERY 30 MIN PRN
Status: DISCONTINUED | OUTPATIENT
Start: 2021-04-26 | End: 2021-04-26 | Stop reason: HOSPADM

## 2021-04-26 RX ORDER — KETOROLAC TROMETHAMINE 30 MG/ML
30 INJECTION, SOLUTION INTRAMUSCULAR; INTRAVENOUS EVERY 6 HOURS
Status: COMPLETED | OUTPATIENT
Start: 2021-04-26 | End: 2021-04-26

## 2021-04-26 RX ORDER — IBUPROFEN 800 MG/1
800 TABLET, FILM COATED ORAL EVERY 6 HOURS
Status: DISCONTINUED | OUTPATIENT
Start: 2021-04-27 | End: 2021-04-26

## 2021-04-26 RX ORDER — NALOXONE HYDROCHLORIDE 0.4 MG/ML
0.2 INJECTION, SOLUTION INTRAMUSCULAR; INTRAVENOUS; SUBCUTANEOUS
Status: DISCONTINUED | OUTPATIENT
Start: 2021-04-26 | End: 2021-04-26

## 2021-04-26 RX ORDER — DEXAMETHASONE SODIUM PHOSPHATE 4 MG/ML
INJECTION, SOLUTION INTRA-ARTICULAR; INTRALESIONAL; INTRAMUSCULAR; INTRAVENOUS; SOFT TISSUE PRN
Status: DISCONTINUED | OUTPATIENT
Start: 2021-04-26 | End: 2021-04-26

## 2021-04-26 RX ORDER — NALOXONE HYDROCHLORIDE 0.4 MG/ML
0.4 INJECTION, SOLUTION INTRAMUSCULAR; INTRAVENOUS; SUBCUTANEOUS
Status: DISCONTINUED | OUTPATIENT
Start: 2021-04-26 | End: 2021-04-26 | Stop reason: HOSPADM

## 2021-04-26 RX ORDER — MISOPROSTOL 200 UG/1
400 TABLET ORAL
Status: DISCONTINUED | OUTPATIENT
Start: 2021-04-26 | End: 2021-04-29 | Stop reason: HOSPADM

## 2021-04-26 RX ORDER — FENTANYL CITRATE-0.9 % NACL/PF 10 MCG/ML
PLASTIC BAG, INJECTION (ML) INTRAVENOUS CONTINUOUS PRN
Status: DISCONTINUED | OUTPATIENT
Start: 2021-04-26 | End: 2021-04-26

## 2021-04-26 RX ORDER — OXYTOCIN 10 [USP'U]/ML
10 INJECTION, SOLUTION INTRAMUSCULAR; INTRAVENOUS
Status: DISCONTINUED | OUTPATIENT
Start: 2021-04-26 | End: 2021-04-29 | Stop reason: HOSPADM

## 2021-04-26 RX ORDER — BUPIVACAINE HYDROCHLORIDE 7.5 MG/ML
INJECTION, SOLUTION INTRASPINAL PRN
Status: DISCONTINUED | OUTPATIENT
Start: 2021-04-26 | End: 2021-04-26

## 2021-04-26 RX ORDER — ALBUTEROL SULFATE 0.83 MG/ML
2.5 SOLUTION RESPIRATORY (INHALATION) EVERY 4 HOURS PRN
Status: DISCONTINUED | OUTPATIENT
Start: 2021-04-26 | End: 2021-04-26 | Stop reason: HOSPADM

## 2021-04-26 RX ORDER — NALOXONE HYDROCHLORIDE 0.4 MG/ML
0.2 INJECTION, SOLUTION INTRAMUSCULAR; INTRAVENOUS; SUBCUTANEOUS
Status: DISCONTINUED | OUTPATIENT
Start: 2021-04-26 | End: 2021-04-29 | Stop reason: HOSPADM

## 2021-04-26 RX ORDER — ONDANSETRON 4 MG/1
4 TABLET, ORALLY DISINTEGRATING ORAL EVERY 30 MIN PRN
Status: DISCONTINUED | OUTPATIENT
Start: 2021-04-26 | End: 2021-04-26 | Stop reason: HOSPADM

## 2021-04-26 RX ORDER — OXYCODONE HYDROCHLORIDE 5 MG/1
5 TABLET ORAL EVERY 4 HOURS PRN
Status: DISCONTINUED | OUTPATIENT
Start: 2021-04-26 | End: 2021-04-29 | Stop reason: HOSPADM

## 2021-04-26 RX ORDER — MORPHINE SULFATE 1 MG/ML
INJECTION, SOLUTION EPIDURAL; INTRATHECAL; INTRAVENOUS PRN
Status: DISCONTINUED | OUTPATIENT
Start: 2021-04-26 | End: 2021-04-26

## 2021-04-26 RX ORDER — NALOXONE HYDROCHLORIDE 0.4 MG/ML
0.2 INJECTION, SOLUTION INTRAMUSCULAR; INTRAVENOUS; SUBCUTANEOUS
Status: DISCONTINUED | OUTPATIENT
Start: 2021-04-26 | End: 2021-04-26 | Stop reason: HOSPADM

## 2021-04-26 RX ORDER — HYDROMORPHONE HYDROCHLORIDE 1 MG/ML
.3-.5 INJECTION, SOLUTION INTRAMUSCULAR; INTRAVENOUS; SUBCUTANEOUS EVERY 10 MIN PRN
Status: DISCONTINUED | OUTPATIENT
Start: 2021-04-26 | End: 2021-04-26 | Stop reason: HOSPADM

## 2021-04-26 RX ORDER — NALBUPHINE HYDROCHLORIDE 10 MG/ML
2.5-5 INJECTION, SOLUTION INTRAMUSCULAR; INTRAVENOUS; SUBCUTANEOUS EVERY 6 HOURS PRN
Status: DISCONTINUED | OUTPATIENT
Start: 2021-04-26 | End: 2021-04-26

## 2021-04-26 RX ORDER — DEXTROSE, SODIUM CHLORIDE, SODIUM LACTATE, POTASSIUM CHLORIDE, AND CALCIUM CHLORIDE 5; .6; .31; .03; .02 G/100ML; G/100ML; G/100ML; G/100ML; G/100ML
INJECTION, SOLUTION INTRAVENOUS CONTINUOUS
Status: DISCONTINUED | OUTPATIENT
Start: 2021-04-26 | End: 2021-04-29 | Stop reason: HOSPADM

## 2021-04-26 RX ORDER — FENTANYL CITRATE 50 UG/ML
25-50 INJECTION, SOLUTION INTRAMUSCULAR; INTRAVENOUS
Status: DISCONTINUED | OUTPATIENT
Start: 2021-04-26 | End: 2021-04-26 | Stop reason: HOSPADM

## 2021-04-26 RX ORDER — AMOXICILLIN 250 MG
1 CAPSULE ORAL 2 TIMES DAILY
Status: DISCONTINUED | OUTPATIENT
Start: 2021-04-26 | End: 2021-04-29 | Stop reason: HOSPADM

## 2021-04-26 RX ORDER — OXYTOCIN/0.9 % SODIUM CHLORIDE 30/500 ML
PLASTIC BAG, INJECTION (ML) INTRAVENOUS PRN
Status: DISCONTINUED | OUTPATIENT
Start: 2021-04-26 | End: 2021-04-26

## 2021-04-26 RX ORDER — EPHEDRINE SULFATE 50 MG/ML
5 INJECTION, SOLUTION INTRAMUSCULAR; INTRAVENOUS; SUBCUTANEOUS
Status: DISCONTINUED | OUTPATIENT
Start: 2021-04-26 | End: 2021-04-26

## 2021-04-26 RX ORDER — NALOXONE HYDROCHLORIDE 0.4 MG/ML
0.4 INJECTION, SOLUTION INTRAMUSCULAR; INTRAVENOUS; SUBCUTANEOUS
Status: DISCONTINUED | OUTPATIENT
Start: 2021-04-26 | End: 2021-04-26

## 2021-04-26 RX ORDER — MEPERIDINE HYDROCHLORIDE 25 MG/ML
12.5 INJECTION INTRAMUSCULAR; INTRAVENOUS; SUBCUTANEOUS
Status: DISCONTINUED | OUTPATIENT
Start: 2021-04-26 | End: 2021-04-26 | Stop reason: HOSPADM

## 2021-04-26 RX ORDER — AMOXICILLIN 250 MG
2 CAPSULE ORAL 2 TIMES DAILY
Status: DISCONTINUED | OUTPATIENT
Start: 2021-04-26 | End: 2021-04-29 | Stop reason: HOSPADM

## 2021-04-26 RX ORDER — CEFAZOLIN SODIUM 2 G/100ML
2 INJECTION, SOLUTION INTRAVENOUS
Status: COMPLETED | OUTPATIENT
Start: 2021-04-26 | End: 2021-04-26

## 2021-04-26 RX ORDER — LIDOCAINE 40 MG/G
CREAM TOPICAL
Status: DISCONTINUED | OUTPATIENT
Start: 2021-04-26 | End: 2021-04-26

## 2021-04-26 RX ORDER — ACETAMINOPHEN 325 MG/1
975 TABLET ORAL EVERY 6 HOURS
Status: DISCONTINUED | OUTPATIENT
Start: 2021-04-26 | End: 2021-04-29 | Stop reason: HOSPADM

## 2021-04-26 RX ORDER — ONDANSETRON 4 MG/1
4 TABLET, ORALLY DISINTEGRATING ORAL EVERY 6 HOURS PRN
Status: DISCONTINUED | OUTPATIENT
Start: 2021-04-26 | End: 2021-04-29 | Stop reason: HOSPADM

## 2021-04-26 RX ORDER — SODIUM CHLORIDE, SODIUM LACTATE, POTASSIUM CHLORIDE, CALCIUM CHLORIDE 600; 310; 30; 20 MG/100ML; MG/100ML; MG/100ML; MG/100ML
INJECTION, SOLUTION INTRAVENOUS CONTINUOUS
Status: DISCONTINUED | OUTPATIENT
Start: 2021-04-26 | End: 2021-04-26 | Stop reason: HOSPADM

## 2021-04-26 RX ORDER — LIDOCAINE 40 MG/G
CREAM TOPICAL
Status: DISCONTINUED | OUTPATIENT
Start: 2021-04-26 | End: 2021-04-29 | Stop reason: HOSPADM

## 2021-04-26 RX ORDER — NALOXONE HYDROCHLORIDE 0.4 MG/ML
0.4 INJECTION, SOLUTION INTRAMUSCULAR; INTRAVENOUS; SUBCUTANEOUS
Status: DISCONTINUED | OUTPATIENT
Start: 2021-04-26 | End: 2021-04-29 | Stop reason: HOSPADM

## 2021-04-26 RX ORDER — HYDRALAZINE HYDROCHLORIDE 20 MG/ML
2.5-5 INJECTION INTRAMUSCULAR; INTRAVENOUS EVERY 10 MIN PRN
Status: DISCONTINUED | OUTPATIENT
Start: 2021-04-26 | End: 2021-04-26 | Stop reason: HOSPADM

## 2021-04-26 RX ORDER — MAGNESIUM HYDROXIDE 1200 MG/15ML
LIQUID ORAL PRN
Status: DISCONTINUED | OUTPATIENT
Start: 2021-04-26 | End: 2021-04-29 | Stop reason: HOSPADM

## 2021-04-26 RX ORDER — MODIFIED LANOLIN
OINTMENT (GRAM) TOPICAL
Status: DISCONTINUED | OUTPATIENT
Start: 2021-04-26 | End: 2021-04-29 | Stop reason: HOSPADM

## 2021-04-26 RX ORDER — PROPOFOL 10 MG/ML
INJECTION, EMULSION INTRAVENOUS PRN
Status: DISCONTINUED | OUTPATIENT
Start: 2021-04-26 | End: 2021-04-26

## 2021-04-26 RX ORDER — ONDANSETRON 2 MG/ML
4 INJECTION INTRAMUSCULAR; INTRAVENOUS EVERY 6 HOURS PRN
Status: DISCONTINUED | OUTPATIENT
Start: 2021-04-26 | End: 2021-04-29 | Stop reason: HOSPADM

## 2021-04-26 RX ORDER — OXYTOCIN/0.9 % SODIUM CHLORIDE 30/500 ML
340 PLASTIC BAG, INJECTION (ML) INTRAVENOUS CONTINUOUS PRN
Status: DISCONTINUED | OUTPATIENT
Start: 2021-04-26 | End: 2021-04-29 | Stop reason: HOSPADM

## 2021-04-26 RX ORDER — PHENYLEPHRINE HYDROCHLORIDE 10 MG/ML
INJECTION INTRAVENOUS PRN
Status: DISCONTINUED | OUTPATIENT
Start: 2021-04-26 | End: 2021-04-26

## 2021-04-26 RX ORDER — SIMETHICONE 80 MG
80 TABLET,CHEWABLE ORAL 4 TIMES DAILY PRN
Status: DISCONTINUED | OUTPATIENT
Start: 2021-04-26 | End: 2021-04-29 | Stop reason: HOSPADM

## 2021-04-26 RX ORDER — NALBUPHINE HYDROCHLORIDE 10 MG/ML
2.5-5 INJECTION, SOLUTION INTRAMUSCULAR; INTRAVENOUS; SUBCUTANEOUS EVERY 6 HOURS PRN
Status: DISCONTINUED | OUTPATIENT
Start: 2021-04-26 | End: 2021-04-29 | Stop reason: HOSPADM

## 2021-04-26 RX ORDER — OXYTOCIN/0.9 % SODIUM CHLORIDE 30/500 ML
100 PLASTIC BAG, INJECTION (ML) INTRAVENOUS CONTINUOUS
Status: DISCONTINUED | OUTPATIENT
Start: 2021-04-26 | End: 2021-04-29 | Stop reason: HOSPADM

## 2021-04-26 RX ORDER — TRANEXAMIC ACID 10 MG/ML
1 INJECTION, SOLUTION INTRAVENOUS EVERY 30 MIN PRN
Status: DISCONTINUED | OUTPATIENT
Start: 2021-04-26 | End: 2021-04-29 | Stop reason: HOSPADM

## 2021-04-26 RX ORDER — CITRIC ACID/SODIUM CITRATE 334-500MG
30 SOLUTION, ORAL ORAL
Status: COMPLETED | OUTPATIENT
Start: 2021-04-26 | End: 2021-04-26

## 2021-04-26 RX ORDER — ONDANSETRON 2 MG/ML
INJECTION INTRAMUSCULAR; INTRAVENOUS PRN
Status: DISCONTINUED | OUTPATIENT
Start: 2021-04-26 | End: 2021-04-26

## 2021-04-26 RX ORDER — BISACODYL 10 MG
10 SUPPOSITORY, RECTAL RECTAL DAILY PRN
Status: DISCONTINUED | OUTPATIENT
Start: 2021-04-28 | End: 2021-04-29 | Stop reason: HOSPADM

## 2021-04-26 RX ORDER — HYDROCORTISONE 2.5 %
CREAM (GRAM) TOPICAL 3 TIMES DAILY PRN
Status: DISCONTINUED | OUTPATIENT
Start: 2021-04-26 | End: 2021-04-29 | Stop reason: HOSPADM

## 2021-04-26 RX ORDER — ACETAMINOPHEN 325 MG/1
975 TABLET ORAL ONCE
Status: COMPLETED | OUTPATIENT
Start: 2021-04-26 | End: 2021-04-26

## 2021-04-26 RX ORDER — CEFAZOLIN SODIUM 1 G/3ML
1 INJECTION, POWDER, FOR SOLUTION INTRAMUSCULAR; INTRAVENOUS SEE ADMIN INSTRUCTIONS
Status: DISCONTINUED | OUTPATIENT
Start: 2021-04-26 | End: 2021-04-26 | Stop reason: HOSPADM

## 2021-04-26 RX ADMIN — PHENYLEPHRINE HYDROCHLORIDE 100 MCG: 10 INJECTION INTRAVENOUS at 07:27

## 2021-04-26 RX ADMIN — SUCCINYLCHOLINE CHLORIDE 80 MG: 20 INJECTION, SOLUTION INTRAMUSCULAR; INTRAVENOUS at 07:46

## 2021-04-26 RX ADMIN — FENTANYL CITRATE 50 MCG: 50 INJECTION, SOLUTION INTRAMUSCULAR; INTRAVENOUS at 09:18

## 2021-04-26 RX ADMIN — FENTANYL CITRATE 25 MCG: 50 INJECTION, SOLUTION INTRAMUSCULAR; INTRAVENOUS at 08:58

## 2021-04-26 RX ADMIN — SODIUM CHLORIDE, POTASSIUM CHLORIDE, SODIUM LACTATE AND CALCIUM CHLORIDE: 600; 310; 30; 20 INJECTION, SOLUTION INTRAVENOUS at 07:45

## 2021-04-26 RX ADMIN — BUPIVACAINE HYDROCHLORIDE IN DEXTROSE 12 MG: 7.5 INJECTION, SOLUTION SUBARACHNOID at 07:27

## 2021-04-26 RX ADMIN — Medication 40 MCG/MIN: at 07:34

## 2021-04-26 RX ADMIN — SIMETHICONE 80 MG: 80 TABLET, CHEWABLE ORAL at 20:18

## 2021-04-26 RX ADMIN — ONDANSETRON 4 MG: 2 INJECTION INTRAMUSCULAR; INTRAVENOUS at 07:22

## 2021-04-26 RX ADMIN — MIDAZOLAM 2 MG: 1 INJECTION INTRAMUSCULAR; INTRAVENOUS at 07:51

## 2021-04-26 RX ADMIN — PHENYLEPHRINE HYDROCHLORIDE 200 MCG: 10 INJECTION INTRAVENOUS at 07:46

## 2021-04-26 RX ADMIN — FENTANYL CITRATE 25 MCG: 50 INJECTION, SOLUTION INTRAMUSCULAR; INTRAVENOUS at 08:49

## 2021-04-26 RX ADMIN — KETOROLAC TROMETHAMINE 30 MG: 30 INJECTION, SOLUTION INTRAMUSCULAR at 09:40

## 2021-04-26 RX ADMIN — ENOXAPARIN SODIUM 40 MG: 40 INJECTION SUBCUTANEOUS at 21:30

## 2021-04-26 RX ADMIN — SODIUM CITRATE AND CITRIC ACID MONOHYDRATE 30 ML: 500; 334 SOLUTION ORAL at 06:48

## 2021-04-26 RX ADMIN — FENTANYL CITRATE 85 MCG: 50 INJECTION, SOLUTION INTRAMUSCULAR; INTRAVENOUS at 07:51

## 2021-04-26 RX ADMIN — DEXAMETHASONE SODIUM PHOSPHATE 4 MG: 4 INJECTION, SOLUTION INTRA-ARTICULAR; INTRALESIONAL; INTRAMUSCULAR; INTRAVENOUS; SOFT TISSUE at 07:22

## 2021-04-26 RX ADMIN — KETOROLAC TROMETHAMINE 30 MG: 30 INJECTION, SOLUTION INTRAMUSCULAR at 15:32

## 2021-04-26 RX ADMIN — OXYTOCIN-SODIUM CHLORIDE 0.9% IV SOLN 30 UNIT/500ML 10 ML: 30-0.9/5 SOLUTION at 07:52

## 2021-04-26 RX ADMIN — MORPHINE SULFATE 1.85 MG: 1 INJECTION EPIDURAL; INTRATHECAL; INTRAVENOUS at 08:16

## 2021-04-26 RX ADMIN — SENNOSIDES AND DOCUSATE SODIUM 1 TABLET: 8.6; 5 TABLET ORAL at 21:30

## 2021-04-26 RX ADMIN — OXYTOCIN-SODIUM CHLORIDE 0.9% IV SOLN 30 UNIT/500ML 290 ML: 30-0.9/5 SOLUTION at 08:29

## 2021-04-26 RX ADMIN — ACETAMINOPHEN 975 MG: 325 TABLET, FILM COATED ORAL at 18:04

## 2021-04-26 RX ADMIN — PROPOFOL 150 MG: 10 INJECTION, EMULSION INTRAVENOUS at 07:46

## 2021-04-26 RX ADMIN — MORPHINE SULFATE 0.15 MG: 1 INJECTION EPIDURAL; INTRATHECAL; INTRAVENOUS at 07:27

## 2021-04-26 RX ADMIN — ACETAMINOPHEN 975 MG: 325 TABLET, FILM COATED ORAL at 12:05

## 2021-04-26 RX ADMIN — SODIUM CHLORIDE, SODIUM LACTATE, POTASSIUM CHLORIDE, CALCIUM CHLORIDE AND DEXTROSE MONOHYDRATE: 5; 600; 310; 30; 20 INJECTION, SOLUTION INTRAVENOUS at 14:03

## 2021-04-26 RX ADMIN — SODIUM CHLORIDE, POTASSIUM CHLORIDE, SODIUM LACTATE AND CALCIUM CHLORIDE: 600; 310; 30; 20 INJECTION, SOLUTION INTRAVENOUS at 05:55

## 2021-04-26 RX ADMIN — Medication 140 MG: at 12:06

## 2021-04-26 RX ADMIN — CEFAZOLIN SODIUM 2 G: 2 INJECTION, SOLUTION INTRAVENOUS at 07:22

## 2021-04-26 RX ADMIN — NALBUPHINE HYDROCHLORIDE 2.5 MG: 10 INJECTION, SOLUTION INTRAMUSCULAR; INTRAVENOUS; SUBCUTANEOUS at 16:31

## 2021-04-26 RX ADMIN — KETOROLAC TROMETHAMINE 30 MG: 30 INJECTION, SOLUTION INTRAMUSCULAR at 21:30

## 2021-04-26 RX ADMIN — Medication 100 ML/HR: at 09:36

## 2021-04-26 RX ADMIN — ACETAMINOPHEN 975 MG: 325 TABLET, FILM COATED ORAL at 06:15

## 2021-04-26 RX ADMIN — FENTANYL CITRATE 15 MCG: 50 INJECTION, SOLUTION INTRAMUSCULAR; INTRAVENOUS at 07:27

## 2021-04-26 RX ADMIN — SERTRALINE HYDROCHLORIDE 50 MG: 50 TABLET ORAL at 14:03

## 2021-04-26 ASSESSMENT — ENCOUNTER SYMPTOMS: DYSRHYTHMIAS: 0

## 2021-04-26 ASSESSMENT — MIFFLIN-ST. JEOR: SCORE: 1252.82

## 2021-04-26 NOTE — ANESTHESIA POSTPROCEDURE EVALUATION
Patient: Rizwana Plummer    Procedure(s):  REPEAT  SECTION    Diagnosis:History of  section [Z98.891]  Diagnosis Additional Information: No value filed.    Anesthesia Type:  General    Note:  Disposition: Admission   Postop Pain Control: Uneventful            Sign Out: Well controlled pain   PONV: No   Neuro/Psych: Uneventful            Sign Out: Acceptable/Baseline neuro status   Airway/Respiratory: Uneventful            Sign Out: Acceptable/Baseline resp. status   CV/Hemodynamics: Uneventful            Sign Out: Acceptable CV status   Other NRE: NONE   DID A NON-ROUTINE EVENT OCCUR? 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

## 2021-04-26 NOTE — ANESTHESIA PREPROCEDURE EVALUATION
Anesthesia Pre-Procedure Evaluation    Patient: Rizwana Pulmmer   MRN: [id number] : 1980        Preoperative Diagnosis: History of  section [Z98.891]   Procedure : Procedure(s):  REPEAT  SECTION     Past Medical History:   Diagnosis Date     Anemia      Gastroesophageal reflux disease      Varicella age 5      Past Surgical History:   Procedure Laterality Date      SECTION  2014    Procedure:  SECTION;   Primary  section         SECTION N/A 2017    Procedure:  SECTION;  Surgeon: Cindy Laurent DO;  Location: [facility] OR      SECTION N/A 2019    Procedure: Repeat  section;  Surgeon: Cindy Laurent DO;  Location: [facility] L+D     Infibulation       MARSUPIALIZATION BARTHOLIN CYST  2013    Procedure: MARSUPIALIZATION BARTHOLIN CYST;  Removal of Inclusion Cyst.   Fetal heart tones 180's;  Surgeon: Rohit Parks MD;  Location: [facility] OR      No Known Allergies   Social History     Tobacco Use     Smoking status: Never Smoker     Smokeless tobacco: Never Used   Substance Use Topics     Alcohol use: Never     Alcohol/week: 0.0 standard drinks     Frequency: Never      Wt Readings from Last 1 Encounters:   21 67.1 kg (148 lb)        Anesthesia Evaluation   Pt has had prior anesthetic. Type: Regional and General.    No history of anesthetic complications       ROS/MED HX  ENT/Pulmonary:  - neg pulmonary ROS  (-) asthma   Neurologic:  - neg neurologic ROS   (+) no peripheral neuropathy     Cardiovascular:  - neg cardiovascular ROS  (-) hypertension, CAD, taking anticoagulants/antiplatelets and arrhythmias   METS/Exercise Tolerance:     Hematologic:     (+) anemia,     Musculoskeletal:  - neg musculoskeletal ROS     GI/Hepatic:     (+) GERD, Asymptomatic on medication,     Renal/Genitourinary:  - neg Renal ROS     Endo:  - neg endo ROS     Psychiatric/Substance Use:  - neg psychiatric ROS     Infectious Disease:  -  neg infectious disease ROS     Malignancy:  - neg malignancy ROS     Other:            Physical Exam    Airway        Mallampati: II   TM distance: > 3 FB   Neck ROM: full   Mouth opening: > 3 cm    Respiratory Devices and Support         Dental  no notable dental history         Cardiovascular   cardiovascular exam normal          Pulmonary   pulmonary exam normal                OUTSIDE LABS:  CBC:   Lab Results   Component Value Date    WBC 5.2 03/16/2021    WBC 7.7 02/19/2021    HGB 11.7 04/23/2021    HGB 9.8 (L) 03/16/2021    HCT 32.7 (L) 03/16/2021    HCT 25.6 (L) 02/19/2021     03/16/2021     02/19/2021     BMP:   Lab Results   Component Value Date     04/04/2021     09/02/2020    POTASSIUM 3.6 04/04/2021    POTASSIUM 3.5 09/02/2020    CHLORIDE 108 04/04/2021    CHLORIDE 107 09/02/2020    CO2 19 (L) 04/04/2021    CO2 24 09/02/2020    BUN 6 (L) 04/04/2021    BUN 8 09/02/2020    CR 0.50 (L) 04/04/2021    CR 0.54 09/02/2020    GLC 69 (L) 04/04/2021    GLC 92 09/02/2020     COAGS: No results found for: PTT, INR, FIBR  POC:   Lab Results   Component Value Date    BGM 77 04/04/2021    HCG Positive (A) 05/20/2018     HEPATIC:   Lab Results   Component Value Date    ALBUMIN 2.7 (L) 04/04/2021    PROTTOTAL 6.7 (L) 04/04/2021    ALT 13 04/04/2021    AST 20 04/04/2021    ALKPHOS 140 04/04/2021    BILITOTAL 0.4 04/04/2021     OTHER:   Lab Results   Component Value Date    LACT 1.3 02/01/2017    ALEJANDRO 8.4 (L) 04/04/2021    MAG 1.9 05/27/2013    LIPASE 90 04/04/2021    TSH 1.80 05/03/2018    T4 1.01 01/23/2014       Anesthesia Plan    ASA Status:  2   NPO Status:  NPO Appropriate    Anesthesia Type: Spinal.              Consents    Anesthesia Plan(s) and associated risks, benefits, and realistic alternatives discussed. Questions answered and patient/representative(s) expressed understanding.     - Discussed with:  Patient      - Extended Intubation/Ventilatory Support Discussed: No.      - Patient is  DNR/DNI Status: No    Use of blood products discussed: No .     Postoperative Care    Pain management: IV analgesics, intrathecal morphine, Multi-modal analgesia.   PONV prophylaxis: Ondansetron (or other 5HT-3), Dexamethasone or Solumedrol     Comments:        Spinal Block: Following an discussion of the procedure, expectations, and risks and benefits (risks including, but not limited to, nerve damage, infection, bleeding/hematoma, spinal headache, partial or failed block), the patient appears to understand and consents to proceed. Discussed conversion to general anesthesia PRN in the event of a failed block or patient intolerance to the procedure. Questions were encouraged and answered. Patient wishes to proceed.             Shade Frost MD

## 2021-04-26 NOTE — ANESTHESIA CARE TRANSFER NOTE
Patient: Rizwana Plummer    Procedure(s):  REPEAT  SECTION    Diagnosis: History of  section [Z98.891]  Diagnosis Additional Information: No value filed.    Anesthesia Type:   Spinal     Note:    Oropharynx: oropharynx clear of all foreign objects  Level of Consciousness: drowsy  Oxygen Supplementation: face mask  Level of Supplemental Oxygen (L/min / FiO2): 6  Independent Airway: airway patency satisfactory and stable  Dentition: dentition unchanged  Vital Signs Stable: post-procedure vital signs reviewed and stable  Report to RN Given: handoff report given  Patient transferred to: PACU    Handoff Report: Identifed the Patient, Identified the Reponsible Provider, Reviewed the pertinent medical history, Discussed the surgical course, Reviewed Intra-OP anesthesia mangement and issues during anesthesia, Set expectations for post-procedure period and Allowed opportunity for questions and acknowledgement of understanding      Vitals: (Last set prior to Anesthesia Care Transfer)  CRNA VITALS  2021 0814 - 2021 0844      2021             Pulse:  106    SpO2:  94 %        Electronically Signed By: SAHIL Rubalcava CRNA  2021  8:44 AM

## 2021-04-26 NOTE — PLAN OF CARE
VSS.  Able to ambulate in room without difficulty.  Unable to void- bladder scanned for 680.  Straight cath 700.  Nubain given for itching. No nausea with regular diet.  Resting now.

## 2021-04-26 NOTE — PLAN OF CARE
Data: Rizwana Plummer transferred to 445 via cart at 1000. Baby transferred via crib with FOB after .  Action: Receiving unit notified of transfer: Yes. Patient and family notified of room change. Report given to Sherita SANTOS RN at 1010. Belongings sent to receiving unit. Accompanied by Registered Nurse. Oriented patient to surroundings. Call light within reach. ID bands double-checked with receiving RN.  Response: Patient tolerated transfer and is stable.

## 2021-04-26 NOTE — L&D DELIVERY NOTE
OB  Delivery Note      Rizwana Plummer MRN# [id number]   Age: 40 year old YOB: 1980       GA: 39w0d  GP:   Labor Complications:    EBL:   mL  Delivery QBL:    Delivery Type: , Low Transverse   ROM to Delivery Time: rupture date or rupture time have not been documented     1 Minute 5 Minute 10 Minute   Apgar Totals: 8   9        Personel Present: ANTON MCNEILL      Details    Pre-Op Diagnosis: 1. Intrauterine pregnancy at 39w0d  2. History of  section x 3   Post-Op Diagnosis: 1. SAME   Indications:      Procedure:   , Low Transverse  via   incision   Anesthesia:       Informed Consent:  The risks, benefits, complications, and alternatives were discussed with the patient. The patient understood that the risks of  section include, but are not limited to: injury to nearby structures or organs, infection, blood loss and possible need for transfusion, and potential need for more surgery including hysterectomy. The patient stated understanding and desired to proceed. All questions were answered. The site of surgery was properly noted and marked. The patient was identified as Rizwana Plummer and the procedure verified as a  delivery. A Time Out was held and the above information confirmed.    Procedure Details: See operative report     Shan Plummer [[id number]]    Labor Length    3rd Stage (hrs): 0 (min): 1      Delivery/Placenta Date and Time    Delivery Date: 21 Delivery Time:  7:51 AM   Placenta Date/Time: 2021  7:52 AM  Oxytocin given at the time of delivery: after delivery of baby  Delivering clinician: Cindy Laurent DO   Other personnel present at delivery:  Provider Role   Anton Mcneill, RN Registered Nurse         Apgars    Living status: Living   1 Minute 5 Minute 10 Minute 15 Minute 20 Minute   Skin color: 0  1       Heart rate: 2  2       Reflex irritability: 2  2       Muscle tone: 2  2       Respiratory  "effort: 2  2       Total: 8  9       Apgars assigned by: LEXIS BAXTER,NIXON     Cord    Vessels: 3 Vessels    Cord Complications: None               Cord Blood Disposition: Lab    Gases Sent?: No    Stem cell collection?: No       Mittie Resuscitation    Methods: None, Suctioning  Mittie Care at Delivery: Asked by Dr. Laurent to attend the delivery of this term, male infant with a gestational age of 39 0/7 weeks secondary to maternal general anesthesia.      30 seconds of delayed cord clamping were completed.  The infant was stimulated, cried and had spontaneous respirations during delayed cord clamping.  The infant was placed on a warmer, dried, stimulated and bulb suctioned.   Gross PE is WNL except for left periauricular ear tag; left upper leg flat, brown marking; and  bilateral lower gum raised areas.  Infant required no further resuscitation.  Infant was left in the care of the nursery nurse.     Lexis Becker, P-BC 2021 8:37 AM  Output in Delivery Room: Voided     Mittie Measurements    Weight: 7 lb 3.3 oz Length: 1' 7.25\"   Head circumference: 35.6 cm    Output in delivery room: Voided     Labor Events and Shoulder Dystocia    Fetal Tracing Prior to Delivery: Category 1  Shoulder dystocia present?: Neg     Delivery (Maternal) (Provider to Complete) ([id number])    Episiotomy: None  Perineal lacerations: None       Blood Loss  Mother: Rizwana Plummer #[id number]   Start of Mother's Information    IO Blood Loss  21 0748 - 21 0839    Total Surgical QBL Blood Loss (mL) Hospital Encounter 785 mL    Total  785 mL         End of Mother's Information  Mother: Rizwana Plummer #[id number]          Delivery - Provider to Complete ([id number])    Delivering clinician: Cindy Laurent,   Delivery Type (Choose the 1 that will go to the Birth History): , Low Transverse                    Priority: Scheduled    Specifics: Repeat   Indications for Repeat: 39+ weeks /Planned " repeat   Other personnel:  Provider Role   Gwen Mcneill, RN Registered Nurse                Placenta    Date/Time: 4/26/2021  7:52 AM  Removal: Spontaneous  Disposition: Hospital disposal           Presentation and Position    Presentation: Vertex    Position: Left Occiput Anterior                 Cindy Laurent DO

## 2021-04-26 NOTE — OP NOTE
PREOPERATIVE DIAGNOSIS: A 40 year old-year-old  at 39w0d weeks estimated gestational age admitted for repeat  section with history of  section x 3.  POSTOPERATIVE DIAGNOSES: A 40 year old-year-old  at 39w0d weeks estimated gestational age admitted for repeat  section with history of  section x 3.  PROCEDURE:   1. Repeat low transverse  section.   2. Seprafilm placement for adhesion prevention.   COMPLICATIONS: None apparent at time of procedure.   ESTIMATED BLOOD LOSS: 785  mL.   SURGEON: Cindy Laurent DO.   INDICATIONS: A 40 year old-year-old  at 39w0d weeks estimated gestational age admitted for repeat  section with history of  section x 3.  Patient signed consent for repeat  section, risks benefits, alternatives are discussed with the patient.    OUTCOME: male infant 7# 3.3 oz apgars 8 at 1 minute and 9 at 5 minutes.   Findings:   Normal uterus, tubes and ovaries. Thin peritoneal window noted.  DETAILS OF PROCEDURE: After informed consent was signed, the patient was placed in dorsal supine position, spinal placed for anesthesia. Fetal heart tones were obtained and found to be in 150ss. The patient was prepped and draped in normal sterile fashion and a time out was performed. Pre-operative antibiotics were given. inadequate anesthesia was reported per patient after the spinal was dosed to a surgical level.  The patient was then placed under general anesthesia.  A pfannensteil incision was then carried down to the underlying fascia and incised in the midline. The fascia is dissected off the rectus muscles superiorly. Blunt/sharp dissection of the peritoneum was performed and sharp dissection and blunt stretching of the muscles was perfomed. The uterus was visualized.  An tiana retractor is placed. A thin peritoneal window was noted. The lower uterine segment was incised with a scalpel. Blunt stretching was performed and the baby  delivered atraumatically.  The nose and mouth were bulb suctioned. The cord was clamped and cut after 30 seconds due to general anesthesia.  Infant was taken over to the waiting  staff. The placenta spontaneously delivered. The uterus was cleared of all clots and debris. The placenta was examined and found to be complete.  The lower uterine segment was reapproximated with 0 Monocryl in a running locked fashion. A second layer of the same suture was used for imbrication. The bladder flap was recreated using 3-0 Monocryl. The tiana retractor is removed. Irrigation was performed. Seprafilm was placed over the bladder flap. Hemostasis was noted. The peritoneum was closed with 3-0 monocryl, this also closed the rectus muscles due to anatomy. Hemostasis was assured with Bovie cautery on the rectus muscles, and Seprafilm was placed over it. The fascia was reapproximated with 0 looped PDS in a running fashion with good reapproximation. A subcuticular stitch using 4-0 Monocryl was used to reapproximate the skin. Steri-Strips, telfa, and tegaderm was applied. The patient tolerated the procedure well. Sponge, lap and needle counts were correct x2.   MARLON FORRESTER DO

## 2021-04-26 NOTE — PLAN OF CARE
[facility]  Reason for maternal/fetal assessment per patient is scheduled C section.  Patient is a .  Prenatal record reviewed. Pregnancy has been uncomplicated thus far.  Gestational Age 39 weeks. VSS. Fetal movement present. Patient deniesbackache, abnormal vaginal discharge, pelvic pressure, UTI symptoms, GI problems, bloody show, vaginal bleeding, edema, headache, visual disturbances, epigastric or URQ pain, abdominal pain, rupture of membranes. Support persons is present.   Action: Verbal consent for EFM.  Bill of rights reviewed.    Response: Patient verbalized agreement with plan.

## 2021-04-26 NOTE — LACTATION NOTE
"Lactation visit. Patient attempting to latch  at time of visit. She states he latched well after delivery, but has struggled since. She has been utilizing formula via bottle, as well as a pacifier. Reviewed best practices for breast feeding, and she states \"I'm going to stop formula for a while so he can breast feed.\"  struggling to sustain latch, some tongue thrusting noted. Assisted with repositioning  tummy to tummy in a partial laid back, partial football position as this was most comfortable to mother and  tolerated and latched very well. Nutritive sucks and a few swallows with stimulation noted. Breast compression shown to patient and encouraged her to continue asking for latching assistance as needed during her stay. She did not have any further questions at this time. Primary nurse updated.  "

## 2021-04-26 NOTE — PLAN OF CARE
[facility]  Transplant Infectious Disease Progress Note     Patient:  Jeffrey Real, Date of birth 1967, Medical record number [id number]  Date of Visit:  01/07/2018    Assessment and Recommendations:     Recommendations:  - Discontinue Valacyclovir given low suspicion for HSV infection  - Agree with not giving Ribavirin as patient has clinically improved and contraindications for oral Ribavirin in renal failure  - Continuing to monitor off antibiotics  - If patient continues to have waxing and waning mental status may need to consider MRI brain, although uncertain of the utility if unable to use contrast.    Please page with any questions or concerns. Over the weekend, Mateo Youngblood and [place] are on call.     Assessment: 51yo man with relapsed IgA kappa multiple myeloma (dx 2013 s/p autologous transplant 8/2014) most recently treated with daratumumab/pomalidomide/dexamethasone (cycle 1 in 11/2017) who was transferred from [facility] to [facility] on 1/2/18 for higher level care in the setting of CHRISTIANE on CKD, sinusitis, pneumonia and altered mental status. Antibiotics were discontinued 1/4 and patient remained clinically well. RVP returned positive for RSV B. He remains afebrile, but has waxing and waning confusion.     Infectious Disease Issues:  1) Non-neutropenic Fever, RSV+ - Afebrile since the morning of 1/4/18. Likely had pneumonia at the time of his second outside hospitalization with improvement in symptoms with treatment. At present he does not have a cough, sputum production, shortness of breath or hypoxemia. His CT chest findings likely represent residual inflammation after his recent PNA. He also does not have symptoms of acute bacterial sinusitis and nasal endoscopy was largely unremarkable. Nasal culture positive for candida albicans/dubliniensis and coagulase negative staph which both likely represent colonization. For these reasons, would continue to observe off  Patient was brought from PACU by cart, Patient transferred to the bed with hover carolyn, Vitals are stable. Bro catheter is in and was told to remove it at 1230.     therapy. His respiratory viral panel returned positive for RSV and may have been the etiology for his fevers. However, given his improvement off treatment and contraindication of oral Ribavirin with CrCl <50, would not treat. Can discontinue Valacyclovir as an alternate etiology for his fevers is now present and lesions are not classic for HSV.     2) AMS (improved, but continues to wax/wane) - He presented to [facility] with his family on 1/1/18 with AMS, found to have CHRISTIANE, possible ELOY/ATN. The AMS is felt likely d/t medications side effects in the setting of renal failure and has improved with holding of meds. He does not have headache or meningismus to suspect a CNS infection, serum cryptococcal ag is negative. If symptoms persist, may need to consider MRI. Although, if unable to use gadolinium, this may be of little utility.     Other Infectious Disease issues include:  - Viral serostatus: CMV-, HSV 1-/2-, VZV+  - Immunization status: due for PCV13  - Gamma globulin status:  on 1/5/18, no need for IVIG from an ID standpoint at this time  - Isolation status: Good hand hygiene.    Katarzyna Yuongblood, DO  Infectious Diseases Fellow  p: [phone]    Attestation:  I have reviewed today's vital signs, medications, labs and imaging.      Floor time: 25 minutes, Face-to-face time: 10 minutes, Total time: 35 minutes  Alex Cheatham,   Pager [phone]  Jeffrey Real was seen in the hospital by Alex Cheatham on Jan 7th with Dr. Youngblood.  I reviewed the history & exam. Assessment and plan were jointly made.  I agree with and have edited the fellow's note and plan of care.  Alex Cheatham MD.         Interval History:     Continues to have waxing and waning mental status with confusion. No longer drowsy. No headache, sinus pressure, cough, shortness of breath, nausea, vomiting, diarrhea, dysuria, rashes. Continues to have a sore on the inside of his lower lip that he is picking at. Has remained  afebrile off antibiotics. Planning to resume MM treatment. Holding on additional IVIG for now.  Last fever on 1/4 up to 101, since remains afebrile.        Transplants:  Auto-HCT 8/2014 for IgA kappa MM     History of Infectious Disease Illness (copied from initial consult note):      Mr. Real is a 51yo man with relapsed IgA kappa multiple myeloma (dx 2013 s/p autologous transplant 8/2014) most recently treated with daratumumab/pomalidomide/dexamethasone (cycle 1 in 11/2017) who was transferred from [facility] to [facility] on 1/2/18 for higher level care in the setting of CHRISTIANE on CKD, sinusitis, pneumonia and altered mental status. The patient has had 3 admission in the past month at [facility]: 12/11-12/21 with neutropenic fever, 12/25-12/30 for neutropenic fever, pneumonia, sinusitis after which he was discharged to home on Augmentin BID x5 days, Azithromycin 500mg daily x5 days, then again admitted 1/1-1/2 with progressive weakness. The patient is still confused and unable to give much history. No headache, vision changes, hallucinations, neck pain/stiffness, focal weakness. Per discussions with his wife, daughter and two friends, he had a productive cough and shortness of breath prior to his second admission at [facility]. The symptoms had significantly improved when discharged at that time and have not returned. Denies any facial pain at present, no sinus congestion, rhinorrhea, or pharyngeal pain. He does have an oral ulcer/abrasion on his inferior lip which has been present for 1 week and may be related to accidentally biting the area.      Since being admitted to [facility], Oxycodone, Dilaudid, Ativan and Gabapentin are being held for possible contribution to his AMS, which is now markedly improved. He was switched from Piperacillin-Tazobactam to Meropenem to cover for possible non-responding pneumonia and sinusitis. ENT was consulted and performed a nasal endoscopy and culture 1/3 with mild inflammation of the right  nasal cavity without purulent drainage and no findings of necrosis or invasive fungal infection. Infectious Diseases is consulted to assist with management.     Exposure History: Born in [place]. Lives in [place] with his wife, dog and cat. No international travel. 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    Review of Systems:  CONSTITUTIONAL:  No subjective fevers or chills  EYES: negative for icterus or acute vision changes  ENT:  negative for acute hearing loss, sore throat, painful lower lip lesion unchanged  RESPIRATORY:  negative for cough, sputum or dyspnea  CARDIOVASCULAR:  negative for chest pain, palpitations  GASTROINTESTINAL:  negative for nausea, vomiting, diarrhea  GENITOURINARY:  negative for dysuria or hematuria  HEME:  No easy bruising or bleeding  INTEGUMENT:  negative for rash or pruritus  NEURO:  Negative for headache         Current Medications & Allergies:       pantoprazole  40 mg Oral QAM AC     diltiazem  60 mg Oral BID     dexamethasone  4 mg Oral Daily     sodium chloride (PF)  20 mL Intracatheter Q8H     heparin lock flush  5-10 mL Intracatheter Q24H     heparin  5 mL Intracatheter Q28 Days     citalopram (celeXA) tablet 40 mg  40 mg Oral Daily       Infusions/Drips:    IV infusion builder WITH additives 125 mL/hr at 01/07/18 1323     - MEDICATION INSTRUCTIONS -         Allergies   Allergen Reactions     Blood-Group Specific Substance      Other reaction(s): Other - Describe In Comment Field  Patient has a Nonspecific antibody. Blood Product orders may be delayed.  Draw one red top and two purple top tubes for ALL Type and Screen/ Type and Crossmatch orders.     Chlorhexidine      Other reaction(s): Irritation At Patch Site  Itching with Prevantic Swabs     Levofloxacin Rash     Stopped levaquin and rash resolved by 8/8/14            Physical Exam:   Ranges for vital signs:  Temp:  [96.7  F (35.9  C)-99  F (37.2  C)] 97.8  F (36.6  C)  Heart  Rate:  [] 107  Resp:  [16-20] 18  BP: (109-136)/(67-88) 134/79  SpO2:  [94 %-97 %] 96 %  Vitals:    01/05/18 0900 01/06/18 1043 01/07/18 0831   Weight: 78.2 kg (172 lb 6.4 oz) 76.4 kg (168 lb 8 oz) 77.1 kg (170 lb)     Physical Examination:  GENERAL:  Well-developed, well-nourished, laying in bed in no acute distress  HEAD:  Head is normocephalic, atraumatic.  EYES:  Eyes have anicteric sclerae without conjunctival injection.  ENT:  Oropharynx is moist, right sided ulceration on the mucosa of his lower lip.  NECK:  Supple. No cervical lymphadenopathy.  LUNGS:  Clear to auscultation bilaterally.  CARDIOVASCULAR:  Regular rate and rhythm with no murmurs, gallops or rubs. ICD in place with no overlaying erythema.  ABDOMEN:  Normal bowel sounds, soft, nontender. No appreciable hepatosplenomegaly.  SKIN:  No acute rashes. R chest port without any surrounding erythema or exudate.  NEUROLOGIC:  Alert and oriented to person and place, slow to date. Memory impaired. Active x4 extremities.         Laboratory Data:       Metabolic Studies       Recent Labs   Lab Test  01/07/18   0630  01/06/18   0613  01/05/18   2043   01/02/18   1830   NA  137  139   --    < >   --    POTASSIUM  4.9  4.2   --    < >   --    CHLORIDE  109  110*   --    < >   --    CO2  14*  19*   --    < >   --    ANIONGAP  14  11   --    < >   --    BUN  29  24   --    < >   --    CR  4.26*  4.38*   --    < >   --    GFRESTIMATED  15*  14*   --    < >   --    GLC  109*  62*   --    < >   --    PARADISE  11.1*  11.6*   --    < >   --    PHOS  6.4*  6.8*   --    < >   --    MAG  1.4*  1.6   --    < >   --    LACT  1.1   --   1.5   < >   --    PCAL   --    --    --    --   0.80    < > = values in this interval not displayed.       Hepatic Studies    Recent Labs   Lab Test  01/05/18   0651  01/02/18   1625   BILITOTAL  0.4  0.6   DBIL  0.1   --    ALKPHOS  128  131   PROTTOTAL  11.1*  10.7*   ALBUMIN  1.7*  1.6*   AST  19  30   ALT  11  12   LDH   --   464*        Hematology Studies      Recent Labs   Lab Test  01/07/18   0630  01/06/18   0613  01/05/18   0651  01/04/18   0429  01/03/18   0358  01/02/18   1625   WBC  3.8*  2.6*  2.8*  2.9*  2.3*  Duplicate request  1.6*   ANEU  3.2  1.9  1.9  1.9  1.6  1.2*   ALYM  0.1*  0.2*  0.2*  0.2*  0.3*  0.1*   PAM  0.0  0.2  0.3  0.3  0.4  0.2   AEOS  0.0  0.1  0.2  0.1  0.1  0.0   HGB  8.8*  9.2*  9.6*  7.8*  7.7*  Duplicate request  8.1*   HCT  27.5*  28.9*  29.7*  25.4*  24.5*  Duplicate request  26.5*   PLT  53*  51*  41*  45*  40*  Duplicate request  35*       Clotting Studies    Recent Labs   Lab Test  01/02/18   1625   INR  1.38*   PTT  50*       Urine Studies     Recent Labs   Lab Test  01/02/18   1901   URINEPH  6.0   NITRITE  Negative   LEUKEST  Negative   WBCU  1       Microbiology:  Blood cultures  - Portacath 1/2: no growth  - Port & peripheral 1/4: no growth     Urine culture  - 1/2: no growth     Nasal culture  - 1/3: gram stain negative, culture with light growth CoNS and candida albicans/dubliniensis     Galactomannan Ag - negative  1,3 Beta D glucan - in process  Cryptococcal Ag - negative     Virology:  Respiratory viral panel - positive for RSV B    CMV IgG positive  CMV PCR not detected  Adenovirus PCR not detected  HHV6 PCR not detected  HSV 1/2 PCR plasma not detected     Imaging:  CT Facial Bones w/ IV contrast (from OSH) 12/27/2017  Sinusitis most prominently maxillary and sphenoid sinuses. Facial bones appear intact.     CXR 2 Views (from OSH) 1/1/2018  Diminished lung volumes with bibasilar streaky and patchy opacities.  There is an increase in streaky opacity at the right lung base in the interim.  The previously noted patchy opacity in the right upper lung has resolved.     CT Head w/o Contrast 1/2/2018 Impression:   1. No acute intracranial pathology.  2. Mild chronic small vessel ischemic disease.  3. Layering paranasal sinus fluid and mucosal thickening, which can be  seen in the setting of  acute sinusitis.  4. Innumerable lytic lesions consistent with history of multiple  myeloma.     US Renal 1/3/2018 No hydronephrosis.     CT Chest w/o Contrast 1/4/2018  1. Diffuse mosaic attenuation suggestive of small vessel or small airway disease.   2. Basilar predominant atelectasis without definite consolidation. Please note however that this study is suboptimal due to substantial respiratory motion.  3. Central bronchial wall thickening consistent with infectious or inflammatory bronchitis.  4. Numerous subacute to chronic pathologic rib fractures and compression deformities of the thoracic spine associated lytic lesions consistent with known multiple myeloma.

## 2021-04-26 NOTE — ANESTHESIA PROCEDURE NOTES
Airway       Patient location during procedure: OR  Staff -        CRNA: Eric Montanez APRN CRNA       Performed By: CRNA  Consent for Airway        Urgency: emergent  Indications and Patient Condition       Indications for airway management: yoselin-procedural       Induction type:intravenous       Mask difficulty assessment: 0 - not attempted    Final Airway Details       Final airway type: endotracheal airway       Successful airway: ETT - single and Oral  Endotracheal Airway Details        ETT size (mm): 7.0       Cuffed: yes       Successful intubation technique: direct laryngoscopy       DL Blade Type: Mcneill 2       Grade View of Cords: 1       Adjucts: stylet       Position: Right       Measured from: lips       Secured at (cm): 22       Bite block used: None    Post intubation assessment        Placement verified by: capnometry, equal breath sounds and chest rise        Number of attempts at approach: 1       Secured with: plastic tape       Ease of procedure: easy       Dentition: Intact and Unchanged

## 2021-04-26 NOTE — ANESTHESIA PROCEDURE NOTES
Intrathecal injection Procedure Note  Pre-Procedure   Staff -        Anesthesiologist:  Shade Frost MD       Performed By: anesthesiologist       Referred By: kenny       Location: OR       Pre-Anesthestic Checklist: patient identified, IV checked, risks and benefits discussed, informed consent, monitors and equipment checked, pre-op evaluation and at physician/surgeon's request  Timeout:       Correct Patient: Yes        Correct Procedure: Yes        Correct Site: Yes        Correct Position: Yes   Procedure Documentation  Procedure: intrathecal injection       Diagnosis: c section       Patient Position: sitting       Patient Prep/Sterile Barriers: sterile gloves, mask, patient draped       Skin prep: Chloraprep       Insertion Site: L4-5 (first attempt at L 3/4, no csf). (midline approach).       Needle Gauge: 25.        Needle Length (Inches): 3.5        Spinal Needle Type: Pencan       Introducer used       Introducer: 20 G       # of attempts: 2 and  # of redirects:  3    Assessment/Narrative         Paresthesias: No.       CSF fluid: clear.    Comments:  Patchy setup, left leg numb, no decrease in sensation on right or abdomen after 10 min

## 2021-04-26 NOTE — PROVIDER NOTIFICATION
04/26/21 0910   Provider Notification   Provider Name/Title Dr Laurent   Method of Notification Phone   Request Evaluate-Remote   Notification Reason Other   MD paged to clarify postop orders. Capnography ordered for general anesthesia. Patient will be on Lovenox starting at 12hrs postop, no Ibuprofen to be given. Patient may have Toradol prior to Lovenox administration. Verbal order received for Oxycodone Q3-4hr prn

## 2021-04-27 LAB
HGB BLD-MCNC: 9 G/DL (ref 11.7–15.7)
PLATELET # BLD AUTO: 188 10E9/L (ref 150–450)

## 2021-04-27 PROCEDURE — 120N000001 HC R&B MED SURG/OB

## 2021-04-27 PROCEDURE — 85018 HEMOGLOBIN: CPT | Performed by: FAMILY MEDICINE

## 2021-04-27 PROCEDURE — 250N000013 HC RX MED GY IP 250 OP 250 PS 637: Performed by: OBSTETRICS & GYNECOLOGY

## 2021-04-27 PROCEDURE — 36415 COLL VENOUS BLD VENIPUNCTURE: CPT | Performed by: FAMILY MEDICINE

## 2021-04-27 PROCEDURE — 250N000011 HC RX IP 250 OP 636: Performed by: FAMILY MEDICINE

## 2021-04-27 PROCEDURE — 250N000013 HC RX MED GY IP 250 OP 250 PS 637: Performed by: FAMILY MEDICINE

## 2021-04-27 PROCEDURE — 85049 AUTOMATED PLATELET COUNT: CPT | Performed by: FAMILY MEDICINE

## 2021-04-27 RX ORDER — IBUPROFEN 800 MG/1
800 TABLET, FILM COATED ORAL EVERY 6 HOURS
Status: DISCONTINUED | OUTPATIENT
Start: 2021-04-27 | End: 2021-04-29 | Stop reason: HOSPADM

## 2021-04-27 RX ADMIN — OXYCODONE HYDROCHLORIDE 5 MG: 5 TABLET ORAL at 08:41

## 2021-04-27 RX ADMIN — ACETAMINOPHEN 975 MG: 325 TABLET, FILM COATED ORAL at 06:03

## 2021-04-27 RX ADMIN — DOCUSATE SODIUM 50 MG AND SENNOSIDES 8.6 MG 2 TABLET: 8.6; 5 TABLET, FILM COATED ORAL at 21:06

## 2021-04-27 RX ADMIN — SIMETHICONE 80 MG: 80 TABLET, CHEWABLE ORAL at 08:40

## 2021-04-27 RX ADMIN — SIMETHICONE 80 MG: 80 TABLET, CHEWABLE ORAL at 05:16

## 2021-04-27 RX ADMIN — OXYCODONE HYDROCHLORIDE 5 MG: 5 TABLET ORAL at 12:52

## 2021-04-27 RX ADMIN — IBUPROFEN 800 MG: 800 TABLET ORAL at 16:20

## 2021-04-27 RX ADMIN — IBUPROFEN 800 MG: 800 TABLET ORAL at 22:30

## 2021-04-27 RX ADMIN — SERTRALINE HYDROCHLORIDE 50 MG: 50 TABLET ORAL at 21:06

## 2021-04-27 RX ADMIN — ACETAMINOPHEN 975 MG: 325 TABLET, FILM COATED ORAL at 18:32

## 2021-04-27 RX ADMIN — ENOXAPARIN SODIUM 40 MG: 40 INJECTION SUBCUTANEOUS at 21:06

## 2021-04-27 RX ADMIN — Medication 140 MG: at 08:41

## 2021-04-27 RX ADMIN — SENNOSIDES AND DOCUSATE SODIUM 1 TABLET: 8.6; 5 TABLET ORAL at 08:44

## 2021-04-27 RX ADMIN — ACETAMINOPHEN 975 MG: 325 TABLET, FILM COATED ORAL at 00:12

## 2021-04-27 RX ADMIN — ACETAMINOPHEN 975 MG: 325 TABLET, FILM COATED ORAL at 12:19

## 2021-04-27 RX ADMIN — OXYCODONE HYDROCHLORIDE 5 MG: 5 TABLET ORAL at 19:28

## 2021-04-27 RX ADMIN — OXYCODONE HYDROCHLORIDE 5 MG: 5 TABLET ORAL at 23:37

## 2021-04-27 NOTE — PLAN OF CARE
Patient vital signs stable and meeting expected outcomes.  Breast and formula feeding independently.  Up independently and voiding adequately.  Pain well controlled with tylenol and ibuprofen.  Incision WDL.  Able to perform all cares for self and infant.  Bonding well with baby.   present and supportive at bedside.  Will continue to monitor.    Patients mobililty level scored using the bedside mobility assistance tool (BMAT). Patient is at a mobility level test number: 4. Mobility equipment used: none required. Required assist of 0 staff members. Further use of BMAT scoring not required.

## 2021-04-27 NOTE — PLAN OF CARE
VSS, Pt up ad olamide. Independent with self and infant cares. Pt up ad olamide in room, complains of occasional gas discomfort when ambulating. Postpartum checks within normal limits - see flow record. No apparent signs of infection. Incision healing well. Patient medicated during the shift for pain and cramping. See MAR. Patient reassessed within 1 hour after each medication and pain was improved - patient stated she was comfortable.

## 2021-04-27 NOTE — PROGRESS NOTES
[facility]    Obstetrics Post-Op / Progress Note    Assessment & Plan   Assessment:  -1 Day Post-Op  Procedure(s):  REPEAT  SECTION    Doing well.  No immediate surgical complications identified.  No excessive bleeding  Pain well-controlled.  ABLA secondary to normal surgical losses.    Plan:  Ambulation encouraged  Breast feeding strategies discussed  Monitor wound for signs of infection  Pain control measures as needed  Reportable signs and symptoms dicussed with the patient  Anticipate discharge in 1-2 days  Continue iron supplementation at discharge.    Ruthy Banda MD    Interval History   Doing well.  Pain is well-controlled.  No fevers.  No history of wound drainage, warmth or significant erythema.  Good appetite.  Denies chest pain, shortness of breath, nausea or vomiting.  Ambulatory.  Breastfeeding well.    Medications     dextrose 5% lactated ringers Stopped (21)     NO Rho (D) immune globulin (RhoGam) needed - mother Rh POSITIVE       oxytocin in 0.9% NaCl 100 mL/hr (21 0936)     oxytocin in 0.9% NaCl         acetaminophen  975 mg Oral Q6H     enoxaparin ANTICOAGULANT  40 mg Subcutaneous Q24H     ferrous sulfate  140 mg Oral Daily     ibuprofen  800 mg Oral Q6H     Measles, Mumps & Rubella Vac  0.5 mL Subcutaneous Once     senna-docusate  1 tablet Oral BID    Or     senna-docusate  2 tablet Oral BID     sertraline  50 mg Oral Daily     sodium chloride (PF)  3 mL Intracatheter Q8H     Tdap (tetanus-diphtheria-acell pertussis)  0.5 mL Intramuscular Once       Physical Exam   Temp: 98.1  F (36.7  C) Temp src: Oral BP: 106/66 Pulse: 78   Resp: 18 SpO2: 99 %      Vitals:    21 1200 21 0559   Weight: 66.2 kg (146 lb) 67.1 kg (148 lb)     Vital Signs with Ranges  Temp:  [97.8  F (36.6  C)-98.1  F (36.7  C)] 98.1  F (36.7  C)  Pulse:  [63-89] 78  Resp:  [16-18] 18  BP: ()/(42-66) 106/66  SpO2:  [96 %-99 %] 99 %  I/O last 3 completed  shifts:  In: 3350 [P.O.:100; I.V.:3250]  Out: 2410 [Urine:1625; Blood:785]    Uterine fundus is firm, non-tender and at the level of the umbilicus  Incision C/D/I  Extremities Non-tender    Data   Recent Labs   Lab Test 04/23/21  1126   ABO O   RH Pos   AS Neg     Recent Labs   Lab Test 04/27/21  0624 04/23/21  1126   HGB 9.0* 11.7     Recent Labs   Lab Test 10/29/20  1115   RUQIGG 18

## 2021-04-28 PROCEDURE — 250N000011 HC RX IP 250 OP 636: Performed by: FAMILY MEDICINE

## 2021-04-28 PROCEDURE — 120N000001 HC R&B MED SURG/OB

## 2021-04-28 PROCEDURE — 250N000013 HC RX MED GY IP 250 OP 250 PS 637: Performed by: OBSTETRICS & GYNECOLOGY

## 2021-04-28 PROCEDURE — 999N000079 HC STATISTIC IP LACTATION SERVICES 1-15 MIN

## 2021-04-28 PROCEDURE — 250N000013 HC RX MED GY IP 250 OP 250 PS 637: Performed by: FAMILY MEDICINE

## 2021-04-28 RX ADMIN — SENNOSIDES AND DOCUSATE SODIUM 1 TABLET: 8.6; 5 TABLET ORAL at 21:14

## 2021-04-28 RX ADMIN — IBUPROFEN 800 MG: 800 TABLET ORAL at 10:28

## 2021-04-28 RX ADMIN — DOCUSATE SODIUM 50 MG AND SENNOSIDES 8.6 MG 2 TABLET: 8.6; 5 TABLET, FILM COATED ORAL at 08:38

## 2021-04-28 RX ADMIN — SERTRALINE HYDROCHLORIDE 50 MG: 50 TABLET ORAL at 21:14

## 2021-04-28 RX ADMIN — OXYCODONE HYDROCHLORIDE 5 MG: 5 TABLET ORAL at 03:55

## 2021-04-28 RX ADMIN — ACETAMINOPHEN 975 MG: 325 TABLET, FILM COATED ORAL at 03:54

## 2021-04-28 RX ADMIN — IBUPROFEN 800 MG: 800 TABLET ORAL at 22:38

## 2021-04-28 RX ADMIN — IBUPROFEN 800 MG: 800 TABLET ORAL at 16:38

## 2021-04-28 RX ADMIN — ACETAMINOPHEN 975 MG: 325 TABLET, FILM COATED ORAL at 19:49

## 2021-04-28 RX ADMIN — OXYCODONE HYDROCHLORIDE 5 MG: 5 TABLET ORAL at 10:28

## 2021-04-28 RX ADMIN — ACETAMINOPHEN 975 MG: 325 TABLET, FILM COATED ORAL at 13:52

## 2021-04-28 RX ADMIN — ENOXAPARIN SODIUM 40 MG: 40 INJECTION SUBCUTANEOUS at 21:14

## 2021-04-28 RX ADMIN — OXYCODONE HYDROCHLORIDE 5 MG: 5 TABLET ORAL at 19:14

## 2021-04-28 RX ADMIN — OXYCODONE HYDROCHLORIDE 5 MG: 5 TABLET ORAL at 23:30

## 2021-04-28 RX ADMIN — Medication 140 MG: at 08:38

## 2021-04-28 NOTE — PLAN OF CARE
Patient meeting expected goals. Is up independent in room, meeting all personal and infant needs. VSS. Pain is being managed with Tylenol, Ibuprofen and PRN Oxycodone. Voiding with out difficulty. Incision to low abdomen is with steri strips and barrier film. Small amount of dried serosanguineous drainage present on strips. No signs of infection noted to surrounding tissue.  Breastfeeding is going well and bonding well with infant. Is also supplementing after BF with formula; parental choice. Plan for discharge tomorrow.

## 2021-04-28 NOTE — PROGRESS NOTES
[facility] Postop C/S Note    S:  Patient without complaints other than typical soreness.  Minimal lochia.  Flatus passed, tolerating Regular diet well    O:  Blood pressure 97/55, pulse 73, temperature 97.7  F (36.5  C), temperature source Oral, resp. rate 16, height 1.524 m (5'), weight 67.1 kg (148 lb), SpO2 99 %, unknown if currently breastfeeding.        No intake or output data in the 24 hours ending 04/28/21 0728        Abdomen - Non-distended, Normoactive bowel sounds, soft, appropriately tender; Fundus firm, at umbilicus, nontender        Dressing/Incision - clean, dry, intact        Extremities - No calf tenderness    Hemoglobin   Date Value Ref Range Status   04/27/2021 9.0 (L) 11.7 - 15.7 g/dL Final   04/23/2021 11.7 11.7 - 15.7 g/dL Final   ]      A:   Postop Day# 2, s/p Repeat LTCS - doing well        Postpartum Acute Blood loss anemia - mild, due to typical intraop blood loss, no sx's.      P:  1)  Routine care, on Fe        2)  Probable D/C tomorrow      Tito Young MD

## 2021-04-28 NOTE — LACTATION NOTE
Lactation visit.  had just been circumcised and sleeping in bassinet. She states breast feeding is going well sometimes. Reports that she always offers the breast first before giving him formula. Reviewed normal behaviors after circ, encouraged attempting frequently, and/or hand expressing if he is sleepy. She will request latch assessment/assistance later if he wants to feed. No questions noted at visit.

## 2021-04-28 NOTE — PLAN OF CARE
Patient vital signs stable and meeting expected outcomes.  Breast and formula feeding independently.  Up  independently and voiding adequately.  Pain well controlled with tylenol, ibuprofen, and oxycodone.  Incision WDL.  Able to perform all cares for self and infant.  Bonding well with baby.  Will continue to monitor.

## 2021-04-28 NOTE — PLAN OF CARE
SW did see patient for hx. Of PPD; current score 12. No, to self harm.  SW feels that patient would benefit from an earlier visit then 6 weeks (2 weeks and at 6 weeks) to ensure mood is improving. Patient is already asking why the medication hasn't made her feel better.  SW gave EDU that it can take 6 weeks for medication to be in full effect. Has family and friends around but states she would not really talk to them or her  about mental health. Message left for MD to see in AM prior to discharge.

## 2021-04-29 VITALS
DIASTOLIC BLOOD PRESSURE: 62 MMHG | SYSTOLIC BLOOD PRESSURE: 115 MMHG | HEIGHT: 60 IN | RESPIRATION RATE: 18 BRPM | OXYGEN SATURATION: 99 % | TEMPERATURE: 98.4 F | HEART RATE: 84 BPM | WEIGHT: 148 LBS | BODY MASS INDEX: 29.06 KG/M2

## 2021-04-29 LAB
ERYTHROCYTE [DISTWIDTH] IN BLOOD BY AUTOMATED COUNT: 22.7 % (ref 10–15)
HCT VFR BLD AUTO: 31 % (ref 35–47)
HGB BLD-MCNC: 9.9 G/DL (ref 11.7–15.7)
MCH RBC QN AUTO: 28 PG (ref 26.5–33)
MCHC RBC AUTO-ENTMCNC: 31.9 G/DL (ref 31.5–36.5)
MCV RBC AUTO: 88 FL (ref 78–100)
PLATELET # BLD AUTO: 209 10E9/L (ref 150–450)
PLATELET # BLD AUTO: 218 10E9/L (ref 150–450)
RBC # BLD AUTO: 3.53 10E12/L (ref 3.8–5.2)
WBC # BLD AUTO: 5.6 10E9/L (ref 4–11)

## 2021-04-29 PROCEDURE — 85027 COMPLETE CBC AUTOMATED: CPT | Performed by: FAMILY MEDICINE

## 2021-04-29 PROCEDURE — 250N000013 HC RX MED GY IP 250 OP 250 PS 637: Performed by: FAMILY MEDICINE

## 2021-04-29 PROCEDURE — 250N000013 HC RX MED GY IP 250 OP 250 PS 637: Performed by: OBSTETRICS & GYNECOLOGY

## 2021-04-29 PROCEDURE — 36415 COLL VENOUS BLD VENIPUNCTURE: CPT | Performed by: FAMILY MEDICINE

## 2021-04-29 PROCEDURE — 85049 AUTOMATED PLATELET COUNT: CPT | Performed by: FAMILY MEDICINE

## 2021-04-29 RX ORDER — DOCUSATE SODIUM 100 MG/1
100 CAPSULE, LIQUID FILLED ORAL 2 TIMES DAILY
Qty: 60 CAPSULE | Refills: 1 | Status: SHIPPED | OUTPATIENT
Start: 2021-04-29

## 2021-04-29 RX ORDER — ACETAMINOPHEN 500 MG
500-1000 TABLET ORAL EVERY 6 HOURS PRN
Qty: 60 TABLET | Refills: 0 | Status: SHIPPED | OUTPATIENT
Start: 2021-04-29

## 2021-04-29 RX ORDER — OXYCODONE HYDROCHLORIDE 5 MG/1
5 TABLET ORAL EVERY 6 HOURS PRN
Qty: 15 TABLET | Refills: 0 | Status: SHIPPED | OUTPATIENT
Start: 2021-04-29

## 2021-04-29 RX ORDER — IBUPROFEN 800 MG/1
800 TABLET, FILM COATED ORAL EVERY 8 HOURS PRN
Qty: 60 TABLET | Refills: 1 | Status: SHIPPED | OUTPATIENT
Start: 2021-04-29

## 2021-04-29 RX ADMIN — Medication 140 MG: at 08:17

## 2021-04-29 RX ADMIN — OXYCODONE HYDROCHLORIDE 5 MG: 5 TABLET ORAL at 03:44

## 2021-04-29 RX ADMIN — OXYCODONE HYDROCHLORIDE 5 MG: 5 TABLET ORAL at 08:18

## 2021-04-29 RX ADMIN — ACETAMINOPHEN 975 MG: 325 TABLET, FILM COATED ORAL at 10:59

## 2021-04-29 RX ADMIN — IBUPROFEN 800 MG: 800 TABLET ORAL at 08:17

## 2021-04-29 RX ADMIN — ACETAMINOPHEN 975 MG: 325 TABLET, FILM COATED ORAL at 01:42

## 2021-04-29 RX ADMIN — DOCUSATE SODIUM 50 MG AND SENNOSIDES 8.6 MG 2 TABLET: 8.6; 5 TABLET, FILM COATED ORAL at 08:17

## 2021-04-29 NOTE — PLAN OF CARE
When Dr. Laurent rounded she stated to charge nurse (RN) that if labs looked good, patient may discharge.  Writer will review all discharge instructions and go over discharge medications.

## 2021-04-29 NOTE — PLAN OF CARE
Stable postoperative patient meeting all expected goals.  Incision is covered Pain controlled with oxycodone, ibuprofen and tylenol.  Patient is up ad olamide. Patient is having episodes of chills.  Temperatures are WNL.  Patient states she has had the same reaction after every child.

## 2021-04-29 NOTE — PROGRESS NOTES
[facility]   Obstetrics Post-Op / Progress Note         Assessment and Plan:    Assessment:   Post-operative day #3  Low transverse repeat  section  L&D complications: History of  section [Z98.891]      Doing well.  Notes shaking/chills      Plan:   Discharge later today  Cbc ordered, normal result, no sign of infection, afebrile.               Interval History:     Doing well.  Pain is well-controlled.  No fevers.  No history of wound drainage, warmth or significant erythema.  Good appetite.  Denies chest pain, shortness of breath, nausea or vomiting.  Ambulatory.  Breastfeeding well.          Significant Problems:    None          Review of Systems:    CONSTITUTIONAL: NEGATIVE for fever, chills, change in weight  INTEGUMENTARY/SKIN: NEGATIVE for worrisome rashes, moles or lesions  EYES: NEGATIVE for vision changes or irritation  ENT/MOUTH: NEGATIVE for ear, mouth and throat problems  RESP: NEGATIVE for significant cough or SOB  BREAST: NEGATIVE for masses, tenderness or discharge  CV: NEGATIVE for chest pain, palpitations or peripheral edema  GI: NEGATIVE for nausea, abdominal pain, heartburn, or change in bowel habits  : NEGATIVE for frequency, dysuria, or hematuria  MUSCULOSKELETAL: NEGATIVE for significant arthralgias or myalgia  NEURO: NEGATIVE for weakness, dizziness or paresthesias  ENDOCRINE: NEGATIVE for temperature intolerance, skin/hair changes  HEME: NEGATIVE for bleeding problems  PSYCHIATRIC: NEGATIVE for changes in mood or affect          Medications:   All medications related to the patient's surgery have been reviewed          Physical Exam:     All vitals stable  Patient Vitals for the past 8 hrs:   BP Temp Temp src Pulse Resp   21 0919 115/62 98.4  F (36.9  C) Oral 84 18     Bandage clean and dry with minimal or no drainage.  Surrounding skin with minimal erythema.          Data:     All laboratory data related to this surgery reviewed  Hemoglobin   Date  Value Ref Range Status   04/29/2021 9.9 (L) 11.7 - 15.7 g/dL Final   04/27/2021 9.0 (L) 11.7 - 15.7 g/dL Final   04/23/2021 11.7 11.7 - 15.7 g/dL Final   03/16/2021 9.8 (L) 11.7 - 15.7 g/dL Final   02/19/2021 7.5 (LL) 11.7 - 15.7 g/dL Final     Comment:     Critical Value called to and read back by  LEXI GERMAN @1146 ON 2/19/21 IH       No imaging studies have been ordered    Cindy Laurent, DO      Dr. Cindy Laurent, DO    OB/GYN   [facility]

## 2021-04-29 NOTE — PLAN OF CARE
"Patient is up independent in room, meeting all personal and infant needs. VSS.  Does complain of feeling chilled, again stating, \"I feel like this after every baby.\"  Is afebrile. Incision to low abdomen is with steri strips and barrier film..Dressing has small dried drainage present with no signs of infection being noted to surrounding tissue. Breastfeeding is going well.  Patient is independent with latch and also supplementing with formula after BF.  Plan is for discharge to home later today. MD is here now rounding. MD placing orders for CBC with plts to be drawn prior to discharge (d/t chills).   "

## 2021-04-29 NOTE — DISCHARGE INSTRUCTIONS
Follow in 6 weeks.   If incision has pain or discharge please call prior to this     If experiencing any post partum depression, crying, feeling down please make earlier appointment   To discuss symptoms    Call [phone] for appointment     Call and ask to be seen or talk to a doctor for the following: (You can go to the ob triage button   On answering service line) .     Increased bleeding, fever, general unwell feeling or increased pain.     Dr. Cindy Laurent, DO    OB/GYN   [facility]    Postop  Birth Instructions    Activity       Do not lift more than 10 pounds for 6 weeks after surgery.  Ask family and friends for help when you need it.    No driving until you have stopped taking your pain medications (usually two weeks after surgery).    No heavy exercise or activity for 6 weeks.  Don't do anything that will put a strain on your surgery site.    Don't strain when using the toilet.  Your care team may prescribe a stool softener if you have problems with your bowel movements.     To care for your incision:       Keep the incision clean and dry.    Do not soak your incision in water. No swimming or hot tubs until it has fully healed. You may soak in the bathtub if the water level is below your incision.    Do not use peroxide, gel, cream, lotion, or ointment on your incision.    Adjust your clothes to avoid pressure on your surgery site (check the elastic in your underwear for example).     You may see a small amount of clear or pink drainage and this is normal.  Check with your health care provider:       If the drainage increases or has an odor.    If the incision reddens, you have swelling, or develop a rash.    If you have increased pain and the medicine we prescribed doesn't help.    If you have a fever above 100.4 F (38 C) with or without chills when placing thermometer under your tongue.   The area around your incision (surgery wound), will feel numb.   This is normal. The numbness should go away in less than a year.     Keep your hands clean:  Always wash your hands before touching your incision (surgery wound). This helps reduce your risk of infection. If your hands aren't dirty, you may use an alcohol hand-rub to clean your hands. Keep your nails clean and short.    Call your healthcare provider if you have any of these symptoms:       You soak a sanitary pad with blood within 1 hour, or you see blood clots larger than a golf ball.    Bleeding that lasts more than 6 weeks.    Vaginal discharge that smells bad.    Severe pain, cramping or tenderness in your lower belly area.    A need to urinate more frequently (use the toilet more often), more urgently (use the toilet very quickly), or it burns when you urinate.    Nausea and vomiting.    Redness, swelling or pain around a vein in your leg.    Problems breastfeeding or a red or painful area on your breast.    Chest pain and cough or are gasping for air.    Problems with coping with sadness, anxiety or depression. If you have concerns about hurting yourself or the baby, call your provider immediately.      You have questions or concerns after you return home.

## 2021-04-29 NOTE — DISCHARGE SUMMARY
39 y/o  2 s/p RCS POD # 3    Hemoglobin   Date Value Ref Range Status   2021 9.9 (L) 11.7 - 15.7 g/dL Final   ]    d/c home   On colace, breast pump and ibuprofen oxycodone   Continue home iron   Follow-up in 1-2 weeks for post op check   Follow-up in 6 weeks for post partum examination    [facility]   Obstetrics Post-Op / Progress Note         Assessment and Plan:    Assessment:   Post-operative day #3  Low transverse repeat  section  L&D complications: History of  section [Z98.891]      Doing well.      Plan:   Discharge later today            Interval History:     Doing well.  Pain is well-controlled.  No fevers.  No history of wound drainage, warmth or significant erythema.  Good appetite.  Denies chest pain, shortness of breath, nausea or vomiting.  Ambulatory.  Breastfeeding well.          Significant Problems:    None          Review of Systems:    CONSTITUTIONAL: NEGATIVE for fever, chills, change in weight  INTEGUMENTARY/SKIN: NEGATIVE for worrisome rashes, moles or lesions  EYES: NEGATIVE for vision changes or irritation  ENT/MOUTH: NEGATIVE for ear, mouth and throat problems  RESP: NEGATIVE for significant cough or SOB  BREAST: NEGATIVE for masses, tenderness or discharge  CV: NEGATIVE for chest pain, palpitations or peripheral edema  GI: NEGATIVE for nausea, abdominal pain, heartburn, or change in bowel habits  : NEGATIVE for frequency, dysuria, or hematuria  MUSCULOSKELETAL: NEGATIVE for significant arthralgias or myalgia  NEURO: NEGATIVE for weakness, dizziness or paresthesias  ENDOCRINE: NEGATIVE for temperature intolerance, skin/hair changes  HEME: NEGATIVE for bleeding problems  PSYCHIATRIC: NEGATIVE for changes in mood or affect          Medications:   All medications related to the patient's surgery have been reviewed          Physical Exam:     All vitals stable  Patient Vitals for the past 8 hrs:   BP Temp Temp src Pulse Resp   21 0919 115/62  98.4  F (36.9  C) Oral 84 18     Bandage clean and dry with minimal or no drainage.  Surrounding skin with minimal erythema.          Data:     All laboratory data related to this surgery reviewed  Hemoglobin   Date Value Ref Range Status   04/29/2021 9.9 (L) 11.7 - 15.7 g/dL Final   04/27/2021 9.0 (L) 11.7 - 15.7 g/dL Final   04/23/2021 11.7 11.7 - 15.7 g/dL Final   03/16/2021 9.8 (L) 11.7 - 15.7 g/dL Final   02/19/2021 7.5 (LL) 11.7 - 15.7 g/dL Final     Comment:     Critical Value called to and read back by  LEXI GERMAN @1146 ON 2/19/21 IH       No imaging studies have been ordered    Cindy Laurent, DO      Dr. Cindy Laurent, DO    OB/GYN   [facility]

## 2021-04-29 NOTE — PROVIDER NOTIFICATION
04/29/21 1340   Provider Notification   Provider Name/Title Dr. Laurent   Method of Notification Phone   Request Evaluate-Remote   Notification Reason Lab Results   Informed this MD of results of CBC and platelets.  MD is fine with patient discharging home and to follow up in 6 weeks for postpartum visit or sooner if needed.

## 2021-04-29 NOTE — PLAN OF CARE
VSS, pt has had a shivering episode late evening, brought warm blankets for comfort, rechecked temperature later and it is wnl, pt also mentioned that shivering was taking place with her other deliveries, continue to monitor; incisional and cramping pain controlled with pharmacological interventions, passing some gas, encouraged to ambulate in room and hallway earlier at shift but pt has declined, declined simethicone, continued resting; breast feeding well, good colostrum on hand expression, supplements with formula per pt plan; talked about discharge expectations for tomorrow, answered questions.

## 2021-04-29 NOTE — PLAN OF CARE
All discharge instructions were reviewed with patient by writer and all questions answered. Discharge medications were issued to patient and next times available discussed and written on paperwork. Additional information was given on PPD and treatment of, as well as anemia.  Will follow up in 6 weeks (or sooner if needed) for PP visit.  Patient left unit with infant and all belongings at 1259.

## 2021-05-04 NOTE — H&P
[facility] Labor and Delivery History and Physical    Doreen Plummer MRN# [id number]   Age: 40 year old YOB: 1980     Date of Admission:  2021    Primary care provider: Cindy Laurnet           Chief Complaint:   Doreen Plummer is a 40 year old female who is 39w0d pregnant and being admitted for .          Pregnancy history:     OBSTETRIC HISTORY:    OB History    Para Term  AB Living   5 4 4 0 1 4   SAB TAB Ectopic Multiple Live Births   1 0 0 0 4      # Outcome Date GA Lbr Anthony/2nd Weight Sex Delivery Anes PTL Lv   5 Term 21 39w0d  3.27 kg (7 lb 3.3 oz) M CS-LTranv   CATRACHO      Name: LEONARDOMALE-DOREEN      Apgar1: 8  Apgar5: 9   4 Term 19 38w6d  3.77 kg (8 lb 5 oz) F CS-LTranv Spinal N CATRACHO      Name: LEONARDOFEMALE-DOREEN      Apgar1: 9  Apgar5: 9   3 Term 17 39w0d  3.31 kg (7 lb 4.8 oz) F CS-LTranv Spinal  CATRACHO      Name: Asma      Apgar1: 8  Apgar5: 9   2 Term 14 41w6d  3.705 kg (8 lb 2.7 oz) M CS-Unspec EPI N CATRACHO      Birth Comments: Breast Feeding and bottle feeding      Name: Radha      Apgar1: 3  Apgar5: 8   1 SAB 12     SAB   ND       EDC: Estimated Date of Delivery: Data Unavailable    Prenatal Labs:   Lab Results   Component Value Date    ABO O 2021    RH Pos 2021    AS Neg 2021    HEPBANG Nonreactive 10/29/2020    CHPCRT Negative 2018    GCPCRT Negative 2018    TREPAB Negative 2017    RUBELLAABIGG 73 2013    HGB 9.9 (L) 2021    HIV Negative 2013       GBS Status:   Lab Results   Component Value Date    GBS Positive (A) 2021       Active Problem List  Patient Active Problem List   Diagnosis     Female genital infibulation     Epidermal inclusion cyst of infibulation scar     High-risk pregnancy     Previous  section complicating pregnancy     Encounter for triage in pregnant patient     S/P repeat low transverse      Hyperemesis gravidarum     History  of  section     Iron deficiency anemia, unspecified iron deficiency anemia type       Medication Prior to Admission  No medications prior to admission.   .        Maternal Past Medical History:     Past Medical History:   Diagnosis Date     Anemia      Gastroesophageal reflux disease      Varicella age 5                       Family History:   This patient has no significant family history            Social History:   This patient has no significant social history         Review of Systems:   CONSTITUTIONAL: NEGATIVE for fever, chills, change in weight  INTEGUMENTARY/SKIN: NEGATIVE for worrisome rashes, moles or lesions  EYES: NEGATIVE for vision changes or irritation  ENT/MOUTH: NEGATIVE for ear, mouth and throat problems  RESP: NEGATIVE for significant cough or SOB  BREAST: NEGATIVE for masses, tenderness or discharge  CV: NEGATIVE for chest pain, palpitations or peripheral edema  GI: NEGATIVE for nausea, abdominal pain, heartburn, or change in bowel habits  : NEGATIVE for frequency, dysuria, or hematuria  MUSCULOSKELETAL: NEGATIVE for significant arthralgias or myalgia  NEURO: NEGATIVE for weakness, dizziness or paresthesias  ENDOCRINE: NEGATIVE for temperature intolerance, skin/hair changes  HEME: NEGATIVE for bleeding problems  PSYCHIATRIC: NEGATIVE for changes in mood or affect          Physical Exam:   Vitals were reviewed                     Constitutional: Awake, alert, cooperative, no apparent distress, and appears stated age.  Eyes: Lids and lashes normal, pupils equal, round and reactive to light, extra ocular muscles intact, sclera clear, conjunctiva normal.  ENT: Normocephalic, without obvious abnormality, atramatic, sinuses nontender on palpation, external ears without lesions, oral pharynx with moist mucus membranes, tonsils without erythema or exudates, gums normal and good dentition.  Neck: Supple, symmetrical, trachea midline, no adenopathy, thyroid symmetric, not enlarged and no  tenderness, skin normal.  Hematologic / Lymphatic: No cervical lymphadenopathy and no supraclavicular lymphadenopathy.  Back: Symmetric, no curvature, spinous processes are non-tender on palpation, paraspinous muscles are non-tender on palpation, no costal vertebral tenderness.  Lungs: No increased work of breathing, good air exchange, clear to auscultation bilaterally, no crackles or wheezing.  Cardiovascular: Regular rate and rhythm, normal S1 and S2, no S3 or S4, and no murmur noted.  Presentation:Cephalic  Fetal Heart Rate Tracing: Tier 1 (normal)  Tocometer: no contractions                     Assessment:   Rizwana Plummer is a 39w0d pregnant female admitted with .          Plan:   Admit - see IP orders  Prepare for  section    Cindy Laurent, DO

## 2021-08-09 ENCOUNTER — MEDICAL CORRESPONDENCE (OUTPATIENT)
Dept: HEALTH INFORMATION MANAGEMENT | Facility: CLINIC | Age: 41
End: 2021-08-09

## 2021-10-03 ENCOUNTER — HEALTH MAINTENANCE LETTER (OUTPATIENT)
Age: 41
End: 2021-10-03

## 2022-01-23 ENCOUNTER — HEALTH MAINTENANCE LETTER (OUTPATIENT)
Age: 42
End: 2022-01-23

## 2022-09-04 ENCOUNTER — HEALTH MAINTENANCE LETTER (OUTPATIENT)
Age: 42
End: 2022-09-04

## 2022-11-20 ENCOUNTER — HOSPITAL ENCOUNTER (EMERGENCY)
Facility: CLINIC | Age: 42
Discharge: HOME | End: 2022-11-20
Attending: PHYSICIAN ASSISTANT | Admitting: PHYSICIAN ASSISTANT
Payer: COMMERCIAL

## 2022-11-20 VITALS
TEMPERATURE: 97.7 F | RESPIRATION RATE: 18 BRPM | DIASTOLIC BLOOD PRESSURE: 38 MMHG | HEART RATE: 79 BPM | SYSTOLIC BLOOD PRESSURE: 111 MMHG | OXYGEN SATURATION: 99 %

## 2022-11-20 DIAGNOSIS — N90.89 ENLARGED CLITORIS: ICD-10-CM

## 2022-11-20 DIAGNOSIS — D64.9 ANEMIA, UNSPECIFIED TYPE: ICD-10-CM

## 2022-11-20 LAB
ANION GAP SERPL CALCULATED.3IONS-SCNC: 12 MMOL/L (ref 7–15)
BASOPHILS # BLD AUTO: 0 10E3/UL (ref 0–0.2)
BASOPHILS NFR BLD AUTO: 0 %
BUN SERPL-MCNC: 9.2 MG/DL (ref 6–20)
CALCIUM SERPL-MCNC: 8.9 MG/DL (ref 8.6–10)
CHLORIDE SERPL-SCNC: 102 MMOL/L (ref 98–107)
CREAT SERPL-MCNC: 0.57 MG/DL (ref 0.51–0.95)
DEPRECATED HCO3 PLAS-SCNC: 24 MMOL/L (ref 22–29)
EOSINOPHIL # BLD AUTO: 0.1 10E3/UL (ref 0–0.7)
EOSINOPHIL NFR BLD AUTO: 1 %
ERYTHROCYTE [DISTWIDTH] IN BLOOD BY AUTOMATED COUNT: 15.3 % (ref 10–15)
GFR SERPL CREATININE-BSD FRML MDRD: >90 ML/MIN/1.73M2
GLUCOSE SERPL-MCNC: 99 MG/DL (ref 70–99)
HCG SERPL QL: NEGATIVE
HCT VFR BLD AUTO: 30.9 % (ref 35–47)
HGB BLD-MCNC: 8.5 G/DL (ref 11.7–15.7)
IMM GRANULOCYTES # BLD: 0 10E3/UL
IMM GRANULOCYTES NFR BLD: 0 %
LYMPHOCYTES # BLD AUTO: 1.5 10E3/UL (ref 0.8–5.3)
LYMPHOCYTES NFR BLD AUTO: 16 %
MCH RBC QN AUTO: 19.7 PG (ref 26.5–33)
MCHC RBC AUTO-ENTMCNC: 27.5 G/DL (ref 31.5–36.5)
MCV RBC AUTO: 72 FL (ref 78–100)
MONOCYTES # BLD AUTO: 0.4 10E3/UL (ref 0–1.3)
MONOCYTES NFR BLD AUTO: 4 %
NEUTROPHILS # BLD AUTO: 7.1 10E3/UL (ref 1.6–8.3)
NEUTROPHILS NFR BLD AUTO: 79 %
NRBC # BLD AUTO: 0 10E3/UL
NRBC BLD AUTO-RTO: 0 /100
PLATELET # BLD AUTO: 330 10E3/UL (ref 150–450)
POTASSIUM SERPL-SCNC: 3.8 MMOL/L (ref 3.4–5.3)
RBC # BLD AUTO: 4.31 10E6/UL (ref 3.8–5.2)
SODIUM SERPL-SCNC: 138 MMOL/L (ref 136–145)
WBC # BLD AUTO: 9.1 10E3/UL (ref 4–11)

## 2022-11-20 PROCEDURE — 82310 ASSAY OF CALCIUM: CPT | Performed by: PHYSICIAN ASSISTANT

## 2022-11-20 PROCEDURE — 99284 EMERGENCY DEPT VISIT MOD MDM: CPT

## 2022-11-20 PROCEDURE — 84703 CHORIONIC GONADOTROPIN ASSAY: CPT | Performed by: PHYSICIAN ASSISTANT

## 2022-11-20 PROCEDURE — 96372 THER/PROPH/DIAG INJ SC/IM: CPT | Performed by: PHYSICIAN ASSISTANT

## 2022-11-20 PROCEDURE — 36415 COLL VENOUS BLD VENIPUNCTURE: CPT | Performed by: PHYSICIAN ASSISTANT

## 2022-11-20 PROCEDURE — 85025 COMPLETE CBC W/AUTO DIFF WBC: CPT | Performed by: PHYSICIAN ASSISTANT

## 2022-11-20 PROCEDURE — 250N000011 HC RX IP 250 OP 636: Performed by: PHYSICIAN ASSISTANT

## 2022-11-20 RX ORDER — FENTANYL CITRATE 50 UG/ML
50 INJECTION, SOLUTION INTRAMUSCULAR; INTRAVENOUS ONCE
Status: DISCONTINUED | OUTPATIENT
Start: 2022-11-20 | End: 2022-11-20 | Stop reason: HOSPADM

## 2022-11-20 RX ORDER — KETOROLAC TROMETHAMINE 15 MG/ML
15 INJECTION, SOLUTION INTRAMUSCULAR; INTRAVENOUS ONCE
Status: COMPLETED | OUTPATIENT
Start: 2022-11-20 | End: 2022-11-20

## 2022-11-20 RX ORDER — HYDROCODONE BITARTRATE AND ACETAMINOPHEN 5; 325 MG/1; MG/1
1 TABLET ORAL EVERY 6 HOURS PRN
Qty: 10 TABLET | Refills: 0 | Status: SHIPPED | OUTPATIENT
Start: 2022-11-20

## 2022-11-20 RX ORDER — HYDROCODONE BITARTRATE AND ACETAMINOPHEN 5; 325 MG/1; MG/1
1 TABLET ORAL ONCE
Status: DISCONTINUED | OUTPATIENT
Start: 2022-11-20 | End: 2022-11-20 | Stop reason: HOSPADM

## 2022-11-20 RX ADMIN — KETOROLAC TROMETHAMINE 15 MG: 15 INJECTION, SOLUTION INTRAMUSCULAR; INTRAVENOUS at 18:57

## 2022-11-20 ASSESSMENT — ACTIVITIES OF DAILY LIVING (ADL): ADLS_ACUITY_SCORE: 37

## 2022-11-20 ASSESSMENT — ENCOUNTER SYMPTOMS
DIFFICULTY URINATING: 0
ABDOMINAL PAIN: 0
NAUSEA: 1
FEVER: 0
VOMITING: 0
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
## 2022-11-20 NOTE — LETTER
Rizwana Plummer was seen and treated in our emergency department on 11/20/2022.  She may return to work on 11/22/2022.  Off work Monday 11/21/22 due to illness     If you have any questions or concerns, please don't hesitate to call.      Chasidy Payne PA-C

## 2022-11-20 NOTE — ED TRIAGE NOTES
Pt here for vaginal cyst, pt has hx of needing to get them drained 9 yrs ago. Pt reports she as been taking 2 500mg tablets every 3-3.5 hrs, pt educated on proper dose and ability to supplement with ibuprofen. Pt advised she has been feeling cold, no thermometer at home to check temp. Requesting female provider if able     Triage Assessment     Row Name 11/20/22 1121       Triage Assessment (Adult)    Airway WDL WDL       Respiratory WDL    Respiratory WDL WDL       Skin Circulation/Temperature WDL    Skin Circulation/Temperature WDL WDL       Cardiac WDL    Cardiac WDL WDL       Peripheral/Neurovascular WDL    Peripheral Neurovascular WDL WDL       Cognitive/Neuro/Behavioral WDL    Cognitive/Neuro/Behavioral WDL WDL

## 2022-11-21 ENCOUNTER — TELEPHONE (OUTPATIENT)
Dept: OBGYN | Facility: CLINIC | Age: 42
End: 2022-11-21

## 2022-11-21 ENCOUNTER — NURSE TRIAGE (OUTPATIENT)
Dept: NURSING | Facility: CLINIC | Age: 42
End: 2022-11-21

## 2022-11-21 ENCOUNTER — OFFICE VISIT (OUTPATIENT)
Dept: OBGYN | Facility: CLINIC | Age: 42
End: 2022-11-21
Payer: COMMERCIAL

## 2022-11-21 VITALS — WEIGHT: 141.9 LBS | DIASTOLIC BLOOD PRESSURE: 58 MMHG | BODY MASS INDEX: 27.71 KG/M2 | SYSTOLIC BLOOD PRESSURE: 102 MMHG

## 2022-11-21 DIAGNOSIS — L72.0 EPITHELIAL INCLUSION CYST: ICD-10-CM

## 2022-11-21 DIAGNOSIS — N76.4 VULVAR ABSCESS: Primary | ICD-10-CM

## 2022-11-21 PROCEDURE — 87077 CULTURE AEROBIC IDENTIFY: CPT | Performed by: FAMILY MEDICINE

## 2022-11-21 PROCEDURE — 87070 CULTURE OTHR SPECIMN AEROBIC: CPT | Performed by: FAMILY MEDICINE

## 2022-11-21 PROCEDURE — 87076 CULTURE ANAEROBE IDENT EACH: CPT | Performed by: FAMILY MEDICINE

## 2022-11-21 PROCEDURE — 99213 OFFICE O/P EST LOW 20 MIN: CPT | Performed by: FAMILY MEDICINE

## 2022-11-21 RX ORDER — LIDOCAINE/PRILOCAINE 2.5 %-2.5%
CREAM (GRAM) TOPICAL PRN
Qty: 70 G | Refills: 3 | Status: SHIPPED | OUTPATIENT
Start: 2022-11-21

## 2022-11-21 RX ORDER — SULFAMETHOXAZOLE/TRIMETHOPRIM 800-160 MG
1 TABLET ORAL 2 TIMES DAILY
Qty: 20 TABLET | Refills: 0 | Status: SHIPPED | OUTPATIENT
Start: 2022-11-21 | End: 2022-12-01

## 2022-11-21 NOTE — TELEPHONE ENCOUNTER
----- Message from Sonali Gaines MD sent at 11/20/2022  7:16 PM CST -----  FYI: this pt was in the ED for a clitoral mass on 11/20, she likely needs follow-up this week.  RNs, could you check in with Mehran to see if she'd like the pt to come on her call day, Monday?

## 2022-11-21 NOTE — TELEPHONE ENCOUNTER
Triage call:     Patient was seen in ER last night for clitoral swelling and pain.   She denies any new or worsening symptoms since her ER visit last night.   She continues to have pain.     She was instructed to follow up with Dr. Laurent.   Will route to care team to advise.     Liss Reyna RN   11/21/22 9:11 AM  [facility] Nurse Advisor    Reason for Disposition    [1] Follow-up call from patient regarding patient's clinical status AND [2] information urgent    Protocols used: PCP CALL - NO TRIAGE-A-

## 2022-11-21 NOTE — TELEPHONE ENCOUNTER
----- Message from Cindy Laurent DO sent at 11/21/2022 10:32 AM CST -----  Of course you can add her today if needed.   Dr. Cindy Laurent DO    Obstetrics and Gynecology  [facility] - [place] and [place]         ----- Message -----  From: Sonali Gaines MD  Sent: 11/20/2022   7:20 PM CST  [facility]    FYI: this pt was in the ED for a clitoral mass on 11/20, she likely needs follow-up this week.  RNs, could you check in with Mehran to see if she'd like the pt to come on her call day, Monday?

## 2022-11-21 NOTE — NURSING NOTE
Chief Complaint   Patient presents with     Vaginal Problem     Was in ER for enlarged clitoris 22--area is swollen and painful--has had for 2 days       Initial /58   Wt 64.4 kg (141 lb 14.4 oz)   BMI 27.71 kg/m   Estimated body mass index is 27.71 kg/m  as calculated from the following:    Height as of 21: 1.524 m (5').    Weight as of this encounter: 64.4 kg (141 lb 14.4 oz).  BP completed using cuff size: regular    Questioned patient about current smoking habits.  Pt. has never smoked.          The following HM Due: NONE

## 2022-11-21 NOTE — DISCHARGE INSTRUCTIONS
EXPLANATION:  The provider has evaluated your clitoral swelling and pain.  This is suspected to be either infection or a cyst but may be a mass.  You will require further evaluation with your OB/GYN, ideally tomorrow.     HOME CARE:  Motrin/Tylenol as needed (this medication is available over the counter, use as directed on the label)  Sitz bathes for comfort  The provider provided a small amount of prescription pain medication for you to use as needed for severe pain.  This medication has addictive properties so use sparingly.  Our ED will not continue to provide refills of this medication so be sure to follow-up as advised.     RETURN to the EMERGENCY DEPARTMENT if you develop any of the following:  Fever  Abdominal pain  Vomiting  Unable to urinate  Redness develops to the area  Area becomes more swollen    The examination and treatment you have received in the Emergency Department has been rendered on an emergency basis only and not intended to be a substitute for complete ongoing medical care.

## 2022-11-21 NOTE — PATIENT INSTRUCTIONS
Cathy will call to help schedule the surgery       Dr. Cindy Laurent,     Obstetrics and Gynecology  [facility] - [place] and [place]

## 2022-11-21 NOTE — TELEPHONE ENCOUNTER
Called pt advising appt today can be moved up to 1:45pm instead of 3:15pm.    Told pt to call back if that works.    Domi Benson CMA

## 2022-11-21 NOTE — CONFIDENTIAL NOTE
SUBJECTIVE:  Rizwana Plummer is an 42 year old  woman who presents for   gynecology consult for clitoral mass, hx of female circumcision.  No LMP recorded. Periods are regular q 28-30 days, lasting   4 days. Menarche @ age teenager, Dysmenorrhea:none. Cyclic symptoms   include none. No intermenstrual bleeding,   spotting, or discharge.    Current contraception: none, abstinent  History of abnormal Pap smear: No  Family history of uterine or ovarian cancer: No  History of abnormal mammogram: No  Family history of breast cancer: No      Past Medical History:   Diagnosis Date     Anemia      Gastroesophageal reflux disease      Varicella age 5          Family History   Problem Relation Age of Onset     Cerebrovascular Disease Mother         secondary to severe preeclampsia with last pregnancy     Diabetes Mother         after stroke     Hypertension Mother         history of severe preeclampsia/stroke     Hypertension Father        Past Surgical History:   Procedure Laterality Date      SECTION  2014    Procedure:  SECTION;   Primary  section         SECTION N/A 2017    Procedure:  SECTION;  Surgeon: Cindy Laurent DO;  Location: [facility] OR      SECTION N/A 2019    Procedure: Repeat  section;  Surgeon: Cindy Laurent DO;  Location: [facility] L+D      SECTION N/A 2021    Procedure: REPEAT  SECTION;  Surgeon: Cindy Laurent DO;  Location: [facility] L[facility]     Infibulation       MARSUPIALIZATION BARTHOLIN CYST  2013    Procedure: MARSUPIALIZATION BARTHOLIN CYST;  Removal of Inclusion Cyst.   Fetal heart tones 180's;  Surgeon: Rohit Parks MD;  Location: [facility] OR       Current Outpatient Medications   Medication     oxyCODONE (ROXICODONE) 5 MG tablet     Acetaminophen (TYLENOL PO)     acetaminophen (TYLENOL) 500 MG tablet     docusate sodium (COLACE) 100 MG capsule     doxylamine (UNISOM) 25 MG TABS tablet      ferrous sulfate (SLO-FE) 142 (45 Fe) MG CR tablet     HYDROcodone-acetaminophen (NORCO) 5-325 MG tablet     hydrOXYzine (ATARAX) 10 MG tablet     ibuprofen (ADVIL/MOTRIN) 800 MG tablet     ondansetron (ZOFRAN ODT) 4 MG ODT tab     Prenatal Vit-Fe Fumarate-FA (PRENATAL MULTIVITAMIN PLUS IRON) 27-0.8 MG TABS per tablet     sertraline (ZOLOFT) 50 MG tablet     No current facility-administered medications for this visit.     No Known Allergies    Social History     Tobacco Use     Smoking status: Never     Smokeless tobacco: Never   Substance Use Topics     Alcohol use: Never     Alcohol/week: 0.0 standard drinks       Review Of Systems  Ears/Nose/Throat: negative  Respiratory: No shortness of breath, dyspnea on exertion, cough, or hemoptysis  Cardiovascular: negative  Gastrointestinal: negative  Genitourinary: See HPI    Constitutional, HEENT, cardiovascular, pulmonary, GI, , musculoskeletal, neuro, skin, endocrine and psych systems are negative, except as otherwise noted.    OBJECTIVE:  /58   Wt 64.4 kg (141 lb 14.4 oz)   BMI 27.71 kg/m    General appearance: healthy, alert and no distress  Skin: Skin color, texture, turgor normal. No rashes or lesions.  Ears: negative  Nose/Sinuses: Nares normal. Septum midline. Mucosa normal. No drainage or sinus tenderness.  Oropharynx: Lips, mucosa, and tongue normal. Teeth and gums normal.  Neck: Neck supple. No adenopathy. Thyroid symmetric, normal size,, Carotids without bruits.  Abdomen: Abdomen soft, non-tender. BS normal. No masses, organomegaly  Pelvic: Pelvic:  Pelvic examination with no pap/no Gonorrhea and Chlamydia   including  External genitalia with large epithelial inclusion cyst with abscess, draining with manipulation     ASSESSMENT:  Rizwana Plummer is an 42 year old  woman who presents for   gynecology consult for clitoral mass, hx of female circumcision, epithelial inclusion cyst.  PLAN:  Dx:  1)  Recommend incision and drainage, she declines,  prefers to have me help drain   And obtain a culture. rx bactrim   2)  Epithelial inclusion cyst:  Would like removal,   Set up     Labs were reviewed in [facility]   Imaging was reviewed in Epic   Tests and documents were reviewed.   Discussion of management or test interpretation       Dr. Cindy Laurent, DO    Obstetrics and Gynecology  [facility] - [place] and [place]

## 2022-11-22 ENCOUNTER — TELEPHONE (OUTPATIENT)
Dept: OBGYN | Facility: CLINIC | Age: 42
End: 2022-11-22

## 2022-11-22 ENCOUNTER — HOSPITAL ENCOUNTER (OUTPATIENT)
Facility: CLINIC | Age: 42
End: 2022-11-22
Attending: FAMILY MEDICINE | Admitting: FAMILY MEDICINE
Payer: COMMERCIAL

## 2022-11-22 DIAGNOSIS — N76.4 VULVAR ABSCESS: Primary | ICD-10-CM

## 2022-11-22 NOTE — TELEPHONE ENCOUNTER
Type of surgery: epithelial inclusion cyst removal  Location of surgery: [facility] OR  Date and time of surgery: 1/4/23 @ 9:05 am  Surgeon: Dr. Laurent  Pre-Op Appt Date: Patient advised to schedule.  Post-Op Appt Date: 1/12/23   Packet sent out: Yes  Pre-cert/Authorization completed:  No  Date: 11/22/22

## 2022-11-22 NOTE — TELEPHONE ENCOUNTER
Patient Name: Rizwana Plummer   MRN: [id number]   Case#: [id number]   Surgeon(s) and Role:      * Cindy Laurent DO - Primary   Date requested: 11/21/2022   Location:  OR   Procedure(s):   epithelial inclusion cyst removal

## 2022-11-22 NOTE — TELEPHONE ENCOUNTER
constantin notified us via fax that pts insurance does not cover the lidocaine cream that was rx'd on 11/21/22.    Please fax an alternative or if a PA is desired, please route this message to [facility] PA MED pool.    Maryam THOMPSON RN

## 2022-11-23 LAB
BACTERIA ABSC ANAEROBE+AEROBE CULT: ABNORMAL

## 2022-11-23 RX ORDER — LIDOCAINE HYDROCHLORIDE 20 MG/ML
JELLY TOPICAL PRN
Qty: 70 G | Refills: 0 | Status: SHIPPED | OUTPATIENT
Start: 2022-11-23 | End: 2022-11-28

## 2022-11-23 NOTE — TELEPHONE ENCOUNTER
Please advise new rx called in   Dr. Cindy Laurent, DO     Obstetrics and Gynecology   [facility] - [place] and [place]

## 2022-11-23 NOTE — TELEPHONE ENCOUNTER
Left message on answering machine for patient that new RX has been sent. To call back if any questions.  Johanna Novak RN

## 2023-01-02 NOTE — TELEPHONE ENCOUNTER
Patient called to cancel surgery scheduled on 1/4/23.    Canceled surgery with Ara tom [facility] Surgery Scheduling.

## 2023-04-30 ENCOUNTER — HEALTH MAINTENANCE LETTER (OUTPATIENT)
Age: 43
End: 2023-04-30

## 2023-07-23 ENCOUNTER — HEALTH MAINTENANCE LETTER (OUTPATIENT)
Age: 43
End: 2023-07-23

## 2024-06-21 NOTE — PROGRESS NOTES
[place] [address]  [address]  [facility] [address]  [phone]  Dept: [phone]    PRE-OP EVALUATION:  Today's date: 2019    Rizwana Plummer (: 1980) presents for pre-operative evaluation assessment as requested by Dr. Laurent.  She requires evaluation and anesthesia risk assessment prior to undergoing repeat  for delivery of her baby.      HPI:     HPI related to upcoming procedure: Pt will be undergoing a repeat  fo delivery of her baby.        MEDICAL HISTORY:     Patient Active Problem List    Diagnosis Date Noted     High-risk pregnancy 2018     Priority: Medium     Previous  section complicating pregnancy 2018     Priority: Medium     Encounter for triage in pregnant patient 2018     Priority: Medium     SI (sacroiliac) joint dysfunction 2018     Priority: Medium     Female genital infibulation 2013     Priority: Medium     Epidermal inclusion cyst of infibulation scar 2013     Priority: Medium      Past Medical History:   Diagnosis Date     Varicella age 5     Past Surgical History:   Procedure Laterality Date      SECTION  2014    Procedure:  SECTION;   Primary  section         SECTION N/A 2017    Procedure:  SECTION;  Surgeon: Cindy Laurent DO;  Location: [facility] OR     Infibulation       MARSUPIALIZATION BARTHOLIN CYST  2013    Procedure: MARSUPIALIZATION BARTHOLIN CYST;  Removal of Inclusion Cyst.   Fetal heart tones 180's;  Surgeon: Rohit Parks MD;  Location: [facility] OR     Current Outpatient Medications   Medication Sig Dispense Refill     Acetaminophen (TYLENOL PO) Take 325 mg by mouth       ondansetron (ZOFRAN ODT) 4 MG ODT tab Take 1 tablet (4 mg) by mouth every 8 hours as needed 30 tablet 3     Prenatal Vit-Fe Fumarate-FA (PRENATAL MULTIVITAMIN PLUS IRON) 27-0.8 MG TABS per tablet Take 1 tablet by mouth daily 100 tablet  You were evaluated in the Emergency Department today for abnormal vaginal bleeding.    Your laboratory evaluation was all normal except for mild anemia, and your ultrasound also was normal, no fibroids.    It is important for you to follow-up with your gynecologist within the next week. We are also providing our clinic information for our gynecologists at this hospital.    We have prescribed Provera for you to take to help with the bleeding.  Please take every day as prescribed.     Please take an iron supplement daily to help with the bleeding.    Taking ibuprofen which is Motrin or Advil can also help slow your bleeding.    We have performed cultures for common sexually transmitted infections, and we will call you if any of these cultures are abnormal within 1 week.    Please return if you are having heavier bleeding with more than 1 pad every hour for 2 days, or if you develop lightheadedness, severe abdominal pain, fever or other new concerns.    We hope you are feeling better soon.   3     pyridOXINE ([employer] VITAMIN B-6) 50 MG tablet Take 1 tablet (50 mg) by mouth 3 times daily (Patient not taking: Reported on 12/26/2018) 90 tablet 1     OTC products: None, except as noted above    No Known Allergies   Latex Allergy: NO    Social History     Tobacco Use     Smoking status: Never Smoker     Smokeless tobacco: Never Used   Substance Use Topics     Alcohol use: No     Alcohol/week: 0.0 oz     History   Drug Use No       REVIEW OF SYSTEMS:   Constitutional, HEENT, cardiovascular, pulmonary, GI, , musculoskeletal, neuro, skin, endocrine and psych systems are negative, except as otherwise noted.        This document serves as a record of the services and decisions personally performed and made by Cindy Laurent DO. It was created on her behalf by Danisha Wing, a trained medical scribe. The creation of this document is based the provider's statements to the medical scribe.  Scribe Danisha Wing 2:03 PM, January 17, 2019    EXAM:   /60 (BP Location: Right arm, Patient Position: Sitting, Cuff Size: Adult Regular)   Temp 97.4  F (36.3  C) (Oral)   Ht 1.524 m (5')   Wt 62.7 kg (138 lb 4.8 oz)   LMP 04/26/2018 (Exact Date)   BMI 27.01 kg/m    GENERAL APPEARANCE: healthy, alert and no distress  HENT: ear canals and TM's normal and nose and mouth without ulcers or lesions  RESP: lungs clear to auscultation - no rales, rhonchi or wheezes  CV: regular rate and rhythm, normal S1 S2, no S3 or S4 and no murmur, click or rub   ABDOMEN: soft, nontender, no HSM or masses and bowel sounds normal  NEURO: Normal strength and tone, sensory exam grossly normal, mentation intact and speech normal    DIAGNOSTICS:   No labs or EKG required for low risk surgery (cataract, skin procedure, breast biopsy, etc)    Recent Labs   Lab Test 11/15/18  1526 11/10/18  0624 11/09/18  1313  06/18/18  1359   HGB 8.5* 7.6* 8.9*   < > 12.0    188 194   < > 290   NA  --   --  136  --  133   POTASSIUM  --   --  3.9  --  4.1   CR   --   --  0.54  --  0.56    < > = values in this interval not displayed.        IMPRESSION:   Reason for surgery/procedure: Pt will be undergoing a repeat  for delivery of her baby.    The proposed surgical procedure is considered LOW risk.    REVISED CARDIAC RISK INDEX  The patient has the following serious cardiovascular risks for perioperative complications such as (MI, PE, VFib and 3  AV Block):  No serious cardiac risks  INTERPRETATION: 0 risks: Class I (very low risk - 0.4% complication rate)    The patient has the following additional risks for perioperative complications:  No identified additional risks      ICD-10-CM    1. Preop general physical exam Z01.818        RECOMMENDATIONS:       Anemia        --Patient is to take all scheduled medications on the day of surgery EXCEPT for modifications listed below.    APPROVAL GIVEN to proceed with proposed procedure, without further diagnostic evaluation         The information in this document, created by the medical scribe for me, accurately reflects the services I personally performed and the decisions made by me. I have reviewed and approved this document for accuracy prior to leaving the patient care area.  2:04 PM, 19    Signed Electronically by: iCndy Laurent DO    Copy of this evaluation report is provided to requesting physician.    [facility] Preop Guidelines    Revised Cardiac Risk Index

## 2024-07-07 ENCOUNTER — HEALTH MAINTENANCE LETTER (OUTPATIENT)
Age: 44
End: 2024-07-07

## 2025-07-13 ENCOUNTER — HEALTH MAINTENANCE LETTER (OUTPATIENT)
Age: 45
End: 2025-07-13

## 2025-08-03 ENCOUNTER — HEALTH MAINTENANCE LETTER (OUTPATIENT)
Age: 45
End: 2025-08-03

## (undated) DEVICE — GLOVE PROTEXIS BLUE W/NEU-THERA 7.5  2D73EB75

## (undated) DEVICE — SOL NACL 0.9% IRRIG 1000ML BOTTLE 2F7124

## (undated) DEVICE — LINEN TOWEL PACK X10 5473

## (undated) DEVICE — SU MONOCRYL 0 CT 36" Y358H

## (undated) DEVICE — SU PDS II 0 CTX 60" Z990G

## (undated) DEVICE — DRSG TEGADERM 4X10" 1627

## (undated) DEVICE — SU MONOCRYL 3-0 SH 27" Y316H

## (undated) DEVICE — PREP CHLORAPREP 26ML TINTED ORANGE  260815

## (undated) DEVICE — SU VICRYL 0 CT-1 36" J346H

## (undated) DEVICE — CAP BABY PINK/BLUE IC-2

## (undated) DEVICE — TRANSFER DEVICE BLOOD NDL HOLDER 364880

## (undated) DEVICE — SU VICRYL 1 CTX 36" J371H

## (undated) DEVICE — DRSG TELFA ISLAND 4X10"

## (undated) DEVICE — CATH TRAY FOLEY SURESTEP 16FR DRAIN BAG STATOCK A899916

## (undated) DEVICE — DRSG STERI STRIP 1/2X4" R1547

## (undated) DEVICE — PACK C-SECTION LF PL15OTA83B

## (undated) DEVICE — SU MONOCRYL 3-0 PS-2 27" Y427H

## (undated) DEVICE — GLOVE PROTEXIS POWDER FREE SMT 6.0  2D72PT60X

## (undated) DEVICE — Device

## (undated) DEVICE — ESU GROUND PAD ADULT W/CORD E7507

## (undated) DEVICE — BLADE CLIPPER SGL USE 9680

## (undated) DEVICE — SU MONOCRYL 4-0 PS-2 27" UND Y426H

## (undated) DEVICE — SOL WATER IRRIG 1000ML BOTTLE 2F7114

## (undated) DEVICE — LINEN BABY BLANKET 5434

## (undated) DEVICE — GLOVE PROTEXIS BLUE W/NEU-THERA 6.5  2D73EB65

## (undated) DEVICE — LINEN DRAPE 54X72" 5467

## (undated) DEVICE — BAG CLEAR TRASH 1.3M 39X33" P4040C

## (undated) DEVICE — LINEN FULL SHEET 5511

## (undated) DEVICE — STOCKING SLEEVE VASOPRESS COMPRESSION CALF MED 18" VP501M

## (undated) DEVICE — STOCKING SLEEVE VASOPRESS COMPRESSION CALF MED VP501M

## (undated) DEVICE — LINEN HALF SHEET 5512

## (undated) DEVICE — DRSG TELFA 3X8" 1238

## (undated) DEVICE — SUCTION CANISTER MEDIVAC LINER 3000ML W/LID 65651-530

## (undated) DEVICE — PAD CHUX UNDERPAD 30X36" P3036C

## (undated) DEVICE — SU MONOCRYL 0 CTX 36" Y398H

## (undated) DEVICE — PREP CHLORAPREP 26ML TINTED HI-LITE ORANGE 930815

## (undated) DEVICE — PREP POVIDONE IODINE SOLUTION 10% 4OZ

## (undated) DEVICE — DRSG STERI STRIP 1X5" R1548

## (undated) DEVICE — GLOVE PROTEXIS W/NEU-THERA 6.5  2D73TE65

## (undated) DEVICE — BARRIER SEPRAFILM 5X6" SINGLE SHEET 4301-02

## (undated) DEVICE — PREP SKIN SCRUB TRAY 4461A

## (undated) DEVICE — GLOVE PROTEXIS W/NEU-THERA 5.5  2D73TE55

## (undated) DEVICE — GLOVE PROTEXIS W/NEU-THERA 7.5  2D73TE75

## (undated) DEVICE — GLOVE PROTEXIS POWDER FREE SMT 6.5  2D72PT65X

## (undated) DEVICE — CATH TRAY FOLEY 16FR DRAINAGE BAG STATLOCK 899916

## (undated) DEVICE — SUCTION CANISTER STRAW 65652-008

## (undated) DEVICE — SOLIDIFIER FLUID RED 1500ML LIQUID KIT SYS POWDER ISOB1500

## (undated) RX ORDER — FENTANYL CITRATE 50 UG/ML
INJECTION, SOLUTION INTRAMUSCULAR; INTRAVENOUS
Status: DISPENSED
Start: 2021-04-26

## (undated) RX ORDER — GLYCOPYRROLATE 0.2 MG/ML
INJECTION INTRAMUSCULAR; INTRAVENOUS
Status: DISPENSED
Start: 2017-02-18

## (undated) RX ORDER — FENTANYL CITRATE-0.9 % NACL/PF 10 MCG/ML
PLASTIC BAG, INJECTION (ML) INTRAVENOUS
Status: DISPENSED
Start: 2021-04-26

## (undated) RX ORDER — ONDANSETRON 2 MG/ML
INJECTION INTRAMUSCULAR; INTRAVENOUS
Status: DISPENSED
Start: 2017-02-18

## (undated) RX ORDER — MORPHINE SULFATE 1 MG/ML
INJECTION, SOLUTION EPIDURAL; INTRATHECAL; INTRAVENOUS
Status: DISPENSED
Start: 2021-04-26

## (undated) RX ORDER — MORPHINE SULFATE 1 MG/ML
INJECTION, SOLUTION EPIDURAL; INTRATHECAL; INTRAVENOUS
Status: DISPENSED
Start: 2019-01-23

## (undated) RX ORDER — DEXAMETHASONE SODIUM PHOSPHATE 4 MG/ML
INJECTION, SOLUTION INTRA-ARTICULAR; INTRALESIONAL; INTRAMUSCULAR; INTRAVENOUS; SOFT TISSUE
Status: DISPENSED
Start: 2021-04-26

## (undated) RX ORDER — PHENYLEPHRINE HCL IN 0.9% NACL 1 MG/10 ML
SYRINGE (ML) INTRAVENOUS
Status: DISPENSED
Start: 2019-01-23

## (undated) RX ORDER — DEXAMETHASONE SODIUM PHOSPHATE 4 MG/ML
INJECTION, SOLUTION INTRA-ARTICULAR; INTRALESIONAL; INTRAMUSCULAR; INTRAVENOUS; SOFT TISSUE
Status: DISPENSED
Start: 2019-01-23

## (undated) RX ORDER — ONDANSETRON 2 MG/ML
INJECTION INTRAMUSCULAR; INTRAVENOUS
Status: DISPENSED
Start: 2021-04-26

## (undated) RX ORDER — KETOROLAC TROMETHAMINE 30 MG/ML
INJECTION, SOLUTION INTRAMUSCULAR; INTRAVENOUS
Status: DISPENSED
Start: 2021-04-26

## (undated) RX ORDER — BUPIVACAINE HYDROCHLORIDE 5 MG/ML
INJECTION, SOLUTION EPIDURAL; INTRACAUDAL
Status: DISPENSED
Start: 2017-02-18

## (undated) RX ORDER — OXYTOCIN 10 [USP'U]/ML
INJECTION, SOLUTION INTRAMUSCULAR; INTRAVENOUS
Status: DISPENSED
Start: 2017-02-18

## (undated) RX ORDER — ONDANSETRON 2 MG/ML
INJECTION INTRAMUSCULAR; INTRAVENOUS
Status: DISPENSED
Start: 2019-01-23

## (undated) RX ORDER — FENTANYL CITRATE 50 UG/ML
INJECTION, SOLUTION INTRAMUSCULAR; INTRAVENOUS
Status: DISPENSED
Start: 2019-01-23

## (undated) RX ORDER — OXYTOCIN/0.9 % SODIUM CHLORIDE 30/500 ML
PLASTIC BAG, INJECTION (ML) INTRAVENOUS
Status: DISPENSED
Start: 2019-01-23

## (undated) RX ORDER — EPHEDRINE SULFATE 50 MG/ML
INJECTION, SOLUTION INTRAMUSCULAR; INTRAVENOUS; SUBCUTANEOUS
Status: DISPENSED
Start: 2019-01-23

## (undated) RX ORDER — MORPHINE SULFATE 1 MG/ML
INJECTION, SOLUTION EPIDURAL; INTRATHECAL; INTRAVENOUS
Status: DISPENSED
Start: 2017-02-18